# Patient Record
Sex: MALE | Race: WHITE | Employment: OTHER | URBAN - NONMETROPOLITAN AREA
[De-identification: names, ages, dates, MRNs, and addresses within clinical notes are randomized per-mention and may not be internally consistent; named-entity substitution may affect disease eponyms.]

---

## 2017-02-09 ENCOUNTER — OFFICE VISIT (OUTPATIENT)
Dept: FAMILY MEDICINE CLINIC | Age: 62
End: 2017-02-09

## 2017-02-09 ENCOUNTER — TELEPHONE (OUTPATIENT)
Dept: FAMILY MEDICINE CLINIC | Age: 62
End: 2017-02-09

## 2017-02-09 VITALS
WEIGHT: 218 LBS | DIASTOLIC BLOOD PRESSURE: 80 MMHG | HEIGHT: 73 IN | OXYGEN SATURATION: 99 % | HEART RATE: 57 BPM | SYSTOLIC BLOOD PRESSURE: 120 MMHG | BODY MASS INDEX: 28.89 KG/M2

## 2017-02-09 DIAGNOSIS — E11.9 TYPE 2 DIABETES MELLITUS WITHOUT COMPLICATION, WITHOUT LONG-TERM CURRENT USE OF INSULIN (HCC): ICD-10-CM

## 2017-02-09 DIAGNOSIS — Z01.84 IMMUNITY STATUS TESTING: ICD-10-CM

## 2017-02-09 DIAGNOSIS — Z23 NEED FOR ZOSTER VACCINE: ICD-10-CM

## 2017-02-09 DIAGNOSIS — C61 PROSTATE CANCER (HCC): ICD-10-CM

## 2017-02-09 DIAGNOSIS — Z11.59 NEED FOR HEPATITIS C SCREENING TEST: ICD-10-CM

## 2017-02-09 DIAGNOSIS — Z23 NEED FOR SHINGLES VACCINE: ICD-10-CM

## 2017-02-09 DIAGNOSIS — I10 ESSENTIAL HYPERTENSION: Primary | ICD-10-CM

## 2017-02-09 DIAGNOSIS — E78.2 MIXED HYPERLIPIDEMIA: ICD-10-CM

## 2017-02-09 PROCEDURE — 99203 OFFICE O/P NEW LOW 30 MIN: CPT | Performed by: FAMILY MEDICINE

## 2017-02-09 RX ORDER — ALBUTEROL SULFATE 90 UG/1
1 AEROSOL, METERED RESPIRATORY (INHALATION)
COMMUNITY
Start: 2015-01-10 | End: 2017-11-07 | Stop reason: SDUPTHER

## 2017-02-09 RX ORDER — ALLOPURINOL 300 MG/1
300 TABLET ORAL DAILY
COMMUNITY
Start: 2016-05-31 | End: 2017-03-06 | Stop reason: SDUPTHER

## 2017-02-09 RX ORDER — METFORMIN HYDROCHLORIDE 500 MG/1
500 TABLET, FILM COATED, EXTENDED RELEASE ORAL DAILY
COMMUNITY
Start: 2016-10-21 | End: 2017-03-06 | Stop reason: SDUPTHER

## 2017-02-09 RX ORDER — LOSARTAN POTASSIUM 100 MG/1
100 TABLET ORAL DAILY
COMMUNITY
Start: 2016-04-21 | End: 2017-03-06 | Stop reason: SDUPTHER

## 2017-02-09 RX ORDER — AMLODIPINE BESYLATE 5 MG/1
5 TABLET ORAL
COMMUNITY
Start: 2016-04-21 | End: 2017-03-06 | Stop reason: SDUPTHER

## 2017-02-09 RX ORDER — SIMVASTATIN 20 MG
20 TABLET ORAL DAILY
COMMUNITY
Start: 2016-04-21 | End: 2017-03-06 | Stop reason: SDUPTHER

## 2017-02-09 RX ORDER — METOPROLOL SUCCINATE 100 MG/1
50 TABLET, EXTENDED RELEASE ORAL DAILY
COMMUNITY
Start: 2017-01-12 | End: 2017-03-06

## 2017-02-09 RX ORDER — HYDROCHLOROTHIAZIDE 12.5 MG/1
12.5 CAPSULE, GELATIN COATED ORAL DAILY
COMMUNITY
Start: 2016-11-08 | End: 2017-03-06 | Stop reason: SDUPTHER

## 2017-02-09 ASSESSMENT — ENCOUNTER SYMPTOMS
BLURRED VISION: 0
SHORTNESS OF BREATH: 0

## 2017-02-10 DIAGNOSIS — E11.9 TYPE 2 DIABETES MELLITUS WITHOUT COMPLICATION, WITHOUT LONG-TERM CURRENT USE OF INSULIN (HCC): ICD-10-CM

## 2017-02-10 DIAGNOSIS — C61 PROSTATE CANCER (HCC): ICD-10-CM

## 2017-02-10 DIAGNOSIS — E78.2 MIXED HYPERLIPIDEMIA: ICD-10-CM

## 2017-02-10 DIAGNOSIS — Z11.59 NEED FOR HEPATITIS C SCREENING TEST: ICD-10-CM

## 2017-02-10 DIAGNOSIS — I10 ESSENTIAL HYPERTENSION: ICD-10-CM

## 2017-02-10 DIAGNOSIS — Z01.84 IMMUNITY STATUS TESTING: ICD-10-CM

## 2017-02-10 LAB
ALT SERPL-CCNC: 31 U/L (ref 0–41)
ANION GAP SERPL CALCULATED.3IONS-SCNC: 13 MEQ/L (ref 7–13)
AST SERPL-CCNC: 30 U/L (ref 0–40)
BASOPHILS ABSOLUTE: 0 K/UL (ref 0–0.2)
BASOPHILS RELATIVE PERCENT: 0.7 %
BUN BLDV-MCNC: 13 MG/DL (ref 8–23)
CALCIUM SERPL-MCNC: 9.3 MG/DL (ref 8.6–10.2)
CHLORIDE BLD-SCNC: 102 MEQ/L (ref 98–107)
CHOLESTEROL, TOTAL: 164 MG/DL (ref 0–199)
CO2: 26 MEQ/L (ref 22–29)
CREAT SERPL-MCNC: 0.7 MG/DL (ref 0.7–1.2)
CREATININE URINE: 217.9 MG/DL
EOSINOPHILS ABSOLUTE: 0.1 K/UL (ref 0–0.7)
EOSINOPHILS RELATIVE PERCENT: 1.6 %
GFR AFRICAN AMERICAN: >60
GFR NON-AFRICAN AMERICAN: >60
GLUCOSE BLD-MCNC: 107 MG/DL (ref 74–109)
HBA1C MFR BLD: 5.7 % (ref 4.8–5.9)
HCT VFR BLD CALC: 43.5 % (ref 42–52)
HDLC SERPL-MCNC: 92 MG/DL (ref 40–59)
HEMOGLOBIN: 14.4 G/DL (ref 14–18)
HEPATITIS C ANTIBODY INTERPRETATION: NORMAL
LDL CHOLESTEROL CALCULATED: 61 MG/DL (ref 0–129)
LYMPHOCYTES ABSOLUTE: 0.9 K/UL (ref 1–4.8)
LYMPHOCYTES RELATIVE PERCENT: 17.6 %
MCH RBC QN AUTO: 31.9 PG (ref 27–31.3)
MCHC RBC AUTO-ENTMCNC: 33.1 % (ref 33–37)
MCV RBC AUTO: 96.4 FL (ref 80–100)
MICROALBUMIN UR-MCNC: <1.2 MG/DL
MICROALBUMIN/CREAT UR-RTO: NORMAL MG/G (ref 0–30)
MONOCYTES ABSOLUTE: 0.5 K/UL (ref 0.2–0.8)
MONOCYTES RELATIVE PERCENT: 10.1 %
NEUTROPHILS ABSOLUTE: 3.6 K/UL (ref 1.4–6.5)
NEUTROPHILS RELATIVE PERCENT: 70 %
PDW BLD-RTO: 13.1 % (ref 11.5–14.5)
PLATELET # BLD: 191 K/UL (ref 130–400)
POTASSIUM SERPL-SCNC: 4 MEQ/L (ref 3.5–5.1)
PROSTATE SPECIFIC ANTIGEN: 0.01 NG/ML (ref 0–5.4)
RBC # BLD: 4.51 M/UL (ref 4.7–6.1)
SODIUM BLD-SCNC: 141 MEQ/L (ref 132–144)
TRIGL SERPL-MCNC: 55 MG/DL (ref 0–200)
WBC # BLD: 5.2 K/UL (ref 4.8–10.8)

## 2017-02-11 LAB — VZV IGG SER QL IA: 276 IV

## 2017-03-06 ENCOUNTER — OFFICE VISIT (OUTPATIENT)
Dept: FAMILY MEDICINE CLINIC | Age: 62
End: 2017-03-06

## 2017-03-06 VITALS
BODY MASS INDEX: 28.63 KG/M2 | DIASTOLIC BLOOD PRESSURE: 62 MMHG | WEIGHT: 216 LBS | HEIGHT: 73 IN | OXYGEN SATURATION: 96 % | SYSTOLIC BLOOD PRESSURE: 108 MMHG | HEART RATE: 68 BPM

## 2017-03-06 DIAGNOSIS — E78.2 MIXED HYPERLIPIDEMIA: ICD-10-CM

## 2017-03-06 DIAGNOSIS — L82.0 KERATOSIS, INFLAMED SEBORRHEIC: ICD-10-CM

## 2017-03-06 DIAGNOSIS — L57.0 ACTINIC KERATOSES: Primary | ICD-10-CM

## 2017-03-06 DIAGNOSIS — E11.9 TYPE 2 DIABETES MELLITUS WITHOUT COMPLICATION, WITHOUT LONG-TERM CURRENT USE OF INSULIN (HCC): ICD-10-CM

## 2017-03-06 DIAGNOSIS — I10 ESSENTIAL HYPERTENSION: ICD-10-CM

## 2017-03-06 DIAGNOSIS — M1A.0720 CHRONIC GOUT OF LEFT FOOT, UNSPECIFIED CAUSE: ICD-10-CM

## 2017-03-06 PROCEDURE — 99212 OFFICE O/P EST SF 10 MIN: CPT | Performed by: FAMILY MEDICINE

## 2017-03-06 PROCEDURE — 17110 DESTRUCTION B9 LES UP TO 14: CPT | Performed by: FAMILY MEDICINE

## 2017-03-06 RX ORDER — METOPROLOL SUCCINATE 50 MG/1
50 TABLET, EXTENDED RELEASE ORAL DAILY
COMMUNITY
End: 2019-01-22 | Stop reason: SDUPTHER

## 2017-03-06 RX ORDER — AMLODIPINE BESYLATE 5 MG/1
5 TABLET ORAL DAILY
Qty: 30 TABLET | Refills: 11 | Status: SHIPPED | OUTPATIENT
Start: 2017-03-06 | End: 2018-01-15 | Stop reason: SDUPTHER

## 2017-03-06 RX ORDER — METFORMIN HYDROCHLORIDE 500 MG/1
500 TABLET, EXTENDED RELEASE ORAL
COMMUNITY
End: 2017-12-20

## 2017-03-06 RX ORDER — HYDROCHLOROTHIAZIDE 12.5 MG/1
12.5 CAPSULE, GELATIN COATED ORAL DAILY
Qty: 30 CAPSULE | Refills: 11 | Status: SHIPPED | OUTPATIENT
Start: 2017-03-06 | End: 2017-12-14

## 2017-03-06 RX ORDER — ALLOPURINOL 300 MG/1
300 TABLET ORAL DAILY
Qty: 30 TABLET | Refills: 11 | Status: SHIPPED | OUTPATIENT
Start: 2017-03-06 | End: 2018-01-15 | Stop reason: SDUPTHER

## 2017-03-06 RX ORDER — LOSARTAN POTASSIUM 100 MG/1
100 TABLET ORAL DAILY
Qty: 30 TABLET | Refills: 11 | Status: SHIPPED | OUTPATIENT
Start: 2017-03-06 | End: 2018-01-15 | Stop reason: SDUPTHER

## 2017-03-06 RX ORDER — SIMVASTATIN 20 MG
20 TABLET ORAL DAILY
Qty: 30 TABLET | Refills: 11 | Status: SHIPPED | OUTPATIENT
Start: 2017-03-06 | End: 2018-01-15 | Stop reason: SDUPTHER

## 2017-03-06 RX ORDER — METFORMIN HYDROCHLORIDE 500 MG/1
500 TABLET, FILM COATED, EXTENDED RELEASE ORAL DAILY
Qty: 30 TABLET | Refills: 11 | Status: SHIPPED | OUTPATIENT
Start: 2017-03-06 | End: 2017-03-06

## 2017-03-06 ASSESSMENT — ENCOUNTER SYMPTOMS
ABDOMINAL PAIN: 0
SHORTNESS OF BREATH: 0

## 2017-03-16 ENCOUNTER — TELEPHONE (OUTPATIENT)
Dept: FAMILY MEDICINE CLINIC | Age: 62
End: 2017-03-16

## 2017-06-20 ENCOUNTER — OFFICE VISIT (OUTPATIENT)
Dept: FAMILY MEDICINE CLINIC | Age: 62
End: 2017-06-20

## 2017-06-20 VITALS
DIASTOLIC BLOOD PRESSURE: 68 MMHG | OXYGEN SATURATION: 98 % | TEMPERATURE: 97.3 F | SYSTOLIC BLOOD PRESSURE: 112 MMHG | HEIGHT: 73 IN | WEIGHT: 204.8 LBS | HEART RATE: 88 BPM | BODY MASS INDEX: 27.14 KG/M2

## 2017-06-20 DIAGNOSIS — L23.7 ALLERGIC CONTACT DERMATITIS DUE TO PLANTS, EXCEPT FOOD: Primary | ICD-10-CM

## 2017-06-20 DIAGNOSIS — Z23 NEED FOR SHINGLES VACCINE: ICD-10-CM

## 2017-06-20 PROCEDURE — 99213 OFFICE O/P EST LOW 20 MIN: CPT | Performed by: NURSE PRACTITIONER

## 2017-06-20 RX ORDER — TRIAMCINOLONE ACETONIDE 40 MG/ML
40 INJECTION, SUSPENSION INTRA-ARTICULAR; INTRAMUSCULAR ONCE
Status: COMPLETED | OUTPATIENT
Start: 2017-06-20 | End: 2017-06-20

## 2017-06-20 RX ORDER — METHYLPREDNISOLONE 4 MG/1
TABLET ORAL
Qty: 21 TABLET | Refills: 0 | Status: SHIPPED | OUTPATIENT
Start: 2017-06-20 | End: 2017-06-26

## 2017-06-20 RX ADMIN — TRIAMCINOLONE ACETONIDE 40 MG: 40 INJECTION, SUSPENSION INTRA-ARTICULAR; INTRAMUSCULAR at 15:01

## 2017-06-20 ASSESSMENT — ENCOUNTER SYMPTOMS
SHORTNESS OF BREATH: 0
COUGH: 0
RHINORRHEA: 0

## 2017-11-08 RX ORDER — ALBUTEROL SULFATE 90 UG/1
2 AEROSOL, METERED RESPIRATORY (INHALATION) EVERY 6 HOURS PRN
Qty: 1 INHALER | Refills: 11 | Status: SHIPPED | OUTPATIENT
Start: 2017-11-08 | End: 2018-01-15 | Stop reason: SDUPTHER

## 2017-11-29 ENCOUNTER — TELEPHONE (OUTPATIENT)
Dept: FAMILY MEDICINE CLINIC | Age: 62
End: 2017-11-29

## 2017-11-29 DIAGNOSIS — E11.9 TYPE 2 DIABETES MELLITUS WITHOUT COMPLICATION, WITHOUT LONG-TERM CURRENT USE OF INSULIN (HCC): Primary | ICD-10-CM

## 2017-11-29 NOTE — TELEPHONE ENCOUNTER
Pt hasn't had blood work done since February and was wondering if you could create an order? He would like to have everything tested that's involved with blood work. He would like to make an appt to see you, but knows that you're booked, which is why he's requesting an order. Please advise pt.

## 2017-11-30 NOTE — TELEPHONE ENCOUNTER
I saw him in Feb 2017, for his diabetes he was supposed to see me in 4 months (see note) which would have been June. He is 5 months late. He needs to schedule an appointment with me or a provider in near future.

## 2017-12-06 DIAGNOSIS — E11.9 TYPE 2 DIABETES MELLITUS WITHOUT COMPLICATION, WITHOUT LONG-TERM CURRENT USE OF INSULIN (HCC): ICD-10-CM

## 2017-12-06 LAB — HBA1C MFR BLD: 5.7 % (ref 4.8–5.9)

## 2017-12-14 ENCOUNTER — OFFICE VISIT (OUTPATIENT)
Dept: FAMILY MEDICINE CLINIC | Age: 62
End: 2017-12-14

## 2017-12-14 VITALS
SYSTOLIC BLOOD PRESSURE: 120 MMHG | OXYGEN SATURATION: 97 % | WEIGHT: 213 LBS | DIASTOLIC BLOOD PRESSURE: 78 MMHG | BODY MASS INDEX: 28.23 KG/M2 | HEART RATE: 70 BPM | HEIGHT: 73 IN

## 2017-12-14 DIAGNOSIS — M1A.0720 CHRONIC GOUT OF LEFT FOOT, UNSPECIFIED CAUSE: ICD-10-CM

## 2017-12-14 DIAGNOSIS — E78.2 MIXED HYPERLIPIDEMIA: ICD-10-CM

## 2017-12-14 DIAGNOSIS — E11.9 TYPE 2 DIABETES MELLITUS WITHOUT COMPLICATION, WITHOUT LONG-TERM CURRENT USE OF INSULIN (HCC): ICD-10-CM

## 2017-12-14 DIAGNOSIS — Z23 NEED FOR SHINGLES VACCINE: ICD-10-CM

## 2017-12-14 DIAGNOSIS — I83.90 VARICOSE VEIN OF LEG: ICD-10-CM

## 2017-12-14 DIAGNOSIS — I10 ESSENTIAL HYPERTENSION: Primary | ICD-10-CM

## 2017-12-14 DIAGNOSIS — M25.562 CHRONIC PAIN OF BOTH KNEES: ICD-10-CM

## 2017-12-14 DIAGNOSIS — G89.29 CHRONIC PAIN OF BOTH KNEES: ICD-10-CM

## 2017-12-14 DIAGNOSIS — M25.561 CHRONIC PAIN OF BOTH KNEES: ICD-10-CM

## 2017-12-14 PROCEDURE — 3017F COLORECTAL CA SCREEN DOC REV: CPT | Performed by: FAMILY MEDICINE

## 2017-12-14 PROCEDURE — 3044F HG A1C LEVEL LT 7.0%: CPT | Performed by: FAMILY MEDICINE

## 2017-12-14 PROCEDURE — 1036F TOBACCO NON-USER: CPT | Performed by: FAMILY MEDICINE

## 2017-12-14 PROCEDURE — 99214 OFFICE O/P EST MOD 30 MIN: CPT | Performed by: FAMILY MEDICINE

## 2017-12-14 PROCEDURE — G8427 DOCREV CUR MEDS BY ELIG CLIN: HCPCS | Performed by: FAMILY MEDICINE

## 2017-12-14 PROCEDURE — G8417 CALC BMI ABV UP PARAM F/U: HCPCS | Performed by: FAMILY MEDICINE

## 2017-12-14 PROCEDURE — G8484 FLU IMMUNIZE NO ADMIN: HCPCS | Performed by: FAMILY MEDICINE

## 2017-12-14 ASSESSMENT — ENCOUNTER SYMPTOMS
BLURRED VISION: 0
SHORTNESS OF BREATH: 0

## 2017-12-14 ASSESSMENT — PATIENT HEALTH QUESTIONNAIRE - PHQ9
1. LITTLE INTEREST OR PLEASURE IN DOING THINGS: 0
SUM OF ALL RESPONSES TO PHQ QUESTIONS 1-9: 0
2. FEELING DOWN, DEPRESSED OR HOPELESS: 0
SUM OF ALL RESPONSES TO PHQ9 QUESTIONS 1 & 2: 0

## 2017-12-14 NOTE — PROGRESS NOTES
Subjective  Eliud Hanson, 60 y.o. male presents today with:  Chief Complaint   Patient presents with    Check-Up       Hypertension   This is a chronic problem. The current episode started more than 1 year ago. The problem has been gradually improving since onset. The problem is controlled. Pertinent negatives include no blurred vision, chest pain, headaches, palpitations, shortness of breath or sweats. There are no associated agents to hypertension. Risk factors for coronary artery disease include diabetes mellitus, dyslipidemia and male gender. Past treatments include diuretics, angiotensin blockers, beta blockers and calcium channel blockers. The current treatment provides significant improvement. There are no compliance problems. There is no history of kidney disease, CAD/MI, CVA or heart failure. Diabetes   He presents for his follow-up diabetic visit. He has type 2 diabetes mellitus. No MedicAlert identification noted. His disease course has been stable. Pertinent negatives for hypoglycemia include no headaches, sweats or tremors. Pertinent negatives for diabetes include no blurred vision, no chest pain, no polydipsia, no polyphagia and no polyuria. There are no hypoglycemic complications. Pertinent negatives for hypoglycemia complications include no blackouts, no hospitalization and no nocturnal hypoglycemia. Symptoms are stable. Pertinent negatives for diabetic complications include no CVA or heart disease. Risk factors for coronary artery disease include diabetes mellitus, dyslipidemia, hypertension and male sex. Current diabetic treatment includes oral agent (dual therapy). He is compliant with treatment all of the time. His weight is stable. He is following a generally healthy diet. He rarely participates in exercise. An ACE inhibitor/angiotensin II receptor blocker is being taken. Here today for 6 month f/u with me on chronic problems. C/o bilateral knee pain. No injury.  Has been having pain for years. C/o varicose veins, had stripping done in the 80's. Also had scalp lesions but did not have time today to address. Review of Systems   Eyes: Negative for blurred vision. Respiratory: Negative for shortness of breath. Cardiovascular: Negative for chest pain and palpitations. Endocrine: Negative for polydipsia, polyphagia and polyuria. Neurological: Negative for tremors and headaches. Past Medical History:   Diagnosis Date    Actinic keratoses     has had LN2 and used Efudex    Allergic rhinitis     cats, weeds, grasses, ragweed    Asthma     Bilateral knee pain     Diabetes ([address]     about 15 years    Gout     History of colon polyps 02/2016    needs f/u 5 years [facility]    Hyperlipidemia     Hypertension     Keratosis, inflamed seborrheic     Prostate [address] ([facility] [address] 2014    s/p prostatectomy    Varicose vein of leg      Past Surgical History:   Procedure Laterality Date    COLONOSCOPY  02/04/2016    Dr. Aye Centeno; polyps f/u in 5 years    PROSTATECTOMY  11/2014    VARICOSE VEIN SURGERY       Social History     Social History    Marital status: Single     Spouse name: N/A    Number of children: N/A    Years of education: 0     Occupational History    Not on file. Social History Main Topics    Smoking status: Former Smoker     Quit date: 2/9/1982    Smokeless tobacco: Never Used    Alcohol use Yes      Comment: daily 5 vodka drinks per day    Drug use: No    Sexual activity: Yes     Partners: Female     Other Topics Concern    Not on file     Social History Narrative    Engaged. No children.      Family History   Problem Relation Age of Onset    Alzheimer's Disease Mother     Heart Disease Father     Cancer Father     Diabetes Father     Cancer Brother      No Known Allergies  Current Outpatient Prescriptions   Medication Sig Dispense Refill    Multiple Vitamins-Minerals (MULTIVITAMIN MEN 50+ PO) Take by mouth      zoster vaccine live, PF, ([facility]) [id number]

## 2017-12-20 ENCOUNTER — OFFICE VISIT (OUTPATIENT)
Dept: FAMILY MEDICINE CLINIC | Age: 62
End: 2017-12-20

## 2017-12-20 VITALS
HEART RATE: 63 BPM | SYSTOLIC BLOOD PRESSURE: 124 MMHG | WEIGHT: 216.4 LBS | BODY MASS INDEX: 28.68 KG/M2 | OXYGEN SATURATION: 97 % | HEIGHT: 73 IN | DIASTOLIC BLOOD PRESSURE: 82 MMHG

## 2017-12-20 DIAGNOSIS — I10 ESSENTIAL HYPERTENSION: ICD-10-CM

## 2017-12-20 DIAGNOSIS — Z23 NEED FOR TDAP VACCINATION: ICD-10-CM

## 2017-12-20 DIAGNOSIS — L57.0 ACTINIC KERATOSES: Primary | ICD-10-CM

## 2017-12-20 DIAGNOSIS — E11.9 TYPE 2 DIABETES MELLITUS WITHOUT COMPLICATION, WITHOUT LONG-TERM CURRENT USE OF INSULIN (HCC): ICD-10-CM

## 2017-12-20 PROCEDURE — 3044F HG A1C LEVEL LT 7.0%: CPT | Performed by: FAMILY MEDICINE

## 2017-12-20 PROCEDURE — 1036F TOBACCO NON-USER: CPT | Performed by: FAMILY MEDICINE

## 2017-12-20 PROCEDURE — G8484 FLU IMMUNIZE NO ADMIN: HCPCS | Performed by: FAMILY MEDICINE

## 2017-12-20 PROCEDURE — G8427 DOCREV CUR MEDS BY ELIG CLIN: HCPCS | Performed by: FAMILY MEDICINE

## 2017-12-20 PROCEDURE — 90715 TDAP VACCINE 7 YRS/> IM: CPT | Performed by: FAMILY MEDICINE

## 2017-12-20 PROCEDURE — 90471 IMMUNIZATION ADMIN: CPT | Performed by: FAMILY MEDICINE

## 2017-12-20 PROCEDURE — 3017F COLORECTAL CA SCREEN DOC REV: CPT | Performed by: FAMILY MEDICINE

## 2017-12-20 PROCEDURE — G8417 CALC BMI ABV UP PARAM F/U: HCPCS | Performed by: FAMILY MEDICINE

## 2017-12-20 PROCEDURE — 99212 OFFICE O/P EST SF 10 MIN: CPT | Performed by: FAMILY MEDICINE

## 2017-12-20 ASSESSMENT — ENCOUNTER SYMPTOMS
ABDOMINAL PAIN: 0
SHORTNESS OF BREATH: 0

## 2017-12-20 NOTE — PATIENT INSTRUCTIONS
Instructions after treatment with liquid nitrogen. Clean the area(s) once daily with antibacterial soap and water. Keep the area(s) moist with vaseline or polysporin. If the area(s) blister, it is best to leave them alone. However, you may take a sterilzed needle and pop the blister or if the blister pops on its own, apply polysporin and cover with a bandage. Change the bandage daily.

## 2017-12-20 NOTE — PROGRESS NOTES
Subjective  Lasha Rubio, 60 y.o. male presents today with:  Chief Complaint   Patient presents with    2 Week Follow-Up     skin, would like tdap       Hypertension  Pertinent negatives include no chest pain or shortness of breath. Diabetes    Pertinent negatives for diabetes include no chest pain. Here today for f/u on HTN and LN2. He stopped the HCTZ and is doing fine. Had lab test which he is here today to review. He did not get the x-ray of his knee at due to cost.    Review of Systems   Constitutional: Negative for fever. Respiratory: Negative for shortness of breath. Cardiovascular: Negative for chest pain. Gastrointestinal: Negative for abdominal pain. Skin: Negative for rash. Past Medical History:   Diagnosis Date    Actinic keratoses     has had LN2 and used Efudex    Actinic keratoses     Allergic rhinitis     cats, weeds, grasses, ragweed    Asthma     Bilateral knee pain     Diabetes ([address]     about 15 years    Gout     History of colon polyps 02/2016    needs f/u 5 years [facility]    Hyperlipidemia     Hypertension     Keratosis, inflamed seborrheic     Prostate [address] ([facility] [address] 2014    s/p prostatectomy    Varicose vein of leg      Past Surgical History:   Procedure Laterality Date    COLONOSCOPY  02/04/2016    Dr. Lonny Eugene; polyps f/u in 5 years    PROSTATECTOMY  11/2014    VARICOSE VEIN SURGERY       Social History     Social History    Marital status: Single     Spouse name: N/A    Number of children: N/A    Years of education: 0     Occupational History    Not on file. Social History Main Topics    Smoking status: Former Smoker     Quit date: 2/9/1982    Smokeless tobacco: Never Used    Alcohol use Yes      Comment: daily 5 vodka drinks per day    Drug use: No    Sexual activity: Yes     Partners: Female     Other Topics Concern    Not on file     Social History Narrative    Engaged. No children.      Family History   Problem Relation Age of Onset    defined types were placed in this encounter. No orders of the defined types were placed in this encounter. Medications Discontinued During This Encounter   Medication Reason    metFORMIN (GLUCOPHAGE-XR) 500 MG extended release tablet      Return in about 3 months (around 3/20/2018).     Ivon Barbour MD

## 2018-01-15 DIAGNOSIS — M1A.0720 CHRONIC GOUT OF LEFT FOOT, UNSPECIFIED CAUSE: ICD-10-CM

## 2018-01-15 DIAGNOSIS — E78.2 MIXED HYPERLIPIDEMIA: ICD-10-CM

## 2018-01-15 DIAGNOSIS — I10 ESSENTIAL HYPERTENSION: ICD-10-CM

## 2018-01-15 RX ORDER — SIMVASTATIN 20 MG
20 TABLET ORAL DAILY
Qty: 30 TABLET | Refills: 11 | Status: SHIPPED | OUTPATIENT
Start: 2018-01-15 | End: 2019-01-22 | Stop reason: SDUPTHER

## 2018-01-15 RX ORDER — AMLODIPINE BESYLATE 5 MG/1
5 TABLET ORAL DAILY
Qty: 30 TABLET | Refills: 11 | Status: SHIPPED | OUTPATIENT
Start: 2018-01-15 | End: 2018-03-14 | Stop reason: ALTCHOICE

## 2018-01-15 RX ORDER — LOSARTAN POTASSIUM 100 MG/1
100 TABLET ORAL DAILY
Qty: 30 TABLET | Refills: 11 | Status: SHIPPED | OUTPATIENT
Start: 2018-01-15 | End: 2019-01-22 | Stop reason: SDUPTHER

## 2018-01-15 RX ORDER — ALBUTEROL SULFATE 90 UG/1
2 AEROSOL, METERED RESPIRATORY (INHALATION) EVERY 6 HOURS PRN
Qty: 1 INHALER | Refills: 11 | Status: SHIPPED | OUTPATIENT
Start: 2018-01-15 | End: 2018-12-26 | Stop reason: SDUPTHER

## 2018-01-15 RX ORDER — ALLOPURINOL 300 MG/1
300 TABLET ORAL DAILY
Qty: 30 TABLET | Refills: 11 | Status: SHIPPED | OUTPATIENT
Start: 2018-01-15 | End: 2019-01-22 | Stop reason: SDUPTHER

## 2018-01-16 ENCOUNTER — OFFICE VISIT (OUTPATIENT)
Dept: INTERVENTIONAL RADIOLOGY/VASCULAR | Age: 63
End: 2018-01-16

## 2018-01-16 VITALS
HEART RATE: 80 BPM | DIASTOLIC BLOOD PRESSURE: 84 MMHG | BODY MASS INDEX: 28.5 KG/M2 | SYSTOLIC BLOOD PRESSURE: 138 MMHG | WEIGHT: 216 LBS | RESPIRATION RATE: 14 BRPM

## 2018-01-16 DIAGNOSIS — M79.604 LEG PAIN, BILATERAL: Primary | ICD-10-CM

## 2018-01-16 DIAGNOSIS — M79.605 LEG PAIN, BILATERAL: Primary | ICD-10-CM

## 2018-01-16 DIAGNOSIS — I83.90 VARICOSE VEIN OF LEG: Primary | ICD-10-CM

## 2018-01-16 DIAGNOSIS — I87.2 VENOUS INSUFFICIENCY OF LEFT LEG: ICD-10-CM

## 2018-01-16 PROCEDURE — G8484 FLU IMMUNIZE NO ADMIN: HCPCS | Performed by: RADIOLOGY

## 2018-01-16 PROCEDURE — G8427 DOCREV CUR MEDS BY ELIG CLIN: HCPCS | Performed by: RADIOLOGY

## 2018-01-16 PROCEDURE — 99203 OFFICE O/P NEW LOW 30 MIN: CPT | Performed by: RADIOLOGY

## 2018-01-16 PROCEDURE — G8417 CALC BMI ABV UP PARAM F/U: HCPCS | Performed by: RADIOLOGY

## 2018-01-16 PROCEDURE — 3017F COLORECTAL CA SCREEN DOC REV: CPT | Performed by: RADIOLOGY

## 2018-01-16 PROCEDURE — 1036F TOBACCO NON-USER: CPT | Performed by: RADIOLOGY

## 2018-01-16 ASSESSMENT — ENCOUNTER SYMPTOMS
VOMITING: 0
BACK PAIN: 1
HEMOPTYSIS: 0
SHORTNESS OF BREATH: 0
BLURRED VISION: 0
DIARRHEA: 0
DOUBLE VISION: 0
NAUSEA: 0
COUGH: 0
ABDOMINAL PAIN: 0
PHOTOPHOBIA: 0
HEARTBURN: 0
GASTROINTESTINAL NEGATIVE: 1
WHEEZING: 1
SPUTUM PRODUCTION: 0
EYES NEGATIVE: 1
CONSTIPATION: 0

## 2018-01-17 ENCOUNTER — TELEPHONE (OUTPATIENT)
Dept: INTERVENTIONAL RADIOLOGY/VASCULAR | Age: 63
End: 2018-01-17

## 2018-01-17 NOTE — TELEPHONE ENCOUNTER
Pt called because his compression stocking Rx was filled out for open toe. I let him know we will fax a new Rx over to his pharmacy but he said not to because they told him his insurance doesn't cover it and he will look for a affordable pair. I told him it was important to get the correct measurements, he said he knows but he doesn't want to pay [facility] the [facility] at [facility]. I told him if he changes his mind to call us and we will send in the Rx.

## 2018-02-02 ENCOUNTER — APPOINTMENT (OUTPATIENT)
Dept: CT IMAGING | Age: 63
End: 2018-02-02
Payer: COMMERCIAL

## 2018-02-02 ENCOUNTER — TELEPHONE (OUTPATIENT)
Dept: ADMINISTRATIVE | Age: 63
End: 2018-02-02

## 2018-02-02 ENCOUNTER — APPOINTMENT (OUTPATIENT)
Dept: GENERAL RADIOLOGY | Age: 63
End: 2018-02-02
Payer: COMMERCIAL

## 2018-02-02 ENCOUNTER — HOSPITAL ENCOUNTER (EMERGENCY)
Age: 63
Discharge: HOME | End: 2018-02-02
Attending: EMERGENCY MEDICINE
Payer: COMMERCIAL

## 2018-02-02 VITALS
TEMPERATURE: 98.3 F | DIASTOLIC BLOOD PRESSURE: 87 MMHG | BODY MASS INDEX: 28.49 KG/M2 | OXYGEN SATURATION: 96 % | RESPIRATION RATE: 16 BRPM | WEIGHT: 215 LBS | HEART RATE: 74 BPM | HEIGHT: 73 IN | SYSTOLIC BLOOD PRESSURE: 155 MMHG

## 2018-02-02 DIAGNOSIS — E86.0 DEHYDRATION: ICD-10-CM

## 2018-02-02 DIAGNOSIS — R55 SYNCOPE AND COLLAPSE: Primary | ICD-10-CM

## 2018-02-02 LAB
ALBUMIN SERPL-MCNC: 4.3 G/DL (ref 3.9–4.9)
ALP BLD-CCNC: 61 U/L (ref 35–104)
ALT SERPL-CCNC: 42 U/L (ref 0–41)
ANION GAP SERPL CALCULATED.3IONS-SCNC: 13 MEQ/L (ref 7–13)
APTT: 25.1 SEC (ref 21.6–35.4)
AST SERPL-CCNC: 40 U/L (ref 0–40)
BILIRUB SERPL-MCNC: 0.6 MG/DL (ref 0–1.2)
BUN BLDV-MCNC: 10 MG/DL (ref 8–23)
CALCIUM SERPL-MCNC: 9.6 MG/DL (ref 8.6–10.2)
CHLORIDE BLD-SCNC: 103 MEQ/L (ref 98–107)
CO2: 25 MEQ/L (ref 22–29)
CREAT SERPL-MCNC: 0.57 MG/DL (ref 0.7–1.2)
EKG ATRIAL RATE: 83 BPM
EKG P AXIS: 53 DEGREES
EKG P-R INTERVAL: 140 MS
EKG Q-T INTERVAL: 394 MS
EKG QRS DURATION: 88 MS
EKG QTC CALCULATION (BAZETT): 462 MS
EKG R AXIS: -2 DEGREES
EKG T AXIS: 38 DEGREES
EKG VENTRICULAR RATE: 83 BPM
GFR AFRICAN AMERICAN: >60
GFR NON-AFRICAN AMERICAN: >60
GLOBULIN: 2.5 G/DL (ref 2.3–3.5)
GLUCOSE BLD-MCNC: 129 MG/DL (ref 74–109)
HCT VFR BLD CALC: 43.5 % (ref 42–52)
HEMOGLOBIN: 15.5 G/DL (ref 14–18)
INR BLD: 1
MAGNESIUM: 2 MG/DL (ref 1.7–2.3)
MCH RBC QN AUTO: 33.3 PG (ref 27–31.3)
MCHC RBC AUTO-ENTMCNC: 35.6 % (ref 33–37)
MCV RBC AUTO: 93.5 FL (ref 80–100)
PDW BLD-RTO: 12.7 % (ref 11.5–14.5)
PLATELET # BLD: 159 K/UL (ref 130–400)
POTASSIUM SERPL-SCNC: 3.8 MEQ/L (ref 3.5–5.1)
PROTHROMBIN TIME: 10.3 SEC (ref 8.1–13.7)
RBC # BLD: 4.65 M/UL (ref 4.7–6.1)
SODIUM BLD-SCNC: 141 MEQ/L (ref 132–144)
TOTAL PROTEIN: 6.8 G/DL (ref 6.4–8.1)
TROPONIN: <0.01 NG/ML (ref 0–0.01)
WBC # BLD: 5.6 K/UL (ref 4.8–10.8)

## 2018-02-02 PROCEDURE — 85610 PROTHROMBIN TIME: CPT

## 2018-02-02 PROCEDURE — 2580000003 HC RX 258: Performed by: EMERGENCY MEDICINE

## 2018-02-02 PROCEDURE — 70450 CT HEAD/BRAIN W/O DYE: CPT

## 2018-02-02 PROCEDURE — 85027 COMPLETE CBC AUTOMATED: CPT

## 2018-02-02 PROCEDURE — 80053 COMPREHEN METABOLIC PANEL: CPT

## 2018-02-02 PROCEDURE — 85730 THROMBOPLASTIN TIME PARTIAL: CPT

## 2018-02-02 PROCEDURE — 99284 EMERGENCY DEPT VISIT MOD MDM: CPT

## 2018-02-02 PROCEDURE — 93005 ELECTROCARDIOGRAM TRACING: CPT

## 2018-02-02 PROCEDURE — 36415 COLL VENOUS BLD VENIPUNCTURE: CPT

## 2018-02-02 PROCEDURE — 84484 ASSAY OF TROPONIN QUANT: CPT

## 2018-02-02 PROCEDURE — 83735 ASSAY OF MAGNESIUM: CPT

## 2018-02-02 PROCEDURE — 71045 X-RAY EXAM CHEST 1 VIEW: CPT

## 2018-02-02 RX ORDER — 0.9 % SODIUM CHLORIDE 0.9 %
1000 INTRAVENOUS SOLUTION INTRAVENOUS ONCE
Status: COMPLETED | OUTPATIENT
Start: 2018-02-02 | End: 2018-02-02

## 2018-02-02 RX ADMIN — SODIUM CHLORIDE 1000 ML: 9 INJECTION, SOLUTION INTRAVENOUS at 13:59

## 2018-02-02 ASSESSMENT — PAIN DESCRIPTION - ORIENTATION: ORIENTATION: RIGHT;LOWER

## 2018-02-02 ASSESSMENT — ENCOUNTER SYMPTOMS
VOMITING: 0
COUGH: 0
SHORTNESS OF BREATH: 0

## 2018-02-02 ASSESSMENT — PAIN SCALES - GENERAL
PAINLEVEL_OUTOF10: 0
PAINLEVEL_OUTOF10: 2

## 2018-02-02 ASSESSMENT — PAIN DESCRIPTION - LOCATION: LOCATION: SHOULDER;ABDOMEN

## 2018-02-02 ASSESSMENT — PAIN DESCRIPTION - DESCRIPTORS: DESCRIPTORS: DISCOMFORT

## 2018-02-02 NOTE — ED PROVIDER NOTES
keratoses     Allergic rhinitis     cats, weeds, grasses, ragweed    Asthma     Bilateral knee pain     Diabetes ([facility] [address]     about 15 years    Gout     History of colon polyps 02/2016    needs f/u 5 years [facility]    Hyperlipidemia     Hypertension     Keratosis, inflamed seborrheic     Prostate cancer ([facility] [address] 2014    s/p prostatectomy    Varicose vein of leg          SURGICAL HISTORY       Past Surgical History:   Procedure Laterality Date    COLONOSCOPY  02/04/2016    Dr. Charleen Franco; polyps f/u in 5 years    PROSTATECTOMY  11/2014    VARICOSE VEIN SURGERY           CURRENT MEDICATIONS       Previous Medications    ALBUTEROL SULFATE HFA (PROAIR HFA) 108 (90 BASE) MCG/ACT INHALER    Inhale 2 puffs into the lungs every 6 hours as needed for Wheezing    ALLOPURINOL (ZYLOPRIM) 300 MG TABLET    Take 1 tablet by mouth daily    AMLODIPINE (NORVASC) 5 MG TABLET    Take 1 tablet by mouth daily    LOSARTAN (COZAAR) 100 MG TABLET    Take 1 tablet by mouth daily    METOPROLOL SUCCINATE (TOPROL XL) 50 MG EXTENDED RELEASE TABLET    Take 50 mg by mouth daily    MULTIPLE VITAMINS-MINERALS (MULTIVITAMIN MEN 50+ PO)    Take by mouth    SIMVASTATIN (ZOCOR) 20 MG TABLET    Take 1 tablet by mouth daily       ALLERGIES     Review of patient's allergies indicates no known allergies.     FAMILY HISTORY       Family History   Problem Relation Age of Onset    Alzheimer's Disease Mother     Heart Disease Father     Cancer Father     Diabetes Father     Cancer Brother           SOCIAL HISTORY       Social History     Social History    Marital status: Single     Spouse name: N/A    Number of children: N/A    Years of education: 0     Social History Main Topics    Smoking status: Former Smoker     Quit date: 2/9/1982    Smokeless tobacco: Never Used    Alcohol use Yes      Comment: daily 5 vodka drinks per day    Drug use: No    Sexual activity: Yes     Partners: Female     Other Topics Concern    None     Social History

## 2018-02-02 NOTE — ED NOTES
Xray at bedside for portable cxr, Labs labeled and sent to lab, Pt then to CT via cart, Pt and family aware of plan of care     Caity Wang RN  02/02/18 0345

## 2018-02-02 NOTE — TELEPHONE ENCOUNTER
Reba Zarco called, stated pt had fallen and was complaining of numbness in feet, Blood pressure was 166/97. She wants to schedule appointment with Dr. Hao Velazquez. When told that the office had no appointments available I advised her to take pt to Emergency Department.

## 2018-02-05 ENCOUNTER — OFFICE VISIT (OUTPATIENT)
Dept: FAMILY MEDICINE CLINIC | Age: 63
End: 2018-02-05
Payer: COMMERCIAL

## 2018-02-05 VITALS
OXYGEN SATURATION: 97 % | DIASTOLIC BLOOD PRESSURE: 86 MMHG | BODY MASS INDEX: 28.63 KG/M2 | HEART RATE: 69 BPM | WEIGHT: 216 LBS | HEIGHT: 73 IN | SYSTOLIC BLOOD PRESSURE: 142 MMHG

## 2018-02-05 DIAGNOSIS — R90.89 ABNORMAL CT OF BRAIN: ICD-10-CM

## 2018-02-05 DIAGNOSIS — R55 SYNCOPE AND COLLAPSE: Primary | ICD-10-CM

## 2018-02-05 PROCEDURE — G8484 FLU IMMUNIZE NO ADMIN: HCPCS | Performed by: FAMILY MEDICINE

## 2018-02-05 PROCEDURE — 3017F COLORECTAL CA SCREEN DOC REV: CPT | Performed by: FAMILY MEDICINE

## 2018-02-05 PROCEDURE — 1036F TOBACCO NON-USER: CPT | Performed by: FAMILY MEDICINE

## 2018-02-05 PROCEDURE — G8417 CALC BMI ABV UP PARAM F/U: HCPCS | Performed by: FAMILY MEDICINE

## 2018-02-05 PROCEDURE — 93010 ELECTROCARDIOGRAM REPORT: CPT | Performed by: INTERNAL MEDICINE

## 2018-02-05 PROCEDURE — G8427 DOCREV CUR MEDS BY ELIG CLIN: HCPCS | Performed by: FAMILY MEDICINE

## 2018-02-05 PROCEDURE — 99214 OFFICE O/P EST MOD 30 MIN: CPT | Performed by: FAMILY MEDICINE

## 2018-02-05 ASSESSMENT — ENCOUNTER SYMPTOMS
SHORTNESS OF BREATH: 0
VOMITING: 0
ABDOMINAL PAIN: 0

## 2018-02-05 NOTE — PROGRESS NOTES
ear and ear canal normal.   Left Ear: Tympanic membrane, external ear and ear canal normal.   Nose: Nose normal.   Mouth/Throat: Uvula is midline, oropharynx is clear and moist and mucous membranes are normal.   Neck: Normal range of motion. Neck supple. Cardiovascular: Normal rate, regular rhythm and normal heart sounds. No murmur heard. Pulmonary/Chest: Effort normal and breath sounds normal. He has no wheezes. Lymphadenopathy:     He has no cervical adenopathy. Skin: Skin is warm and dry. No rash noted. Assessment & Plan   1. Syncope and collapse  Holter Monitor 24 Hour    ECHO Complete 2D W Doppler W Color    Ambulatory referral to Cardiology    Referral To Neurology - ([facility]) MD Ashley Stokes   2. Abnormal CT of brain  Referral To Neurology - ([facility]) MD Ashley Stokes     Referrals made to cardiology for the syncope and neurology for the remote lacunar infarct. Counseled to cut back/quit drinking alcohol. He will cut down by 1 drink every 2 weeks until he gets to 1-2 drinks per day.      Orders Placed This Encounter   Procedures    Ambulatory referral to Cardiology     Referral Priority:   Routine     Referral Type:   Consult for Advice and Opinion     Referral Reason:   Specialty Services Required     Referred to Provider:   Bruno Benavides MD     Requested Specialty:   Cardiology     Number of Visits Requested:   1    Referral To Neurology - ([facility]) MD Ashley Stokes     Referral Priority:   Routine     Referral Type:   Consult for Advice and Opinion     Referral Reason:   Specialty Services Required     Referred to Provider:   Lara Gongora MD     Requested Specialty:   Neurology     Number of Visits Requested:   1    Holter Monitor 24 Hour     Standing Status:   Future     Standing Expiration Date:   5/5/2018     Order Specific Question:   Reason for Exam?     Answer:   Syncope    ECHO Complete 2D W Doppler W Color     Standing Status:   Future     Standing Expiration Date:

## 2018-02-07 ENCOUNTER — OFFICE VISIT (OUTPATIENT)
Dept: CARDIOLOGY CLINIC | Age: 63
End: 2018-02-07
Payer: COMMERCIAL

## 2018-02-07 VITALS
OXYGEN SATURATION: 98 % | DIASTOLIC BLOOD PRESSURE: 70 MMHG | HEIGHT: 73 IN | BODY MASS INDEX: 28.36 KG/M2 | HEART RATE: 68 BPM | WEIGHT: 214 LBS | SYSTOLIC BLOOD PRESSURE: 136 MMHG

## 2018-02-07 DIAGNOSIS — R55 SYNCOPE AND COLLAPSE: Primary | ICD-10-CM

## 2018-02-07 PROCEDURE — G8427 DOCREV CUR MEDS BY ELIG CLIN: HCPCS | Performed by: INTERNAL MEDICINE

## 2018-02-07 PROCEDURE — 3017F COLORECTAL CA SCREEN DOC REV: CPT | Performed by: INTERNAL MEDICINE

## 2018-02-07 PROCEDURE — 1036F TOBACCO NON-USER: CPT | Performed by: INTERNAL MEDICINE

## 2018-02-07 PROCEDURE — G8417 CALC BMI ABV UP PARAM F/U: HCPCS | Performed by: INTERNAL MEDICINE

## 2018-02-07 PROCEDURE — 99203 OFFICE O/P NEW LOW 30 MIN: CPT | Performed by: INTERNAL MEDICINE

## 2018-02-07 PROCEDURE — G8484 FLU IMMUNIZE NO ADMIN: HCPCS | Performed by: INTERNAL MEDICINE

## 2018-02-21 ENCOUNTER — HOSPITAL ENCOUNTER (OUTPATIENT)
Dept: NON INVASIVE DIAGNOSTICS | Age: 63
Discharge: HOME | End: 2018-02-21
Payer: COMMERCIAL

## 2018-02-21 DIAGNOSIS — R55 SYNCOPE AND COLLAPSE: ICD-10-CM

## 2018-02-21 LAB
LV EF: 50 %
LVEF MODALITY: NORMAL

## 2018-02-21 PROCEDURE — 93225 XTRNL ECG REC<48 HRS REC: CPT

## 2018-02-21 PROCEDURE — 93226 XTRNL ECG REC<48 HR SCAN A/R: CPT

## 2018-02-21 PROCEDURE — 93306 TTE W/DOPPLER COMPLETE: CPT

## 2018-02-22 ASSESSMENT — ENCOUNTER SYMPTOMS
NAUSEA: 0
EYE DISCHARGE: 0
SHORTNESS OF BREATH: 0
CHOKING: 0
COLOR CHANGE: 0
ABDOMINAL PAIN: 0
COUGH: 0
WHEEZING: 0
APNEA: 0
TROUBLE SWALLOWING: 0
STRIDOR: 0
EYE PAIN: 0
ABDOMINAL DISTENTION: 0
CHEST TIGHTNESS: 0

## 2018-02-22 NOTE — PROGRESS NOTES
[facility] CARDIOLOGY OFFICE CONSULT      Patient: Sav Patient  YOB: 1955  MRN: [id number]    Chief Complaint:  Chief Complaint   Patient presents with    Loss of Consciousness       Subjective/HPI    Patient had a syncopal episode. Got up and felt lightheaded, fell to the ground with loss of consciousness. No shaking, no bowel or bladder incontinence. Came back around quickly with no post-ictal state. Did not hurt himself. No chest pain or palpitations. EKG: NSR no ischemia    Past Medical History:   Diagnosis Date    Actinic keratoses     has had LN2 and used Efudex    Allergic rhinitis     cats, weeds, grasses, ragweed    Asthma     Bilateral knee pain     Diabetes ([facility] [address]     about 15 years    Gout     History of colon polyps 02/2016    needs f/u 5 years [facility]    Hyperlipidemia     Hypertension     Keratosis, inflamed seborrheic     Prostate cancer ([facility] [address] 2014    s/p prostatectomy    Syncope and collapse     Varicose vein of leg        Past Surgical History:   Procedure Laterality Date    COLONOSCOPY  02/04/2016    Dr. Scotty Lopez; polyps f/u in 5 years    PROSTATECTOMY  11/2014    [address]         Family History   Problem Relation Age of Onset    Alzheimer's Disease Mother     Heart Disease Father     Cancer Father     Diabetes Father     Cancer Brother        Social History     Social History    Marital status: Single     Spouse name: N/A    Number of children: N/A    Years of education: 0     Social History Main Topics    Smoking status: Former Smoker     Quit date: 2/9/1982    Smokeless tobacco: Never Used    Alcohol use Yes      Comment: daily 5 vodka drinks per day    Drug use: No    Sexual activity: Yes     Partners: Female     Other Topics Concern    Not on file     Social History Narrative    Engaged. No children.        No Known Allergies    Current Outpatient Prescriptions   Medication Sig Dispense Refill    amLODIPine (NORVASC) 5 MG tablet Take 1 tablet by mouth daily 30 tablet 11    losartan (COZAAR) 100 MG tablet Take 1 tablet by mouth daily 30 tablet 11    allopurinol (ZYLOPRIM) 300 MG tablet Take 1 tablet by mouth daily 30 tablet 11    simvastatin (ZOCOR) 20 MG tablet Take 1 tablet by mouth daily 30 tablet 11    albuterol sulfate HFA (PROAIR HFA) 108 (90 Base) MCG/ACT inhaler Inhale 2 puffs into the lungs every 6 hours as needed for Wheezing 1 Inhaler 11    Multiple Vitamins-Minerals (MULTIVITAMIN MEN 50+ PO) Take by mouth      metoprolol succinate (TOPROL XL) 50 MG extended release tablet Take 50 mg by mouth daily       No current facility-administered medications for this visit. Review of Systems:   Review of Systems   Constitutional: Negative. HENT: Negative for congestion and trouble swallowing. Eyes: Negative for pain, discharge and visual disturbance. Respiratory: Negative for apnea, cough, choking, chest tightness, shortness of breath, wheezing and stridor. Cardiovascular: Negative for chest pain, palpitations and leg swelling. Gastrointestinal: Negative for abdominal distention, abdominal pain and nausea. Endocrine: Negative for cold intolerance and heat intolerance. Genitourinary: Negative for difficulty urinating. Musculoskeletal: Negative for arthralgias and joint swelling. Skin: Negative for color change and pallor. Allergic/Immunologic: Negative for immunocompromised state. Neurological: Positive for dizziness, syncope and light-headedness. Negative for seizures, facial asymmetry, speech difficulty, weakness, numbness and headaches. Hematological: Negative for adenopathy. Psychiatric/Behavioral: Negative for agitation, behavioral problems and confusion. Physical Examination:    /70 (Site: Left Arm, Position: Sitting, Cuff Size: Large Adult)   Pulse 68   Ht 6' 1\" (1.854 m)   Wt 214 lb (97.1 kg)   SpO2 98%   BMI 28.23 kg/m²    Physical Exam   Constitutional: He appears healthy.  No

## 2018-02-26 DIAGNOSIS — M1A.0720 CHRONIC GOUT OF LEFT FOOT, UNSPECIFIED CAUSE: ICD-10-CM

## 2018-02-26 DIAGNOSIS — I10 ESSENTIAL HYPERTENSION: ICD-10-CM

## 2018-02-26 DIAGNOSIS — E11.9 TYPE 2 DIABETES MELLITUS WITHOUT COMPLICATION, WITHOUT LONG-TERM CURRENT USE OF INSULIN (HCC): ICD-10-CM

## 2018-02-26 DIAGNOSIS — E78.2 MIXED HYPERLIPIDEMIA: ICD-10-CM

## 2018-02-26 DIAGNOSIS — D72.819 LEUKOPENIA, UNSPECIFIED TYPE: Primary | ICD-10-CM

## 2018-02-26 LAB
ALT SERPL-CCNC: 29 U/L (ref 0–41)
ANION GAP SERPL CALCULATED.3IONS-SCNC: 19 MEQ/L (ref 7–13)
AST SERPL-CCNC: 24 U/L (ref 0–40)
BASOPHILS ABSOLUTE: 0 K/UL (ref 0–0.2)
BASOPHILS RELATIVE PERCENT: 0.9 %
BUN BLDV-MCNC: 13 MG/DL (ref 8–23)
CALCIUM SERPL-MCNC: 9.4 MG/DL (ref 8.6–10.2)
CHLORIDE BLD-SCNC: 99 MEQ/L (ref 98–107)
CHOLESTEROL, TOTAL: 174 MG/DL (ref 0–199)
CO2: 22 MEQ/L (ref 22–29)
CREAT SERPL-MCNC: 0.68 MG/DL (ref 0.7–1.2)
CREATININE URINE: 34.5 MG/DL
EOSINOPHILS ABSOLUTE: 0.1 K/UL (ref 0–0.7)
EOSINOPHILS RELATIVE PERCENT: 3.1 %
GFR AFRICAN AMERICAN: >60
GFR NON-AFRICAN AMERICAN: >60
GLUCOSE BLD-MCNC: 100 MG/DL (ref 74–109)
HBA1C MFR BLD: 5.5 % (ref 4.8–5.9)
HCT VFR BLD CALC: 45.8 % (ref 42–52)
HDLC SERPL-MCNC: 83 MG/DL (ref 40–59)
HEMOGLOBIN: 14.9 G/DL (ref 14–18)
LDL CHOLESTEROL CALCULATED: 80 MG/DL (ref 0–129)
LYMPHOCYTES ABSOLUTE: 1.2 K/UL (ref 1–4.8)
LYMPHOCYTES RELATIVE PERCENT: 29.8 %
MCH RBC QN AUTO: 32.2 PG (ref 27–31.3)
MCHC RBC AUTO-ENTMCNC: 32.6 % (ref 33–37)
MCV RBC AUTO: 98.9 FL (ref 80–100)
MICROALBUMIN UR-MCNC: <1.2 MG/DL
MICROALBUMIN/CREAT UR-RTO: NORMAL MG/G (ref 0–30)
MONOCYTES ABSOLUTE: 0.6 K/UL (ref 0.2–0.8)
MONOCYTES RELATIVE PERCENT: 14.8 %
NEUTROPHILS ABSOLUTE: 2 K/UL (ref 1.4–6.5)
NEUTROPHILS RELATIVE PERCENT: 51.4 %
PDW BLD-RTO: 13 % (ref 11.5–14.5)
PLATELET # BLD: 204 K/UL (ref 130–400)
POTASSIUM SERPL-SCNC: 4.2 MEQ/L (ref 3.5–5.1)
RBC # BLD: 4.63 M/UL (ref 4.7–6.1)
SODIUM BLD-SCNC: 140 MEQ/L (ref 132–144)
TRIGL SERPL-MCNC: 56 MG/DL (ref 0–200)
URIC ACID, SERUM: 4 MG/DL (ref 3.4–7)
WBC # BLD: 3.9 K/UL (ref 4.8–10.8)

## 2018-03-14 ENCOUNTER — OFFICE VISIT (OUTPATIENT)
Dept: CARDIOLOGY CLINIC | Age: 63
End: 2018-03-14
Payer: COMMERCIAL

## 2018-03-14 ENCOUNTER — TELEPHONE (OUTPATIENT)
Dept: FAMILY MEDICINE CLINIC | Age: 63
End: 2018-03-14

## 2018-03-14 VITALS
OXYGEN SATURATION: 96 % | WEIGHT: 212.8 LBS | HEIGHT: 73 IN | HEART RATE: 92 BPM | DIASTOLIC BLOOD PRESSURE: 80 MMHG | BODY MASS INDEX: 28.2 KG/M2 | SYSTOLIC BLOOD PRESSURE: 130 MMHG

## 2018-03-14 DIAGNOSIS — I50.32 DIASTOLIC CHF, CHRONIC (HCC): ICD-10-CM

## 2018-03-14 DIAGNOSIS — I10 ESSENTIAL HYPERTENSION: Primary | ICD-10-CM

## 2018-03-14 DIAGNOSIS — E78.00 PURE HYPERCHOLESTEROLEMIA: ICD-10-CM

## 2018-03-14 DIAGNOSIS — I63.9 CEREBROVASCULAR ACCIDENT (CVA), UNSPECIFIED MECHANISM (HCC): ICD-10-CM

## 2018-03-14 DIAGNOSIS — R55 SYNCOPE, UNSPECIFIED SYNCOPE TYPE: ICD-10-CM

## 2018-03-14 PROCEDURE — 99212 OFFICE O/P EST SF 10 MIN: CPT | Performed by: INTERNAL MEDICINE

## 2018-03-14 PROCEDURE — G8599 NO ASA/ANTIPLAT THER USE RNG: HCPCS | Performed by: INTERNAL MEDICINE

## 2018-03-14 PROCEDURE — G8484 FLU IMMUNIZE NO ADMIN: HCPCS | Performed by: INTERNAL MEDICINE

## 2018-03-14 PROCEDURE — 1036F TOBACCO NON-USER: CPT | Performed by: INTERNAL MEDICINE

## 2018-03-14 PROCEDURE — G8417 CALC BMI ABV UP PARAM F/U: HCPCS | Performed by: INTERNAL MEDICINE

## 2018-03-14 PROCEDURE — G8427 DOCREV CUR MEDS BY ELIG CLIN: HCPCS | Performed by: INTERNAL MEDICINE

## 2018-03-14 PROCEDURE — 3017F COLORECTAL CA SCREEN DOC REV: CPT | Performed by: INTERNAL MEDICINE

## 2018-03-14 RX ORDER — AMLODIPINE BESYLATE 5 MG/1
5 TABLET ORAL DAILY
Qty: 30 TABLET | Refills: 3 | Status: SHIPPED | OUTPATIENT
Start: 2018-03-14 | End: 2019-01-22 | Stop reason: SDUPTHER

## 2018-03-14 NOTE — TELEPHONE ENCOUNTER
Pt calling was seen be cardiology today states that  He was given bad news. He missed work today and is asking for a work excuse. Ok to do?  Dr Rema Veronica told him to ask PCP  Pt can be reached at [phone]

## 2018-03-15 ENCOUNTER — OFFICE VISIT (OUTPATIENT)
Dept: FAMILY MEDICINE CLINIC | Age: 63
End: 2018-03-15
Payer: COMMERCIAL

## 2018-03-15 VITALS
DIASTOLIC BLOOD PRESSURE: 80 MMHG | BODY MASS INDEX: 28.1 KG/M2 | SYSTOLIC BLOOD PRESSURE: 120 MMHG | WEIGHT: 212 LBS | HEART RATE: 79 BPM | HEIGHT: 73 IN | OXYGEN SATURATION: 98 %

## 2018-03-15 DIAGNOSIS — D72.819 LEUKOPENIA, UNSPECIFIED TYPE: ICD-10-CM

## 2018-03-15 DIAGNOSIS — Z13.39 ALCOHOL CONSUMPTION OF ONE TO FOUR DRINKS PER DAY ON ALCOHOL SCREENING: ICD-10-CM

## 2018-03-15 DIAGNOSIS — E11.9 TYPE 2 DIABETES MELLITUS WITHOUT COMPLICATION, WITHOUT LONG-TERM CURRENT USE OF INSULIN (HCC): ICD-10-CM

## 2018-03-15 DIAGNOSIS — L57.0 ACTINIC KERATOSES: ICD-10-CM

## 2018-03-15 DIAGNOSIS — E78.00 PURE HYPERCHOLESTEROLEMIA: ICD-10-CM

## 2018-03-15 DIAGNOSIS — I10 ESSENTIAL HYPERTENSION: Primary | ICD-10-CM

## 2018-03-15 DIAGNOSIS — M25.511 RIGHT SHOULDER PAIN, UNSPECIFIED CHRONICITY: ICD-10-CM

## 2018-03-15 PROCEDURE — 3017F COLORECTAL CA SCREEN DOC REV: CPT | Performed by: FAMILY MEDICINE

## 2018-03-15 PROCEDURE — G8417 CALC BMI ABV UP PARAM F/U: HCPCS | Performed by: FAMILY MEDICINE

## 2018-03-15 PROCEDURE — 1036F TOBACCO NON-USER: CPT | Performed by: FAMILY MEDICINE

## 2018-03-15 PROCEDURE — 99214 OFFICE O/P EST MOD 30 MIN: CPT | Performed by: FAMILY MEDICINE

## 2018-03-15 PROCEDURE — G8484 FLU IMMUNIZE NO ADMIN: HCPCS | Performed by: FAMILY MEDICINE

## 2018-03-15 PROCEDURE — G8427 DOCREV CUR MEDS BY ELIG CLIN: HCPCS | Performed by: FAMILY MEDICINE

## 2018-03-15 PROCEDURE — 17110 DESTRUCTION B9 LES UP TO 14: CPT | Performed by: FAMILY MEDICINE

## 2018-03-15 PROCEDURE — 3044F HG A1C LEVEL LT 7.0%: CPT | Performed by: FAMILY MEDICINE

## 2018-03-15 PROCEDURE — G8599 NO ASA/ANTIPLAT THER USE RNG: HCPCS | Performed by: FAMILY MEDICINE

## 2018-03-15 ASSESSMENT — ENCOUNTER SYMPTOMS
ABDOMINAL PAIN: 0
SHORTNESS OF BREATH: 0

## 2018-03-19 DIAGNOSIS — M25.559 ARTHRALGIA OF HIP, UNSPECIFIED LATERALITY: Primary | ICD-10-CM

## 2018-03-19 DIAGNOSIS — M25.569 KNEE PAIN, UNSPECIFIED CHRONICITY, UNSPECIFIED LATERALITY: ICD-10-CM

## 2018-03-21 ENCOUNTER — HOSPITAL ENCOUNTER (OUTPATIENT)
Dept: ULTRASOUND IMAGING | Age: 63
Discharge: HOME | End: 2018-03-23
Payer: COMMERCIAL

## 2018-03-21 DIAGNOSIS — I63.9 CEREBROVASCULAR ACCIDENT (CVA), UNSPECIFIED MECHANISM (HCC): ICD-10-CM

## 2018-03-21 PROCEDURE — 93880 EXTRACRANIAL BILAT STUDY: CPT

## 2018-03-27 ASSESSMENT — ENCOUNTER SYMPTOMS
EYE DISCHARGE: 0
NAUSEA: 0
ABDOMINAL DISTENTION: 0
ABDOMINAL PAIN: 0
STRIDOR: 0
WHEEZING: 0
TROUBLE SWALLOWING: 0
CHOKING: 0
SHORTNESS OF BREATH: 0
APNEA: 0
CHEST TIGHTNESS: 0
COUGH: 0
COLOR CHANGE: 0
EYE PAIN: 0

## 2018-03-27 NOTE — PROGRESS NOTES
[facility] CARDIOLOGY OFFICE FOLLOW-UP      Patient: Svetlana Mcnulty  YOB: 1955  MRN: [id number]    Chief Complaint:  Chief Complaint   Patient presents with    1 Month Follow-Up     ON SYNCOPE    Results     OF HOLTER MONITOR AND ECHOCARDIOGRAM       Subjective/HPI    Patient seen originally for syncope. Holter was benign. ECHO showed pseudonormal diastolic filling pattern, mild PA hypertension and normal LV. Patient denies symptoms currently. Patient denies chest pain or tightness, jaw or arm pain, shortness of breath, dyspnea on exertion, orthopnea, nocturnal dyspnea, lower extremity edema, weight gain, syncope, palpitations, lightheadedness, dizziness or claudication.         Past Medical History:   Diagnosis Date    Actinic keratoses     has had LN2 and used Efudex    Alcohol consumption of one to four drinks per day on alcohol screening     Allergic rhinitis     cats, weeds, grasses, ragweed    Asthma     Bilateral knee pain     Diabetes ([address]     about 15 years    Gout     History of colon polyps 02/2016    needs f/u 5 years [facility]    Hyperlipidemia     Hypertension     Keratosis, inflamed seborrheic     Osteoarthritis, localized, shoulder, right     Prostate [facility] ([facility] [address] 2014    s/p prostatectomy    Syncope and collapse     Varicose vein of leg        Past Surgical History:   Procedure Laterality Date    COLONOSCOPY  02/04/2016    Dr. Abiola Zapata; polyps f/u in 5 years    PROSTATECTOMY  11/2014    [address]         Family History   Problem Relation Age of Onset    Alzheimer's Disease Mother     Heart Disease Father     Cancer Father     Diabetes Father     Cancer Brother        Social History     Social History    Marital status: Single     Spouse name: N/A    Number of children: N/A    Years of education: 0     Social History Main Topics    Smoking status: Former Smoker     Quit date: 2/9/1982    Smokeless tobacco: Never Used    Alcohol use Yes      Comment:

## 2018-03-29 ENCOUNTER — HOSPITAL ENCOUNTER (OUTPATIENT)
Dept: NEUROLOGY | Age: 63
Discharge: HOME | End: 2018-03-29
Payer: COMMERCIAL

## 2018-03-29 ENCOUNTER — HOSPITAL ENCOUNTER (OUTPATIENT)
Dept: GENERAL RADIOLOGY | Age: 63
Discharge: HOME | End: 2018-03-31
Payer: COMMERCIAL

## 2018-03-29 DIAGNOSIS — M25.561 RIGHT KNEE PAIN, UNSPECIFIED CHRONICITY: ICD-10-CM

## 2018-03-29 DIAGNOSIS — M54.5 LOW BACK PAIN, UNSPECIFIED BACK PAIN LATERALITY, UNSPECIFIED CHRONICITY, WITH SCIATICA PRESENCE UNSPECIFIED: ICD-10-CM

## 2018-03-29 DIAGNOSIS — M25.562 LEFT KNEE PAIN, UNSPECIFIED CHRONICITY: ICD-10-CM

## 2018-03-29 PROCEDURE — 73564 X-RAY EXAM KNEE 4 OR MORE: CPT

## 2018-03-29 PROCEDURE — 95816 EEG AWAKE AND DROWSY: CPT

## 2018-03-29 PROCEDURE — 72100 X-RAY EXAM L-S SPINE 2/3 VWS: CPT

## 2018-04-03 LAB
CHOLESTEROL, TOTAL: 162 MG/DL (ref 0–199)
FOLATE: >20 NG/ML (ref 7.3–26.1)
HBA1C MFR BLD: 5.7 % (ref 4.8–5.9)
HDLC SERPL-MCNC: 73 MG/DL (ref 40–59)
LDL CHOLESTEROL CALCULATED: 78 MG/DL (ref 0–129)
TRIGL SERPL-MCNC: 57 MG/DL (ref 0–200)
TSH SERPL DL<=0.05 MIU/L-ACNC: 0.66 UIU/ML (ref 0.27–4.2)
VITAMIN B-12: 414 PG/ML (ref 232–1245)
VITAMIN D 25-HYDROXY: 43.1 NG/ML (ref 30–100)

## 2018-04-04 ENCOUNTER — HOSPITAL ENCOUNTER (OUTPATIENT)
Dept: MRI IMAGING | Age: 63
Discharge: HOME | End: 2018-04-06
Payer: COMMERCIAL

## 2018-04-04 DIAGNOSIS — G46.4 CEREBELLAR STROKE SYNDROME: ICD-10-CM

## 2018-04-04 PROCEDURE — 70551 MRI BRAIN STEM W/O DYE: CPT

## 2018-04-06 NOTE — PROCEDURES
[facility]                       [address] [facility] [place], [address] [facility]                            ELECTROENCEPHALOGRAM REPORT    PATIENT NAME: Hermila Díaz                      :        1955  MED REC NO:   [id number]                            ROOM:  ACCOUNT NO:   [de-identified]                           ADMIT DATE: 2018  PROVIDER:     Kellen Rubin MD    DATE OF EE2018    REFERRING PHYSICIAN:  Kellen Rubin MD.    REASON FOR THE STUDY:  The patient had changes in mental status. This is a 20-channel EEG. The patient had a background of 9 to 10 Hz alpha  activity, which is well developed and well organized. Artifacts appear due  to the patient's movement and muscle activity and electrode artifacts. Cardiac monitor shows heart rate of 91 beats per minute and is regular. Eye opening suppresses the background well. On hyperventilation, artifacts  appear. No epileptic discharges were seen during or after the  hyperventilation. On photic stimulation, good photic drive is seen. No  epileptic discharges were seen during or after the photic stimulation. During sleep, slowing of the background activity appears. No focal,  paroxysmal, or lateralizing abnormal discharges were seen during the  record. IMPRESSION:  This is normal, awake, drowsy, and sleep EEG. If clinically  indicated, we may repeat the study in two to months to see if any further  changes develop.       Vu Lucas MD    D: 2018 15:03:47       T: 2018 15:45:52     [email or web address]_[place]  Job#: [id number]     Doc#: [id number]    CC:

## 2018-04-11 ENCOUNTER — HOSPITAL ENCOUNTER (OUTPATIENT)
Dept: NEUROLOGY | Age: 63
Discharge: HOME | End: 2018-04-11
Payer: COMMERCIAL

## 2018-04-11 PROCEDURE — 95886 MUSC TEST DONE W/N TEST COMP: CPT

## 2018-04-11 PROCEDURE — 95910 NRV CNDJ TEST 7-8 STUDIES: CPT

## 2018-05-04 ENCOUNTER — HOSPITAL ENCOUNTER (OUTPATIENT)
Dept: MRI IMAGING | Age: 63
Discharge: HOME | End: 2018-05-06
Payer: COMMERCIAL

## 2018-05-04 DIAGNOSIS — G46.4 CEREBELLAR STROKE SYNDROME: ICD-10-CM

## 2018-05-04 PROCEDURE — 70551 MRI BRAIN STEM W/O DYE: CPT

## 2018-05-07 ENCOUNTER — OFFICE VISIT (OUTPATIENT)
Dept: INTERVENTIONAL RADIOLOGY/VASCULAR | Age: 63
End: 2018-05-07
Payer: COMMERCIAL

## 2018-05-07 VITALS
HEART RATE: 65 BPM | WEIGHT: 204.8 LBS | DIASTOLIC BLOOD PRESSURE: 80 MMHG | SYSTOLIC BLOOD PRESSURE: 126 MMHG | BODY MASS INDEX: 27.02 KG/M2 | OXYGEN SATURATION: 99 % | RESPIRATION RATE: 14 BRPM

## 2018-05-07 DIAGNOSIS — I83.812 VARICOSE VEINS OF LEFT LOWER EXTREMITY WITH PAIN: Primary | ICD-10-CM

## 2018-05-07 DIAGNOSIS — I87.2 VENOUS INSUFFICIENCY OF LEFT LEG: ICD-10-CM

## 2018-05-07 DIAGNOSIS — I83.93 VARICOSE VEINS OF BOTH LOWER EXTREMITIES: ICD-10-CM

## 2018-05-07 PROCEDURE — G8417 CALC BMI ABV UP PARAM F/U: HCPCS | Performed by: NURSE PRACTITIONER

## 2018-05-07 PROCEDURE — G8598 ASA/ANTIPLAT THER USED: HCPCS | Performed by: NURSE PRACTITIONER

## 2018-05-07 PROCEDURE — 1036F TOBACCO NON-USER: CPT | Performed by: NURSE PRACTITIONER

## 2018-05-07 PROCEDURE — G8427 DOCREV CUR MEDS BY ELIG CLIN: HCPCS | Performed by: NURSE PRACTITIONER

## 2018-05-07 PROCEDURE — 3017F COLORECTAL CA SCREEN DOC REV: CPT | Performed by: NURSE PRACTITIONER

## 2018-05-07 PROCEDURE — 99214 OFFICE O/P EST MOD 30 MIN: CPT | Performed by: NURSE PRACTITIONER

## 2018-05-07 RX ORDER — ASPIRIN 81 MG/1
81 TABLET, CHEWABLE ORAL DAILY
COMMUNITY
End: 2022-10-25 | Stop reason: ALTCHOICE

## 2018-05-07 RX ORDER — ASCORBIC ACID 500 MG
500 TABLET ORAL DAILY
COMMUNITY

## 2018-05-07 ASSESSMENT — ENCOUNTER SYMPTOMS
COUGH: 0
DOUBLE VISION: 0
BLURRED VISION: 0
BACK PAIN: 1
SINUS PAIN: 0
VOMITING: 0
CONSTIPATION: 0
HEARTBURN: 0
WHEEZING: 0
SHORTNESS OF BREATH: 0
DIARRHEA: 0
SORE THROAT: 0
ABDOMINAL PAIN: 0
NAUSEA: 0
EYE PAIN: 0

## 2018-05-09 DIAGNOSIS — D72.819 LEUKOPENIA, UNSPECIFIED TYPE: ICD-10-CM

## 2018-05-09 LAB — WBC # BLD: 7.4 K/UL (ref 4.8–10.8)

## 2018-05-17 ENCOUNTER — TELEPHONE (OUTPATIENT)
Dept: INTERVENTIONAL RADIOLOGY/VASCULAR | Age: 63
End: 2018-05-17

## 2018-07-03 ENCOUNTER — OFFICE VISIT (OUTPATIENT)
Dept: FAMILY MEDICINE CLINIC | Age: 63
End: 2018-07-03
Payer: COMMERCIAL

## 2018-07-03 VITALS
HEART RATE: 67 BPM | OXYGEN SATURATION: 98 % | WEIGHT: 210.8 LBS | SYSTOLIC BLOOD PRESSURE: 138 MMHG | BODY MASS INDEX: 27.94 KG/M2 | HEIGHT: 73 IN | DIASTOLIC BLOOD PRESSURE: 82 MMHG

## 2018-07-03 DIAGNOSIS — M1A.0720 CHRONIC GOUT OF LEFT FOOT, UNSPECIFIED CAUSE: ICD-10-CM

## 2018-07-03 DIAGNOSIS — L57.0 ACTINIC KERATOSES: ICD-10-CM

## 2018-07-03 DIAGNOSIS — I10 ESSENTIAL HYPERTENSION: Primary | ICD-10-CM

## 2018-07-03 DIAGNOSIS — L29.9 PRURITUS OF SKIN: ICD-10-CM

## 2018-07-03 DIAGNOSIS — E78.2 MIXED HYPERLIPIDEMIA: ICD-10-CM

## 2018-07-03 PROCEDURE — 3017F COLORECTAL CA SCREEN DOC REV: CPT | Performed by: FAMILY MEDICINE

## 2018-07-03 PROCEDURE — G8427 DOCREV CUR MEDS BY ELIG CLIN: HCPCS | Performed by: FAMILY MEDICINE

## 2018-07-03 PROCEDURE — G8417 CALC BMI ABV UP PARAM F/U: HCPCS | Performed by: FAMILY MEDICINE

## 2018-07-03 PROCEDURE — 17003 DESTRUCT PREMALG LES 2-14: CPT | Performed by: FAMILY MEDICINE

## 2018-07-03 PROCEDURE — G8598 ASA/ANTIPLAT THER USED: HCPCS | Performed by: FAMILY MEDICINE

## 2018-07-03 PROCEDURE — 99214 OFFICE O/P EST MOD 30 MIN: CPT | Performed by: FAMILY MEDICINE

## 2018-07-03 PROCEDURE — 17000 DESTRUCT PREMALG LESION: CPT | Performed by: FAMILY MEDICINE

## 2018-07-03 PROCEDURE — 1036F TOBACCO NON-USER: CPT | Performed by: FAMILY MEDICINE

## 2018-07-03 ASSESSMENT — ENCOUNTER SYMPTOMS
SHORTNESS OF BREATH: 0
ABDOMINAL PAIN: 0

## 2018-07-03 NOTE — PROGRESS NOTES
Subjective  Joya Olson, 58 y.o. male presents today with:  Chief Complaint   Patient presents with    3 Month Follow-Up       Hyperlipidemia   This is a chronic problem. The current episode started more than 1 year ago. The problem is controlled. Recent lipid tests were reviewed and are normal. He has no history of hypothyroidism, liver disease or obesity. Factors aggravating his hyperlipidemia include beta blockers. Pertinent negatives include no chest pain, leg pain or shortness of breath. Current antihyperlipidemic treatment includes statins. The current treatment provides significant improvement of lipids. There are no compliance problems. Risk factors for coronary artery disease include dyslipidemia, hypertension and male sex. Here today for 3 month f/u on HTN, hyperlipidemia. He is drinking more water but still drinking about 3 drinks per day. Wondering if he can stop gout med. Also has itch spots on scalp. Review of Systems   Constitutional: Negative for fever. Respiratory: Negative for shortness of breath. Cardiovascular: Negative for chest pain. Gastrointestinal: Negative for abdominal pain. Skin: Negative for rash.        Past Medical History:   Diagnosis Date    Actinic keratoses     has had LN2 and used Efudex    Alcohol consumption of one to four drinks per day on alcohol screening     Allergic rhinitis     cats, weeds, grasses, ragweed    Asthma     Bilateral knee pain     Gout     History of colon polyps 02/2016    needs f/u 5 years [facility]    History of type 2 diabetes mellitus     about 15 years    Hyperlipidemia     Hypertension     Keratosis, inflamed seborrheic     Osteoarthritis, localized, shoulder, right     Pro[facility] ([facility] [address] 2014    s/p prostatectomy    Syncope and collapse     Varicose vein of leg      Past Surgical History:   Procedure Laterality Date    COLONOSCOPY  02/04/2016    Dr. Samantha Padgett; polyps f/u in 5 years    PROSTATECTOMY  11/2014   

## 2018-09-17 ENCOUNTER — OFFICE VISIT (OUTPATIENT)
Dept: FAMILY MEDICINE CLINIC | Age: 63
End: 2018-09-17
Payer: COMMERCIAL

## 2018-09-17 VITALS
OXYGEN SATURATION: 98 % | HEIGHT: 73 IN | DIASTOLIC BLOOD PRESSURE: 82 MMHG | BODY MASS INDEX: 28.76 KG/M2 | HEART RATE: 68 BPM | SYSTOLIC BLOOD PRESSURE: 130 MMHG | WEIGHT: 217 LBS

## 2018-09-17 DIAGNOSIS — R73.03 PREDIABETES: ICD-10-CM

## 2018-09-17 DIAGNOSIS — G89.29 CHRONIC PAIN OF BOTH KNEES: ICD-10-CM

## 2018-09-17 DIAGNOSIS — L57.0 ACTINIC KERATOSES: ICD-10-CM

## 2018-09-17 DIAGNOSIS — M25.562 CHRONIC PAIN OF BOTH KNEES: ICD-10-CM

## 2018-09-17 DIAGNOSIS — M1A.0720 CHRONIC GOUT OF LEFT FOOT, UNSPECIFIED CAUSE: Primary | ICD-10-CM

## 2018-09-17 DIAGNOSIS — L29.9 PRURITUS OF SKIN: ICD-10-CM

## 2018-09-17 DIAGNOSIS — M1A.0720 CHRONIC GOUT OF LEFT FOOT, UNSPECIFIED CAUSE: ICD-10-CM

## 2018-09-17 DIAGNOSIS — M25.561 CHRONIC PAIN OF BOTH KNEES: ICD-10-CM

## 2018-09-17 LAB
HBA1C MFR BLD: 5.7 % (ref 4.8–5.9)
URIC ACID, SERUM: 3.6 MG/DL (ref 3.4–7)

## 2018-09-17 PROCEDURE — G8427 DOCREV CUR MEDS BY ELIG CLIN: HCPCS | Performed by: FAMILY MEDICINE

## 2018-09-17 PROCEDURE — 99213 OFFICE O/P EST LOW 20 MIN: CPT | Performed by: FAMILY MEDICINE

## 2018-09-17 PROCEDURE — G8417 CALC BMI ABV UP PARAM F/U: HCPCS | Performed by: FAMILY MEDICINE

## 2018-09-17 PROCEDURE — 1036F TOBACCO NON-USER: CPT | Performed by: FAMILY MEDICINE

## 2018-09-17 PROCEDURE — 17110 DESTRUCTION B9 LES UP TO 14: CPT | Performed by: FAMILY MEDICINE

## 2018-09-17 PROCEDURE — 3017F COLORECTAL CA SCREEN DOC REV: CPT | Performed by: FAMILY MEDICINE

## 2018-09-17 PROCEDURE — G8598 ASA/ANTIPLAT THER USED: HCPCS | Performed by: FAMILY MEDICINE

## 2018-09-17 RX ORDER — MECLIZINE HYDROCHLORIDE 25 MG/1
25 TABLET ORAL NIGHTLY PRN
COMMUNITY
End: 2019-01-14

## 2018-09-17 ASSESSMENT — PATIENT HEALTH QUESTIONNAIRE - PHQ9
SUM OF ALL RESPONSES TO PHQ QUESTIONS 1-9: 0
1. LITTLE INTEREST OR PLEASURE IN DOING THINGS: 0
SUM OF ALL RESPONSES TO PHQ9 QUESTIONS 1 & 2: 0
2. FEELING DOWN, DEPRESSED OR HOPELESS: 0
SUM OF ALL RESPONSES TO PHQ QUESTIONS 1-9: 0

## 2018-09-17 ASSESSMENT — ENCOUNTER SYMPTOMS
ABDOMINAL PAIN: 0
SHORTNESS OF BREATH: 0

## 2018-09-17 NOTE — PROGRESS NOTES
Subjective  Jonny Echevarria, 58 y.o. male presents today with:  Chief Complaint   Patient presents with    1 Month Follow-Up       HPI    Here today for 2 month f/u on LN2. Also 2 months ago we decided to try his allopurinol every other day but he only did that for a short time and and went back on it once daily. Also c/o bilateral chronic knee pain which is worse on the right. Review of Systems   Constitutional: Negative for fever. Respiratory: Negative for shortness of breath. Cardiovascular: Negative for chest pain. Gastrointestinal: Negative for abdominal pain. Skin: Negative for rash. Past Medical History:   Diagnosis Date    Actinic keratoses     has had LN2 and used Efudex    Alcohol consumption of one to four drinks per day on alcohol screening     Allergic rhinitis     cats, weeds, grasses, ragweed    Asthma     Bilateral knee pain     Gout     History of colon polyps 02/2016    needs f/u 5 years [facility]    History of type 2 diabetes mellitus     about 15 years    Hyperlipidemia     Hypertension     Keratosis, inflamed seborrheic     Osteoarthritis, localized, shoulder, right     Prediabetes     Prostate cancer ([facility] [address] 2014    s/p prostatectomy    Syncope and collapse     Varicose vein of leg      Past Surgical History:   Procedure Laterality Date    COLONOSCOPY  02/04/2016    Dr. Laurie Huizar; polyps f/u in 5 years    PROSTATECTOMY  11/2014    VARICOSE VEIN SURGERY       Social History     Social History    Marital status: Single     Spouse name: N/A    Number of children: N/A    Years of education: 0     Occupational History    Not on file.      Social History Main Topics    Smoking status: Former Smoker     Packs/day: 0.50     Years: 12.00     Types: Cigarettes     Quit date: 2/9/1982    Smokeless tobacco: Never Used    Alcohol use Yes      Comment: daily 3 vodka drinks per day    Drug use: No    Sexual activity: Yes     Partners: Female     Other Topics Concern    unspecified cause  Uric Acid   2. Actinic keratoses  67562 - SC DESTRUC PREMALIGNANT,2-14 LESIONS   3. Pruritus of skin  23414 - [place] DESTRUC PREMALIGNANT,2-14 LESIONS   4. Prediabetes  Hemoglobin A1C   5. Chronic pain of both knees  diclofenac sodium 1 % GEL     He is on allopurinol once a day so we will check a uric acid level. Plan as noted above. Follow up in 3 months. LN2 times 3 seconds to affected areas times 12 lesion(s). Informed consent given was given. Risks including infection, bleeding, scarring discussed. No guarantee how scars will look. Post op instructions given. Best to leave blisters alone if they form. If blister(s) pop or patient pops with a sterilized needed, apply antibiotic ointment and a bandage to affected area(s). Orders Placed This Encounter   Procedures    Hemoglobin A1C     Standing Status:   Future     Standing Expiration Date:   3/17/2019    Uric Acid     Standing Status:   Future     Standing Expiration Date:   3/17/2019    30869 - [place] DESTRUC PREMALIGNANT,2-14 LESIONS     Orders Placed This Encounter   Medications    diclofenac sodium 1 % GEL     Sig: Apply 2 g topically 2 times daily     Dispense:  1 Tube     Refill:  3     There are no discontinued medications. Return in about 3 months (around 12/17/2018).     Jas Cobb MD

## 2018-10-09 ENCOUNTER — TELEPHONE (OUTPATIENT)
Dept: FAMILY MEDICINE CLINIC | Age: 63
End: 2018-10-09

## 2018-12-14 ENCOUNTER — HOSPITAL ENCOUNTER (EMERGENCY)
Age: 63
Discharge: HOME | End: 2018-12-14
Payer: COMMERCIAL

## 2018-12-14 ENCOUNTER — TELEPHONE (OUTPATIENT)
Dept: FAMILY MEDICINE CLINIC | Age: 63
End: 2018-12-14

## 2018-12-14 VITALS
BODY MASS INDEX: 29.16 KG/M2 | HEART RATE: 87 BPM | TEMPERATURE: 98.6 F | HEIGHT: 73 IN | RESPIRATION RATE: 20 BRPM | DIASTOLIC BLOOD PRESSURE: 105 MMHG | WEIGHT: 220 LBS | OXYGEN SATURATION: 96 % | SYSTOLIC BLOOD PRESSURE: 167 MMHG

## 2018-12-14 DIAGNOSIS — G89.29 CHRONIC RIGHT SHOULDER PAIN: Primary | ICD-10-CM

## 2018-12-14 DIAGNOSIS — M43.6 TORTICOLLIS: ICD-10-CM

## 2018-12-14 DIAGNOSIS — M25.511 CHRONIC RIGHT SHOULDER PAIN: Primary | ICD-10-CM

## 2018-12-14 LAB — URIC ACID, SERUM: 3.5 MG/DL (ref 3.4–7)

## 2018-12-14 PROCEDURE — 6360000002 HC RX W HCPCS: Performed by: PHYSICIAN ASSISTANT

## 2018-12-14 PROCEDURE — 36415 COLL VENOUS BLD VENIPUNCTURE: CPT

## 2018-12-14 PROCEDURE — 96372 THER/PROPH/DIAG INJ SC/IM: CPT

## 2018-12-14 PROCEDURE — 84550 ASSAY OF BLOOD/URIC ACID: CPT

## 2018-12-14 PROCEDURE — 99283 EMERGENCY DEPT VISIT LOW MDM: CPT

## 2018-12-14 RX ORDER — ORPHENADRINE CITRATE 30 MG/ML
60 INJECTION INTRAMUSCULAR; INTRAVENOUS ONCE
Status: COMPLETED | OUTPATIENT
Start: 2018-12-14 | End: 2018-12-14

## 2018-12-14 RX ORDER — NAPROXEN 250 MG/1
500 TABLET ORAL 2 TIMES DAILY WITH MEALS
Qty: 20 TABLET | Refills: 0 | Status: SHIPPED | OUTPATIENT
Start: 2018-12-14 | End: 2019-04-03

## 2018-12-14 RX ORDER — CYCLOBENZAPRINE HCL 10 MG
10 TABLET ORAL 3 TIMES DAILY PRN
Qty: 15 TABLET | Refills: 0 | Status: SHIPPED | OUTPATIENT
Start: 2018-12-14 | End: 2018-12-24

## 2018-12-14 RX ORDER — KETOROLAC TROMETHAMINE 30 MG/ML
30 INJECTION, SOLUTION INTRAMUSCULAR; INTRAVENOUS ONCE
Status: COMPLETED | OUTPATIENT
Start: 2018-12-14 | End: 2018-12-14

## 2018-12-14 RX ADMIN — KETOROLAC TROMETHAMINE 30 MG: 30 INJECTION, SOLUTION INTRAMUSCULAR at 13:59

## 2018-12-14 RX ADMIN — ORPHENADRINE CITRATE 60 MG: 60 INJECTION INTRAMUSCULAR; INTRAVENOUS at 14:02

## 2018-12-14 ASSESSMENT — ENCOUNTER SYMPTOMS
RESPIRATORY NEGATIVE: 1
EYES NEGATIVE: 1
GASTROINTESTINAL NEGATIVE: 1

## 2018-12-14 ASSESSMENT — PAIN DESCRIPTION - ORIENTATION: ORIENTATION: RIGHT

## 2018-12-14 ASSESSMENT — PAIN DESCRIPTION - PROGRESSION: CLINICAL_PROGRESSION: NOT CHANGED

## 2018-12-14 ASSESSMENT — PAIN DESCRIPTION - FREQUENCY: FREQUENCY: CONTINUOUS

## 2018-12-14 ASSESSMENT — PAIN SCALES - WONG BAKER: WONGBAKER_NUMERICALRESPONSE: 2

## 2018-12-14 ASSESSMENT — PAIN DESCRIPTION - LOCATION: LOCATION: NECK

## 2018-12-14 ASSESSMENT — PAIN DESCRIPTION - DESCRIPTORS: DESCRIPTORS: CONSTANT;SHOOTING

## 2018-12-14 ASSESSMENT — PAIN SCALES - GENERAL
PAINLEVEL_OUTOF10: 4
PAINLEVEL_OUTOF10: 10

## 2018-12-14 ASSESSMENT — PAIN DESCRIPTION - ONSET: ONSET: PROGRESSIVE

## 2018-12-17 ENCOUNTER — OFFICE VISIT (OUTPATIENT)
Dept: FAMILY MEDICINE CLINIC | Age: 63
End: 2018-12-17
Payer: COMMERCIAL

## 2018-12-17 VITALS
OXYGEN SATURATION: 97 % | HEIGHT: 73 IN | TEMPERATURE: 97.8 F | DIASTOLIC BLOOD PRESSURE: 76 MMHG | BODY MASS INDEX: 29.29 KG/M2 | HEART RATE: 70 BPM | WEIGHT: 221 LBS | SYSTOLIC BLOOD PRESSURE: 116 MMHG

## 2018-12-17 DIAGNOSIS — B02.9 HERPES ZOSTER WITHOUT COMPLICATION: Primary | ICD-10-CM

## 2018-12-17 PROCEDURE — 3017F COLORECTAL CA SCREEN DOC REV: CPT | Performed by: FAMILY MEDICINE

## 2018-12-17 PROCEDURE — G8598 ASA/ANTIPLAT THER USED: HCPCS | Performed by: FAMILY MEDICINE

## 2018-12-17 PROCEDURE — G8417 CALC BMI ABV UP PARAM F/U: HCPCS | Performed by: FAMILY MEDICINE

## 2018-12-17 PROCEDURE — 1036F TOBACCO NON-USER: CPT | Performed by: FAMILY MEDICINE

## 2018-12-17 PROCEDURE — G8484 FLU IMMUNIZE NO ADMIN: HCPCS | Performed by: FAMILY MEDICINE

## 2018-12-17 PROCEDURE — G8427 DOCREV CUR MEDS BY ELIG CLIN: HCPCS | Performed by: FAMILY MEDICINE

## 2018-12-17 PROCEDURE — 99214 OFFICE O/P EST MOD 30 MIN: CPT | Performed by: FAMILY MEDICINE

## 2018-12-17 RX ORDER — VALACYCLOVIR HYDROCHLORIDE 1 G/1
1000 TABLET, FILM COATED ORAL 3 TIMES DAILY
Qty: 21 TABLET | Refills: 0 | Status: SHIPPED | OUTPATIENT
Start: 2018-12-17 | End: 2018-12-24

## 2018-12-17 ASSESSMENT — ENCOUNTER SYMPTOMS
NAUSEA: 0
COLOR CHANGE: 0
EYE DISCHARGE: 0
ABDOMINAL PAIN: 0
CONSTIPATION: 0
COUGH: 0
VOICE CHANGE: 0
ABDOMINAL DISTENTION: 0
SHORTNESS OF BREATH: 0
TROUBLE SWALLOWING: 0
DIARRHEA: 0

## 2018-12-17 NOTE — PROGRESS NOTES
Subjective:      Patient ID: Benson Rivera is a 58 y.o. male who presents for:  Chief Complaint   Patient presents with   [address] from [facility]     hospital f/u 12/14/18. pt states he was in for right shoulder pain that radiated to his neck. pt states he got shots there and his pain is more relief.  Medication Reaction     x sat. pt states he is having an allergic reaction from one of the new meds he was recently prescribed or from the muscle relaxer shot they given at the hospital.   [facility].     [facility].  Health Maintenance     refused flu vaccine and pneumoccal 23. pt would like shingrix script. Rashes been present for the last couple days. He stopped the cast face and after 2 treatments because he felt irritation. He noted some blistering. Increased pain. No itching. No discharge. No fever. Medications really don't seem to be helping for the pain. Pain is in the right neck right shoulder and into the right arm little bit. There is some involvement of the rash under the right arm.         Current Outpatient Prescriptions on File Prior to Visit   Medication Sig Dispense Refill    naproxen (NAPROSYN) 250 MG tablet Take 2 tablets by mouth 2 times daily (with meals) 20 tablet 0    cyclobenzaprine (FLEXERIL) 10 MG tablet Take 1 tablet by mouth 3 times daily as needed for Muscle spasms 15 tablet 0    aspirin 81 MG chewable tablet Take 81 mg by mouth daily      vitamin C (ASCORBIC ACID) 500 MG tablet Take 500 mg by mouth daily      amLODIPine (NORVASC) 5 MG tablet Take 1 tablet by mouth daily 30 tablet 3    losartan (COZAAR) 100 MG tablet Take 1 tablet by mouth daily 30 tablet 11    allopurinol (ZYLOPRIM) 300 MG tablet Take 1 tablet by mouth daily 30 tablet 11    simvastatin (ZOCOR) 20 MG tablet Take 1 tablet by mouth daily 30 tablet 11    albuterol sulfate HFA (PROAIR HFA) 108 (90 Base) MCG/ACT inhaler Inhale 2 puffs into the lungs every 6 hours as needed for Wheezing 1 Inhaler 11    Multiple Vitamins-Minerals (MULTIVITAMIN MEN 50+ PO) Take by mouth      metoprolol succinate (TOPROL XL) 50 MG extended release tablet Take 50 mg by mouth daily      meclizine (ANTIVERT) 25 MG tablet Take 25 mg by mouth nightly as needed       No current facility-administered medications on file prior to visit. Past Medical History:   Diagnosis Date    Actinic keratoses     has had LN2 and used Efudex    Alcohol consumption of one to four drinks per day on alcohol screening     Allergic rhinitis     cats, weeds, grasses, ragweed    Asthma     Bilateral knee pain     Gout     History of colon polyps 02/2016    needs f/u 5 years [facility]    History of type 2 diabetes mellitus     about 15 years    Hyperlipidemia     Hypertension     Keratosis, inflamed seborrheic     Osteoarthritis, localized, shoulder, right     Prediabetes     Prostate cancer ([facility] [address] 2014    s/p prostatectomy    Syncope and collapse     Varicose vein of leg      Past Surgical History:   Procedure Laterality Date    COLONOSCOPY  02/04/2016    Dr. Amador Woods; polyps f/u in 5 years    PROSTATECTOMY  11/2014    VARICOSE VEIN SURGERY       Social History     Social History    Marital status: Single     Spouse name: N/A    Number of children: N/A    Years of education: 0     Occupational History    Not on file. Social History Main Topics    Smoking status: Former Smoker     Packs/day: 0.50     Years: 12.00     Types: Cigarettes     Quit date: 2/9/1982    Smokeless tobacco: Never Used    Alcohol use Yes      Comment: daily 3 vodka drinks per day    Drug use: No    Sexual activity: Yes     Partners: Female     Other Topics Concern    Not on file     Social History Narrative    Engaged. No children.      Family History   Problem Relation Age of Onset    Alzheimer's Disease Mother     Heart Disease Father     Cancer Father     Diabetes Father     Cancer Brother      Allergies:  Patient has no known right side. Psychiatric: He has a normal mood and affect. Judgment and thought content normal.   Vitals reviewed. No results found for this visit on 12/17/18. Assessment:       Diagnosis Orders   1. Herpes zoster without complication  valACYclovir (VALTREX) 1 g tablet         Plan:     Patient Instructions       Patient Education        Shingles: Care Instructions  Your Care Instructions    Shingles (herpes zoster) causes pain and a blistered rash. The rash can appear anywhere on the body but will be on only one side of the body, the left or right. It will be in a band, a strip, or a small area. The pain can be very severe. Shingles can also cause tingling or itching in the area of the rash. The blisters scab over after a few days and heal in 2 to 4 weeks. Medicines can help you feel better and may help prevent more serious problems caused by shingles. Shingles is caused by the same virus that causes chickenpox. When you have chickenpox, the virus gets into your nerve roots and stays there (becomes dormant) long after you get over the chickenpox. If the virus becomes active again, it can cause shingles. Follow-up care is a key part of your treatment and safety. Be sure to make and go to all appointments, and call your doctor if you are having problems. It's also a good idea to know your test results and keep a list of the medicines you take. How can you care for yourself at home? · Be safe with medicines. Take your medicines exactly as prescribed. Call your doctor if you think you are having a problem with your medicine. Antiviral medicine helps you get better faster. · Try not to scratch or pick at the blisters. They will crust over and fall off on their own if you leave them alone. · Put cool, wet cloths on the area to relieve pain and itching. You can also use calamine lotion. Try not to use so much lotion that it cakes and is hard to get off.   · Put cornstarch or baking soda on the sores to help dry them out so they heal faster. · Do not use thick ointment, such as petroleum jelly, on the sores. This will keep them from drying and healing. · To help remove loose crusts, soak them in tap water. This can help decrease oozing, and dry and soothe the skin. · Take an over-the-counter pain medicine, such as acetaminophen (Tylenol), ibuprofen (Advil, Motrin), or naproxen (Aleve). Read and follow all instructions on the label. · Avoid close contact with people until the blisters have healed. It is very important for you to avoid contact with anyone who has never had chickenpox or the chickenpox vaccine. Pregnant women, young babies, and anyone else who has a hard time fighting infection (such as someone with HIV, diabetes, or cancer) is especially at risk. When should you call for help? Call your doctor now or seek immediate medical care if:    · You have a new or higher fever.     · You have a severe headache and a stiff neck.     · You lose the ability to think clearly.     · The rash spreads to your forehead, nose, eyes, or eyelids.     · You have eye pain, or your vision gets worse.     · You have new pain in your face, or you cannot move the muscles in your face.     · Blisters spread to new parts of your body.    Watch closely for changes in your health, and be sure to contact your doctor if:    · The rash has not healed after 2 to 4 weeks.     · You still have pain after the rash has healed. Where can you learn more? Go to https://agreement24 avtal24po.Flamsred. org and sign in to your Break30 account. Sameer Stewart in the [facility] box to learn more about \"Shingles: Care Instructions. \"     If you do not have an account, please click on the \"Sign Up Now\" link. Current as of: November 18, 2017  Content Version: 11.8  © 5812-6526 Healthwise, Incorporated. Care instructions adapted under license by [facility] ([facility]).  If you have questions about a medical condition or this instruction, always ask your healthcare professional. [place] [address] any warranty or liability for your use of this information. No orders of the defined types were placed in this encounter. Orders Placed This Encounter   Medications    valACYclovir (VALTREX) 1 g tablet     Sig: Take 1 tablet by mouth 3 times daily for 7 days For Shingles     Dispense:  21 tablet     Refill:  0       Return if symptoms worsen or fail to improve. Mukesh Phillips M.D.

## 2018-12-17 NOTE — PATIENT INSTRUCTIONS
Patient Education        Shingles: Care Instructions  Your Care Instructions    Shingles (herpes zoster) causes pain and a blistered rash. The rash can appear anywhere on the body but will be on only one side of the body, the left or right. It will be in a band, a strip, or a small area. The pain can be very severe. Shingles can also cause tingling or itching in the area of the rash. The blisters scab over after a few days and heal in 2 to 4 weeks. Medicines can help you feel better and may help prevent more serious problems caused by shingles. Shingles is caused by the same virus that causes chickenpox. When you have chickenpox, the virus gets into your nerve roots and stays there (becomes dormant) long after you get over the chickenpox. If the virus becomes active again, it can cause shingles. Follow-up care is a key part of your treatment and safety. Be sure to make and go to all appointments, and call your doctor if you are having problems. It's also a good idea to know your test results and keep a list of the medicines you take. How can you care for yourself at home? · Be safe with medicines. Take your medicines exactly as prescribed. Call your doctor if you think you are having a problem with your medicine. Antiviral medicine helps you get better faster. · Try not to scratch or pick at the blisters. They will crust over and fall off on their own if you leave them alone. · Put cool, wet cloths on the area to relieve pain and itching. You can also use calamine lotion. Try not to use so much lotion that it cakes and is hard to get off. · Put cornstarch or baking soda on the sores to help dry them out so they heal faster. · Do not use thick ointment, such as petroleum jelly, on the sores. This will keep them from drying and healing. · To help remove loose crusts, soak them in tap water. This can help decrease oozing, and dry and soothe the skin.   · Take an over-the-counter pain medicine, such as acetaminophen (Tylenol), ibuprofen (Advil, Motrin), or naproxen (Aleve). Read and follow all instructions on the label. · Avoid close contact with people until the blisters have healed. It is very important for you to avoid contact with anyone who has never had chickenpox or the chickenpox vaccine. Pregnant women, young babies, and anyone else who has a hard time fighting infection (such as someone with HIV, diabetes, or cancer) is especially at risk. When should you call for help? Call your doctor now or seek immediate medical care if:    · You have a new or higher fever.     · You have a severe headache and a stiff neck.     · You lose the ability to think clearly.     · The rash spreads to your forehead, nose, eyes, or eyelids.     · You have eye pain, or your vision gets worse.     · You have new pain in your face, or you cannot move the muscles in your face.     · Blisters spread to new parts of your body.    Watch closely for changes in your health, and be sure to contact your doctor if:    · The rash has not healed after 2 to 4 weeks.     · You still have pain after the rash has healed. Where can you learn more? Go to https://HyperActive TechnologiespeEosHealtheweb.UM [employer]. org and sign in to your [employer] account. Van Bardales in the [facility] box to learn more about \"Shingles: Care Instructions. \"     If you do not have an account, please click on the \"Sign Up Now\" link. Current as of: November 18, 2017  Content Version: 11.8  © 6938-1470 Healthwise, [employer]. Care instructions adapted under license by [facility] ([facility]). If you have questions about a medical condition or this instruction, always ask your healthcare professional. [address] any warranty or liability for your use of this information.

## 2018-12-26 RX ORDER — ALBUTEROL SULFATE 90 UG/1
2 AEROSOL, METERED RESPIRATORY (INHALATION) EVERY 6 HOURS PRN
Qty: 1 INHALER | Refills: 11 | Status: SHIPPED | OUTPATIENT
Start: 2018-12-26 | End: 2019-10-07 | Stop reason: DRUGHIGH

## 2018-12-31 ENCOUNTER — TELEPHONE (OUTPATIENT)
Dept: FAMILY MEDICINE CLINIC | Age: 63
End: 2018-12-31

## 2018-12-31 DIAGNOSIS — B02.9 HERPES ZOSTER WITHOUT COMPLICATION: ICD-10-CM

## 2018-12-31 RX ORDER — VALACYCLOVIR HYDROCHLORIDE 1 G/1
1000 TABLET, FILM COATED ORAL 3 TIMES DAILY
Qty: 21 TABLET | Refills: 0 | Status: CANCELLED | OUTPATIENT
Start: 2018-12-31 | End: 2019-01-07

## 2019-01-14 ENCOUNTER — OFFICE VISIT (OUTPATIENT)
Dept: FAMILY MEDICINE CLINIC | Age: 64
End: 2019-01-14
Payer: COMMERCIAL

## 2019-01-14 VITALS
DIASTOLIC BLOOD PRESSURE: 70 MMHG | BODY MASS INDEX: 29.69 KG/M2 | HEART RATE: 77 BPM | WEIGHT: 224 LBS | SYSTOLIC BLOOD PRESSURE: 120 MMHG | OXYGEN SATURATION: 97 % | TEMPERATURE: 97 F | HEIGHT: 73 IN

## 2019-01-14 DIAGNOSIS — B36.0 TINEA VERSICOLOR: ICD-10-CM

## 2019-01-14 DIAGNOSIS — B02.29 POST HERPETIC NEURALGIA: Primary | ICD-10-CM

## 2019-01-14 PROCEDURE — G8484 FLU IMMUNIZE NO ADMIN: HCPCS | Performed by: FAMILY MEDICINE

## 2019-01-14 PROCEDURE — G8417 CALC BMI ABV UP PARAM F/U: HCPCS | Performed by: FAMILY MEDICINE

## 2019-01-14 PROCEDURE — 99214 OFFICE O/P EST MOD 30 MIN: CPT | Performed by: FAMILY MEDICINE

## 2019-01-14 PROCEDURE — G8427 DOCREV CUR MEDS BY ELIG CLIN: HCPCS | Performed by: FAMILY MEDICINE

## 2019-01-14 PROCEDURE — 3017F COLORECTAL CA SCREEN DOC REV: CPT | Performed by: FAMILY MEDICINE

## 2019-01-14 PROCEDURE — 1036F TOBACCO NON-USER: CPT | Performed by: FAMILY MEDICINE

## 2019-01-14 RX ORDER — NYSTATIN 100000 U/G
CREAM TOPICAL
Qty: 15 G | Refills: 0 | Status: SHIPPED | OUTPATIENT
Start: 2019-01-14 | End: 2019-10-07 | Stop reason: ALTCHOICE

## 2019-01-14 RX ORDER — GABAPENTIN 300 MG/1
300 CAPSULE ORAL 2 TIMES DAILY
Qty: 60 CAPSULE | Refills: 5 | Status: SHIPPED | OUTPATIENT
Start: 2019-01-14 | End: 2019-03-19

## 2019-01-14 ASSESSMENT — ENCOUNTER SYMPTOMS
BACK PAIN: 1
SHORTNESS OF BREATH: 0
VOICE CHANGE: 0
DIARRHEA: 0
TROUBLE SWALLOWING: 0
EYE DISCHARGE: 0
NAUSEA: 0
ABDOMINAL DISTENTION: 0
COLOR CHANGE: 0
ABDOMINAL PAIN: 0
COUGH: 0
CONSTIPATION: 0

## 2019-01-22 DIAGNOSIS — M1A.0720 CHRONIC GOUT OF LEFT FOOT, UNSPECIFIED CAUSE: ICD-10-CM

## 2019-01-22 DIAGNOSIS — E78.2 MIXED HYPERLIPIDEMIA: ICD-10-CM

## 2019-01-22 DIAGNOSIS — I10 ESSENTIAL HYPERTENSION: ICD-10-CM

## 2019-01-23 ENCOUNTER — TELEPHONE (OUTPATIENT)
Dept: FAMILY MEDICINE CLINIC | Age: 64
End: 2019-01-23

## 2019-01-23 ENCOUNTER — APPOINTMENT (OUTPATIENT)
Dept: CT IMAGING | Age: 64
End: 2019-01-23
Payer: COMMERCIAL

## 2019-01-23 ENCOUNTER — HOSPITAL ENCOUNTER (EMERGENCY)
Age: 64
Discharge: HOME | End: 2019-01-23
Payer: COMMERCIAL

## 2019-01-23 VITALS
TEMPERATURE: 97.4 F | WEIGHT: 225 LBS | DIASTOLIC BLOOD PRESSURE: 92 MMHG | OXYGEN SATURATION: 97 % | RESPIRATION RATE: 16 BRPM | BODY MASS INDEX: 29.82 KG/M2 | SYSTOLIC BLOOD PRESSURE: 159 MMHG | HEART RATE: 80 BPM | HEIGHT: 73 IN

## 2019-01-23 DIAGNOSIS — R10.31 RIGHT LOWER QUADRANT ABDOMINAL PAIN: Primary | ICD-10-CM

## 2019-01-23 DIAGNOSIS — N20.0 KIDNEY STONE: ICD-10-CM

## 2019-01-23 LAB
ALBUMIN SERPL-MCNC: 4.5 G/DL (ref 3.9–4.9)
ALP BLD-CCNC: 71 U/L (ref 35–104)
ALT SERPL-CCNC: 39 U/L (ref 0–41)
ANION GAP SERPL CALCULATED.3IONS-SCNC: 12 MEQ/L (ref 7–13)
AST SERPL-CCNC: 33 U/L (ref 0–40)
BASOPHILS ABSOLUTE: 0 K/UL (ref 0–0.2)
BASOPHILS RELATIVE PERCENT: 0.5 %
BILIRUB SERPL-MCNC: 0.4 MG/DL (ref 0–1.2)
BILIRUBIN URINE: NEGATIVE
BLOOD, URINE: NEGATIVE
BUN BLDV-MCNC: 14 MG/DL (ref 8–23)
CALCIUM SERPL-MCNC: 9.4 MG/DL (ref 8.6–10.2)
CHLORIDE BLD-SCNC: 101 MEQ/L (ref 98–107)
CLARITY: CLEAR
CO2: 27 MEQ/L (ref 22–29)
COLOR: YELLOW
CREAT SERPL-MCNC: 0.74 MG/DL (ref 0.7–1.2)
EOSINOPHILS ABSOLUTE: 0.3 K/UL (ref 0–0.7)
EOSINOPHILS RELATIVE PERCENT: 4.6 %
GFR AFRICAN AMERICAN: >60
GFR NON-AFRICAN AMERICAN: >60
GLOBULIN: 2.8 G/DL (ref 2.3–3.5)
GLUCOSE BLD-MCNC: 109 MG/DL (ref 74–109)
GLUCOSE URINE: NEGATIVE MG/DL
HCT VFR BLD CALC: 45.5 % (ref 42–52)
HEMOGLOBIN: 15.8 G/DL (ref 14–18)
KETONES, URINE: NEGATIVE MG/DL
LACTIC ACID: 1.4 MMOL/L (ref 0.5–2.2)
LEUKOCYTE ESTERASE, URINE: NEGATIVE
LIPASE: 39 U/L (ref 13–60)
LYMPHOCYTES ABSOLUTE: 1.2 K/UL (ref 1–4.8)
LYMPHOCYTES RELATIVE PERCENT: 20 %
MCH RBC QN AUTO: 32.9 PG (ref 27–31.3)
MCHC RBC AUTO-ENTMCNC: 34.7 % (ref 33–37)
MCV RBC AUTO: 95 FL (ref 80–100)
MONOCYTES ABSOLUTE: 0.7 K/UL (ref 0.2–0.8)
MONOCYTES RELATIVE PERCENT: 11.5 %
NEUTROPHILS ABSOLUTE: 3.8 K/UL (ref 1.4–6.5)
NEUTROPHILS RELATIVE PERCENT: 63.4 %
NITRITE, URINE: NEGATIVE
PDW BLD-RTO: 13.4 % (ref 11.5–14.5)
PH UA: 7 (ref 5–9)
PLATELET # BLD: 179 K/UL (ref 130–400)
POTASSIUM SERPL-SCNC: 3.9 MEQ/L (ref 3.5–5.1)
PROTEIN UA: NEGATIVE MG/DL
RBC # BLD: 4.79 M/UL (ref 4.7–6.1)
SODIUM BLD-SCNC: 140 MEQ/L (ref 132–144)
SPECIFIC GRAVITY UA: 1.01 (ref 1–1.03)
TOTAL PROTEIN: 7.3 G/DL (ref 6.4–8.1)
URINE REFLEX TO CULTURE: NORMAL
UROBILINOGEN, URINE: 0.2 E.U./DL
WBC # BLD: 6 K/UL (ref 4.8–10.8)

## 2019-01-23 PROCEDURE — 36415 COLL VENOUS BLD VENIPUNCTURE: CPT

## 2019-01-23 PROCEDURE — 2580000003 HC RX 258: Performed by: PHYSICIAN ASSISTANT

## 2019-01-23 PROCEDURE — 74150 CT ABDOMEN W/O CONTRAST: CPT

## 2019-01-23 PROCEDURE — 83690 ASSAY OF LIPASE: CPT

## 2019-01-23 PROCEDURE — 83605 ASSAY OF LACTIC ACID: CPT

## 2019-01-23 PROCEDURE — 6360000002 HC RX W HCPCS: Performed by: PHYSICIAN ASSISTANT

## 2019-01-23 PROCEDURE — 81003 URINALYSIS AUTO W/O SCOPE: CPT

## 2019-01-23 PROCEDURE — 80053 COMPREHEN METABOLIC PANEL: CPT

## 2019-01-23 PROCEDURE — 85025 COMPLETE CBC W/AUTO DIFF WBC: CPT

## 2019-01-23 PROCEDURE — 96374 THER/PROPH/DIAG INJ IV PUSH: CPT

## 2019-01-23 PROCEDURE — 99284 EMERGENCY DEPT VISIT MOD MDM: CPT

## 2019-01-23 RX ORDER — ETODOLAC 400 MG/1
400 TABLET, FILM COATED ORAL 2 TIMES DAILY
Qty: 14 TABLET | Refills: 0 | Status: SHIPPED | OUTPATIENT
Start: 2019-01-23 | End: 2019-03-19 | Stop reason: ALTCHOICE

## 2019-01-23 RX ORDER — METOPROLOL SUCCINATE 50 MG/1
50 TABLET, EXTENDED RELEASE ORAL DAILY
Qty: 30 TABLET | Refills: 11 | Status: SHIPPED | OUTPATIENT
Start: 2019-01-23 | End: 2020-02-04 | Stop reason: SDUPTHER

## 2019-01-23 RX ORDER — ONDANSETRON 4 MG/1
4 TABLET, ORALLY DISINTEGRATING ORAL EVERY 8 HOURS PRN
Qty: 20 TABLET | Refills: 0 | Status: SHIPPED | OUTPATIENT
Start: 2019-01-23 | End: 2019-03-19 | Stop reason: ALTCHOICE

## 2019-01-23 RX ORDER — LOSARTAN POTASSIUM 100 MG/1
100 TABLET ORAL DAILY
Qty: 30 TABLET | Refills: 11 | Status: SHIPPED | OUTPATIENT
Start: 2019-01-23 | End: 2020-01-21 | Stop reason: RX

## 2019-01-23 RX ORDER — KETOROLAC TROMETHAMINE 30 MG/ML
30 INJECTION, SOLUTION INTRAMUSCULAR; INTRAVENOUS ONCE
Status: COMPLETED | OUTPATIENT
Start: 2019-01-23 | End: 2019-01-23

## 2019-01-23 RX ORDER — AMLODIPINE BESYLATE 5 MG/1
5 TABLET ORAL DAILY
Qty: 30 TABLET | Refills: 11 | Status: SHIPPED | OUTPATIENT
Start: 2019-01-23 | End: 2020-01-27

## 2019-01-23 RX ORDER — ALLOPURINOL 300 MG/1
300 TABLET ORAL DAILY
Qty: 30 TABLET | Refills: 11 | Status: SHIPPED | OUTPATIENT
Start: 2019-01-23 | End: 2019-01-24 | Stop reason: SDUPTHER

## 2019-01-23 RX ORDER — SIMVASTATIN 20 MG
20 TABLET ORAL DAILY
Qty: 30 TABLET | Refills: 11 | Status: SHIPPED | OUTPATIENT
Start: 2019-01-23 | End: 2019-01-24 | Stop reason: SDUPTHER

## 2019-01-23 RX ORDER — 0.9 % SODIUM CHLORIDE 0.9 %
1000 INTRAVENOUS SOLUTION INTRAVENOUS ONCE
Status: COMPLETED | OUTPATIENT
Start: 2019-01-23 | End: 2019-01-23

## 2019-01-23 RX ADMIN — SODIUM CHLORIDE 1000 ML: 9 INJECTION, SOLUTION INTRAVENOUS at 18:06

## 2019-01-23 RX ADMIN — KETOROLAC TROMETHAMINE 30 MG: 30 INJECTION INTRAMUSCULAR; INTRAVENOUS at 18:06

## 2019-01-23 ASSESSMENT — PAIN SCALES - GENERAL
PAINLEVEL_OUTOF10: 1
PAINLEVEL_OUTOF10: 0

## 2019-01-23 ASSESSMENT — ENCOUNTER SYMPTOMS
ALLERGIC/IMMUNOLOGIC NEGATIVE: 1
APNEA: 0
TROUBLE SWALLOWING: 0
EYE PAIN: 0
ABDOMINAL PAIN: 1
SHORTNESS OF BREATH: 0
COLOR CHANGE: 0

## 2019-01-24 DIAGNOSIS — M1A.0720 CHRONIC GOUT OF LEFT FOOT, UNSPECIFIED CAUSE: ICD-10-CM

## 2019-01-24 DIAGNOSIS — E78.2 MIXED HYPERLIPIDEMIA: ICD-10-CM

## 2019-01-25 RX ORDER — SIMVASTATIN 20 MG
20 TABLET ORAL DAILY
Qty: 30 TABLET | Refills: 2 | Status: SHIPPED | OUTPATIENT
Start: 2019-01-25 | End: 2020-02-04 | Stop reason: SDUPTHER

## 2019-01-25 RX ORDER — ALLOPURINOL 300 MG/1
300 TABLET ORAL DAILY
Qty: 30 TABLET | Refills: 2 | Status: SHIPPED | OUTPATIENT
Start: 2019-01-25 | End: 2019-05-06 | Stop reason: SDUPTHER

## 2019-03-19 ENCOUNTER — OFFICE VISIT (OUTPATIENT)
Dept: FAMILY MEDICINE CLINIC | Age: 64
End: 2019-03-19
Payer: MEDICARE

## 2019-03-19 VITALS
TEMPERATURE: 97.6 F | HEART RATE: 65 BPM | DIASTOLIC BLOOD PRESSURE: 78 MMHG | OXYGEN SATURATION: 98 % | HEIGHT: 73 IN | WEIGHT: 227 LBS | BODY MASS INDEX: 30.09 KG/M2 | SYSTOLIC BLOOD PRESSURE: 110 MMHG

## 2019-03-19 DIAGNOSIS — T48.5X5A RHINITIS MEDICAMENTOSA: Primary | ICD-10-CM

## 2019-03-19 DIAGNOSIS — M1A.0720 CHRONIC GOUT OF LEFT FOOT, UNSPECIFIED CAUSE: ICD-10-CM

## 2019-03-19 DIAGNOSIS — K57.30 DIVERTICULOSIS OF LARGE INTESTINE WITHOUT HEMORRHAGE: ICD-10-CM

## 2019-03-19 DIAGNOSIS — J45.41 MODERATE PERSISTENT ASTHMA WITH EXACERBATION: ICD-10-CM

## 2019-03-19 DIAGNOSIS — I10 ESSENTIAL HYPERTENSION: ICD-10-CM

## 2019-03-19 DIAGNOSIS — L23.81 CAT ALLERGY DUE TO BOTH AIRBORNE AND SKIN CONTACT: ICD-10-CM

## 2019-03-19 DIAGNOSIS — J31.0 RHINITIS MEDICAMENTOSA: Primary | ICD-10-CM

## 2019-03-19 DIAGNOSIS — E78.2 MIXED HYPERLIPIDEMIA: ICD-10-CM

## 2019-03-19 DIAGNOSIS — E11.9 TYPE 2 DIABETES MELLITUS WITHOUT COMPLICATION, WITHOUT LONG-TERM CURRENT USE OF INSULIN (HCC): ICD-10-CM

## 2019-03-19 DIAGNOSIS — J30.81 CAT ALLERGY DUE TO BOTH AIRBORNE AND SKIN CONTACT: ICD-10-CM

## 2019-03-19 PROCEDURE — 99215 OFFICE O/P EST HI 40 MIN: CPT | Performed by: FAMILY MEDICINE

## 2019-03-19 PROCEDURE — 1036F TOBACCO NON-USER: CPT | Performed by: FAMILY MEDICINE

## 2019-03-19 PROCEDURE — 2022F DILAT RTA XM EVC RTNOPTHY: CPT | Performed by: FAMILY MEDICINE

## 2019-03-19 PROCEDURE — G8417 CALC BMI ABV UP PARAM F/U: HCPCS | Performed by: FAMILY MEDICINE

## 2019-03-19 PROCEDURE — 3046F HEMOGLOBIN A1C LEVEL >9.0%: CPT | Performed by: FAMILY MEDICINE

## 2019-03-19 PROCEDURE — G8427 DOCREV CUR MEDS BY ELIG CLIN: HCPCS | Performed by: FAMILY MEDICINE

## 2019-03-19 PROCEDURE — G8484 FLU IMMUNIZE NO ADMIN: HCPCS | Performed by: FAMILY MEDICINE

## 2019-03-19 PROCEDURE — 3017F COLORECTAL CA SCREEN DOC REV: CPT | Performed by: FAMILY MEDICINE

## 2019-03-19 RX ORDER — METHYLPREDNISOLONE 4 MG/1
TABLET ORAL
Qty: 1 KIT | Refills: 0 | Status: SHIPPED | OUTPATIENT
Start: 2019-03-19 | End: 2019-04-03 | Stop reason: ALTCHOICE

## 2019-03-19 RX ORDER — LORATADINE 10 MG/1
10 TABLET ORAL DAILY
Qty: 30 TABLET | Refills: 11 | Status: SHIPPED | OUTPATIENT
Start: 2019-03-19 | End: 2021-12-07

## 2019-03-19 RX ORDER — FLUTICASONE PROPIONATE 50 MCG
1 SPRAY, SUSPENSION (ML) NASAL DAILY
Qty: 2 BOTTLE | Refills: 1 | Status: SHIPPED | OUTPATIENT
Start: 2019-03-19 | End: 2020-04-20

## 2019-03-19 ASSESSMENT — ENCOUNTER SYMPTOMS
CONSTIPATION: 0
NAUSEA: 0
TROUBLE SWALLOWING: 0
ABDOMINAL PAIN: 1
DIARRHEA: 0
VOICE CHANGE: 0
ABDOMINAL DISTENTION: 0
COUGH: 0
COLOR CHANGE: 0
SHORTNESS OF BREATH: 0
EYE DISCHARGE: 0

## 2019-03-19 ASSESSMENT — PATIENT HEALTH QUESTIONNAIRE - PHQ9
1. LITTLE INTEREST OR PLEASURE IN DOING THINGS: 0
2. FEELING DOWN, DEPRESSED OR HOPELESS: 0
SUM OF ALL RESPONSES TO PHQ9 QUESTIONS 1 & 2: 0
SUM OF ALL RESPONSES TO PHQ QUESTIONS 1-9: 0
SUM OF ALL RESPONSES TO PHQ QUESTIONS 1-9: 0

## 2019-04-03 ENCOUNTER — OFFICE VISIT (OUTPATIENT)
Dept: FAMILY MEDICINE CLINIC | Age: 64
End: 2019-04-03
Payer: MEDICARE

## 2019-04-03 VITALS
HEIGHT: 73 IN | DIASTOLIC BLOOD PRESSURE: 64 MMHG | HEART RATE: 72 BPM | TEMPERATURE: 97.6 F | SYSTOLIC BLOOD PRESSURE: 110 MMHG | OXYGEN SATURATION: 98 % | BODY MASS INDEX: 30.09 KG/M2 | WEIGHT: 227 LBS

## 2019-04-03 DIAGNOSIS — J31.0 RHINITIS MEDICAMENTOSA: Primary | ICD-10-CM

## 2019-04-03 DIAGNOSIS — L57.0 AK (ACTINIC KERATOSIS): ICD-10-CM

## 2019-04-03 DIAGNOSIS — T48.5X5A RHINITIS MEDICAMENTOSA: Primary | ICD-10-CM

## 2019-04-03 PROCEDURE — 1123F ACP DISCUSS/DSCN MKR DOCD: CPT | Performed by: FAMILY MEDICINE

## 2019-04-03 PROCEDURE — 4040F PNEUMOC VAC/ADMIN/RCVD: CPT | Performed by: FAMILY MEDICINE

## 2019-04-03 PROCEDURE — G8417 CALC BMI ABV UP PARAM F/U: HCPCS | Performed by: FAMILY MEDICINE

## 2019-04-03 PROCEDURE — G8427 DOCREV CUR MEDS BY ELIG CLIN: HCPCS | Performed by: FAMILY MEDICINE

## 2019-04-03 PROCEDURE — 1036F TOBACCO NON-USER: CPT | Performed by: FAMILY MEDICINE

## 2019-04-03 PROCEDURE — 99213 OFFICE O/P EST LOW 20 MIN: CPT | Performed by: FAMILY MEDICINE

## 2019-04-03 PROCEDURE — 3017F COLORECTAL CA SCREEN DOC REV: CPT | Performed by: FAMILY MEDICINE

## 2019-04-03 ASSESSMENT — ENCOUNTER SYMPTOMS
ABDOMINAL PAIN: 0
ABDOMINAL DISTENTION: 0
COUGH: 0
VOICE CHANGE: 0
TROUBLE SWALLOWING: 0
COLOR CHANGE: 0
DIARRHEA: 0
NAUSEA: 0
EYE DISCHARGE: 0
CONSTIPATION: 0
SHORTNESS OF BREATH: 0

## 2019-04-03 NOTE — PATIENT INSTRUCTIONS
Post op instructions given. A printed copy provided. It is best to leave blisters alone if they form. They should be covered with a bandage to prevent blister breakage and dirt exposure. The wounds should remain dry while there is a blister, therefore if this is a sweaty location like the foot you may need to change socks multiple times per day. If blister(s) pop or patient pops with a sterilized needed, apply antibiotic (not triple antibiotic) ointment and a bandage to affected area(s). The ointment should be applied to the open area as long as it is not covered with skin. Exposed tissue is meant to be moist.  Once a scab formed she may stop applying ointment. If this treatment was for a large wart you may note that a plug of skin may fall out of the area that was treated. That is the center of the wart and it is appropriate for it to come out. If exposed skin remains treat that area as you would a ruptured blister as mentioned above.

## 2019-04-14 ENCOUNTER — HOSPITAL ENCOUNTER (EMERGENCY)
Age: 64
Discharge: HOME | End: 2019-04-14
Payer: MEDICARE

## 2019-04-14 ENCOUNTER — APPOINTMENT (OUTPATIENT)
Dept: CT IMAGING | Age: 64
End: 2019-04-14
Payer: MEDICARE

## 2019-04-14 VITALS
SYSTOLIC BLOOD PRESSURE: 167 MMHG | HEIGHT: 73 IN | WEIGHT: 225 LBS | TEMPERATURE: 97.4 F | DIASTOLIC BLOOD PRESSURE: 92 MMHG | RESPIRATION RATE: 20 BRPM | HEART RATE: 96 BPM | OXYGEN SATURATION: 98 % | BODY MASS INDEX: 29.82 KG/M2

## 2019-04-14 DIAGNOSIS — S09.90XA INJURY OF HEAD, INITIAL ENCOUNTER: Primary | ICD-10-CM

## 2019-04-14 PROCEDURE — 70450 CT HEAD/BRAIN W/O DYE: CPT

## 2019-04-14 PROCEDURE — 6370000000 HC RX 637 (ALT 250 FOR IP): Performed by: NURSE PRACTITIONER

## 2019-04-14 PROCEDURE — 99283 EMERGENCY DEPT VISIT LOW MDM: CPT

## 2019-04-14 PROCEDURE — 72125 CT NECK SPINE W/O DYE: CPT

## 2019-04-14 RX ORDER — ACETAMINOPHEN 500 MG
1000 TABLET ORAL ONCE
Status: COMPLETED | OUTPATIENT
Start: 2019-04-14 | End: 2019-04-14

## 2019-04-14 RX ADMIN — ACETAMINOPHEN 1000 MG: 500 TABLET ORAL at 18:06

## 2019-04-14 ASSESSMENT — ENCOUNTER SYMPTOMS
SORE THROAT: 0
BACK PAIN: 0
NAUSEA: 0
SHORTNESS OF BREATH: 0
VOMITING: 0
COUGH: 0
EYE PAIN: 0
ABDOMINAL PAIN: 0
DIARRHEA: 0
RHINORRHEA: 0
PHOTOPHOBIA: 0

## 2019-04-14 ASSESSMENT — PAIN DESCRIPTION - LOCATION: LOCATION: HEAD

## 2019-04-14 ASSESSMENT — PAIN SCALES - GENERAL
PAINLEVEL_OUTOF10: 4
PAINLEVEL_OUTOF10: 3

## 2019-04-14 ASSESSMENT — PAIN DESCRIPTION - DESCRIPTORS: DESCRIPTORS: CONSTANT

## 2019-04-14 NOTE — ED TRIAGE NOTES
Pt to ER with c/o head injury, pt states that on Friday he hit his head really hard on a metal rack and today has been dizzy, shaky has a constant headache pain 3/10 and feels very anxious, pt a&ox4, resp even and unlabored

## 2019-04-14 NOTE — ED PROVIDER NOTES
[address] [facility] ED  eMERGENCY dEPARTMENT eNCOUnter      Pt Name: Rosaura Rosa  MRN: [id number]  [place] 1955  Date of evaluation: 4/14/2019  Provider: CALIXTO Marie 6626       Chief Complaint   Patient presents with    Head Injury     hit head on metal rack on friday and now has headache and is shaky and anxious         HISTORY OF PRESENT ILLNESS   (Location/Symptom, Timing/Onset,Context/Setting, Quality, Duration, Modifying Factors, Severity)  Note limiting factors. Rosaura Rosa is a 71 y.o. male who presents to the emergency department with complaints of head injury. He admits to striking the top of his head on a metal rack 2 days ago. He did not lose consciousness. He did not have any dizziness or lightheadedness. He admits that he awoke today with a headache and became concerned after he struck his head on this metal rack. He also reports that he is feeling slightly dehydrated after consuming excessive amounts of alcohol last night. Currently he has complaints of minimal tenderness to the top of his head and rates the pain at 2 out of 10 only with touch. He denies dizziness lightheadedness fever sweats or chills chest pain shortness of breath palpitations nausea vomiting diarrhea constipation or abdominal pain. Location/Symptom - head injury  Timing/Onset - 2 days ago  Context/Setting - as above  Quality - tenderness  Duration - 2 days  Modifying Factors - tenderness to touch, it resolves when he stops touching    Severity - mild    Nursing Notes were reviewed. REVIEW OF SYSTEMS    (2-9 systems for level 4, 10 or more for level 5)     Review of Systems   Constitutional: Negative for chills, diaphoresis, fatigue and fever. HENT: Negative for congestion, rhinorrhea and sore throat. Eyes: Negative for photophobia and pain. Respiratory: Negative for cough and shortness of breath. Cardiovascular: Negative for chest pain and palpitations. Gastrointestinal: Negative for abdominal pain, diarrhea, nausea and vomiting. Genitourinary: Negative for dysuria and flank pain. Musculoskeletal: Negative for back pain. Skin: Negative for rash. Neurological: Positive for headaches (head injury). Negative for dizziness and light-headedness. Psychiatric/Behavioral: Negative. All other systems reviewed and are negative. Except as noted above the remainder of the review of systems was reviewed and negative.        PAST MEDICAL HISTORY     Past Medical History:   Diagnosis Date    Actinic keratoses     has had LN2 and used Efudex    Alcohol consumption of one to four drinks per day on alcohol screening     Allergic rhinitis     cats, weeds, grasses, ragweed    Asthma     Bilateral knee pain     Gout     History of colon polyps 2016    needs f/u 5 years [facility]    History of type 2 diabetes mellitus     about 15 years    Hyperlipidemia     Hypertension     Keratosis, inflamed seborrheic     Osteoarthritis, localized, shoulder, right     Prediabetes     Prostate cancer ([facility] [address] 2014    s/p prostatectomy    Syncope and collapse     Varicose vein of leg      Past Surgical History:   Procedure Laterality Date    COLONOSCOPY  2016    Dr. Inez Mae; polyps f/u in 5 years    PROSTATECTOMY  2014    VARICOSE VEIN SURGERY       Social History     Socioeconomic History    Marital status: Single     Spouse name: None    Number of children: None    Years of education: 0    Highest education level: None   Occupational History    None   Social Needs    Financial resource strain: None    Food insecurity:     Worry: None     Inability: None    Transportation needs:     Medical: None     Non-medical: None   Tobacco Use    Smoking status: Former Smoker     Packs/day: 0.50     Years: 12.00     Pack years: 6.00     Types: Cigarettes     Last attempt to quit: 1982     Years since quittin.2    Smokeless tobacco: Never Used Substance and Sexual Activity    Alcohol use: Yes     Comment: daily 5 vodka drinks per day    Drug use: No    Sexual activity: Yes     Partners: Female   Lifestyle    Physical activity:     Days per week: None     Minutes per session: None    Stress: None   Relationships    Social connections:     Talks on phone: None     Gets together: None     Attends Rastafari service: None     Active member of club or organization: None     Attends meetings of clubs or organizations: None     Relationship status: None    Intimate partner violence:     Fear of current or ex partner: None     Emotionally abused: None     Physically abused: None     Forced sexual activity: None   Other Topics Concern    None   Social History Narrative    Engaged. No children. SCREENINGS    [place] Coma Scale  Eye Opening: Spontaneous  Best Verbal Response: Oriented  Best Motor Response: Obeys commands  [place] Coma Scale Score: 15 @FLOW([id number])@      PHYSICAL EXAM    (up to 7 for level 4, 8 or more for level 5)     ED Triage Vitals [04/14/19 1619]   BP Temp Temp Source Pulse Resp SpO2 Height Weight   (!) 181/89 97.4 °F (36.3 °C) Oral 107 20 97 % 6' 1\" (1.854 m) 225 lb (102.1 kg)       Physical Exam   Constitutional: He is oriented to person, place, and time. He appears well-developed and well-nourished. He is active. No distress. HENT:   Head: Normocephalic and atraumatic. Mouth/Throat: Mucous membranes are normal.   Eyes: Conjunctivae and lids are normal.   Neck: Normal range of motion. Neck supple. Cardiovascular: Normal rate, regular rhythm, normal heart sounds, intact distal pulses and normal pulses. Pulmonary/Chest: Effort normal and breath sounds normal.   Abdominal: Soft. Normal appearance and bowel sounds are normal. There is no tenderness. Lymphadenopathy:     He has no cervical adenopathy. Neurological: He is alert and oriented to person, place, and time. He has normal strength.    Skin: Skin is warm, dry

## 2019-04-14 NOTE — ED NOTES
No laceration, hematoma, ecchymosis, redness, or swelling noted atop of head.       Larissa Alcala RN  04/14/19 6215

## 2019-04-14 NOTE — ED NOTES
Patient provided ice water per Jackson Hernandez NP's verbal order.       Carlos Cruz RN  04/14/19 7883

## 2019-10-07 ENCOUNTER — OFFICE VISIT (OUTPATIENT)
Dept: FAMILY MEDICINE CLINIC | Age: 64
End: 2019-10-07
Payer: MEDICARE

## 2019-10-07 VITALS
HEART RATE: 79 BPM | BODY MASS INDEX: 29.82 KG/M2 | DIASTOLIC BLOOD PRESSURE: 80 MMHG | WEIGHT: 225 LBS | OXYGEN SATURATION: 98 % | TEMPERATURE: 98.4 F | SYSTOLIC BLOOD PRESSURE: 118 MMHG | HEIGHT: 73 IN

## 2019-10-07 DIAGNOSIS — L57.0 AK (ACTINIC KERATOSIS): ICD-10-CM

## 2019-10-07 DIAGNOSIS — Z23 NEED FOR PNEUMOCOCCAL VACCINE: ICD-10-CM

## 2019-10-07 DIAGNOSIS — J45.41 MODERATE PERSISTENT ASTHMA WITH ACUTE EXACERBATION: ICD-10-CM

## 2019-10-07 DIAGNOSIS — C61 PROSTATE CA (HCC): ICD-10-CM

## 2019-10-07 DIAGNOSIS — E11.9 TYPE 2 DIABETES MELLITUS WITHOUT COMPLICATION, WITHOUT LONG-TERM CURRENT USE OF INSULIN (HCC): ICD-10-CM

## 2019-10-07 DIAGNOSIS — I10 ESSENTIAL HYPERTENSION: Primary | ICD-10-CM

## 2019-10-07 DIAGNOSIS — J30.2 SEASONAL ALLERGIC RHINITIS, UNSPECIFIED TRIGGER: ICD-10-CM

## 2019-10-07 PROCEDURE — G8427 DOCREV CUR MEDS BY ELIG CLIN: HCPCS | Performed by: FAMILY MEDICINE

## 2019-10-07 PROCEDURE — 90670 PCV13 VACCINE IM: CPT | Performed by: FAMILY MEDICINE

## 2019-10-07 PROCEDURE — 1123F ACP DISCUSS/DSCN MKR DOCD: CPT | Performed by: FAMILY MEDICINE

## 2019-10-07 PROCEDURE — G8417 CALC BMI ABV UP PARAM F/U: HCPCS | Performed by: FAMILY MEDICINE

## 2019-10-07 PROCEDURE — 2022F DILAT RTA XM EVC RTNOPTHY: CPT | Performed by: FAMILY MEDICINE

## 2019-10-07 PROCEDURE — G8510 SCR DEP NEG, NO PLAN REQD: HCPCS | Performed by: FAMILY MEDICINE

## 2019-10-07 PROCEDURE — 99214 OFFICE O/P EST MOD 30 MIN: CPT | Performed by: FAMILY MEDICINE

## 2019-10-07 PROCEDURE — 17003 DESTRUCT PREMALG LES 2-14: CPT | Performed by: FAMILY MEDICINE

## 2019-10-07 PROCEDURE — 1036F TOBACCO NON-USER: CPT | Performed by: FAMILY MEDICINE

## 2019-10-07 PROCEDURE — G0009 ADMIN PNEUMOCOCCAL VACCINE: HCPCS | Performed by: FAMILY MEDICINE

## 2019-10-07 PROCEDURE — 17000 DESTRUCT PREMALG LESION: CPT | Performed by: FAMILY MEDICINE

## 2019-10-07 PROCEDURE — 3017F COLORECTAL CA SCREEN DOC REV: CPT | Performed by: FAMILY MEDICINE

## 2019-10-07 PROCEDURE — 3046F HEMOGLOBIN A1C LEVEL >9.0%: CPT | Performed by: FAMILY MEDICINE

## 2019-10-07 PROCEDURE — 3288F FALL RISK ASSESSMENT DOCD: CPT | Performed by: FAMILY MEDICINE

## 2019-10-07 PROCEDURE — 4040F PNEUMOC VAC/ADMIN/RCVD: CPT | Performed by: FAMILY MEDICINE

## 2019-10-07 PROCEDURE — G8484 FLU IMMUNIZE NO ADMIN: HCPCS | Performed by: FAMILY MEDICINE

## 2019-10-07 RX ORDER — ALBUTEROL SULFATE 90 UG/1
2 AEROSOL, METERED RESPIRATORY (INHALATION) EVERY 6 HOURS PRN
Qty: 1 INHALER | Refills: 11 | Status: SHIPPED | OUTPATIENT
Start: 2019-10-07 | End: 2020-02-11

## 2019-10-07 RX ORDER — FLUTICASONE PROPIONATE 110 UG/1
2 AEROSOL, METERED RESPIRATORY (INHALATION) 2 TIMES DAILY
Qty: 1 INHALER | Refills: 3 | Status: SHIPPED | OUTPATIENT
Start: 2019-10-07 | End: 2021-01-19 | Stop reason: SDUPTHER

## 2019-10-07 ASSESSMENT — ENCOUNTER SYMPTOMS
COLOR CHANGE: 0
NAUSEA: 0
DIARRHEA: 0
SHORTNESS OF BREATH: 0
CONSTIPATION: 0
COUGH: 0
EYE DISCHARGE: 0
VOICE CHANGE: 0
ABDOMINAL DISTENTION: 0
TROUBLE SWALLOWING: 0
ABDOMINAL PAIN: 0

## 2019-10-07 ASSESSMENT — PATIENT HEALTH QUESTIONNAIRE - PHQ9
2. FEELING DOWN, DEPRESSED OR HOPELESS: 0
1. LITTLE INTEREST OR PLEASURE IN DOING THINGS: 0
SUM OF ALL RESPONSES TO PHQ QUESTIONS 1-9: 0
SUM OF ALL RESPONSES TO PHQ QUESTIONS 1-9: 0
SUM OF ALL RESPONSES TO PHQ9 QUESTIONS 1 & 2: 0

## 2019-10-08 ENCOUNTER — TELEPHONE (OUTPATIENT)
Dept: FAMILY MEDICINE CLINIC | Age: 64
End: 2019-10-08

## 2019-10-08 DIAGNOSIS — E11.9 TYPE 2 DIABETES MELLITUS WITHOUT COMPLICATION, WITHOUT LONG-TERM CURRENT USE OF INSULIN (HCC): ICD-10-CM

## 2019-10-08 DIAGNOSIS — C61 PROSTATE CA (HCC): ICD-10-CM

## 2019-10-08 DIAGNOSIS — I10 ESSENTIAL HYPERTENSION: ICD-10-CM

## 2019-10-08 LAB
ALBUMIN SERPL-MCNC: 4.6 G/DL (ref 3.5–4.6)
ALP BLD-CCNC: 82 U/L (ref 35–104)
ALT SERPL-CCNC: 86 U/L (ref 0–41)
ANION GAP SERPL CALCULATED.3IONS-SCNC: 14 MEQ/L (ref 9–15)
AST SERPL-CCNC: 83 U/L (ref 0–40)
BASOPHILS ABSOLUTE: 0 K/UL (ref 0–0.2)
BASOPHILS RELATIVE PERCENT: 0.6 %
BILIRUB SERPL-MCNC: 1.4 MG/DL (ref 0.2–0.7)
BUN BLDV-MCNC: 8 MG/DL (ref 8–23)
CALCIUM SERPL-MCNC: 9.8 MG/DL (ref 8.5–9.9)
CHLORIDE BLD-SCNC: 97 MEQ/L (ref 95–107)
CHOLESTEROL, FASTING: 162 MG/DL (ref 0–199)
CO2: 24 MEQ/L (ref 20–31)
CREAT SERPL-MCNC: 0.78 MG/DL (ref 0.7–1.2)
CREATININE URINE: 212.4 MG/DL
EOSINOPHILS ABSOLUTE: 0.1 K/UL (ref 0–0.7)
EOSINOPHILS RELATIVE PERCENT: 1.5 %
GFR AFRICAN AMERICAN: >60
GFR NON-AFRICAN AMERICAN: >60
GLOBULIN: 2.8 G/DL (ref 2.3–3.5)
GLUCOSE BLD-MCNC: 133 MG/DL (ref 70–99)
HBA1C MFR BLD: 6.2 % (ref 4.8–5.9)
HCT VFR BLD CALC: 45.5 % (ref 42–52)
HDLC SERPL-MCNC: 75 MG/DL (ref 40–59)
HEMOGLOBIN: 15.2 G/DL (ref 14–18)
LDL CHOLESTEROL CALCULATED: 71 MG/DL (ref 0–129)
LYMPHOCYTES ABSOLUTE: 1.1 K/UL (ref 1–4.8)
LYMPHOCYTES RELATIVE PERCENT: 17.9 %
MCH RBC QN AUTO: 33.2 PG (ref 27–31.3)
MCHC RBC AUTO-ENTMCNC: 33.5 % (ref 33–37)
MCV RBC AUTO: 99.2 FL (ref 80–100)
MICROALBUMIN UR-MCNC: <1.2 MG/DL
MICROALBUMIN/CREAT UR-RTO: NORMAL MG/G (ref 0–30)
MONOCYTES ABSOLUTE: 0.9 K/UL (ref 0.2–0.8)
MONOCYTES RELATIVE PERCENT: 15.2 %
NEUTROPHILS ABSOLUTE: 3.8 K/UL (ref 1.4–6.5)
NEUTROPHILS RELATIVE PERCENT: 64.8 %
PDW BLD-RTO: 12.9 % (ref 11.5–14.5)
PLATELET # BLD: 185 K/UL (ref 130–400)
POTASSIUM SERPL-SCNC: 3.7 MEQ/L (ref 3.4–4.9)
PROSTATE SPECIFIC ANTIGEN: <0.01 NG/ML (ref 0–5.4)
RBC # BLD: 4.59 M/UL (ref 4.7–6.1)
SODIUM BLD-SCNC: 135 MEQ/L (ref 135–144)
TOTAL PROTEIN: 7.4 G/DL (ref 6.3–8)
TRIGLYCERIDE, FASTING: 78 MG/DL (ref 0–150)
TSH REFLEX: 1.33 UIU/ML (ref 0.44–3.86)
WBC # BLD: 5.9 K/UL (ref 4.8–10.8)

## 2019-10-21 ENCOUNTER — OFFICE VISIT (OUTPATIENT)
Dept: FAMILY MEDICINE CLINIC | Age: 64
End: 2019-10-21
Payer: MEDICARE

## 2019-10-21 VITALS
HEART RATE: 76 BPM | TEMPERATURE: 98.6 F | OXYGEN SATURATION: 97 % | SYSTOLIC BLOOD PRESSURE: 138 MMHG | WEIGHT: 230 LBS | BODY MASS INDEX: 30.48 KG/M2 | HEIGHT: 73 IN | DIASTOLIC BLOOD PRESSURE: 78 MMHG

## 2019-10-21 DIAGNOSIS — J45.41 MODERATE PERSISTENT ASTHMA WITH ACUTE EXACERBATION: Primary | ICD-10-CM

## 2019-10-21 DIAGNOSIS — E11.9 TYPE 2 DIABETES MELLITUS WITHOUT COMPLICATION, WITHOUT LONG-TERM CURRENT USE OF INSULIN (HCC): ICD-10-CM

## 2019-10-21 DIAGNOSIS — J30.2 SEASONAL ALLERGIC RHINITIS, UNSPECIFIED TRIGGER: ICD-10-CM

## 2019-10-21 DIAGNOSIS — R74.8 ELEVATED LIVER ENZYMES: ICD-10-CM

## 2019-10-21 PROCEDURE — G8427 DOCREV CUR MEDS BY ELIG CLIN: HCPCS | Performed by: FAMILY MEDICINE

## 2019-10-21 PROCEDURE — G8417 CALC BMI ABV UP PARAM F/U: HCPCS | Performed by: FAMILY MEDICINE

## 2019-10-21 PROCEDURE — 4040F PNEUMOC VAC/ADMIN/RCVD: CPT | Performed by: FAMILY MEDICINE

## 2019-10-21 PROCEDURE — 3044F HG A1C LEVEL LT 7.0%: CPT | Performed by: FAMILY MEDICINE

## 2019-10-21 PROCEDURE — 99214 OFFICE O/P EST MOD 30 MIN: CPT | Performed by: FAMILY MEDICINE

## 2019-10-21 PROCEDURE — 2022F DILAT RTA XM EVC RTNOPTHY: CPT | Performed by: FAMILY MEDICINE

## 2019-10-21 PROCEDURE — 1123F ACP DISCUSS/DSCN MKR DOCD: CPT | Performed by: FAMILY MEDICINE

## 2019-10-21 PROCEDURE — 3017F COLORECTAL CA SCREEN DOC REV: CPT | Performed by: FAMILY MEDICINE

## 2019-10-21 PROCEDURE — 1036F TOBACCO NON-USER: CPT | Performed by: FAMILY MEDICINE

## 2019-10-21 PROCEDURE — G8484 FLU IMMUNIZE NO ADMIN: HCPCS | Performed by: FAMILY MEDICINE

## 2019-10-21 ASSESSMENT — ENCOUNTER SYMPTOMS
TROUBLE SWALLOWING: 0
CONSTIPATION: 0
VOICE CHANGE: 0
EYE DISCHARGE: 0
COLOR CHANGE: 0
DIARRHEA: 0
ABDOMINAL PAIN: 0
NAUSEA: 0
ABDOMINAL DISTENTION: 0
SHORTNESS OF BREATH: 0
COUGH: 0

## 2020-01-14 ENCOUNTER — PROCEDURE VISIT (OUTPATIENT)
Dept: FAMILY MEDICINE CLINIC | Age: 65
End: 2020-01-14
Payer: MEDICARE

## 2020-01-14 VITALS
TEMPERATURE: 97.5 F | SYSTOLIC BLOOD PRESSURE: 138 MMHG | WEIGHT: 236 LBS | HEART RATE: 94 BPM | DIASTOLIC BLOOD PRESSURE: 70 MMHG | HEIGHT: 73 IN | OXYGEN SATURATION: 95 % | BODY MASS INDEX: 31.28 KG/M2

## 2020-01-14 PROCEDURE — 99213 OFFICE O/P EST LOW 20 MIN: CPT | Performed by: FAMILY MEDICINE

## 2020-01-14 PROCEDURE — G8510 SCR DEP NEG, NO PLAN REQD: HCPCS | Performed by: FAMILY MEDICINE

## 2020-01-14 PROCEDURE — 4040F PNEUMOC VAC/ADMIN/RCVD: CPT | Performed by: FAMILY MEDICINE

## 2020-01-14 PROCEDURE — G8427 DOCREV CUR MEDS BY ELIG CLIN: HCPCS | Performed by: FAMILY MEDICINE

## 2020-01-14 PROCEDURE — G8417 CALC BMI ABV UP PARAM F/U: HCPCS | Performed by: FAMILY MEDICINE

## 2020-01-14 PROCEDURE — 17003 DESTRUCT PREMALG LES 2-14: CPT | Performed by: FAMILY MEDICINE

## 2020-01-14 PROCEDURE — 3017F COLORECTAL CA SCREEN DOC REV: CPT | Performed by: FAMILY MEDICINE

## 2020-01-14 PROCEDURE — 1036F TOBACCO NON-USER: CPT | Performed by: FAMILY MEDICINE

## 2020-01-14 PROCEDURE — 1123F ACP DISCUSS/DSCN MKR DOCD: CPT | Performed by: FAMILY MEDICINE

## 2020-01-14 PROCEDURE — 17000 DESTRUCT PREMALG LESION: CPT | Performed by: FAMILY MEDICINE

## 2020-01-14 PROCEDURE — G8484 FLU IMMUNIZE NO ADMIN: HCPCS | Performed by: FAMILY MEDICINE

## 2020-01-14 SDOH — ECONOMIC STABILITY: FOOD INSECURITY: WITHIN THE PAST 12 MONTHS, THE FOOD YOU BOUGHT JUST DIDN'T LAST AND YOU DIDN'T HAVE MONEY TO GET MORE.: NEVER TRUE

## 2020-01-14 SDOH — ECONOMIC STABILITY: TRANSPORTATION INSECURITY
IN THE PAST 12 MONTHS, HAS LACK OF TRANSPORTATION KEPT YOU FROM MEETINGS, WORK, OR FROM GETTING THINGS NEEDED FOR DAILY LIVING?: NO

## 2020-01-14 SDOH — ECONOMIC STABILITY: FOOD INSECURITY: WITHIN THE PAST 12 MONTHS, YOU WORRIED THAT YOUR FOOD WOULD RUN OUT BEFORE YOU GOT MONEY TO BUY MORE.: NEVER TRUE

## 2020-01-14 SDOH — ECONOMIC STABILITY: INCOME INSECURITY: HOW HARD IS IT FOR YOU TO PAY FOR THE VERY BASICS LIKE FOOD, HOUSING, MEDICAL CARE, AND HEATING?: NOT HARD AT ALL

## 2020-01-14 SDOH — ECONOMIC STABILITY: TRANSPORTATION INSECURITY
IN THE PAST 12 MONTHS, HAS THE LACK OF TRANSPORTATION KEPT YOU FROM MEDICAL APPOINTMENTS OR FROM GETTING MEDICATIONS?: NO

## 2020-01-14 ASSESSMENT — PATIENT HEALTH QUESTIONNAIRE - PHQ9
2. FEELING DOWN, DEPRESSED OR HOPELESS: 0
SUM OF ALL RESPONSES TO PHQ QUESTIONS 1-9: 0
1. LITTLE INTEREST OR PLEASURE IN DOING THINGS: 0
SUM OF ALL RESPONSES TO PHQ QUESTIONS 1-9: 0
SUM OF ALL RESPONSES TO PHQ9 QUESTIONS 1 & 2: 0

## 2020-01-14 ASSESSMENT — ENCOUNTER SYMPTOMS
FACIAL SWELLING: 0
COUGH: 0
SINUS PAIN: 0
EYE DISCHARGE: 0
EYE REDNESS: 0
WHEEZING: 0

## 2020-01-14 NOTE — PROGRESS NOTES
 Asthma     Bilateral knee pain     Gout     History of colon polyps 2016    needs f/u 5 years [facility]    History of type 2 diabetes mellitus     about 15 years    Hyperlipidemia     Hypertension     Keratosis, inflamed seborrheic     Osteoarthritis, localized, shoulder, right     Polyp of transverse colon f/u due 2014    Prediabetes     Prostate cancer ([facility] [address] 2014    s/p prostatectomy    Syncope and collapse     Varicose vein of leg      Past Surgical History:   Procedure Laterality Date    COLONOSCOPY  2016    Dr. Daron Ureña; polyps f/u in 5 years    PROSTATECTOMY  2014    [address]       Social History     Socioeconomic History    Marital status: Single     Spouse name: Not on file    Number of children: Not on file    Years of education: 0    Highest education level: Not on file   Occupational History    Not on file   Social Needs    Financial resource strain: Not hard at all   [employer] insecurity:     Worry: Never true     Inability: Never true   [employer] needs:     Medical: No     Non-medical: No   Tobacco Use    Smoking status: Former Smoker     Packs/day: 0.50     Years: 12.00     Pack years: 6.00     Types: Cigarettes     Last attempt to quit: 1982     Years since quittin.9    Smokeless tobacco: Never Used   Substance and Sexual Activity    Alcohol use: Yes     Comment: daily 5 vodka drinks per day    Drug use: No    Sexual activity: Yes     Partners: Female   Lifestyle    Physical activity:     Days per week: Not on file     Minutes per session: Not on file    Stress: Not on file   Relationships    Social connections:     Talks on phone: Not on file     Gets together: Not on file     Attends Church service: Not on file     Active member of club or organization: Not on file     Attends meetings of clubs or organizations: Not on file     Relationship status: Not on file    Intimate partner violence:     Fear of current or ex partner: Not on file     Emotionally abused: Not on file     Physically abused: Not on file     Forced sexual activity: Not on file   Other Topics Concern    Not on file   Social History Narrative    Engaged. No children. Family History   Problem Relation Age of Onset    Alzheimer's Disease Mother     Heart Disease Father     Cancer Father     Diabetes Father     Cancer Brother      Allergies:  Patient has no known allergies. Review of Systems   Constitutional: Negative for chills, fatigue and fever. HENT: Negative for congestion, ear discharge, facial swelling, mouth sores, nosebleeds, postnasal drip and sinus pain. Eyes: Negative for discharge and redness. Respiratory: Negative for cough and wheezing. Endocrine: Negative for cold intolerance and polydipsia. Genitourinary: Negative for genital sores. Musculoskeletal: Negative for joint swelling. Allergic/Immunologic: Negative for environmental allergies and food allergies. Hematological: Negative for adenopathy. Does not bruise/bleed easily. Objective:   /70   Pulse 94   Temp 97.5 °F (36.4 °C)   Ht 6' 1\" (1.854 m)   Wt 236 lb (107 kg)   SpO2 95%   BMI 31.14 kg/m²     Physical Exam  Constitutional:       General: He is not in acute distress. Appearance: Normal appearance. He is well-developed. He is not toxic-appearing. HENT:      Head: Normocephalic and atraumatic. Right Ear: Hearing and tympanic membrane normal. A foreign body (excess cerumen) is present. Left Ear: Hearing, tympanic membrane, ear canal and external ear normal.      Nose: Nose normal. No nasal deformity. Eyes:      General: Lids are normal.         Right eye: No discharge. Left eye: No discharge. Conjunctiva/sclera: Conjunctivae normal.      Pupils: Pupils are equal, round, and reactive to light. Neck:      Musculoskeletal: Full passive range of motion without pain. Thyroid: No thyroid mass or thyromegaly.

## 2020-01-21 ENCOUNTER — TELEPHONE (OUTPATIENT)
Dept: FAMILY MEDICINE CLINIC | Age: 65
End: 2020-01-21

## 2020-01-21 RX ORDER — IRBESARTAN 150 MG/1
150 TABLET ORAL DAILY
Qty: 30 TABLET | Refills: 5 | Status: SHIPPED | OUTPATIENT
Start: 2020-01-21 | End: 2020-06-17 | Stop reason: SDUPTHER

## 2020-01-21 NOTE — TELEPHONE ENCOUNTER
Pt calling losartan is not available he needs something sent to [facility] to replace this.             Pt can be reached at [phone]

## 2020-01-27 RX ORDER — AMLODIPINE BESYLATE 5 MG/1
TABLET ORAL
Qty: 30 TABLET | Refills: 2 | Status: SHIPPED | OUTPATIENT
Start: 2020-01-27 | End: 2020-02-04 | Stop reason: SDUPTHER

## 2020-02-04 RX ORDER — ALLOPURINOL 300 MG/1
300 TABLET ORAL DAILY
Qty: 30 TABLET | Refills: 11 | Status: SHIPPED | OUTPATIENT
Start: 2020-02-04 | End: 2021-02-08

## 2020-02-04 RX ORDER — METOPROLOL SUCCINATE 50 MG/1
50 TABLET, EXTENDED RELEASE ORAL DAILY
Qty: 30 TABLET | Refills: 11 | Status: SHIPPED | OUTPATIENT
Start: 2020-02-04 | End: 2020-09-09 | Stop reason: DRUGHIGH

## 2020-02-04 RX ORDER — SIMVASTATIN 20 MG
20 TABLET ORAL DAILY
Qty: 30 TABLET | Refills: 11 | Status: SHIPPED | OUTPATIENT
Start: 2020-02-04 | End: 2020-09-09 | Stop reason: SDUPTHER

## 2020-02-04 RX ORDER — AMLODIPINE BESYLATE 5 MG/1
5 TABLET ORAL DAILY
Qty: 30 TABLET | Refills: 11 | Status: SHIPPED | OUTPATIENT
Start: 2020-02-04 | End: 2020-03-10 | Stop reason: SDUPTHER

## 2020-02-04 NOTE — TELEPHONE ENCOUNTER
Patient  requesting medication refill.  Please approve or deny this request.    Rx requested:  Requested Prescriptions     Pending Prescriptions Disp Refills    metoprolol succinate (TOPROL XL) 50 MG extended release tablet 30 tablet 11     Sig: Take 1 tablet by mouth daily    simvastatin (ZOCOR) 20 MG tablet 30 tablet 2     Sig: Take 1 tablet by mouth daily    allopurinol (ZYLOPRIM) 300 MG tablet 90 tablet 3    amLODIPine (NORVASC) 5 MG tablet 30 tablet 2         Last Office Visit:   10/21/2019      Next Visit Date:  Future Appointments   Date Time Provider Odessa Peres   4/7/2020  1:15 PM [facility]

## 2020-03-06 ENCOUNTER — CARE COORDINATION (OUTPATIENT)
Dept: CARE COORDINATION | Age: 65
End: 2020-03-06

## 2020-03-10 RX ORDER — AMLODIPINE BESYLATE 5 MG/1
5 TABLET ORAL DAILY
Qty: 30 TABLET | Refills: 11 | Status: SHIPPED | OUTPATIENT
Start: 2020-03-10 | End: 2021-03-29 | Stop reason: SDUPTHER

## 2020-03-13 ENCOUNTER — CARE COORDINATION (OUTPATIENT)
Dept: CARE COORDINATION | Age: 65
End: 2020-03-13

## 2020-04-06 ENCOUNTER — TELEPHONE (OUTPATIENT)
Dept: FAMILY MEDICINE CLINIC | Age: 65
End: 2020-04-06

## 2020-04-20 RX ORDER — FLUTICASONE PROPIONATE 50 MCG
SPRAY, SUSPENSION (ML) NASAL
Qty: 32 G | Refills: 0 | Status: SHIPPED | OUTPATIENT
Start: 2020-04-20 | End: 2020-04-20 | Stop reason: SDUPTHER

## 2020-04-20 RX ORDER — FLUTICASONE PROPIONATE 50 MCG
2 SPRAY, SUSPENSION (ML) NASAL DAILY
Qty: 32 G | Refills: 0 | Status: SHIPPED | OUTPATIENT
Start: 2020-04-20 | End: 2022-01-28 | Stop reason: SDUPTHER

## 2020-04-28 ENCOUNTER — OFFICE VISIT (OUTPATIENT)
Dept: FAMILY MEDICINE CLINIC | Age: 65
End: 2020-04-28
Payer: MEDICARE

## 2020-04-28 VITALS
HEIGHT: 73 IN | DIASTOLIC BLOOD PRESSURE: 72 MMHG | WEIGHT: 237 LBS | SYSTOLIC BLOOD PRESSURE: 124 MMHG | HEART RATE: 86 BPM | OXYGEN SATURATION: 98 % | TEMPERATURE: 98.6 F | BODY MASS INDEX: 31.41 KG/M2

## 2020-04-28 LAB — HBA1C MFR BLD: 6.7 %

## 2020-04-28 PROCEDURE — 17000 DESTRUCT PREMALG LESION: CPT | Performed by: FAMILY MEDICINE

## 2020-04-28 PROCEDURE — 1036F TOBACCO NON-USER: CPT | Performed by: FAMILY MEDICINE

## 2020-04-28 PROCEDURE — 99214 OFFICE O/P EST MOD 30 MIN: CPT | Performed by: FAMILY MEDICINE

## 2020-04-28 PROCEDURE — 3017F COLORECTAL CA SCREEN DOC REV: CPT | Performed by: FAMILY MEDICINE

## 2020-04-28 PROCEDURE — G8417 CALC BMI ABV UP PARAM F/U: HCPCS | Performed by: FAMILY MEDICINE

## 2020-04-28 PROCEDURE — 17003 DESTRUCT PREMALG LES 2-14: CPT | Performed by: FAMILY MEDICINE

## 2020-04-28 PROCEDURE — 1123F ACP DISCUSS/DSCN MKR DOCD: CPT | Performed by: FAMILY MEDICINE

## 2020-04-28 PROCEDURE — G8427 DOCREV CUR MEDS BY ELIG CLIN: HCPCS | Performed by: FAMILY MEDICINE

## 2020-04-28 PROCEDURE — 83036 HEMOGLOBIN GLYCOSYLATED A1C: CPT | Performed by: FAMILY MEDICINE

## 2020-04-28 PROCEDURE — G0439 PPPS, SUBSEQ VISIT: HCPCS | Performed by: FAMILY MEDICINE

## 2020-04-28 PROCEDURE — 2022F DILAT RTA XM EVC RTNOPTHY: CPT | Performed by: FAMILY MEDICINE

## 2020-04-28 PROCEDURE — 4040F PNEUMOC VAC/ADMIN/RCVD: CPT | Performed by: FAMILY MEDICINE

## 2020-04-28 PROCEDURE — 3044F HG A1C LEVEL LT 7.0%: CPT | Performed by: FAMILY MEDICINE

## 2020-04-28 ASSESSMENT — LIFESTYLE VARIABLES
HOW OFTEN DURING THE LAST YEAR HAVE YOU HAD A FEELING OF GUILT OR REMORSE AFTER DRINKING: 0
HAS A RELATIVE, FRIEND, DOCTOR, OR ANOTHER HEALTH PROFESSIONAL EXPRESSED CONCERN ABOUT YOUR DRINKING OR SUGGESTED YOU CUT DOWN: 4
HOW OFTEN DURING THE LAST YEAR HAVE YOU NEEDED AN ALCOHOLIC DRINK FIRST THING IN THE MORNING TO GET YOURSELF GOING AFTER A NIGHT OF HEAVY DRINKING: 4
HOW OFTEN DURING THE LAST YEAR HAVE YOU FOUND THAT YOU WERE NOT ABLE TO STOP DRINKING ONCE YOU HAD STARTED: 4
HOW OFTEN DO YOU HAVE A DRINK CONTAINING ALCOHOL: 4
HOW OFTEN DO YOU HAVE SIX OR MORE DRINKS ON ONE OCCASION: 4
HOW OFTEN DURING THE LAST YEAR HAVE YOU FAILED TO DO WHAT WAS NORMALLY EXPECTED FROM YOU BECAUSE OF DRINKING: 0
HOW MANY STANDARD DRINKS CONTAINING ALCOHOL DO YOU HAVE ON A TYPICAL DAY: 0
AUDIT TOTAL SCORE: 20
AUDIT-C TOTAL SCORE: 8
HAVE YOU OR SOMEONE ELSE BEEN INJURED AS A RESULT OF YOUR DRINKING: 0
HOW OFTEN DURING THE LAST YEAR HAVE YOU BEEN UNABLE TO REMEMBER WHAT HAPPENED THE NIGHT BEFORE BECAUSE YOU HAD BEEN DRINKING: 0

## 2020-04-28 ASSESSMENT — ENCOUNTER SYMPTOMS
SHORTNESS OF BREATH: 0
DIARRHEA: 0
EYE DISCHARGE: 0
VOICE CHANGE: 0
ABDOMINAL DISTENTION: 0
TROUBLE SWALLOWING: 0
CONSTIPATION: 0
COLOR CHANGE: 0
NAUSEA: 0
COUGH: 0
ABDOMINAL PAIN: 0

## 2020-04-28 NOTE — PATIENT INSTRUCTIONS
when traveling in a car. Always wear a helmet when riding a bicycle or motorcycle. Personalized Preventive Plan for Evan Donald - 4/28/2020  Medicare offers a range of preventive health benefits. Some of the tests and screenings are paid in full while other may be subject to a deductible, co-insurance, and/or copay. Some of these benefits include a comprehensive review of your medical history including lifestyle, illnesses that may run in your family, and various assessments and screenings as appropriate. After reviewing your medical record and screening and assessments performed today your provider may have ordered immunizations, labs, imaging, and/or referrals for you. A list of these orders (if applicable) as well as your Preventive Care list are included within your After Visit Summary for your review. Other Preventive Recommendations:    A preventive eye exam performed by an eye specialist is recommended every 1-2 years to screen for glaucoma; cataracts, macular degeneration, and other eye disorders. A preventive dental visit is recommended every 6 months. Try to get at least 150 minutes of exercise per week or 10,000 steps per day on a pedometer . Order or download the FREE \"Exercise & Physical Activity: Your Everyday Guide\" from [employer]. Call 0-937.480.1863 or search The [employer] online. You need 2911-4066 mg of calcium and 6990-4699 IU of vitamin D per day. It is possible to meet your calcium requirement with diet alone, but a vitamin D supplement is usually necessary to meet this goal.  When exposed to the sun, use a sunscreen that protects against both UVA and UVB radiation with an SPF of 30 or greater. Reapply every 2 to 3 hours or after sweating, drying off with a towel, or swimming. Always wear a seat belt when traveling in a car. Always wear a helmet when riding a bicycle or motorcycle. 1

## 2020-04-28 NOTE — PROGRESS NOTES
Subjective:      Patient ID: Tarik Mulligan is a 76 y.o. male who presents for:  Chief Complaint   Patient presents with    6 Month Follow-Up     [facility]     Skin Problem     Skin check - Scalp     Medicare AW       Does not think he has very many new skin lesions. He is using Suave shampoo. He has a history of actinic keratosis in the past.  Prior treatment for actinic keratosis has been cryo therapy    Asthma has been under good control at this time. He is not needing a rescue inhaler at all. Not feeling short of breath. Activities do not lead to coughing. No ill symptoms. No limitation of activities. Diabetes   He presents for his follow-up diabetic visit. He has type 2 diabetes mellitus. No MedicAlert identification noted. His disease course has been stable. There are no hypoglycemic associated symptoms. Pertinent negatives for hypoglycemia include no speech difficulty. There are no diabetic associated symptoms. Pertinent negatives for diabetes include no chest pain, no fatigue, no polydipsia, no polyuria and no weakness. There are no hypoglycemic complications. Symptoms are stable. There are no diabetic complications. Risk factors for coronary artery disease include male sex, obesity and sedentary lifestyle. Current diabetic treatment includes diet. He is compliant with treatment most of the time. He is following a generally healthy diet. He has not had a previous visit with a dietitian. He rarely participates in exercise. An ACE inhibitor/angiotensin II receptor blocker is being taken. He does not see a podiatrist.Eye exam is current.        Current Outpatient Medications on File Prior to Visit   Medication Sig Dispense Refill    Coenzyme Q10-Vitamin E (QUNOL ULTRA COQ10) 100-150 MG-UNIT CAPS       fluticasone (FLONASE) 50 MCG/ACT nasal spray 2 sprays by Nasal route daily 32 g 0    amLODIPine (NORVASC) 5 MG tablet Take 1 tablet by mouth daily 30 tablet 11    VENTOLIN  (90 Base) MCG/ACT inhaler inhale 2 puffs into the lungs every 6 hours as needed for wheezing 18 g 0    metoprolol succinate (TOPROL XL) 50 MG extended release tablet Take 1 tablet by mouth daily 30 tablet 11    simvastatin (ZOCOR) 20 MG tablet Take 1 tablet by mouth daily 30 tablet 11    allopurinol (ZYLOPRIM) 300 MG tablet Take 1 tablet by mouth daily 30 tablet 11    irbesartan (AVAPRO) 150 MG tablet Take 1 tablet by mouth daily 30 tablet 5    fluticasone (FLOVENT HFA) 110 MCG/ACT inhaler Inhale 2 puffs into the lungs 2 times daily 1 Inhaler 3    loratadine (CLARITIN) 10 MG tablet Take 1 tablet by mouth daily 30 tablet 11    aspirin 81 MG chewable tablet Take 81 mg by mouth daily      vitamin C (ASCORBIC ACID) 500 MG tablet Take 500 mg by mouth daily      Multiple Vitamins-Minerals (MULTIVITAMIN MEN 50+ PO) Take by mouth       No current facility-administered medications on file prior to visit.       Past Medical History:   Diagnosis Date    Actinic keratoses     has had LN2 and used Efudex    Alcohol consumption of one to four drinks per day on alcohol screening     Allergic rhinitis     cats, weeds, grasses, ragweed    Asthma     Bilateral knee pain     Gout     History of colon polyps 02/2016    needs f/u 5 years [facility]    History of type 2 diabetes mellitus     about 15 years    Hyperlipidemia     Hypertension     Keratosis, inflamed seborrheic     Osteoarthritis, localized, shoulder, right     Polyp of transverse colon f/u due 2021 8/9/2014    Prediabetes     Prostate cancer ([facility] [address] 2014    s/p prostatectomy    Syncope and collapse     Varicose vein of leg      Past Surgical History:   Procedure Laterality Date    COLONOSCOPY  02/04/2016    Dr. Linn Cabot; polyps f/u in 5 years    PROSTATECTOMY  11/2014    VARICOSE VEIN SURGERY       Social History     Socioeconomic History    Marital status: Single     Spouse name: Not on file    Number of children: Not on file    Years of education: 0    Highest education level: Not on file   Occupational History    Not on file   Social Needs    Financial resource strain: Not hard at all    Food insecurity     Worry: Never true     Inability: Never true   [employer] needs     Medical: No     Non-medical: No   Tobacco Use    Smoking status: Former Smoker     Packs/day: 0.50     Years: 12.00     Pack years: 6.00     Types: Cigarettes     Last attempt to quit: 1982     Years since quittin.2    Smokeless tobacco: Never Used   Substance and Sexual Activity    Alcohol use: Yes     Comment: daily 5 vodka drinks per day    Drug use: No    Sexual activity: Yes     Partners: Female   Lifestyle    Physical activity     Days per week: Not on file     Minutes per session: Not on file    Stress: Not on file   Relationships    Social connections     Talks on phone: Not on file     Gets together: Not on file     Attends [employer] service: Not on file     Active member of club or organization: Not on file     Attends meetings of clubs or organizations: Not on file     Relationship status: Not on file    Intimate partner violence     Fear of current or ex partner: Not on file     Emotionally abused: Not on file     Physically abused: Not on file     Forced sexual activity: Not on file   Other Topics Concern    Not on file   Social History Narrative    Engaged. No children. Family History   Problem Relation Age of Onset    Alzheimer's Disease Mother     Heart Disease Father     Cancer Father     Diabetes Father     Cancer Brother      Allergies:  Patient has no known allergies. Review of Systems   Constitutional: Negative for activity change, appetite change, fatigue and unexpected weight change. HENT: Negative for congestion, dental problem, trouble swallowing and voice change. Eyes: Negative for discharge and visual disturbance. Respiratory: Negative for cough and shortness of breath. Cardiovascular: Negative for chest pain. these benefits include a comprehensive review of your medical history including lifestyle, illnesses that may run in your family, and various assessments and screenings as appropriate. After reviewing your medical record and screening and assessments performed today your provider may have ordered immunizations, labs, imaging, and/or referrals for you. A list of these orders (if applicable) as well as your Preventive Care list are included within your After Visit Summary for your review. Other Preventive Recommendations:    A preventive eye exam performed by an eye specialist is recommended every 1-2 years to screen for glaucoma; cataracts, macular degeneration, and other eye disorders. A preventive dental visit is recommended every 6 months. Try to get at least 150 minutes of exercise per week or 10,000 steps per day on a pedometer . Order or download the FREE \"Exercise & Physical Activity: Your Everyday Guide\" from [employer]. Call 8-195.991.7669 or search The [employer] online. You need 7908-2810 mg of calcium and 6047-8670 IU of vitamin D per day. It is possible to meet your calcium requirement with diet alone, but a vitamin D supplement is usually necessary to meet this goal.  When exposed to the sun, use a sunscreen that protects against both UVA and UVB radiation with an SPF of 30 or greater. Reapply every 2 to 3 hours or after sweating, drying off with a towel, or swimming. Always wear a seat belt when traveling in a car. Always wear a helmet when riding a bicycle or motorcycle. Mukesh Crouch M.D. Medicare Annual Wellness Visit  Name: Derek Arellano Date: 2020   MRN: [id number] Sex: Male   Age: 76 y.o.  Ethnicity: Non-/Non    : 1955 Race: Armando Norwood is here for 6 Month Follow-Up (Address [facility] ); Skin Problem (Skin check - Scalp ); and Medicare AWV    Screenings for behavioral, psychosocial patient refuse to take the cognition test?: No  Cognitive Impairment Interventions:  · in office test normal    General Health:  General  In general, how would you say your health is?: Good  In the past 7 days, have you experienced any of the following? New or Increased Pain, New or Increased Fatigue, Loneliness, Social Isolation, Stress or Anger?: None of These  Do you get the social and emotional support that you need?: Yes  Do you have a Living Will?: (!) No  General Health Risk Interventions:  · gave resources pt will look to initiate    Health Habits/Nutrition:  Health Habits/Nutrition  Do you exercise for at least 20 minutes 2-3 times per week?: Yes  Have you lost any weight without trying in the past 3 months?: No  Do you eat fewer than 2 meals per day?: No  Have you seen a dentist within the past year?: (!) No  Body mass index is 31.7 kg/m².   Health Habits/Nutrition Interventions:  · Inadequate physical activity:  pt will think about diet and activity he can change and work toward that    Safety:  Safety  Do you have working smoke detectors?: Yes  Have all throw rugs been removed or fastened?: (!) No  Do you have non-slip mats or surfaces in all bathtubs/showers?: (!) No  Do all of your stairways have a railing or banister?: Yes  Are your doorways, halls and stairs free of clutter?: Yes  Do you always fasten your seatbelt when you are in a car?: Yes  Safety Interventions:  · Home safety tips provided    Personalized Preventive Plan   Current Health Maintenance Status  Immunization History   Administered Date(s) Administered    Pneumococcal Conjugate 13-valent ([id number]) 10/07/2019    Tdap (Boostrix, Adacel) 12/20/2017        Health Maintenance   Topic Date Due    AAA screen  1955    Diabetic retinal exam  03/21/1964    Shingles Vaccine (1 of 2) 03/21/2004    Annual Wellness Visit (AWV)  05/29/2019    Flu vaccine (Season Ended) 10/07/2020 (Originally 9/1/2020)    Pneumococcal 65+ years Vaccine Increased Pain, New or Increased Fatigue, Loneliness, Social Isolation, Stress or Anger?: None of These  Do you get the social and emotional support that you need?: Yes  Do you have a Living Will?: (!) No  General Health Risk Interventions:  · see other note    Health Habits/Nutrition:  Health Habits/Nutrition  Do you exercise for at least 20 minutes 2-3 times per week?: Yes  Have you lost any weight without trying in the past 3 months?: No  Do you eat fewer than 2 meals per day?: No  Have you seen a dentist within the past year?: (!) No  Body mass index is 31.7 kg/m².   Health Habits/Nutrition Interventions:  · pt will look into    Safety:  Safety  Do you have working smoke detectors?: Yes  Have all throw rugs been removed or fastened?: (!) No  Do you have non-slip mats or surfaces in all bathtubs/showers?: (!) No  Do all of your stairways have a railing or banister?: Yes  Are your doorways, halls and stairs free of clutter?: Yes  Do you always fasten your seatbelt when you are in a car?: Yes  Safety Interventions:  · Home safety tips provided    Personalized Preventive Plan   Current Health Maintenance Status  Immunization History   Administered Date(s) Administered    Pneumococcal Conjugate 13-valent ([id number]) 10/07/2019    Tdap (Boostrix, Adacel) 12/20/2017        Health Maintenance   Topic Date Due    AAA screen  1955    Diabetic retinal exam  03/21/1964    Shingles Vaccine (1 of 2) 03/21/2004    Annual Wellness Visit (AWV)  05/29/2019    Flu vaccine (Season Ended) 10/07/2020 (Originally 9/1/2020)    Pneumococcal 65+ years Vaccine (2 of 2 - PPSV23) 10/07/2020    A1C test (Diabetic or Prediabetic)  10/08/2020    Diabetic microalbuminuria test  10/08/2020    Lipid screen  10/08/2020    Potassium monitoring  10/08/2020    Creatinine monitoring  10/08/2020    PSA counseling  10/08/2020    Diabetic foot exam  10/21/2020    Colon cancer screen colonoscopy  02/04/2026    DTaP/Tdap/Td vaccine (2

## 2020-05-14 RX ORDER — LOSARTAN POTASSIUM 100 MG/1
100 TABLET ORAL DAILY
Qty: 30 TABLET | Refills: 5 | OUTPATIENT
Start: 2020-05-14

## 2020-06-17 RX ORDER — IRBESARTAN 150 MG/1
150 TABLET ORAL DAILY
Qty: 90 TABLET | Refills: 3 | Status: SHIPPED | OUTPATIENT
Start: 2020-06-17 | End: 2020-07-22 | Stop reason: SDUPTHER

## 2020-07-22 RX ORDER — IRBESARTAN 150 MG/1
150 TABLET ORAL DAILY
Qty: 90 TABLET | Refills: 3 | Status: SHIPPED | OUTPATIENT
Start: 2020-07-22 | End: 2021-08-30

## 2020-07-22 NOTE — TELEPHONE ENCOUNTER
Contact patient. He has been back and forth between irbesartan and losartan due to pharmacy supply. Find out what he is currently taking.

## 2020-07-28 ENCOUNTER — OFFICE VISIT (OUTPATIENT)
Dept: FAMILY MEDICINE CLINIC | Age: 65
End: 2020-07-28
Payer: MEDICARE

## 2020-07-28 VITALS
BODY MASS INDEX: 31.42 KG/M2 | HEIGHT: 72 IN | WEIGHT: 232 LBS | DIASTOLIC BLOOD PRESSURE: 74 MMHG | TEMPERATURE: 97.3 F | SYSTOLIC BLOOD PRESSURE: 124 MMHG | OXYGEN SATURATION: 98 % | HEART RATE: 96 BPM

## 2020-07-28 PROBLEM — I50.32 DIASTOLIC CHF, CHRONIC (HCC): Status: ACTIVE | Noted: 2020-07-28

## 2020-07-28 PROCEDURE — 1123F ACP DISCUSS/DSCN MKR DOCD: CPT | Performed by: FAMILY MEDICINE

## 2020-07-28 PROCEDURE — 99214 OFFICE O/P EST MOD 30 MIN: CPT | Performed by: FAMILY MEDICINE

## 2020-07-28 PROCEDURE — 4040F PNEUMOC VAC/ADMIN/RCVD: CPT | Performed by: FAMILY MEDICINE

## 2020-07-28 PROCEDURE — 3017F COLORECTAL CA SCREEN DOC REV: CPT | Performed by: FAMILY MEDICINE

## 2020-07-28 PROCEDURE — 1036F TOBACCO NON-USER: CPT | Performed by: FAMILY MEDICINE

## 2020-07-28 PROCEDURE — G8417 CALC BMI ABV UP PARAM F/U: HCPCS | Performed by: FAMILY MEDICINE

## 2020-07-28 PROCEDURE — G8427 DOCREV CUR MEDS BY ELIG CLIN: HCPCS | Performed by: FAMILY MEDICINE

## 2020-07-28 PROCEDURE — 2022F DILAT RTA XM EVC RTNOPTHY: CPT | Performed by: FAMILY MEDICINE

## 2020-07-28 PROCEDURE — 3044F HG A1C LEVEL LT 7.0%: CPT | Performed by: FAMILY MEDICINE

## 2020-07-28 ASSESSMENT — ENCOUNTER SYMPTOMS
ABDOMINAL PAIN: 0
CHEST TIGHTNESS: 0
PHOTOPHOBIA: 0
SHORTNESS OF BREATH: 0
VISUAL CHANGE: 0
ABDOMINAL DISTENTION: 0

## 2020-07-28 NOTE — PATIENT INSTRUCTIONS
[email or web address] brand home blood pressure monitor has been found to be reliable in this area. Arm measures 13 inches. Patient will bring his own cuff to the office for evaluation of accuracy. If it is accurate there is no need to get a new cuff. The patient verbalizes understanding of the diagnosis of hypertension (high blood pressure). The patient is aware the goal for the blood pressure is 135/85 or below. We have also discussed the symptoms of low blood pressure. They are lightheadedness, weakness, pale color, and nausea. If these occur the patient should have the blood pressure checked as soon as possible. This is because the blood pressure medication dose may need to be delayed. If the patient has a home blood pressure monitor they can do this at home. If the patient does not, the patient should have someone bring them to the office to have blood pressure checked. The patient is aware that weight loss and diet control are an important part of managing high blood pressure. Diet aiming at lowering sodium and cholesterol  are very important contributors to blood pressure management. The overall control of  blood pressure is important because of directly relates preventing heart attacks and strokes. Patient will contact prostate cancer specialist to be certain of follow-up strategy. It appears last follow-up was around 2016.

## 2020-07-28 NOTE — PROGRESS NOTES
Diagnosis Orders   1. Essential hypertension  CBC with Differential    Comprehensive Metabolic Panel    Lipid, Fasting    TSH with Reflex    US SCREENING FOR AAA   2. Type 2 diabetes mellitus without complication, without long-term current use of insulin (HCC)  Microalbumin / Creatinine Urine Ratio    Hemoglobin A1C   3. Diastolic CHF, chronic (HCC)  Brain Natriuretic Peptide    Echo 2d w doppler w color w contrast   4. Prostate CA (HCC)  Psa screening   5. Family history of tobacco use  US SCREENING FOR AAA     Return in about 6 months (around 1/28/2021) for for review of outcome of today's recommendation. Subjective:      Patient ID: Holden Shea is a 76 y.o. male who presents for:  Chief Complaint   Patient presents with    3 Month Follow-Up     DM 4/28/2020 Last a1c 6.7    Skin Problem     Pt [facility]        Weight trending back down. LAST prostate cancer f/u around 2016. Prostatectomy 2014 complete. Hypertension:   He is somewhat adherent to a low sodium/low cholesterol diet. Patient denies chest pain, shortness of breath, headache, blurred vision, palpitations and leg swelling. Antihypertensive medication side effects: no medication side effects noted. Use of agents associated with hypertension: none. Pt is currently on beta blocker and ARB  Pt has no complications from this regimen and has achieved acceptable hypertensive control. Pt does have a home blood pressure monitor that has not been verified for accuracy. Patient's other medical conditions that contribute to blood pressure goals are hyperlipidemia. Pt has not suffered complications from HTN such as heart attack, stroke, or renal compromise. Diabetes   He presents for his follow-up diabetic visit. He has type 2 diabetes mellitus. No MedicAlert identification noted. His disease course has been stable.  Pertinent negatives for hypoglycemia include no confusion, dizziness, headaches or tremors. Pertinent negatives for diabetes include no chest pain, no polydipsia, no visual change and no weakness. There are no hypoglycemic complications. Symptoms are stable. There are no diabetic complications. Risk factors for coronary artery disease include diabetes mellitus, dyslipidemia, hypertension and male sex. Current diabetic treatment includes oral agent (monotherapy). He is compliant with treatment most of the time. His weight is decreasing steadily. He is following a generally healthy diet. Meal planning includes avoidance of concentrated sweets. There is no change in his home blood glucose trend. An ACE inhibitor/angiotensin II receptor blocker is being taken.        Current Outpatient Medications on File Prior to Visit   Medication Sig Dispense Refill    irbesartan (AVAPRO) 150 MG tablet Take 1 tablet by mouth daily 90 tablet 3    Coenzyme Q10-Vitamin E (QUNOL ULTRA COQ10) 100-150 MG-UNIT CAPS       metFORMIN (GLUCOPHAGE) 500 MG tablet Take 1 tablet by mouth daily (with breakfast) 30 tablet 5    fluticasone (FLONASE) 50 MCG/ACT nasal spray 2 sprays by Nasal route daily 32 g 0    amLODIPine (NORVASC) 5 MG tablet Take 1 tablet by mouth daily 30 tablet 11    VENTOLIN  (90 Base) MCG/ACT inhaler inhale 2 puffs into the lungs every 6 hours as needed for wheezing 18 g 0    metoprolol succinate (TOPROL XL) 50 MG extended release tablet Take 1 tablet by mouth daily 30 tablet 11    simvastatin (ZOCOR) 20 MG tablet Take 1 tablet by mouth daily 30 tablet 11    allopurinol (ZYLOPRIM) 300 MG tablet Take 1 tablet by mouth daily 30 tablet 11    fluticasone (FLOVENT HFA) 110 MCG/ACT inhaler Inhale 2 puffs into the lungs 2 times daily 1 Inhaler 3    loratadine (CLARITIN) 10 MG tablet Take 1 tablet by mouth daily 30 tablet 11    aspirin 81 MG chewable tablet Take 81 mg by mouth daily      vitamin C (ASCORBIC ACID) 500 MG tablet Take 500 mg by mouth daily      Multiple Vitamins-Minerals (MULTIVITAMIN MEN 50+ PO) Take by mouth       No current facility-administered medications on file prior to visit.       Past Medical History:   Diagnosis Date    Actinic keratoses     has had LN2 and used Efudex    Alcohol consumption of one to four drinks per day on alcohol screening     Allergic rhinitis     cats, weeds, grasses, ragweed    Asthma     Bilateral knee pain     Gout     History of colon polyps 2016    needs f/u 5 years [facility]    History of type 2 diabetes mellitus     about 15 years    Hyperlipidemia     Hypertension     Keratosis, inflamed seborrheic     Osteoarthritis, localized, shoulder, right     Polyp of transverse colon f/u due 2014    Prediabetes     [facility] 2014    s/p prostatectomy    Syncope and collapse     Varicose vein of leg      Past Surgical History:   Procedure Laterality Date    COLONOSCOPY  2016    Dr. Mady Charles; polyps f/u in 5 years    PROSTATECTOMY  2014    [address]       Social History     Socioeconomic History    Marital status: Single     Spouse name: Not on file    Number of children: Not on file    Years of education: 0    Highest education level: Not on file   Occupational History    Not on file   Social Needs    Financial resource strain: Not hard at all   Inkvite insecurity     Worry: Never true     Inability: Never true   Souktel needs     Medical: No     Non-medical: No   Tobacco Use    Smoking status: Former Smoker     Packs/day: 0.50     Years: 12.00     Pack years: 6.00     Types: Cigarettes     Last attempt to quit: 1982     Years since quittin.4    Smokeless tobacco: Never Used   Substance and Sexual Activity    Alcohol use: Yes     Comment: daily 5 vodka drinks per day    Drug use: No    Sexual activity: Yes     Partners: Female   Lifestyle    Physical activity     Days per week: Not on file     Minutes per session: Not on file    Stress: Not on file   Relationships    Social Nose: No nasal deformity or rhinorrhea. Eyes:      General: Lids are normal.         Right eye: No discharge. Left eye: No discharge. Extraocular Movements:      Right eye: No nystagmus. Left eye: No nystagmus. Conjunctiva/sclera: Conjunctivae normal.      Right eye: No hemorrhage. Left eye: No hemorrhage. Pupils: Pupils are equal, round, and reactive to light. Neck:      Musculoskeletal: Full passive range of motion without pain and neck supple. Normal range of motion. Thyroid: No thyroid mass or thyromegaly. Vascular: Normal carotid pulses. No carotid bruit or JVD. Trachea: No tracheal tenderness or tracheal deviation. Cardiovascular:      Rate and Rhythm: Normal rate and regular rhythm. Chest Wall: PMI displaced: normal location PMI. Pulses:           Carotid pulses are 2+ on the right side and 2+ on the left side. Radial pulses are 2+ on the right side and 2+ on the left side. Heart sounds: S1 normal and S2 normal. No murmur. No friction rub. No gallop. No S3 sounds. Pulmonary:      Effort: Pulmonary effort is normal. No accessory muscle usage or respiratory distress. Breath sounds: Normal breath sounds. Chest:      Chest wall: No deformity. Abdominal:      General: There is no distension. Musculoskeletal:         General: No deformity. Cervical back: He exhibits normal range of motion, no tenderness and no deformity. Lymphadenopathy:      Cervical:      Right cervical: No superficial cervical adenopathy. Left cervical: No superficial cervical adenopathy. Upper Body:      Right upper body: No supraclavicular adenopathy. Left upper body: No supraclavicular adenopathy. Skin:     General: Skin is warm and dry. Findings: No bruising or rash. Nails: There is no clubbing. Neurological:      Mental Status: He is alert.       Cranial Nerves: Cranial nerve deficit: CN II-XII GI without obvious deficit. Sensory: Sensory deficit: grossly intact for hands. Motor: No tremor. Atrophy: B UE and LE without atrophy. Abnormal muscle tone: B UE and LE have normal tone. Coordination: Coordination normal.      Gait: Gait normal.   Psychiatric:         Attention and Perception: He is attentive. Mood and Affect: Mood is not anxious. Affect is not inappropriate. Speech: Speech normal.         Behavior: Behavior is not agitated. Behavior is cooperative. Judgment: Judgment normal.         No results found for this visit on 07/28/20. No results found for this or any previous visit (from the past 2016 hour(s)). Assessment:       Diagnosis Orders   1. Essential hypertension  CBC with Differential    Comprehensive Metabolic Panel    Lipid, Fasting    TSH with Reflex    US SCREENING FOR AAA   2. Type 2 diabetes mellitus without complication, without long-term current use of insulin (HCC)  Microalbumin / Creatinine Urine Ratio    Hemoglobin A1C   3. Diastolic CHF, chronic (HCC)  Brain Natriuretic Peptide    Echo 2d w doppler w color w contrast   4. Prostate CA (HCC)  Psa screening   5.  Family history of tobacco use  US SCREENING FOR AAA         Orders Placed This Encounter   Procedures    US SCREENING FOR AAA     Standing Status:   Future     Standing Expiration Date:   7/28/2021     Order Specific Question:   Reason for exam:     Answer:   HISTORY SMOKING    Psa screening     Standing Status:   Future     Standing Expiration Date:   7/28/2021    CBC with Differential     Standing Status:   Future     Standing Expiration Date:   7/28/2021    Comprehensive Metabolic Panel     Standing Status:   Future     Standing Expiration Date:   7/28/2021    Lipid, Fasting     Standing Status:   Future     Standing Expiration Date:   7/28/2021    TSH with Reflex     Standing Status:   Future     Standing Expiration Date:   7/28/2021    Brain Natriuretic Peptide     Standing Status: Darian Romeo M.D.

## 2020-08-14 ENCOUNTER — HOSPITAL ENCOUNTER (OUTPATIENT)
Dept: ULTRASOUND IMAGING | Age: 65
Discharge: HOME | End: 2020-08-16
Payer: MEDICARE

## 2020-08-14 ENCOUNTER — HOSPITAL ENCOUNTER (OUTPATIENT)
Dept: NON INVASIVE DIAGNOSTICS | Age: 65
Discharge: HOME | End: 2020-08-14
Payer: MEDICARE

## 2020-08-14 LAB
LV EF: 60 %
LVEF MODALITY: NORMAL

## 2020-08-14 PROCEDURE — 76706 US ABDL AORTA SCREEN AAA: CPT

## 2020-08-14 PROCEDURE — 93306 TTE W/DOPPLER COMPLETE: CPT

## 2020-08-25 ENCOUNTER — OFFICE VISIT (OUTPATIENT)
Dept: FAMILY MEDICINE CLINIC | Age: 65
End: 2020-08-25
Payer: MEDICARE

## 2020-08-25 ENCOUNTER — NURSE ONLY (OUTPATIENT)
Dept: FAMILY MEDICINE CLINIC | Age: 65
End: 2020-08-25

## 2020-08-25 VITALS
HEART RATE: 99 BPM | SYSTOLIC BLOOD PRESSURE: 175 MMHG | WEIGHT: 223 LBS | TEMPERATURE: 97.3 F | OXYGEN SATURATION: 99 % | DIASTOLIC BLOOD PRESSURE: 115 MMHG | BODY MASS INDEX: 30.24 KG/M2

## 2020-08-25 VITALS — DIASTOLIC BLOOD PRESSURE: 105 MMHG | SYSTOLIC BLOOD PRESSURE: 161 MMHG

## 2020-08-25 DIAGNOSIS — M54.50 LOW BACK PAIN, UNSPECIFIED BACK PAIN LATERALITY, UNSPECIFIED CHRONICITY, UNSPECIFIED WHETHER SCIATICA PRESENT: ICD-10-CM

## 2020-08-25 DIAGNOSIS — R14.0 ABDOMINAL DISTENSION: ICD-10-CM

## 2020-08-25 LAB
ALBUMIN SERPL-MCNC: 4.6 G/DL (ref 3.5–4.6)
ALP BLD-CCNC: 79 U/L (ref 35–104)
ALT SERPL-CCNC: 65 U/L (ref 0–41)
AMYLASE: 37 U/L (ref 22–93)
ANION GAP SERPL CALCULATED.3IONS-SCNC: 18 MEQ/L (ref 9–15)
AST SERPL-CCNC: 53 U/L (ref 0–40)
BASOPHILS ABSOLUTE: 0 K/UL (ref 0–0.2)
BASOPHILS RELATIVE PERCENT: 0.4 %
BILIRUB SERPL-MCNC: 1.2 MG/DL (ref 0.2–0.7)
BUN BLDV-MCNC: 10 MG/DL (ref 8–23)
CALCIUM SERPL-MCNC: 10.4 MG/DL (ref 8.5–9.9)
CHLORIDE BLD-SCNC: 97 MEQ/L (ref 95–107)
CO2: 24 MEQ/L (ref 20–31)
CREAT SERPL-MCNC: 0.75 MG/DL (ref 0.7–1.2)
EOSINOPHILS ABSOLUTE: 0 K/UL (ref 0–0.7)
EOSINOPHILS RELATIVE PERCENT: 0.4 %
GFR AFRICAN AMERICAN: >60
GFR NON-AFRICAN AMERICAN: >60
GLOBULIN: 3.3 G/DL (ref 2.3–3.5)
GLUCOSE BLD-MCNC: 126 MG/DL (ref 70–99)
HCT VFR BLD CALC: 47.9 % (ref 42–52)
HEMOGLOBIN: 16.4 G/DL (ref 14–18)
LIPASE: 32 U/L (ref 12–95)
LYMPHOCYTES ABSOLUTE: 0.9 K/UL (ref 1–4.8)
LYMPHOCYTES RELATIVE PERCENT: 16.3 %
MCH RBC QN AUTO: 33.6 PG (ref 27–31.3)
MCHC RBC AUTO-ENTMCNC: 34.1 % (ref 33–37)
MCV RBC AUTO: 98.4 FL (ref 80–100)
MONOCYTES ABSOLUTE: 0.7 K/UL (ref 0.2–0.8)
MONOCYTES RELATIVE PERCENT: 11.6 %
NEUTROPHILS ABSOLUTE: 4.1 K/UL (ref 1.4–6.5)
NEUTROPHILS RELATIVE PERCENT: 71.3 %
PDW BLD-RTO: 13 % (ref 11.5–14.5)
PLATELET # BLD: 154 K/UL (ref 130–400)
POTASSIUM SERPL-SCNC: 3.7 MEQ/L (ref 3.4–4.9)
PRO-BNP: 136 PG/ML
PROSTATE SPECIFIC ANTIGEN: 0.01 NG/ML (ref 0–5.4)
RBC # BLD: 4.87 M/UL (ref 4.7–6.1)
SODIUM BLD-SCNC: 139 MEQ/L (ref 135–144)
TOTAL PROTEIN: 7.9 G/DL (ref 6.3–8)
TROPONIN: <0.01 NG/ML (ref 0–0.01)
WBC # BLD: 5.8 K/UL (ref 4.8–10.8)

## 2020-08-25 PROCEDURE — 1123F ACP DISCUSS/DSCN MKR DOCD: CPT | Performed by: FAMILY MEDICINE

## 2020-08-25 PROCEDURE — 99215 OFFICE O/P EST HI 40 MIN: CPT | Performed by: FAMILY MEDICINE

## 2020-08-25 PROCEDURE — 4040F PNEUMOC VAC/ADMIN/RCVD: CPT | Performed by: FAMILY MEDICINE

## 2020-08-25 PROCEDURE — 3017F COLORECTAL CA SCREEN DOC REV: CPT | Performed by: FAMILY MEDICINE

## 2020-08-25 PROCEDURE — G8427 DOCREV CUR MEDS BY ELIG CLIN: HCPCS | Performed by: FAMILY MEDICINE

## 2020-08-25 PROCEDURE — 93000 ELECTROCARDIOGRAM COMPLETE: CPT | Performed by: FAMILY MEDICINE

## 2020-08-25 PROCEDURE — G8417 CALC BMI ABV UP PARAM F/U: HCPCS | Performed by: FAMILY MEDICINE

## 2020-08-25 PROCEDURE — 1036F TOBACCO NON-USER: CPT | Performed by: FAMILY MEDICINE

## 2020-08-25 RX ORDER — BLOOD PRESSURE TEST KIT
1 KIT MISCELLANEOUS 4 TIMES DAILY
Qty: 1 KIT | Refills: 0 | Status: SHIPPED | OUTPATIENT
Start: 2020-08-25

## 2020-08-25 NOTE — PROGRESS NOTES
ventolin for a long time. flovent added in the fall. Had no issues with the ventolin. The flovent makes him want to gag. He does not have a spacer chamber. He is still wheezing a couple times a week and wants to know if he needs to use his inhalers. He notes he has weakness upon standing. He is difficulty changing position from sitting to standing. Once he standing and walking he does okay. He feels like he has weakness in his knees that leads to this difficulty from sitting to standing. He has to push off his chair in order to get up. Current Outpatient Medications on File Prior to Visit   Medication Sig Dispense Refill    irbesartan (AVAPRO) 150 MG tablet Take 1 tablet by mouth daily 90 tablet 3    Coenzyme Q10-Vitamin E (QUNOL ULTRA COQ10) 100-150 MG-UNIT CAPS       metFORMIN (GLUCOPHAGE) 500 MG tablet Take 1 tablet by mouth daily (with breakfast) 30 tablet 5    fluticasone (FLONASE) 50 MCG/ACT nasal spray 2 sprays by Nasal route daily 32 g 0    amLODIPine (NORVASC) 5 MG tablet Take 1 tablet by mouth daily 30 tablet 11    VENTOLIN  (90 Base) MCG/ACT inhaler inhale 2 puffs into the lungs every 6 hours as needed for wheezing 18 g 0    metoprolol succinate (TOPROL XL) 50 MG extended release tablet Take 1 tablet by mouth daily 30 tablet 11    simvastatin (ZOCOR) 20 MG tablet Take 1 tablet by mouth daily 30 tablet 11    allopurinol (ZYLOPRIM) 300 MG tablet Take 1 tablet by mouth daily 30 tablet 11    fluticasone (FLOVENT HFA) 110 MCG/ACT inhaler Inhale 2 puffs into the lungs 2 times daily 1 Inhaler 3    loratadine (CLARITIN) 10 MG tablet Take 1 tablet by mouth daily 30 tablet 11    aspirin 81 MG chewable tablet Take 81 mg by mouth daily      vitamin C (ASCORBIC ACID) 500 MG tablet Take 500 mg by mouth daily      Multiple Vitamins-Minerals (MULTIVITAMIN MEN 50+ PO) Take by mouth       No current facility-administered medications on file prior to visit.       Past Medical History: Diagnosis Date    Actinic keratoses     has had LN2 and used Efudex    Alcohol consumption of one to four drinks per day on alcohol screening     Allergic rhinitis     cats, weeds, grasses, ragweed    Asthma     Bilateral knee pain     Gout     History of colon polyps 2016    needs f/u 5 years [facility]    History of type 2 diabetes mellitus     about 15 years    Hyperlipidemia     Hypertension     Keratosis, inflamed seborrheic     Osteoarthritis, localized, shoulder, right     Polyp of transverse colon f/u due 2014    Prediabetes     Prostate cancer ([facility] [address] 2014    s/p prostatectomy    Syncope and collapse     Varicose vein of leg      Past Surgical History:   Procedure Laterality Date    COLONOSCOPY  2016    Dr. Daruisz Patel; polyps f/u in 5 years    PROSTATECTOMY  2014    [address]       Social History     Socioeconomic History    Marital status: Single     Spouse name: Not on file    Number of children: Not on file    Years of education: 0    Highest education level: Not on file   Occupational History    Not on file   Social Needs    Financial resource strain: Not hard at all   Vaprema insecurity     Worry: Never true     Inability: Never true   Returbo needs     Medical: No     Non-medical: No   Tobacco Use    Smoking status: Former Smoker     Packs/day: 0.50     Years: 12.00     Pack years: 6.00     Types: Cigarettes     Last attempt to quit: 1982     Years since quittin.5    Smokeless tobacco: Never Used   Substance and Sexual Activity    Alcohol use: Yes     Comment: daily 5 vodka drinks per day    Drug use: No    Sexual activity: Yes     Partners: Female   Lifestyle    Physical activity     Days per week: Not on file     Minutes per session: Not on file    Stress: Not on file   Relationships    Social connections     Talks on phone: Not on file     Gets together: Not on file     Attends Congregational service: Not on file     Active member of club or organization: Not on file     Attends meetings of clubs or organizations: Not on file     Relationship status: Not on file    Intimate partner violence     Fear of current or ex partner: Not on file     Emotionally abused: Not on file     Physically abused: Not on file     Forced sexual activity: Not on file   Other Topics Concern    Not on file   Social History Narrative    Engaged. No children. Family History   Problem Relation Age of Onset    Alzheimer's Disease Mother     Heart Disease Father     Cancer Father     Diabetes Father     Cancer Brother      Allergies:  Patient has no known allergies. Review of Systems   Constitutional:        Refer to HPI. All other systems reviewed and are negative. Objective:   BP (!) 175/115 (Site: Left Upper Arm) Comment: pt bp cuff  Pulse 99   Temp 97.3 °F (36.3 °C)   Wt 223 lb (101.2 kg)   SpO2 99%   BMI 30.24 kg/m²     Physical Exam  Constitutional:       Appearance: Normal appearance. He is well-developed. He is not ill-appearing or diaphoretic. Comments: Vitals signs reviewed   HENT:      Head: Normocephalic and atraumatic. Right Ear: Hearing and external ear normal.      Left Ear: Hearing and external ear normal.      Nose: No nasal deformity or rhinorrhea. Eyes:      General: Lids are normal.         Right eye: No discharge. Left eye: No discharge. Extraocular Movements:      Right eye: No nystagmus. Left eye: No nystagmus. Conjunctiva/sclera: Conjunctivae normal.      Right eye: No hemorrhage. Left eye: No hemorrhage. Pupils: Pupils are equal, round, and reactive to light. Neck:      Musculoskeletal: Full passive range of motion without pain and neck supple. Normal range of motion. Thyroid: No thyroid mass or thyromegaly. Vascular: Normal carotid pulses. No carotid bruit or JVD. Trachea: No tracheal tenderness or tracheal deviation.    Cardiovascular:      Rate and Rhythm: Normal rate and regular rhythm. Chest Wall: PMI displaced: normal location PMI. Pulses:           Carotid pulses are 2+ on the right side and 2+ on the left side. Radial pulses are 2+ on the right side and 2+ on the left side. Heart sounds: S1 normal and S2 normal. No murmur. No friction rub. No gallop. No S3 sounds. Pulmonary:      Effort: Pulmonary effort is normal. No accessory muscle usage or respiratory distress. Breath sounds: Normal breath sounds. Chest:      Chest wall: No deformity. Abdominal:      General: Bowel sounds are normal. There is distension. Palpations: There is no mass. Tenderness: There is no abdominal tenderness. There is no guarding. Hernia: No hernia is present. Musculoskeletal:         General: No swelling, tenderness, deformity or signs of injury. Cervical back: He exhibits normal range of motion, no tenderness and no deformity. Right lower leg: No edema. Left lower leg: No edema. Lymphadenopathy:      Cervical:      Right cervical: No superficial cervical adenopathy. Left cervical: No superficial cervical adenopathy. Upper Body:      Right upper body: No supraclavicular adenopathy. Left upper body: No supraclavicular adenopathy. Skin:     General: Skin is warm and dry. Findings: No bruising or rash. Nails: There is no clubbing. Neurological:      Mental Status: He is alert. Cranial Nerves: Cranial nerve deficit: CN II-XII GI without obvious deficit. Sensory: Sensory deficit: grossly intact for hands. Motor: No tremor. Atrophy: B UE and LE without atrophy. Abnormal muscle tone: B UE and LE have normal tone. Coordination: Coordination normal.      Gait: Gait normal.      Comments: Patient has difficulty getting up from seated. As he attempts to do it without using his hands it appears his hip girdle and quadriceps are very weak.   Once he is up and walking he ambulates normally   Psychiatric:         Attention and Perception: He is attentive. Mood and Affect: Mood is not anxious. Affect is not inappropriate. Speech: Speech normal.         Behavior: Behavior is not agitated. Behavior is cooperative. Judgment: Judgment normal.         No results found for this visit on 08/25/20. No results found for this or any previous visit (from the past 2016 hour(s)). Assessment:       Diagnosis Orders   1. Essential hypertension uncontrolled EKG 12 lead    EKG 12 lead    Blood Pressure KIT   2. Abdominal distension new onset, worsening and associated with weight loss XR ABDOMEN (complete, including decubitus and/or erect views)    CBC Auto Differential    Psa screening    Amylase    Lipase    Comprehensive Metabolic Panel    Brain Natriuretic Peptide   3. Mild intermittent asthma without complication  Spacer/Aero-Hold Chamber Bags MISC   4.  Low back pain, unspecified back pain laterality, unspecified chronicity, unspecified whether sciatica present  Troponin    XR LUMBAR SPINE (MIN 4 VIEWS)   5. Leg weakness, bilateral  XR LUMBAR SPINE (MIN 4 VIEWS)         Orders Placed This Encounter   Procedures    XR ABDOMEN (complete, including decubitus and/or erect views)     Standing Status:   Future     Number of Occurrences:   1     Standing Expiration Date:   8/25/2021    XR LUMBAR SPINE (MIN 4 VIEWS)     Standing Status:   Future     Number of Occurrences:   1     Standing Expiration Date:   8/25/2021    CBC Auto Differential     Standing Status:   Future     Standing Expiration Date:   8/25/2021    Psa screening     Standing Status:   Future     Standing Expiration Date:   8/25/2021    Amylase     Standing Status:   Future     Standing Expiration Date:   8/25/2021    Lipase     Standing Status:   Future     Standing Expiration Date:   8/25/2021    Comprehensive Metabolic Panel     Standing Status:   Future     Standing Expiration Date:   8/25/2021

## 2020-08-25 NOTE — PATIENT INSTRUCTIONS
Due to his uncontrolled hypertension we obtained an EKG today. EKG appears normal.  Increase metoprolol to 100mg, tonight take extra 50 mg to equal 100 mg    Labs and xray today. F/u appt 1 day    Spacer ordered    With patient's history of prostate cancer metastasis is concerning here. Also with his history of drinking abdominal distention and cirrhosis are concerning here. I am concerned his back pain may be either related to bone metastasis or distention of the abdomen pulling so hard he gives him back pain. Patient has been advised to go to the emergency room if anything worsens in the meantime.

## 2020-08-26 ENCOUNTER — OFFICE VISIT (OUTPATIENT)
Dept: FAMILY MEDICINE CLINIC | Age: 65
End: 2020-08-26
Payer: MEDICARE

## 2020-08-26 VITALS
HEART RATE: 81 BPM | SYSTOLIC BLOOD PRESSURE: 130 MMHG | WEIGHT: 224 LBS | BODY MASS INDEX: 29.69 KG/M2 | OXYGEN SATURATION: 98 % | HEIGHT: 73 IN | DIASTOLIC BLOOD PRESSURE: 88 MMHG | TEMPERATURE: 97.3 F

## 2020-08-26 PROBLEM — R79.89 ELEVATED LFTS: Status: ACTIVE | Noted: 2020-08-26

## 2020-08-26 PROCEDURE — G8417 CALC BMI ABV UP PARAM F/U: HCPCS | Performed by: FAMILY MEDICINE

## 2020-08-26 PROCEDURE — 99214 OFFICE O/P EST MOD 30 MIN: CPT | Performed by: FAMILY MEDICINE

## 2020-08-26 PROCEDURE — 1123F ACP DISCUSS/DSCN MKR DOCD: CPT | Performed by: FAMILY MEDICINE

## 2020-08-26 PROCEDURE — G8427 DOCREV CUR MEDS BY ELIG CLIN: HCPCS | Performed by: FAMILY MEDICINE

## 2020-08-26 PROCEDURE — 1036F TOBACCO NON-USER: CPT | Performed by: FAMILY MEDICINE

## 2020-08-26 PROCEDURE — 3017F COLORECTAL CA SCREEN DOC REV: CPT | Performed by: FAMILY MEDICINE

## 2020-08-26 PROCEDURE — 4040F PNEUMOC VAC/ADMIN/RCVD: CPT | Performed by: FAMILY MEDICINE

## 2020-08-26 RX ORDER — ALBUTEROL SULFATE 90 UG/1
2 AEROSOL, METERED RESPIRATORY (INHALATION) EVERY 6 HOURS PRN
Qty: 18 G | Refills: 1 | Status: SHIPPED | OUTPATIENT
Start: 2020-08-26 | End: 2022-03-07 | Stop reason: SDUPTHER

## 2020-08-26 RX ORDER — CYCLOBENZAPRINE HCL 10 MG
10 TABLET ORAL NIGHTLY PRN
Qty: 30 TABLET | Refills: 0 | Status: SHIPPED | OUTPATIENT
Start: 2020-08-26 | End: 2021-11-22 | Stop reason: SDUPTHER

## 2020-08-26 ASSESSMENT — ENCOUNTER SYMPTOMS
BACK PAIN: 1
ABDOMINAL PAIN: 0
ABDOMINAL DISTENTION: 1

## 2020-08-26 NOTE — PROGRESS NOTES
Diagnosis Orders   1. Low back pain, unspecified back pain laterality, unspecified chronicity, unspecified whether sciatica present  [facility] [place]    cyclobenzaprine (FLEXERIL) 10 MG tablet    diclofenac (VOLTAREN) 50 MG EC tablet   2. Leg weakness, bilateral  [facility] [place]    cyclobenzaprine (FLEXERIL) 10 MG tablet    diclofenac (VOLTAREN) 50 MG EC tablet   3. Abdominal distension  CT ABDOMEN PELVIS W IV CONTRAST Additional Contrast? Oral   4. History of prostate cancer  CT ABDOMEN PELVIS W IV CONTRAST Additional Contrast? Oral   5. Elevated LFTs       Return for After testing complete. Subjective:      Patient ID: Elian Kauffman is a 76 y.o. male who presents for:  Chief Complaint   Patient presents with    Blood Pressure Check     pT BROUT IN HOME MONITOR TO CHECK FOR ACCURACY    Check-Up     X 1 DAY - BACK STILL ACJY        Pain and weakness are somewhat decreased today. Still present. Pain is mostly behind the right side of the low back. Weakness is mostly around both knees. Belly is still distended. No change. We reviewed laboratories from yesterday as well as x-ray findings.       Current Outpatient Medications on File Prior to Visit   Medication Sig Dispense Refill    Spacer/Aero-Hold Chamber Bags MISC 1 applicator by Does not apply route 4 times daily 1 each 0    Blood Pressure KIT 1 applicator by Does not apply route 4 times daily 1 kit 0    irbesartan (AVAPRO) 150 MG tablet Take 1 tablet by mouth daily 90 tablet 3    Coenzyme Q10-Vitamin E (QUNOL ULTRA COQ10) 100-150 MG-UNIT CAPS       metFORMIN (GLUCOPHAGE) 500 MG tablet Take 1 tablet by mouth daily (with breakfast) 30 tablet 5    fluticasone (FLONASE) 50 MCG/ACT nasal spray 2 sprays by Nasal route daily 32 g 0    amLODIPine (NORVASC) 5 MG tablet Take 1 tablet by mouth daily 30 tablet 11    metoprolol succinate (TOPROL XL) 50 MG extended release tablet Take 1 tablet by mouth daily 30 tablet 11    simvastatin (ZOCOR) 20 MG tablet Take 1 tablet by mouth daily 30 tablet 11    allopurinol (ZYLOPRIM) 300 MG tablet Take 1 tablet by mouth daily 30 tablet 11    fluticasone (FLOVENT HFA) 110 MCG/ACT inhaler Inhale 2 puffs into the lungs 2 times daily 1 Inhaler 3    loratadine (CLARITIN) 10 MG tablet Take 1 tablet by mouth daily 30 tablet 11    aspirin 81 MG chewable tablet Take 81 mg by mouth daily      vitamin C (ASCORBIC ACID) 500 MG tablet Take 500 mg by mouth daily      Multiple Vitamins-Minerals (MULTIVITAMIN MEN 50+ PO) Take by mouth       No current facility-administered medications on file prior to visit.       Past Medical History:   Diagnosis Date    Actinic keratoses     has had LN2 and used Efudex    Alcohol consumption of one to four drinks per day on alcohol screening     Allergic rhinitis     cats, weeds, grasses, ragweed    Asthma     Bilateral knee pain     Gout     History of colon polyps 02/2016    needs f/u 5 years [facility]    History of type 2 diabetes mellitus     about 15 years    Hyperlipidemia     Hypertension     Keratosis, inflamed seborrheic     Osteoarthritis, localized, shoulder, right     Polyp of transverse colon f/u due 2021 8/9/2014    Prediabetes     Prostate cancer ([facility] [address] 2014    s/p prostatectomy    Syncope and collapse     Varicose vein of leg      Past Surgical History:   Procedure Laterality Date    COLONOSCOPY  02/04/2016    Dr. Shade Valdes; polyps f/u in 5 years    PROSTATECTOMY  11/2014    VARICOSE VEIN SURGERY       Social History     Socioeconomic History    Marital status: Single     Spouse name: Not on file    Number of children: Not on file    Years of education: 0    Highest education level: Not on file   Occupational History    Not on file   Social Needs    Financial resource strain: Not hard at all   Jamey-[place]om[place] insecurity     Worry: Never true     Inability: Never true   [employer] needs     Medical: No     Non-medical: No well-developed. HENT:      Head: Normocephalic and atraumatic. Right Ear: External ear normal.      Left Ear: External ear normal.      Nose: Nose normal.   Eyes:      General:         Right eye: No discharge. Left eye: No discharge. Conjunctiva/sclera: Conjunctivae normal.      Pupils: Pupils are equal, round, and reactive to light. Neck:      Musculoskeletal: Neck supple. Thyroid: No thyromegaly. Cardiovascular:      Rate and Rhythm: Normal rate and regular rhythm. Pulmonary:      Effort: Pulmonary effort is normal. No respiratory distress. Abdominal:      General: There is no distension. Skin:     General: Skin is warm and dry. Neurological:      Mental Status: He is alert and oriented to person, place, and time. Coordination: Coordination normal.      Comments: Ambulating much more easily out of the chair today. Psychiatric:         Thought Content: Thought content normal.         Judgment: Judgment normal.         No results found for this visit on 08/26/20.     Recent Results (from the past 2016 hour(s))   Troponin    Collection Time: 08/25/20  4:30 PM   Result Value Ref Range    Troponin <0.010 0.000 - 0.010 ng/mL   Brain Natriuretic Peptide    Collection Time: 08/25/20  4:30 PM   Result Value Ref Range    Pro- pg/mL   Comprehensive Metabolic Panel    Collection Time: 08/25/20  4:30 PM   Result Value Ref Range    Sodium 139 135 - 144 mEq/L    Potassium 3.7 3.4 - 4.9 mEq/L    Chloride 97 95 - 107 mEq/L    CO2 24 20 - 31 mEq/L    Anion Gap 18 (H) 9 - 15 mEq/L    Glucose 126 (H) 70 - 99 mg/dL    BUN 10 8 - 23 mg/dL    CREATININE 0.75 0.70 - 1.20 mg/dL    GFR Non-African American >60.0 >60    GFR  >60.0 >60    Calcium 10.4 (H) 8.5 - 9.9 mg/dL    Total Protein 7.9 6.3 - 8.0 g/dL    Alb 4.6 3.5 - 4.6 g/dL    Total Bilirubin 1.2 (H) 0.2 - 0.7 mg/dL    Alkaline Phosphatase 79 35 - 104 U/L    ALT 65 (H) 0 - 41 U/L    AST 53 (H) 0 - 40 U/L    Globulin 3.3 2.3 - 3.5 g/dL   Lipase    Collection Time: 08/25/20  4:30 PM   Result Value Ref Range    Lipase 32 12 - 95 U/L   Amylase    Collection Time: 08/25/20  4:30 PM   Result Value Ref Range    Amylase 37 22 - 93 U/L   Psa screening    Collection Time: 08/25/20  4:30 PM   Result Value Ref Range    PSA 0.01 0.00 - 5.40 ng/mL   CBC Auto Differential    Collection Time: 08/25/20  4:30 PM   Result Value Ref Range    WBC 5.8 4.8 - 10.8 K/uL    RBC 4.87 4.70 - 6.10 M/uL    Hemoglobin 16.4 14.0 - 18.0 g/dL    Hematocrit 47.9 42.0 - 52.0 %    MCV 98.4 80.0 - 100.0 fL    MCH 33.6 (H) 27.0 - 31.3 pg    MCHC 34.1 33.0 - 37.0 %    RDW 13.0 11.5 - 14.5 %    Platelets 307 471 - 624 K/uL    Neutrophils % 71.3 %    Lymphocytes % 16.3 %    Monocytes % 11.6 %    Eosinophils % 0.4 %    Basophils % 0.4 %    Neutrophils Absolute 4.1 1.4 - 6.5 K/uL    Lymphocytes Absolute 0.9 (L) 1.0 - 4.8 K/uL    Monocytes Absolute 0.7 0.2 - 0.8 K/uL    Eosinophils Absolute 0.0 0.0 - 0.7 K/uL    Basophils Absolute 0.0 0.0 - 0.2 K/uL           Assessment:       Diagnosis Orders   1. Low back pain, unspecified back pain laterality, unspecified chronicity, unspecified whether sciatica present  [facility] [place]    cyclobenzaprine (FLEXERIL) 10 MG tablet    diclofenac (VOLTAREN) 50 MG EC tablet   2. Leg weakness, bilateral  [facility] [place]    cyclobenzaprine (FLEXERIL) 10 MG tablet    diclofenac (VOLTAREN) 50 MG EC tablet   3. Abdominal distension  CT ABDOMEN PELVIS W IV CONTRAST Additional Contrast? Oral   4. History of prostate cancer  CT ABDOMEN PELVIS W IV CONTRAST Additional Contrast? Oral   5.  Elevated LFTs           Orders Placed This Encounter   Procedures    CT ABDOMEN PELVIS W IV CONTRAST Additional Contrast? Oral     Standing Status:   Future     Standing Expiration Date:   8/26/2021     Order Specific Question:   Additional Contrast?     Answer:   Oral     Order Specific Question:   Reason for exam: Answer:   see above   [address]     Referral Priority:   Routine     Referral Type:   Eval and Treat     Referral Reason:   Specialty Services Required     Requested Specialty:   Physical Therapy     Number of Visits Requested:   1         Plan:   Return for After testing complete. Patient Instructions   Reviewed all lab results with the patient. Will monitor LFTs in the future. Obtaining CAT scan of the abdomen to further evaluate abdominal distention especially in relation to the history of prostate cancer. Physical therapy to help with the discomfort from the back pain and then monitor the gains in strength of the legs. If that remains unchanged consider flexion-extension views of the back and MRI. Patient has a reliable home blood pressure cuff. Blood pressure is better today. No changes. Mukesh Woods M.D.

## 2020-08-26 NOTE — PATIENT INSTRUCTIONS
Reviewed all lab results with the patient. Will monitor LFTs in the future. Obtaining CAT scan of the abdomen to further evaluate abdominal distention especially in relation to the history of prostate cancer. Physical therapy to help with the discomfort from the back pain and then monitor the gains in strength of the legs. If that remains unchanged consider flexion-extension views of the back and MRI. Patient has a reliable home blood pressure cuff. Blood pressure is better today. No changes.

## 2020-09-01 ENCOUNTER — HOSPITAL ENCOUNTER (OUTPATIENT)
Dept: CT IMAGING | Age: 65
Discharge: HOME | End: 2020-09-03
Payer: MEDICARE

## 2020-09-01 VITALS
RESPIRATION RATE: 16 BRPM | WEIGHT: 225 LBS | SYSTOLIC BLOOD PRESSURE: 139 MMHG | BODY MASS INDEX: 29.82 KG/M2 | HEIGHT: 73 IN | DIASTOLIC BLOOD PRESSURE: 81 MMHG | HEART RATE: 69 BPM

## 2020-09-01 PROCEDURE — 6360000004 HC RX CONTRAST MEDICATION: Performed by: FAMILY MEDICINE

## 2020-09-01 PROCEDURE — 74177 CT ABD & PELVIS W/CONTRAST: CPT

## 2020-09-01 PROCEDURE — 2500000003 HC RX 250 WO HCPCS: Performed by: FAMILY MEDICINE

## 2020-09-01 RX ADMIN — IOPAMIDOL 100 ML: 612 INJECTION, SOLUTION INTRAVENOUS at 14:02

## 2020-09-01 RX ADMIN — BARIUM SULFATE 450 ML: 20 SUSPENSION ORAL at 14:02

## 2020-09-08 ENCOUNTER — VIRTUAL VISIT (OUTPATIENT)
Dept: GASTROENTEROLOGY | Age: 65
End: 2020-09-08
Payer: MEDICARE

## 2020-09-08 PROCEDURE — 99443 PR PHYS/QHP TELEPHONE EVALUATION 21-30 MIN: CPT | Performed by: INTERNAL MEDICINE

## 2020-09-08 RX ORDER — SODIUM, POTASSIUM,MAG SULFATES 17.5-3.13G
SOLUTION, RECONSTITUTED, ORAL ORAL
Qty: 1 BOTTLE | Refills: 0 | Status: SHIPPED | OUTPATIENT
Start: 2020-09-08 | End: 2021-01-26 | Stop reason: ALTCHOICE

## 2020-09-08 RX ORDER — METOPROLOL SUCCINATE 50 MG/1
50 TABLET, EXTENDED RELEASE ORAL DAILY
Qty: 30 TABLET | Refills: 11 | OUTPATIENT
Start: 2020-09-08

## 2020-09-08 NOTE — PROGRESS NOTES
2020    TELEHEALTH EVALUATION -- Audio/Visual (During PHVCW-59 public health emergency)    Due to COVID 19 outbreak, patient's office visit was converted to a virtual visit. Patient was contacted and agreed to proceed with a virtual visit via phone visit  The risks and benefits of converting to a virtual visit were discussed in light of the current infectious disease epidemic. Patient also understood that insurance coverage and co-pays are up to their individual insurance plans. Chief Complaint   Patient presents with    Consultation     HPI:  Chantelle Mccord (:  1955) has requested an audio/video evaluation for the following concern(s): This is very pleasant 70-year-old was referred to us for further evaluation management. Patient during routine work-up for back pain had CT abdomen that shows a mass in the liver like around 1 cm. The mass was not clearly characterized. Noted patient does have underlying disease liver disease with abnormal liver enzymes noted he does endorse alcohol use 6 beers a day for many years. Denies any prior history of viral hepatitis or admission related to liver conditions. Denies any history of jaundice easy bruising or hepatic encephalopathy. Noted platelet borderline range. Otherwise patient had previous colonoscopy in 2016 and was recommended 5 years follow-up. Patient came in today for the initial visit. Referral visit    Previous GI work up/Endoscopic investigations:     Review of Systems    Prior to Visit Medications    Medication Sig Taking?  Authorizing Provider   Na Sulfate-K Sulfate-Mg Sulf 17.5-3.13-1.6 GM/177ML SOLN As directed Yes Jewel Muse MD   albuterol sulfate HFA (VENTOLIN HFA) 108 (90 Base) MCG/ACT inhaler Inhale 2 puffs into the lungs every 6 hours as needed for Wheezing Yes Yocasta Lock MD   diclofenac (VOLTAREN) 50 MG EC tablet Take 1 tablet by mouth 3 times daily (with meals) Yes Yocasta Lock MD   Spacer/Aero-Hold Chamber Bags MISC 1 applicator by Does not apply route 4 times daily Yes Nori Streeter MD   Blood Pressure KIT 1 applicator by Does not apply route 4 times daily Yes Nori Streeter MD   irbesartan (AVAPRO) 150 MG tablet Take 1 tablet by mouth daily Yes Nori Streeter MD   Coenzyme Q10-Vitamin E (QUNOL ULTRA COQ10) 100-150 MG-UNIT CAPS  Yes Historical Provider, MD   fluticasone (FLONASE) 50 MCG/ACT nasal spray 2 sprays by Nasal route daily Yes Nori Streeter MD   amLODIPine (NORVASC) 5 MG tablet Take 1 tablet by mouth daily Yes Nori Streeter MD   metoprolol succinate (TOPROL XL) 50 MG extended release tablet Take 1 tablet by mouth daily Yes Nori Streeter MD   simvastatin (ZOCOR) 20 MG tablet Take 1 tablet by mouth daily Yes Nori Streeter MD   allopurinol (ZYLOPRIM) 300 MG tablet Take 1 tablet by mouth daily Yes Nori Streeter MD   fluticasone (FLOVENT HFA) 110 MCG/ACT inhaler Inhale 2 puffs into the lungs 2 times daily Yes Nori Streeter MD   loratadine (CLARITIN) 10 MG tablet Take 1 tablet by mouth daily Yes Nori Streeter MD   aspirin 81 MG chewable tablet Take 81 mg by mouth daily Yes Historical Provider, MD   vitamin C (ASCORBIC ACID) 500 MG tablet Take 500 mg by mouth daily Yes Historical Provider, MD   Multiple Vitamins-Minerals (MULTIVITAMIN MEN 50+ PO) Take by mouth Yes Historical Provider, MD   metFORMIN (GLUCOPHAGE) 500 MG tablet Take 1 tablet by mouth daily (with breakfast)  Patient not taking: Reported on 2020  Nori Streeter MD     Social History     Tobacco Use    Smoking status: Former Smoker     Packs/day: 0.50     Years: 12.00     Pack years: 6.00     Types: Cigarettes     Last attempt to quit: 1982     Years since quittin.6    Smokeless tobacco: Never Used   Substance Use Topics    Alcohol use: Yes     Comment: daily 5 vodka drinks per day    Drug use: No      No Known Allergies,   Past Medical History:   Diagnosis Date    Actinic keratoses     has had LN2 and used Efudex    Alcohol consumption of one to four drinks per day on alcohol screening     Allergic rhinitis     cats, weeds, grasses, ragweed    Asthma     Bilateral knee pain     Gout     History of colon polyps 2016    needs f/u 5 years [facility]    History of type 2 diabetes mellitus     about 15 years    Hyperlipidemia     Hypertension     Keratosis, inflamed seborrheic     Osteoarthritis, localized, shoulder, right     Polyp of transverse colon f/u due 2014    Prediabetes     Prostate cancer ([facility] [address] 2014    s/p prostatectomy    Syncope and collapse     Varicose vein of leg    ,   Past Surgical History:   Procedure Laterality Date    COLONOSCOPY  2016    Dr. Bubba Anne; polyps f/u in 5 years    PROSTATECTOMY  2014    [address]     ,   Social History     Tobacco Use    Smoking status: Former Smoker     Packs/day: 0.50     Years: 12.00     Pack years: 6.00     Types: Cigarettes     Last attempt to quit: 1982     Years since quittin.6    Smokeless tobacco: Never Used   Substance Use Topics    Alcohol use: Yes     Comment: daily 5 vodka drinks per day    Drug use: No   ,   Family History   Problem Relation Age of Onset    Alzheimer's Disease Mother     Heart Disease Father     Cancer Father     Diabetes Father     Cancer Brother     Colon Cancer Neg Hx     Celiac Disease Neg Hx     Crohn's Disease Neg Hx    ,   Immunization History   Administered Date(s) Administered    Pneumococcal Conjugate 13-valent (Mariya Churn) 10/07/2019    Tdap (Boostrix, Adacel) 2017   ,   Health Maintenance   Topic Date Due    Diabetic retinal exam  1964    Shingles Vaccine (1 of 2) 2004    Flu vaccine (1) 2020    Pneumococcal 65+ years Vaccine (2 of 2 - PPSV23) 10/07/2020    Diabetic microalbuminuria test  10/08/2020    Lipid screen  10/08/2020    Diabetic foot exam  10/21/2020    A1C test (Diabetic or Prediabetic)  2021    Annual Wellness Visit (AWV)  04/29/2021    Potassium monitoring  08/25/2021    Creatinine monitoring  08/25/2021    PSA counseling  08/25/2021    Colon cancer screen colonoscopy  02/04/2026    DTaP/Tdap/Td vaccine (2 - Td) 12/20/2027    AAA screen  Completed    Hepatitis C screen  Completed    Hepatitis A vaccine  Aged Out    Hib vaccine  Aged Out    Meningococcal (ACWY) vaccine  Aged Out       PHYSICAL EXAMINATION:  [ INSTRUCTIONS:  \"[x]\" Indicates a positive item  \"[]\" Indicates a negative item  -- DELETE ALL ITEMS NOT EXAMINED]  [] Alert  [] Oriented to person/place/time    [] No apparent distress  [] Toxic appearing    [] Face flushed appearing [] Sclera clear  [] Lips are cyanotic      [] Breathing appears normal  [] Appears tachypneic      [] Rash on visible skin    [] Cranial Nerves II-XII grossly intact    [] Motor grossly intact in visible upper extremities    [] Motor grossly intact in visible lower extremities    [] Normal Mood  [] Anxious appearing    [] Depressed appearing  [] Confused appearing      [] Poor short term memory  [] Poor long term memory    [] OTHER:      Due to this being a TeleHealth encounter, evaluation of the following organ systems is limited: Vitals/Constitutional/EENT/Resp/CV/GI//MS/Neuro/Skin/Heme-Lymph-Imm. ASSESSMENT/PLAN:    1- Liver mass:  Ongoing evaluation. The mass is around 1 cm not clearly identified on Two phase CT scan  Obtain cross-sectional study either MRI with Eovist or equivalent/four-phase CT scan. Noted given the size the reported lesion, MRI could provide better sensitivity for further identification. Noted underlying liver disease with suspicions of alcoholic liver disease. Presently elevation liver enzymes and borderline thrombocytopenia  Will also proceed with colonoscopy given the history of colon polyps for comprehensive evaluation  2-Underlying liver disease evaluation  No clinical or radiological evidence of cirrhosis.   However NB persistent elevation of liver enzymes with borderline platelet that may suggest advanced disease. We will proceed with FibroScan for staging purposes. Obtain etiology work-up with to assess for any associated viral, metabolic or other etiologies  3-Alcoholic liver disease  Noted  steatosis and significant alcohol use. Alcohol abstinence strongly recommended  4-associated medical conditions include but not limited to history of colon polyp noting colonoscopy in 2016, joint pain, gout, hypertension, lipidemia, history of prostate surgery. .. Spent 22-minute during the telephone visit    Return in about 4 weeks (around 10/6/2020) for Post procedure results discussion, further management. An electronic signature was used to authenticate this note. --Steve Monk MD on 9/8/2020 at 12:58 PM  Gastroenterology  [address] to the emergency declaration under the [facility], [facility]5 waiver authority and the [employer] Act, this Virtual Visit was conducted, with patient's consent, to reduce the patient's risk of exposure to COVID-19 and provide continuity of care for an established patient. Services were provided through a video synchronous discussion virtually to substitute for in-person clinic visit. Please note this report has been partially produced using speech recognition software and may cause contain errors related to thatsystem including grammar, punctuation and spelling as well as words and phrases that may seem inappropriate. If there are questions or concerns please feel free to contact me to clarify.

## 2020-09-09 DIAGNOSIS — R79.89 ABNORMAL LFTS: ICD-10-CM

## 2020-09-09 DIAGNOSIS — K76.0 FATTY LIVER: ICD-10-CM

## 2020-09-09 DIAGNOSIS — R93.3 ABNORMAL FINDING ON GI TRACT IMAGING: ICD-10-CM

## 2020-09-09 DIAGNOSIS — Z11.59 ENCOUNTER FOR SCREENING FOR OTHER VIRAL DISEASES: ICD-10-CM

## 2020-09-09 DIAGNOSIS — R16.0 LIVER MASS: ICD-10-CM

## 2020-09-09 LAB
FERRITIN: 492.9 NG/ML (ref 30–400)
HBV SURFACE AB TITR SER: NORMAL MIU/ML
HEPATITIS B SURFACE ANTIGEN INTERPRETATION: NORMAL
HEPATITIS C ANTIBODY INTERPRETATION: NORMAL
INR BLD: 1.1
IRON SATURATION: 56 % (ref 11–46)
IRON: 153 UG/DL (ref 59–158)
PROTHROMBIN TIME: 14.3 SEC (ref 12.3–14.9)
TOTAL IRON BINDING CAPACITY: 274 UG/DL (ref 178–450)

## 2020-09-09 RX ORDER — SIMVASTATIN 20 MG
20 TABLET ORAL DAILY
Qty: 90 TABLET | Refills: 1 | Status: SHIPPED | OUTPATIENT
Start: 2020-09-09 | End: 2021-01-25 | Stop reason: SDUPTHER

## 2020-09-09 RX ORDER — METOPROLOL SUCCINATE 100 MG/1
100 TABLET, EXTENDED RELEASE ORAL DAILY
Qty: 90 TABLET | Refills: 1 | Status: SHIPPED | OUTPATIENT
Start: 2020-09-09 | End: 2021-03-08

## 2020-09-11 LAB
HAV AB SERPL IA-ACNC: NEGATIVE
HEPATITIS B CORE TOTAL ANTIBODY: NEGATIVE

## 2020-09-12 LAB
ALPHA-1 ANTITRYPSIN PHENOTYPE: ABNORMAL
ALPHA-1 ANTITRYPSIN: 63 MG/DL (ref 90–200)

## 2020-09-15 RX ORDER — POLYETHYLENE GLYCOL 3350, SODIUM SULFATE ANHYDROUS, SODIUM BICARBONATE, SODIUM CHLORIDE, POTASSIUM CHLORIDE 236; 22.74; 6.74; 5.86; 2.97 G/4L; G/4L; G/4L; G/4L; G/4L
4 POWDER, FOR SOLUTION ORAL ONCE
Qty: 4000 ML | Refills: 0 | Status: SHIPPED | OUTPATIENT
Start: 2020-09-15 | End: 2020-09-15

## 2020-09-16 ENCOUNTER — ANCILLARY PROCEDURE (OUTPATIENT)
Dept: ENDOSCOPY | Age: 65
End: 2020-09-16
Payer: MEDICARE

## 2020-09-16 PROCEDURE — 91200 LIVER ELASTOGRAPHY: CPT

## 2020-09-28 ENCOUNTER — NURSE ONLY (OUTPATIENT)
Dept: PRIMARY CARE CLINIC | Age: 65
End: 2020-09-28

## 2020-09-28 ENCOUNTER — HOSPITAL ENCOUNTER (OUTPATIENT)
Dept: MRI IMAGING | Age: 65
Discharge: HOME | End: 2020-09-30
Payer: MEDICARE

## 2020-09-28 PROCEDURE — A9577 INJ MULTIHANCE: HCPCS | Performed by: INTERNAL MEDICINE

## 2020-09-28 PROCEDURE — 74183 MRI ABD W/O CNTR FLWD CNTR: CPT

## 2020-09-28 PROCEDURE — 6360000004 HC RX CONTRAST MEDICATION: Performed by: INTERNAL MEDICINE

## 2020-09-28 PROCEDURE — U0003 INFECTIOUS AGENT DETECTION BY NUCLEIC ACID (DNA OR RNA); SEVERE ACUTE RESPIRATORY SYNDROME CORONAVIRUS 2 (SARS-COV-2) (CORONAVIRUS DISEASE [COVID-19]), AMPLIFIED PROBE TECHNIQUE, MAKING USE OF HIGH THROUGHPUT TECHNOLOGIES AS DESCRIBED BY CMS-2020-01-R: HCPCS

## 2020-09-28 RX ORDER — SODIUM CHLORIDE 0.9 % (FLUSH) 0.9 %
40 SYRINGE (ML) INJECTION ONCE
Status: DISCONTINUED | OUTPATIENT
Start: 2020-09-28 | End: 2020-10-01 | Stop reason: HOSPADM

## 2020-09-28 RX ADMIN — GADOBENATE DIMEGLUMINE 20 ML: 529 INJECTION, SOLUTION INTRAVENOUS at 10:20

## 2020-09-30 LAB
SARS-COV-2: NOT DETECTED
SOURCE: NORMAL

## 2020-10-02 ENCOUNTER — ANESTHESIA (OUTPATIENT)
Dept: ENDOSCOPY | Age: 65
End: 2020-10-02
Payer: MEDICARE

## 2020-10-02 ENCOUNTER — ANESTHESIA EVENT (OUTPATIENT)
Dept: ENDOSCOPY | Age: 65
End: 2020-10-02
Payer: MEDICARE

## 2020-10-02 ENCOUNTER — ANCILLARY PROCEDURE (OUTPATIENT)
Dept: ENDOSCOPY | Age: 65
End: 2020-10-02
Attending: INTERNAL MEDICINE
Payer: MEDICARE

## 2020-10-02 ENCOUNTER — HOSPITAL ENCOUNTER (OUTPATIENT)
Age: 65
Setting detail: OUTPATIENT SURGERY
Discharge: HOME | End: 2020-10-02
Attending: INTERNAL MEDICINE | Admitting: INTERNAL MEDICINE
Payer: MEDICARE

## 2020-10-02 VITALS
WEIGHT: 222 LBS | HEIGHT: 73 IN | DIASTOLIC BLOOD PRESSURE: 72 MMHG | OXYGEN SATURATION: 95 % | RESPIRATION RATE: 16 BRPM | BODY MASS INDEX: 29.42 KG/M2 | TEMPERATURE: 98.1 F | SYSTOLIC BLOOD PRESSURE: 121 MMHG | HEART RATE: 64 BPM

## 2020-10-02 VITALS
SYSTOLIC BLOOD PRESSURE: 95 MMHG | DIASTOLIC BLOOD PRESSURE: 61 MMHG | OXYGEN SATURATION: 96 % | RESPIRATION RATE: 12 BRPM

## 2020-10-02 LAB
GLUCOSE BLD-MCNC: 124 MG/DL (ref 60–115)
PERFORMED ON: ABNORMAL

## 2020-10-02 PROCEDURE — 3700000001 HC ADD 15 MINUTES (ANESTHESIA): Performed by: INTERNAL MEDICINE

## 2020-10-02 PROCEDURE — 45380 COLONOSCOPY AND BIOPSY: CPT | Performed by: INTERNAL MEDICINE

## 2020-10-02 PROCEDURE — 2500000003 HC RX 250 WO HCPCS: Performed by: NURSE ANESTHETIST, CERTIFIED REGISTERED

## 2020-10-02 PROCEDURE — 6360000002 HC RX W HCPCS: Performed by: NURSE ANESTHETIST, CERTIFIED REGISTERED

## 2020-10-02 PROCEDURE — 2709999900 HC NON-CHARGEABLE SUPPLY: Performed by: INTERNAL MEDICINE

## 2020-10-02 PROCEDURE — 3700000000 HC ANESTHESIA ATTENDED CARE: Performed by: INTERNAL MEDICINE

## 2020-10-02 PROCEDURE — 2580000003 HC RX 258

## 2020-10-02 PROCEDURE — 45385 COLONOSCOPY W/LESION REMOVAL: CPT | Performed by: INTERNAL MEDICINE

## 2020-10-02 PROCEDURE — 3609027000 HC COLONOSCOPY: Performed by: INTERNAL MEDICINE

## 2020-10-02 PROCEDURE — 88305 TISSUE EXAM BY PATHOLOGIST: CPT

## 2020-10-02 PROCEDURE — 2580000003 HC RX 258: Performed by: INTERNAL MEDICINE

## 2020-10-02 PROCEDURE — 7100000010 HC PHASE II RECOVERY - FIRST 15 MIN: Performed by: INTERNAL MEDICINE

## 2020-10-02 PROCEDURE — 6370000000 HC RX 637 (ALT 250 FOR IP): Performed by: INTERNAL MEDICINE

## 2020-10-02 RX ORDER — SIMETHICONE 20 MG/.3ML
EMULSION ORAL PRN
Status: DISCONTINUED | OUTPATIENT
Start: 2020-10-02 | End: 2020-10-02 | Stop reason: ALTCHOICE

## 2020-10-02 RX ORDER — PROPOFOL 10 MG/ML
INJECTION, EMULSION INTRAVENOUS PRN
Status: DISCONTINUED | OUTPATIENT
Start: 2020-10-02 | End: 2020-10-02 | Stop reason: SDUPTHER

## 2020-10-02 RX ORDER — MAGNESIUM HYDROXIDE 1200 MG/15ML
LIQUID ORAL PRN
Status: DISCONTINUED | OUTPATIENT
Start: 2020-10-02 | End: 2020-10-02 | Stop reason: ALTCHOICE

## 2020-10-02 RX ORDER — SODIUM CHLORIDE 9 MG/ML
INJECTION, SOLUTION INTRAVENOUS
Status: COMPLETED
Start: 2020-10-02 | End: 2020-10-02

## 2020-10-02 RX ORDER — SODIUM CHLORIDE 9 MG/ML
INJECTION, SOLUTION INTRAVENOUS CONTINUOUS
Status: DISCONTINUED | OUTPATIENT
Start: 2020-10-02 | End: 2020-10-02 | Stop reason: HOSPADM

## 2020-10-02 RX ORDER — LIDOCAINE HYDROCHLORIDE 20 MG/ML
INJECTION, SOLUTION INFILTRATION; PERINEURAL PRN
Status: DISCONTINUED | OUTPATIENT
Start: 2020-10-02 | End: 2020-10-02 | Stop reason: SDUPTHER

## 2020-10-02 RX ADMIN — PROPOFOL 500 MG: 10 INJECTION, EMULSION INTRAVENOUS at 08:57

## 2020-10-02 RX ADMIN — LIDOCAINE HYDROCHLORIDE 60 MG: 20 INJECTION, SOLUTION INFILTRATION; PERINEURAL at 08:57

## 2020-10-02 RX ADMIN — SODIUM CHLORIDE: 9 INJECTION, SOLUTION INTRAVENOUS at 08:17

## 2020-10-02 ASSESSMENT — PULMONARY FUNCTION TESTS
PIF_VALUE: 0

## 2020-10-02 ASSESSMENT — PAIN SCALES - GENERAL
PAINLEVEL_OUTOF10: 0
PAINLEVEL_OUTOF10: 0

## 2020-10-02 ASSESSMENT — PAIN - FUNCTIONAL ASSESSMENT: PAIN_FUNCTIONAL_ASSESSMENT: 0-10

## 2020-10-02 NOTE — H&P
Patient Name: Sabrina Queen  : 1955  MRN: [id number]  DATE: 10/02/20      ENDOSCOPY  History and Physical    Procedure:    [x] Diagnostic Colonoscopy       [x] Screening Colonoscopy  [] EGD      [] ERCP      [] EUS       [] Other    [x] Previous office notes/History and Physical reviewed from the patients chart. Please see EMR for further details of HPI. I have examined the patient's status immediately prior to the procedure and:      Indications/HPI:    []Abdominal Pain   []Cancer- GI/Lung  []Fhx of colon CA/polyps  [x]History of Polyps   []Watkinss   []Melena  []Abnormal Imaging   []Dysphagia    []Persistent Pneumonia  []Anemia   []Food Impaction  []History of Polyps  []GI Bleed   []Pulmonary nodule/Mass  []Change in bowel habits  []Heartburn/Reflux  []Rectal Bleed (BRBPR)  []Chest Pain - Non Cardiac  []Heme (+) Stool  []Ulcers  []Constipation   []Hemoptysis   []Varices  []Diarrhea   []Hypoxemia  []Nausea/Vomiting   []Screening   []Crohns/Colitis  []Other:    Anesthesia:   [x] MAC [] Moderate Sedation   [] General   [] None     ROS: 12 pt Review of Symptoms was negative unless mentioned above    Medications:   Prior to Admission medications    Medication Sig Start Date End Date Taking?  Authorizing Provider   metoprolol succinate (TOPROL XL) 100 MG extended release tablet Take 1 tablet by mouth daily 20  Yes Maggie Beavers MD   metFORMIN (GLUCOPHAGE) 500 MG tablet Take 1 tablet by mouth daily (with breakfast) 20  Yes Maggie Beavers MD   Na Sulfate-K Sulfate-Mg Sulf 17.5-3.13-1.6 GM/177ML SOLN As directed 20  Yes Summer Pino MD   fluticasone (FLONASE) 50 MCG/ACT nasal spray 2 sprays by Nasal route daily 20  Yes Maggie Beavers MD   fluticasone (FLOVENT HFA) 110 MCG/ACT inhaler Inhale 2 puffs into the lungs 2 times daily 10/7/19 10/6/20 Yes Maggie Beavers MD   loratadine (CLARITIN) 10 MG tablet Take 1 tablet by mouth daily 3/19/19  Yes Maggie Beavers MD   aspirin 81 MG chewable tablet Take 81 mg by mouth daily   Yes Historical Provider, MD   simvastatin (ZOCOR) 20 MG tablet Take 1 tablet by mouth daily 9/9/20   Luis Spann MD   albuterol sulfate HFA (VENTOLIN HFA) 108 (90 Base) MCG/ACT inhaler Inhale 2 puffs into the lungs every 6 hours as needed for Wheezing 8/26/20   Luis Spann MD   diclofenac (VOLTAREN) 50 MG EC tablet Take 1 tablet by mouth 3 times daily (with meals) 8/26/20   Luis Spann MD   Spacer/Aero-Hold Chamber Bags MISC 1 applicator by Does not apply route 4 times daily 8/25/20   Luis Spann MD   Blood Pressure KIT 1 applicator by Does not apply route 4 times daily 8/25/20   Luis Spann MD   irbesartan (AVAPRO) 150 MG tablet Take 1 tablet by mouth daily 7/22/20   Luis Spann MD   Coenzyme Q10-Vitamin E (QUNOL ULTRA COQ10) 100-150 MG-UNIT CAPS  3/10/20   Historical Provider, MD   amLODIPine (NORVASC) 5 MG tablet Take 1 tablet by mouth daily 3/10/20   Luis Spann MD   allopurinol (ZYLOPRIM) 300 MG tablet Take 1 tablet by mouth daily 2/4/20   Luis Spann MD   vitamin C (ASCORBIC ACID) 500 MG tablet Take 500 mg by mouth daily    Historical Provider, MD   Multiple Vitamins-Minerals (MULTIVITAMIN MEN 50+ PO) Take by mouth    Historical Provider, MD       Allergies: No Known Allergies     History of allergic reaction to anesthesia:  No    Past Medical History:  Past Medical History:   Diagnosis Date    Actinic keratoses     has had LN2 and used Efudex    Alcohol consumption of one to four drinks per day on alcohol screening     Allergic rhinitis     cats, weeds, grasses, ragweed    Asthma     Bilateral knee pain     Gout     History of colon polyps 02/2016    needs f/u 5 years [facility]    History of type 2 diabetes mellitus     about 15 years    Hyperlipidemia     Hypertension     Keratosis, inflamed seborrheic     Osteoarthritis, localized, shoulder, right     Polyp of transverse colon f/u due 2021 8/9/2014    Prediabetes     Prostate cancer ([facility] [address]     s/p prostatectomy    Syncope and collapse     Varicose vein of leg        Past Surgical History:  Past Surgical History:   Procedure Laterality Date    COLONOSCOPY  2016    Dr. Su Darden; polyps f/u in 5 years    PROSTATECTOMY  2014    VARICOSE VEIN SURGERY         Social History:  Social History     Tobacco Use    Smoking status: Former Smoker     Packs/day: 0.50     Years: 12.00     Pack years: 6.00     Types: Cigarettes     Last attempt to quit: 1982     Years since quittin.6    Smokeless tobacco: Never Used   Substance Use Topics    Alcohol use: Yes     Comment: daily 5 vodka drinks per day    Drug use: No       Vital Signs:   Vitals:    10/02/20 0807   BP: (!) 156/89   Pulse: 69   Resp: 18   Temp: 98.1 °F (36.7 °C)   SpO2: 96%        Physical Exam:  Cardiac:  [x]WNL  []Comments:  Pulmonary:  [x]WNL   []Comments:   Neuro/Mental Status:  [x]WNL  []Comments:  Abdominal:  [x]WNL    []Comments:  Other:   []WNL  []Comments:    Informed Consent:  The risks and benefits of the procedure have been discussed with either the patient or if they cannot consent, their representative. Assessment:  Patient examined and appropriate for planned sedation and procedure. Plan:  Proceed with planned sedation and procedure as above.     Clearance MD [place]  8:14 AM

## 2020-10-02 NOTE — ANESTHESIA PRE PROCEDURE
Department of Anesthesiology  Preprocedure Note       Name:  Nannette Sanabria   Age:  76 y.o.  :  1955                                          MRN:  [id number]         Date:  10/2/2020      Surgeon: Adrien Jones):  Saw Greco MD    Procedure: Procedure(s):  COLONOSCOPY DIAGNOSTIC    Medications prior to admission:   Prior to Admission medications    Medication Sig Start Date End Date Taking?  Authorizing Provider   metoprolol succinate (TOPROL XL) 100 MG extended release tablet Take 1 tablet by mouth daily 20   Bonner Merlin, MD   simvastatin (ZOCOR) 20 MG tablet Take 1 tablet by mouth daily 20   Bonner Merlin, MD   metFORMIN (GLUCOPHAGE) 500 MG tablet Take 1 tablet by mouth daily (with breakfast) 20   Bonner Merlin, MD   Na Sulfate-K Sulfate-Mg Sulf 17.5-3.13-1.6 GM/177ML SOLN As directed 20   Saw Greco MD   albuterol sulfate HFA (VENTOLIN HFA) 108 (90 Base) MCG/ACT inhaler Inhale 2 puffs into the lungs every 6 hours as needed for Wheezing 20   Bonner Merlin, MD   diclofenac (VOLTAREN) 50 MG EC tablet Take 1 tablet by mouth 3 times daily (with meals) 20   Bonner Merlin, MD   Spacer/Aero-Hold Chamber Bags MISC 1 applicator by Does not apply route 4 times daily 20   Bonner Merlin, MD   Blood Pressure KIT 1 applicator by Does not apply route 4 times daily 20   Bonner Merlin, MD   irbesartan (AVAPRO) 150 MG tablet Take 1 tablet by mouth daily 20   Bonner Merlin, MD   Coenzyme Q10-Vitamin E (QUNOL ULTRA COQ10) 100-150 MG-UNIT CAPS  3/10/20   Historical Provider, MD   fluticasone [facility]) 50 MCG/ACT nasal spray 2 sprays by Nasal route daily 20   Bonner Merlin, MD   amLODIPine (NORVASC) 5 MG tablet Take 1 tablet by mouth daily 3/10/20   Bonner Merlin, MD   allopurinol (ZYLOPRIM) 300 MG tablet Take 1 tablet by mouth daily 20   Bonner Merlin, MD   fluticasone (FLOVENT HFA) 110 MCG/ACT inhaler Inhale 2 puffs into the lungs 2 times daily 10/7/19 10/6/20  Epi Ron MD   loratadine (CLARITIN) 10 MG tablet Take 1 tablet by mouth daily 3/19/19   Epi Ron MD   aspirin 81 MG chewable tablet Take 81 mg by mouth daily    Historical Provider, MD   vitamin C (ASCORBIC ACID) 500 MG tablet Take 500 mg by mouth daily    Historical Provider, MD   Multiple Vitamins-Minerals (MULTIVITAMIN MEN 50+ PO) Take by mouth    Historical Provider, MD       Current medications:    No current facility-administered medications for this encounter.       Current Outpatient Medications   Medication Sig Dispense Refill    metoprolol succinate (TOPROL XL) 100 MG extended release tablet Take 1 tablet by mouth daily 90 tablet 1    simvastatin (ZOCOR) 20 MG tablet Take 1 tablet by mouth daily 90 tablet 1    metFORMIN (GLUCOPHAGE) 500 MG tablet Take 1 tablet by mouth daily (with breakfast) 90 tablet 1    Na Sulfate-K Sulfate-Mg Sulf 17.5-3.13-1.6 GM/177ML SOLN As directed 1 Bottle 0    albuterol sulfate HFA (VENTOLIN HFA) 108 (90 Base) MCG/ACT inhaler Inhale 2 puffs into the lungs every 6 hours as needed for Wheezing 18 g 1    diclofenac (VOLTAREN) 50 MG EC tablet Take 1 tablet by mouth 3 times daily (with meals) 60 tablet 0    Spacer/Aero-Hold Chamber Bags MISC 1 applicator by Does not apply route 4 times daily 1 each 0    Blood Pressure KIT 1 applicator by Does not apply route 4 times daily 1 kit 0    irbesartan (AVAPRO) 150 MG tablet Take 1 tablet by mouth daily 90 tablet 3    Coenzyme Q10-Vitamin E (QUNOL ULTRA COQ10) 100-150 MG-UNIT CAPS       fluticasone (FLONASE) 50 MCG/ACT nasal spray 2 sprays by Nasal route daily 32 g 0    amLODIPine (NORVASC) 5 MG tablet Take 1 tablet by mouth daily 30 tablet 11    allopurinol (ZYLOPRIM) 300 MG tablet Take 1 tablet by mouth daily 30 tablet 11    fluticasone (FLOVENT HFA) 110 MCG/ACT inhaler Inhale 2 puffs into the lungs 2 times daily 1 Inhaler 3    loratadine (CLARITIN) 10 MG tablet Take 1 tablet by mouth daily 30 tablet 11    aspirin 81 MG chewable tablet Take 81 mg by mouth daily      vitamin C (ASCORBIC ACID) 500 MG tablet Take 500 mg by mouth daily      Multiple Vitamins-Minerals (MULTIVITAMIN MEN 50+ PO) Take by mouth         Allergies:  No Known Allergies    Problem List:    Patient Active Problem List   Diagnosis Code    Asthma J45.909    Allergic rhinitis J30.9    Gout M10.9    Mixed hyperlipidemia E78.2    Essential hypertension I10    Prostate CA ([address] C61    Actinic keratoses L57.0    Keratosis, inflamed seborrheic L82.0    Bilateral knee pain M25.561, M25.562    Varicose veins of left lower extremity with pain I83.812    Venous insufficiency of left leg I87.2    Syncope and collapse R55    Alcohol consumption of one to four drinks per day on alcohol screening Z13.39    Varicose veins of both lower extremities I83.93    Acute left ankle pain M25.572    FH: prostate cancer Z80.42    Hypertrophy of prostate with urinary obstruction and other lower urinary tract symptoms (LUTS) N40.1    Hyperuricemia E79.0    Polyp of transverse colon f/u due 2021 K63.5    Posterior calcaneal exostosis M77.30    Primary osteoarthritis of both knees M17.0    S/P prostatectomy Z90.79    Type 2 diabetes mellitus without complication (HCC) G60.4    Diverticula of colon Z02.19    Diastolic CHF, chronic (HCC) I50.32    Elevated LFTs R79.89       Past Medical History:        Diagnosis Date    Actinic keratoses     has had LN2 and used Efudex    Alcohol consumption of one to four drinks per day on alcohol screening     Allergic rhinitis     cats, weeds, grasses, ragweed    Asthma     Bilateral knee pain     Gout     History of colon polyps 02/2016    needs f/u 5 years [facility]    History of type 2 diabetes mellitus     about 15 years    Hyperlipidemia     Hypertension     Keratosis, inflamed seborrheic     Osteoarthritis, localized, shoulder, right     Polyp of transverse colon f/u due 2014    Prediabetes     Prostate cancer ([facility] [address] 2014    s/p prostatectomy    Syncope and collapse     Varicose vein of leg        Past Surgical History:        Procedure Laterality Date    COLONOSCOPY  2016    Dr. Jorge Garcia; polyps f/u in 5 years    PROSTATECTOMY  2014    VARICOSE VEIN SURGERY         Social History:    Social History     Tobacco Use    Smoking status: Former Smoker     Packs/day: 0.50     Years: 12.00     Pack years: 6.00     Types: Cigarettes     Last attempt to quit: 1982     Years since quittin.6    Smokeless tobacco: Never Used   Substance Use Topics    Alcohol use: Yes     Comment: daily 5 vodka drinks per day                                Counseling given: Not Answered      Vital Signs (Current): There were no vitals filed for this visit.                                            BP Readings from Last 3 Encounters:   20 139/81   20 130/88   20 (!) 175/115       NPO Status:                                                                                 BMI:   Wt Readings from Last 3 Encounters:   20 225 lb (102.1 kg)   20 224 lb (101.6 kg)   20 223 lb (101.2 kg)     There is no height or weight on file to calculate BMI.    CBC:   Lab Results   Component Value Date    WBC 5.8 2020    RBC 4.87 2020    HGB 16.4 2020    HCT 47.9 2020    MCV 98.4 2020    RDW 13.0 2020     2020       CMP:   Lab Results   Component Value Date     2020    K 3.7 2020    CL 97 2020    CO2 24 2020    BUN 10 2020    CREATININE 0.75 2020    GFRAA >60.0 2020    LABGLOM >60.0 2020    GLUCOSE 126 2020    PROT 7.9 2020    CALCIUM 10.4 2020    BILITOT 1.2 2020    ALKPHOS 79 2020    AST 53 2020    ALT 65 2020       POC Tests: No results for input(s): POCGLU, POCNA, POCK, POCCL, POCBUN, POCHEMO, POCHCT in the last 72 hours.    Coags:   Lab Results   Component Value Date    PROTIME 14.3 09/09/2020    INR 1.1 09/09/2020    APTT 25.1 02/02/2018       HCG (If Applicable): No results found for: PREGTESTUR, PREGSERUM, HCG, HCGQUANT     ABGs: No results found for: PHART, PO2ART, SJO7FIS, MQC8RMU, BEART, M8NOHKBG     Type & Screen (If Applicable):  No results found for: LABABO, LABRH    Drug/Infectious Status (If Applicable):  No results found for: HIV, HEPCAB    COVID-19 Screening (If Applicable):   Lab Results   Component Value Date    COVID19 Not Detected 09/28/2020         Anesthesia Evaluation    Airway: Mallampati: II  TM distance: >3 FB   Neck ROM: full  Mouth opening: > = 3 FB Dental:          Pulmonary:normal exam    (+) asthma:                            Cardiovascular:    (+) hypertension:, CHF:, hyperlipidemia        Rhythm: regular  Rate: normal                    Neuro/Psych:   Negative Neuro/Psych ROS              GI/Hepatic/Renal:   (+) bowel prep,           Endo/Other:    (+) Diabetes, : arthritis: OA., .                 Abdominal:           Vascular: negative vascular ROS. Anesthesia Plan      MAC     ASA 3       Induction: intravenous. Anesthetic plan and risks discussed with patient. Plan discussed with attending.                   Padma Lombardi, APRN - CRNA   10/2/2020

## 2020-10-02 NOTE — ANESTHESIA POSTPROCEDURE EVALUATION
Department of Anesthesiology  Postprocedure Note    Patient: Misbah Wheeler  MRN: [id number]  [place]: 1955  Date of evaluation: 10/2/2020  Time:  9:19 AM     Procedure Summary     Date:  10/02/20 Room / Location:  [address] [place] [address]    Anesthesia Start:  2242 Anesthesia Stop:      Procedure:  COLONOSCOPY DIAGNOSTIC (N/A ) Diagnosis:  (polyp of colon, liver mass, abnormal findings on GI tract imaging; K63.5, R16.0, R93.3)    Surgeon:  Tracey Soares MD Responsible Provider:  CALIXTO Willingham CRNA    Anesthesia Type:  MAC ASA Status:  3          Anesthesia Type: MAC    Isabella Phase I: Isabella Score: 10    Isabella Phase II:      Last vitals: Reviewed and per EMR flowsheets.        Anesthesia Post Evaluation    Patient location during evaluation: bedside  Patient participation: complete - patient participated  Level of consciousness: awake and awake and alert  Pain score: 0  Airway patency: patent  Nausea & Vomiting: no nausea and no vomiting  Complications: no  Cardiovascular status: blood pressure returned to baseline and hemodynamically stable  Respiratory status: acceptable and spontaneous ventilation  Hydration status: euvolemic

## 2020-10-12 RX ORDER — SIMVASTATIN 20 MG
20 TABLET ORAL DAILY
Qty: 90 TABLET | Refills: 1 | OUTPATIENT
Start: 2020-10-12

## 2020-11-18 DIAGNOSIS — I10 ESSENTIAL HYPERTENSION: ICD-10-CM

## 2020-11-18 DIAGNOSIS — E11.9 TYPE 2 DIABETES MELLITUS WITHOUT COMPLICATION, WITHOUT LONG-TERM CURRENT USE OF INSULIN (HCC): ICD-10-CM

## 2020-11-18 DIAGNOSIS — C61 PROSTATE CA (HCC): ICD-10-CM

## 2020-11-18 DIAGNOSIS — I50.32 DIASTOLIC CHF, CHRONIC (HCC): ICD-10-CM

## 2020-11-18 LAB
ALBUMIN SERPL-MCNC: 4.4 G/DL (ref 3.5–4.6)
ALP BLD-CCNC: 68 U/L (ref 35–104)
ALT SERPL-CCNC: 52 U/L (ref 0–41)
ANION GAP SERPL CALCULATED.3IONS-SCNC: 15 MEQ/L (ref 9–15)
AST SERPL-CCNC: 54 U/L (ref 0–40)
BASOPHILS ABSOLUTE: 0 K/UL (ref 0–0.2)
BASOPHILS RELATIVE PERCENT: 0.5 %
BILIRUB SERPL-MCNC: 1.3 MG/DL (ref 0.2–0.7)
BUN BLDV-MCNC: 11 MG/DL (ref 8–23)
CALCIUM SERPL-MCNC: 9.2 MG/DL (ref 8.5–9.9)
CHLORIDE BLD-SCNC: 101 MEQ/L (ref 95–107)
CHOLESTEROL, FASTING: 146 MG/DL (ref 0–199)
CO2: 24 MEQ/L (ref 20–31)
CREAT SERPL-MCNC: 0.77 MG/DL (ref 0.7–1.2)
CREATININE URINE: 713.1 MG/DL
EOSINOPHILS ABSOLUTE: 0.2 K/UL (ref 0–0.7)
EOSINOPHILS RELATIVE PERCENT: 2.6 %
GFR AFRICAN AMERICAN: >60
GFR NON-AFRICAN AMERICAN: >60
GLOBULIN: 2.7 G/DL (ref 2.3–3.5)
GLUCOSE BLD-MCNC: 131 MG/DL (ref 70–99)
HBA1C MFR BLD: 6.5 % (ref 4.8–5.9)
HCT VFR BLD CALC: 46.6 % (ref 42–52)
HDLC SERPL-MCNC: 58 MG/DL (ref 40–59)
HEMOGLOBIN: 15.5 G/DL (ref 14–18)
LDL CHOLESTEROL CALCULATED: 71 MG/DL (ref 0–129)
LYMPHOCYTES ABSOLUTE: 1.3 K/UL (ref 1–4.8)
LYMPHOCYTES RELATIVE PERCENT: 21.3 %
MCH RBC QN AUTO: 33.2 PG (ref 27–31.3)
MCHC RBC AUTO-ENTMCNC: 33.2 % (ref 33–37)
MCV RBC AUTO: 100 FL (ref 80–100)
MICROALBUMIN UR-MCNC: 21.2 MG/DL
MICROALBUMIN/CREAT UR-RTO: 29.7 MG/G (ref 0–30)
MONOCYTES ABSOLUTE: 0.8 K/UL (ref 0.2–0.8)
MONOCYTES RELATIVE PERCENT: 13.2 %
NEUTROPHILS ABSOLUTE: 3.8 K/UL (ref 1.4–6.5)
NEUTROPHILS RELATIVE PERCENT: 62.4 %
PDW BLD-RTO: 13 % (ref 11.5–14.5)
PLATELET # BLD: 189 K/UL (ref 130–400)
POTASSIUM SERPL-SCNC: 4 MEQ/L (ref 3.4–4.9)
PRO-BNP: 91 PG/ML
PROSTATE SPECIFIC ANTIGEN: 0.01 NG/ML (ref 0–5.4)
RBC # BLD: 4.66 M/UL (ref 4.7–6.1)
SODIUM BLD-SCNC: 140 MEQ/L (ref 135–144)
TOTAL PROTEIN: 7.1 G/DL (ref 6.3–8)
TRIGLYCERIDE, FASTING: 84 MG/DL (ref 0–150)
TSH REFLEX: 1.71 UIU/ML (ref 0.44–3.86)
WBC # BLD: 6.1 K/UL (ref 4.8–10.8)

## 2021-01-19 DIAGNOSIS — J45.41 MODERATE PERSISTENT ASTHMA WITH ACUTE EXACERBATION: ICD-10-CM

## 2021-01-20 RX ORDER — FLUTICASONE PROPIONATE 110 UG/1
2 AEROSOL, METERED RESPIRATORY (INHALATION) 2 TIMES DAILY
Qty: 1 INHALER | Refills: 3 | Status: SHIPPED | OUTPATIENT
Start: 2021-01-20 | End: 2022-08-25 | Stop reason: SDUPTHER

## 2021-01-25 DIAGNOSIS — E78.2 MIXED HYPERLIPIDEMIA: ICD-10-CM

## 2021-01-25 RX ORDER — SIMVASTATIN 20 MG
20 TABLET ORAL DAILY
Qty: 90 TABLET | Refills: 1 | Status: SHIPPED | OUTPATIENT
Start: 2021-01-25 | End: 2021-05-29 | Stop reason: SDUPTHER

## 2021-01-26 ENCOUNTER — OFFICE VISIT (OUTPATIENT)
Dept: FAMILY MEDICINE CLINIC | Age: 66
End: 2021-01-26
Payer: MEDICARE

## 2021-01-26 VITALS
DIASTOLIC BLOOD PRESSURE: 72 MMHG | HEIGHT: 73 IN | TEMPERATURE: 98.3 F | WEIGHT: 234 LBS | HEART RATE: 73 BPM | SYSTOLIC BLOOD PRESSURE: 126 MMHG | OXYGEN SATURATION: 98 % | BODY MASS INDEX: 31.01 KG/M2

## 2021-01-26 DIAGNOSIS — C61 PROSTATE CA (HCC): ICD-10-CM

## 2021-01-26 DIAGNOSIS — L57.0 ACTINIC KERATOSES: Primary | ICD-10-CM

## 2021-01-26 DIAGNOSIS — D17.1 LIPOMA OF TORSO: ICD-10-CM

## 2021-01-26 DIAGNOSIS — Z23 NEED FOR VACCINATION FOR PNEUMOCOCCUS: ICD-10-CM

## 2021-01-26 DIAGNOSIS — Z23 NEED FOR PNEUMOCOCCAL VACCINE: ICD-10-CM

## 2021-01-26 DIAGNOSIS — E11.9 TYPE 2 DIABETES MELLITUS WITHOUT COMPLICATION, WITHOUT LONG-TERM CURRENT USE OF INSULIN (HCC): ICD-10-CM

## 2021-01-26 DIAGNOSIS — I50.32 DIASTOLIC CHF, CHRONIC (HCC): ICD-10-CM

## 2021-01-26 PROCEDURE — 17000 DESTRUCT PREMALG LESION: CPT | Performed by: FAMILY MEDICINE

## 2021-01-26 PROCEDURE — 1036F TOBACCO NON-USER: CPT | Performed by: FAMILY MEDICINE

## 2021-01-26 PROCEDURE — 99215 OFFICE O/P EST HI 40 MIN: CPT | Performed by: FAMILY MEDICINE

## 2021-01-26 PROCEDURE — 3046F HEMOGLOBIN A1C LEVEL >9.0%: CPT | Performed by: FAMILY MEDICINE

## 2021-01-26 PROCEDURE — 4040F PNEUMOC VAC/ADMIN/RCVD: CPT | Performed by: FAMILY MEDICINE

## 2021-01-26 PROCEDURE — 1123F ACP DISCUSS/DSCN MKR DOCD: CPT | Performed by: FAMILY MEDICINE

## 2021-01-26 PROCEDURE — 90732 PPSV23 VACC 2 YRS+ SUBQ/IM: CPT | Performed by: FAMILY MEDICINE

## 2021-01-26 PROCEDURE — 2022F DILAT RTA XM EVC RTNOPTHY: CPT | Performed by: FAMILY MEDICINE

## 2021-01-26 PROCEDURE — G8417 CALC BMI ABV UP PARAM F/U: HCPCS | Performed by: FAMILY MEDICINE

## 2021-01-26 PROCEDURE — G8427 DOCREV CUR MEDS BY ELIG CLIN: HCPCS | Performed by: FAMILY MEDICINE

## 2021-01-26 PROCEDURE — G8484 FLU IMMUNIZE NO ADMIN: HCPCS | Performed by: FAMILY MEDICINE

## 2021-01-26 PROCEDURE — 17003 DESTRUCT PREMALG LES 2-14: CPT | Performed by: FAMILY MEDICINE

## 2021-01-26 PROCEDURE — G0009 ADMIN PNEUMOCOCCAL VACCINE: HCPCS | Performed by: FAMILY MEDICINE

## 2021-01-26 PROCEDURE — 3017F COLORECTAL CA SCREEN DOC REV: CPT | Performed by: FAMILY MEDICINE

## 2021-01-26 ASSESSMENT — ENCOUNTER SYMPTOMS: COLOR CHANGE: 1

## 2021-01-26 ASSESSMENT — PATIENT HEALTH QUESTIONNAIRE - PHQ9
SUM OF ALL RESPONSES TO PHQ QUESTIONS 1-9: 0
2. FEELING DOWN, DEPRESSED OR HOPELESS: 0

## 2021-01-26 NOTE — PROGRESS NOTES
Diagnosis Orders   1. Actinic keratoses  VT DESTRUC PREMALIGNANT, FIRST LESION    VT DESTRUC PREMALIGNANT,2-14 LESIONS   2. Lipoma of torso  Camella Kayser, MD, General Surgery, [place]   3. Type 2 diabetes mellitus without complication, without long-term current use of insulin ([address]     4. Prostate CA ([address]     5. Diastolic CHF, chronic (HCC)     6. Need for vaccination for pneumococcus     7. Need for pneumococcal vaccine  PNEUMOVAX 23 subcutaneous/IM (Pneumococcal polysaccharide vaccine 23-valent >= 3yo)     Return for May appt for dm,  6 month skin. Patient Instructions   Liver abnormality being followed by Dr. Mio Molina. Reviewed recent scans ordered by Dr. Adair Machado. Last PSA was normal for patient no further action this year. Last hemoglobin A1c was 6.5 with microalbumin was elevated. Follow-up with us in 6 months of those labs for repeat. Refer to surgery for removal of lipoma when patient is ready. Treatment for actinic S keratosis with liquid nitrogen was done today. Diastolic heart failure has been taken off the problem list as it is resolved last 2D echo was normal.    Cryotherapy instructions    Post op instructions given. A printed copy provided. Patient has been diabetic for few years. Taking Metformin routinely. Watching his diet. Staying active. Tries to avoid concentrated sweets and get his eye exams routinely. Last hemoglobin A1c was 6.5. Current Outpatient Medications on File Prior to Visit   Medication Sig Dispense Refill    simvastatin (ZOCOR) 20 MG tablet Take 1 tablet by mouth daily 90 tablet 1    fluticasone (FLOVENT HFA) 110 MCG/ACT inhaler Inhale 2 puffs into the lungs 2 times daily 1 Inhaler 3    metFORMIN (GLUCOPHAGE) 500 MG tablet TAKE ONE TABLET BY MOUTH EVERY DAY WITH BREAKFAST  30 tablet 3    metoprolol succinate (TOPROL XL) 100 MG extended release tablet Take 1 tablet by mouth daily 90 tablet 1    albuterol sulfate HFA (VENTOLIN HFA) 108 (90 Base) MCG/ACT inhaler Inhale 2 puffs into the lungs every 6 hours as needed for Wheezing 18 g 1    diclofenac (VOLTAREN) 50 MG EC tablet Take 1 tablet by mouth 3 times daily (with meals) 60 tablet 0    Spacer/Aero-Hold Chamber Bags MISC 1 applicator by Does not apply route 4 times daily 1 each 0    Blood Pressure KIT 1 applicator by Does not apply route 4 times daily 1 kit 0    irbesartan (AVAPRO) 150 MG tablet Take 1 tablet by mouth daily 90 tablet 3    Coenzyme Q10-Vitamin E (QUNOL ULTRA COQ10) 100-150 MG-UNIT CAPS       fluticasone (FLONASE) 50 MCG/ACT nasal spray 2 sprays by Nasal route daily 32 g 0    amLODIPine (NORVASC) 5 MG tablet Take 1 tablet by mouth daily 30 tablet 11    allopurinol (ZYLOPRIM) 300 MG tablet Take 1 tablet by mouth daily 30 tablet 11    loratadine (CLARITIN) 10 MG tablet Take 1 tablet by mouth daily 30 tablet 11    aspirin 81 MG chewable tablet Take 81 mg by mouth daily      vitamin C (ASCORBIC ACID) 500 MG tablet Take 500 mg by mouth daily      Multiple Vitamins-Minerals (MULTIVITAMIN MEN 50+ PO) Take by mouth       No current facility-administered medications on file prior to visit.       Past Medical History:   Diagnosis Date  Actinic keratoses     has had LN2 and used Efudex    Alcohol consumption of one to four drinks per day on alcohol screening     Allergic rhinitis     cats, weeds, grasses, ragweed    Asthma     Bilateral knee pain     Diabetes mellitus ([facility] [address]     Gout     History of colon polyps 2016    needs f/u 5 years [facility]    History of type 2 diabetes mellitus     about 15 years    Hyperlipidemia     Hypertension     Keratosis, inflamed seborrheic     Osteoarthritis, localized, shoulder, right     Polyp of transverse colon f/u due 2014    Prediabetes     Prostate [facility] ([facility] 2014    s/p prostatectomy    Syncope and collapse     Varicose vein of leg      Past Surgical History:   Procedure Laterality Date    COLONOSCOPY  2016    Dr. Carly Segura; polyps f/u in 5 years    COLONOSCOPY N/A 10/2/2020    COLONOSCOPY DIAGNOSTIC performed by Beau Ontiveros MD at [address] [place] [address]  2014    ULNAR TUNNEL RELEASE Bilateral     VARICOSE VEIN SURGERY       Social History     Socioeconomic History    Marital status: Single     Spouse name: Not on file    Number of children: Not on file    Years of education: 0    Highest education level: Not on file   Occupational History    Not on file   Social Needs    Financial resource strain: Not hard at all   Flumes insecurity     Worry: Never true     Inability: Never true   [employer] needs     Medical: No     Non-medical: No   Tobacco Use    Smoking status: Former Smoker     Packs/day: 0.50     Years: 12.00     Pack years: 6.00     Types: Cigarettes     Quit date: 1982     Years since quittin.9    Smokeless tobacco: Never Used   Substance and Sexual Activity    Alcohol use: Yes     Comment: daily 5 vodka drinks per day    Drug use: No    Sexual activity: Yes     Partners: Female   Lifestyle    Physical activity     Days per week: Not on file     Minutes per session: Not on file    Stress: Not on file Pupils: Pupils are equal, round, and reactive to light. Neck:      Musculoskeletal: Full passive range of motion without pain. Thyroid: No thyroid mass or thyromegaly. Vascular: No JVD. Trachea: Trachea and phonation normal.   Cardiovascular:      Rate and Rhythm: Normal rate and regular rhythm. Pulmonary:      Effort: No accessory muscle usage or respiratory distress. Musculoskeletal:      Comments: FROM all large muscle groups and joints as witnessed when walking to exam table, getting on, and getting off the exam table. Skin:     General: Skin is warm and dry. Findings: No rash. Comments: 8 lesions throughout the scalp erythematous base rough white flake consistent with AK. Upper thoracic back large lipoma. Neurological:      Mental Status: He is alert. Motor: No tremor or atrophy. Gait: Gait normal.   Psychiatric:         Speech: Speech normal.         Behavior: Behavior normal.         Thought Content: Thought content normal.         No results found for this visit on 01/26/21.     Recent Results (from the past 2016 hour(s))   Psa screening    Collection Time: 11/18/20  8:37 AM   Result Value Ref Range    PSA 0.01 0.00 - 5.40 ng/mL   Comprehensive Metabolic Panel    Collection Time: 11/18/20  8:37 AM   Result Value Ref Range    Sodium 140 135 - 144 mEq/L    Potassium 4.0 3.4 - 4.9 mEq/L    Chloride 101 95 - 107 mEq/L    CO2 24 20 - 31 mEq/L    Anion Gap 15 9 - 15 mEq/L    Glucose 131 (H) 70 - 99 mg/dL    BUN 11 8 - 23 mg/dL    CREATININE 0.77 0.70 - 1.20 mg/dL    GFR Non-African American >60.0 >60    GFR  >60.0 >60    Calcium 9.2 8.5 - 9.9 mg/dL    Total Protein 7.1 6.3 - 8.0 g/dL    Albumin 4.4 3.5 - 4.6 g/dL    Total Bilirubin 1.3 (H) 0.2 - 0.7 mg/dL    Alkaline Phosphatase 68 35 - 104 U/L    ALT 52 (H) 0 - 41 U/L    AST 54 (H) 0 - 40 U/L    Globulin 2.7 2.3 - 3.5 g/dL   Lipid, Fasting    Collection Time: 11/18/20  8:37 AM Result Value Ref Range    Cholesterol, Fasting 146 0 - 199 mg/dL    Triglyceride, Fasting 84 0 - 150 mg/dL    HDL 58 40 - 59 mg/dL    LDL Calculated 71 0 - 129 mg/dL   TSH with Reflex    Collection Time: 11/18/20  8:37 AM   Result Value Ref Range    TSH 1.710 0.440 - 3.860 uIU/mL   Brain Natriuretic Peptide    Collection Time: 11/18/20  8:37 AM   Result Value Ref Range    Pro-BNP 91 pg/mL   Hemoglobin A1C    Collection Time: 11/18/20  8:37 AM   Result Value Ref Range    Hemoglobin A1C 6.5 (H) 4.8 - 5.9 %   CBC Auto Differential    Collection Time: 11/18/20  8:38 AM   Result Value Ref Range    WBC 6.1 4.8 - 10.8 K/uL    RBC 4.66 (L) 4.70 - 6.10 M/uL    Hemoglobin 15.5 14.0 - 18.0 g/dL    Hematocrit 46.6 42.0 - 52.0 %    .0 80.0 - 100.0 fL    MCH 33.2 (H) 27.0 - 31.3 pg    MCHC 33.2 33.0 - 37.0 %    RDW 13.0 11.5 - 14.5 %    Platelets 252 339 - 314 K/uL    Neutrophils % 62.4 %    Lymphocytes % 21.3 %    Monocytes % 13.2 %    Eosinophils % 2.6 %    Basophils % 0.5 %    Neutrophils Absolute 3.8 1.4 - 6.5 K/uL    Lymphocytes Absolute 1.3 1.0 - 4.8 K/uL    Monocytes Absolute 0.8 0.2 - 0.8 K/uL    Eosinophils Absolute 0.2 0.0 - 0.7 K/uL    Basophils Absolute 0.0 0.0 - 0.2 K/uL   Microalbumin / Creatinine Urine Ratio    Collection Time: 11/18/20  8:48 AM   Result Value Ref Range    Microalbumin, Random Urine 21.20 (H) Not Established mg/dL    Creatinine, Ur 713.1 Not Established mg/dL    Microalbumin Creatinine Ratio 29.7 0.0 - 30.0 mg/G           Assessment:       Diagnosis Orders   1. Actinic keratoses  MN DESTRUC PREMALIGNANT, FIRST LESION    MN DESTRUC PREMALIGNANT,2-14 LESIONS   2. Lipoma of torso  Gino Zayas MD, General Surgery, [place]   3. Type 2 diabetes mellitus without complication, without long-term current use of insulin ([address]     4. Prostate CA ([address]     5. Diastolic CHF, chronic (HCC)     6.  Need for vaccination for pneumococcus 7. Need for pneumococcal vaccine  PNEUMOVAX 23 subcutaneous/IM (Pneumococcal polysaccharide vaccine 23-valent >= 3yo)         Orders Placed This Encounter   Procedures    PNEUMOVAX 23 subcutaneous/IM (Pneumococcal polysaccharide vaccine 23-valent >= 3yo)   Mildred Brannon MD, General Surgery, [place]     Referral Priority:   Routine     Referral Type:   Eval and Treat     Referral Reason:   Specialty Services Required     Referred to Provider:   Olga Lidia Alvarenga MD     Requested Specialty:   General Surgery     Number of Visits Requested:   1     [facility], FIRST LESION     [facility]         Plan:   Return for May appt for dm,  6 month skin. Patient Instructions   Liver abnormality being followed by Dr. Vinod Walker. Reviewed recent scans ordered by Dr. Hans Claude. Last PSA was normal for patient no further action this year. Last hemoglobin A1c was 6.5 with microalbumin was elevated. Follow-up with us in 6 months of those labs for repeat. Refer to surgery for removal of lipoma when patient is ready. Treatment for actinic S keratosis with liquid nitrogen was done today. Diastolic heart failure has been taken off the problem list as it is resolved last 2D echo was normal.    Cryotherapy instructions    Post op instructions given. A printed copy provided. It is best to leave blisters alone if they form for the first 1-3 days to allow the desired damaged tissue (precancer lesion, wart, or whatever lesion is being removed) to separate from healthy tissue. They should be covered with a bandage to prevent blister breakage and dirt exposure. The wounds should remain dry while there is a blister, therefore if this is a sweaty location like the foot you may need to change socks multiple times per day. When the blister(s) pop or patient removes the top as instructed between day 3-5, apply antibiotic (NOT triple antibiotic, one brand is Neosporin) ointment and a bandage to affected area(s). The ointment should be applied to the open area as long as it is not covered with skin. Exposed tissue is meant to be moist.  Once a scab is formed the patient may stop applying ointment. The scab may appear yellow while moist, don't confuse this with infection. If the wound is infection pus will drain from the site. If this treatment was for a large wart you may note that a plug of skin may fall out of the area that was treated. That is the center of the wart and it is appropriate for it to come out. If exposed skin remains, treat that area as you would a ruptured blister as mentioned above. Bacitracin sample supplied              This note was partially created with the assistance of dictation. This may lead to grammatical or spelling errors. Michele L. Maryetta Saint, M.D.

## 2021-01-26 NOTE — PATIENT INSTRUCTIONS
Liver abnormality being followed by Dr. Amy Lopez. Reviewed recent scans ordered by Dr. Martina Garcia. Last PSA was normal for patient no further action this year. Last hemoglobin A1c was 6.5 with microalbumin was elevated. Follow-up with us in 6 months of those labs for repeat. Refer to surgery for removal of lipoma when patient is ready. Treatment for actinic S keratosis with liquid nitrogen was done today. Diastolic heart failure has been taken off the problem list as it is resolved last 2D echo was normal.    Cryotherapy instructions    Post op instructions given. A printed copy provided. It is best to leave blisters alone if they form for the first 1-3 days to allow the desired damaged tissue (precancer lesion, wart, or whatever lesion is being removed) to separate from healthy tissue. They should be covered with a bandage to prevent blister breakage and dirt exposure. The wounds should remain dry while there is a blister, therefore if this is a sweaty location like the foot you may need to change socks multiple times per day. When the blister(s) pop or patient removes the top as instructed between day 3-5, apply antibiotic (NOT triple antibiotic, one brand is Neosporin) ointment and a bandage to affected area(s). The ointment should be applied to the open area as long as it is not covered with skin. Exposed tissue is meant to be moist.  Once a scab is formed the patient may stop applying ointment. The scab may appear yellow while moist, don't confuse this with infection. If the wound is infection pus will drain from the site. If this treatment was for a large wart you may note that a plug of skin may fall out of the area that was treated. That is the center of the wart and it is appropriate for it to come out. If exposed skin remains, treat that area as you would a ruptured blister as mentioned above.     Bacitracin sample supplied

## 2021-01-26 NOTE — PROGRESS NOTES
After obtaining consent, and per orders of Dr. David Park, injection of Pneu 23 given in Left deltoid by Piter Hernandes. Patient instructed to remain in clinic for 20 minutes afterwards, and to report any adverse reaction to me immediately.

## 2021-02-22 ENCOUNTER — OFFICE VISIT (OUTPATIENT)
Dept: SURGERY | Age: 66
End: 2021-02-22
Payer: MEDICARE

## 2021-02-22 VITALS
HEIGHT: 73 IN | SYSTOLIC BLOOD PRESSURE: 116 MMHG | DIASTOLIC BLOOD PRESSURE: 74 MMHG | BODY MASS INDEX: 31.12 KG/M2 | TEMPERATURE: 97.2 F | WEIGHT: 234.8 LBS

## 2021-02-22 DIAGNOSIS — D17.1 LIPOMA OF BACK: Primary | ICD-10-CM

## 2021-02-22 PROCEDURE — 99203 OFFICE O/P NEW LOW 30 MIN: CPT | Performed by: SURGERY

## 2021-02-22 PROCEDURE — 3017F COLORECTAL CA SCREEN DOC REV: CPT | Performed by: SURGERY

## 2021-02-22 PROCEDURE — G8484 FLU IMMUNIZE NO ADMIN: HCPCS | Performed by: SURGERY

## 2021-02-22 PROCEDURE — 1036F TOBACCO NON-USER: CPT | Performed by: SURGERY

## 2021-02-22 PROCEDURE — G8427 DOCREV CUR MEDS BY ELIG CLIN: HCPCS | Performed by: SURGERY

## 2021-02-22 PROCEDURE — 4040F PNEUMOC VAC/ADMIN/RCVD: CPT | Performed by: SURGERY

## 2021-02-22 PROCEDURE — G8417 CALC BMI ABV UP PARAM F/U: HCPCS | Performed by: SURGERY

## 2021-02-22 PROCEDURE — 1123F ACP DISCUSS/DSCN MKR DOCD: CPT | Performed by: SURGERY

## 2021-02-22 ASSESSMENT — ENCOUNTER SYMPTOMS
ABDOMINAL PAIN: 0
RHINORRHEA: 0
CONSTIPATION: 0
SHORTNESS OF BREATH: 0
COLOR CHANGE: 0
EYES NEGATIVE: 1
ABDOMINAL DISTENTION: 0
CHEST TIGHTNESS: 0
ALLERGIC/IMMUNOLOGIC NEGATIVE: 1
BLOOD IN STOOL: 0

## 2021-02-22 NOTE — PROGRESS NOTES
Julio Doan (:  1955) is a 76 y.o. male,New patient, here for evaluation of the following chief complaint(s):  Lipoma      ASSESSMENT/PLAN:  Back lipoma, recurrent    Excision of back lipoma    The options of therapy were discussed. The procedure of the excision as well as potential risks and complications were discussed including but not exclusive to recurrence, infection, difficulty with wound healing and blood loss. The patient understands and is agreeable to the surgery and will call at a later date to schedule the procedure. SUBJECTIVE/OBJECTIVE:  HPI   Julio Doan is a 76 y.o. male seen at the request of Dr Palmira Castro MD for a soft tissue lesion of the back. It has been there for 2+ years and has been getting larger. It is not painful. The patient denies infection/inflammation. There is a history of previous surgery at this site. The patient does not have similar lesions. Testing has not been done. Review of Systems   Constitutional: Negative for activity change, appetite change and unexpected weight change. HENT: Negative for congestion, nosebleeds, postnasal drip, rhinorrhea and sneezing. Eyes: Negative. Negative for visual disturbance. Respiratory: Negative for chest tightness and shortness of breath. Cardiovascular: Negative for chest pain and leg swelling. Gastrointestinal: Negative for abdominal distention, abdominal pain, blood in stool and constipation. Endocrine: Negative. Genitourinary: Negative for difficulty urinating. Musculoskeletal: Negative for arthralgias, gait problem and myalgias. Skin: Negative for color change. Allergic/Immunologic: Negative. Neurological: Negative for dizziness, light-headedness, numbness and headaches. Hematological: Does not bruise/bleed easily. Psychiatric/Behavioral: Negative for sleep disturbance. Physical Exam  Constitutional:       General: He is not in acute distress. Appearance: Normal appearance. He is normal weight. HENT:      Mouth/Throat:      Mouth: Mucous membranes are moist.      Pharynx: Oropharynx is clear. Eyes:      Pupils: Pupils are equal, round, and reactive to light. Neck:      Comments: Neck is supple without any masses, no thyromegaly, trachea midline  Pulmonary:       Musculoskeletal:      Comments: Normal gait   Skin:     Findings: No bruising, lesion or rash. Neurological:      Mental Status: He is alert and oriented to person, place, and time. Psychiatric:         Mood and Affect: Mood normal.         Judgment: Judgment normal.       /74   Temp 97.2 °F (36.2 °C) (Temporal)   Ht 6' 1\" (1.854 m)   Wt 234 lb 12.8 oz (106.5 kg)   BMI 30.98 kg/m²       An electronic signature was used to authenticate this note.     --Sujata Gonzales MD

## 2021-03-01 DIAGNOSIS — E11.9 TYPE 2 DIABETES MELLITUS WITHOUT COMPLICATION, WITHOUT LONG-TERM CURRENT USE OF INSULIN (HCC): ICD-10-CM

## 2021-03-08 DIAGNOSIS — I10 ESSENTIAL HYPERTENSION: ICD-10-CM

## 2021-03-08 RX ORDER — METOPROLOL SUCCINATE 100 MG/1
TABLET, EXTENDED RELEASE ORAL
Qty: 90 TABLET | Refills: 0 | Status: SHIPPED | OUTPATIENT
Start: 2021-03-08 | End: 2021-05-29 | Stop reason: SDUPTHER

## 2021-03-17 ENCOUNTER — CARE COORDINATION (OUTPATIENT)
Dept: CARE COORDINATION | Age: 66
End: 2021-03-17

## 2021-03-17 NOTE — CARE COORDINATION
Attempted to contact patient for care coordination discussion and possible enrollment. Unable to reach patient by phone. Message left regarding purpose of call. Number provided and call back requested.

## 2021-03-24 ENCOUNTER — CARE COORDINATION (OUTPATIENT)
Dept: CARE COORDINATION | Age: 66
End: 2021-03-24

## 2021-04-01 ENCOUNTER — CARE COORDINATION (OUTPATIENT)
Dept: CARE COORDINATION | Age: 66
End: 2021-04-01

## 2021-04-26 ENCOUNTER — TELEPHONE (OUTPATIENT)
Dept: FAMILY MEDICINE CLINIC | Age: 66
End: 2021-04-26

## 2021-04-26 DIAGNOSIS — E11.9 TYPE 2 DIABETES MELLITUS WITHOUT COMPLICATION, WITHOUT LONG-TERM CURRENT USE OF INSULIN (HCC): ICD-10-CM

## 2021-04-29 ENCOUNTER — OFFICE VISIT (OUTPATIENT)
Dept: FAMILY MEDICINE CLINIC | Age: 66
End: 2021-04-29
Payer: MEDICARE

## 2021-04-29 ENCOUNTER — CLINICAL DOCUMENTATION (OUTPATIENT)
Dept: SPIRITUAL SERVICES | Age: 66
End: 2021-04-29

## 2021-04-29 VITALS
BODY MASS INDEX: 31.67 KG/M2 | HEIGHT: 72 IN | SYSTOLIC BLOOD PRESSURE: 138 MMHG | WEIGHT: 233.8 LBS | OXYGEN SATURATION: 97 % | DIASTOLIC BLOOD PRESSURE: 84 MMHG | HEART RATE: 76 BPM | TEMPERATURE: 98 F

## 2021-04-29 DIAGNOSIS — Z00.00 MEDICARE ANNUAL WELLNESS VISIT, SUBSEQUENT: Primary | ICD-10-CM

## 2021-04-29 PROCEDURE — 4040F PNEUMOC VAC/ADMIN/RCVD: CPT | Performed by: FAMILY MEDICINE

## 2021-04-29 PROCEDURE — 1123F ACP DISCUSS/DSCN MKR DOCD: CPT | Performed by: FAMILY MEDICINE

## 2021-04-29 PROCEDURE — G0439 PPPS, SUBSEQ VISIT: HCPCS | Performed by: FAMILY MEDICINE

## 2021-04-29 PROCEDURE — 99497 ADVNCD CARE PLAN 30 MIN: CPT | Performed by: FAMILY MEDICINE

## 2021-04-29 PROCEDURE — 3017F COLORECTAL CA SCREEN DOC REV: CPT | Performed by: FAMILY MEDICINE

## 2021-04-29 ASSESSMENT — LIFESTYLE VARIABLES
HOW OFTEN DO YOU HAVE SIX OR MORE DRINKS ON ONE OCCASION: 1
HOW OFTEN DO YOU HAVE A DRINK CONTAINING ALCOHOL: 4
HAS A RELATIVE, FRIEND, DOCTOR, OR ANOTHER HEALTH PROFESSIONAL EXPRESSED CONCERN ABOUT YOUR DRINKING OR SUGGESTED YOU CUT DOWN: 0
AUDIT TOTAL SCORE: 6
AUDIT-C TOTAL SCORE: 6
HAVE YOU OR SOMEONE ELSE BEEN INJURED AS A RESULT OF YOUR DRINKING: 0
HOW OFTEN DURING THE LAST YEAR HAVE YOU FAILED TO DO WHAT WAS NORMALLY EXPECTED FROM YOU BECAUSE OF DRINKING: 0
HOW OFTEN DURING THE LAST YEAR HAVE YOU BEEN UNABLE TO REMEMBER WHAT HAPPENED THE NIGHT BEFORE BECAUSE YOU HAD BEEN DRINKING: 0
HOW OFTEN DURING THE LAST YEAR HAVE YOU FOUND THAT YOU WERE NOT ABLE TO STOP DRINKING ONCE YOU HAD STARTED: 0

## 2021-04-29 ASSESSMENT — PATIENT HEALTH QUESTIONNAIRE - PHQ9
SUM OF ALL RESPONSES TO PHQ9 QUESTIONS 1 & 2: 0
SUM OF ALL RESPONSES TO PHQ QUESTIONS 1-9: 0
2. FEELING DOWN, DEPRESSED OR HOPELESS: 0
SUM OF ALL RESPONSES TO PHQ QUESTIONS 1-9: 0

## 2021-04-29 NOTE — PROGRESS NOTES
(106.5 kg)   01/26/21 234 lb (106.1 kg)     Vitals:    04/29/21 1116   BP: 138/84   Pulse: 76   Temp: 98 °F (36.7 °C)   SpO2: 97%   Weight: 233 lb 12.8 oz (106.1 kg)   Height: 6' 0.25\" (1.835 m)     Body mass index is 31.49 kg/m². Based upon direct observation of the patient, evaluation of cognition reveals recent and remote memory intact. Patient's complete Health Risk Assessment and screening values have been reviewed and are found in Flowsheets. The following problems were reviewed today and where indicated follow up appointments were made and/or referrals ordered. Positive Risk Factor Screenings with Interventions:            General Health and ACP:  General  In general, how would you say your health is?: Good  In the past 7 days, have you experienced any of the following? New or Increased Pain, New or Increased Fatigue, Loneliness, Social Isolation, Stress or Anger?: None of These  Do you get the social and emotional support that you need?: Yes  Do you have a Living Will?: (!) No  Advance Directives     Power of [facility] Will ACP-Advance Directive ACP-Power of     Not on File Not on File Not on File Not on File      General Health Risk Interventions:  · No Living Will: Advance Care Planning addressed with patient today and Patient referred to [address] [place] [address] (ACP) Physician/NP/PA Conversation    Date of Conversation: 4/29/2021  Conducted with: Patient with Decision Making Capacity    Healthcare Decision Maker:        Click here to complete [address] including selection of the Healthcare Decision Maker Relationship (ie \"Primary\")  Today we documented desired Decision Maker(s), who is (are) NOT Legal Next of Kin. ACP documents are required for decision maker authority. Care Preferences:    Hospitalization:   \"If your health worsens and it becomes clear that your chance of recovery is unlikely, what would be your preference regarding hospitalization? \"  The patient would prefer hospitalization. Ventilation: \"If you were unable to breath on your own and your chance of recovery was unlikely, what would be your preference about the use of a ventilator (breathing machine) if it was available to you? \"  The patient would desire the use of a ventilator. Resuscitation: \"In the event your heart stopped as a result of an underlying serious health condition, would you want attempts made to restart your heart, or would you prefer a natural death? \"  Yes, attempt to resuscitate.     treatment goals    Conversation Outcomes / Follow-Up Plan:  ACP incomplete - refer to ACP Clinical Specialist  Reviewed DNR/DNI and patient elects Full Code (Attempt Resuscitation)    Length of Voluntary ACP Conversation in minutes:  16 minutes    [facility] Habits/Nutrition:  Health Habits/Nutrition  Do you exercise for at least 20 minutes 2-3 times per week?: (!) No  Have you lost any weight without trying in the past 3 months?: No  Do you eat only one meal per day?: No  Have you seen the dentist within the past year?: (!) No  Body mass index: (!) 31.49  Health Habits/Nutrition Interventions:  · just hasnt gotten around to dentist.  will be biking in the summer       Personalized Preventive Plan   Current Health Maintenance Status  Immunization History   Administered Date(s) Administered    COVID-19, Fernández Peter, PF, 30mcg/0.3mL 03/11/2021, 04/23/2021    Pneumococcal Conjugate 13-valent ([id number]) 10/07/2019    Pneumococcal Polysaccharide ([id number]) 01/26/2021    Tdap (Boostrix, Adacel) 12/20/2017        Health Maintenance   Topic Date Due    Shingles Vaccine (1 of 2) Never done   [employer] Visit (AWV)  Never done    Diabetic foot exam  10/21/2020    Flu vaccine (Season Ended) 09/01/2021    A1C test (Diabetic or Prediabetic)  11/18/2021    Diabetic microalbuminuria test  11/18/2021    Lipid screen  11/18/2021    Potassium monitoring  11/18/2021    Creatinine monitoring  11/18/2021    PSA counseling  11/18/2021    Diabetic retinal exam  03/11/2022    Colon cancer screen colonoscopy  10/02/2025    DTaP/Tdap/Td vaccine (2 - Td) 12/20/2027    Pneumococcal 65+ years Vaccine  Completed    COVID-19 Vaccine  Completed    AAA screen  Completed    Hepatitis C screen  Completed    Hepatitis A vaccine  Aged Out    Hib vaccine  Aged Out    Meningococcal (ACWY) vaccine  Aged Out     Recommendations for [employer] Due: see orders and patient instructions/AVS.  . Recommended screening schedule for the next 5-10 years is provided to the patient in written form: see Patient Saniya Jones was seen today for medicare awv.     Diagnoses and all orders for this visit:    Medicare annual wellness visit, subsequent  -     [facility] Referral to [facility] Clinical Specialist

## 2021-05-12 ENCOUNTER — CLINICAL DOCUMENTATION (OUTPATIENT)
Dept: SPIRITUAL SERVICES | Age: 66
End: 2021-05-12

## 2021-05-12 NOTE — PROGRESS NOTES
Advance Care Planning    Ambulatory ACP Specialist Patient Outreach    Date:  5/12/2021  ACP Specialist:  Lilia Mcleod    Outreach call to patient in follow-up to ACP Specialist referral from:  [x] PCP  [] Provider   [] Ambulatory Care Management [] Other for Reason:    [x] Continued Conversation for ACP decision making / Goals of Care  [] Code Status Discussion  [] Completion of Adv Directive  [] Completion of Portable DNR order  [] Other (Specify)    Date Referral Received:4/29/21    Today's Outreach:  [] First   [x] Second  [] Third                               Third outreach made by []  phone  [] email []   [employer]     Intervention:  [x] Spoke with Patient  [] Left VM requesting return call      Outcome: Called patient. Patient stated he received email with docs but has not had a chance to look through them. Patient would like time to go through docs and states alejandro call back when he is ready for a specialist. Will call back in 2 weeks. Next Step:   [] ACP scheduled conversation  [x] Outreach again in one week               [] Email / Mail ACP Info Sheets  [] Email / Mail Advance Directive            [] Close Referral. Routing closure to referring provider/staff and to ACP Specialist .      Thank you for this referral.

## 2021-05-24 DIAGNOSIS — E11.9 TYPE 2 DIABETES MELLITUS WITHOUT COMPLICATION, WITHOUT LONG-TERM CURRENT USE OF INSULIN (HCC): ICD-10-CM

## 2021-05-27 DIAGNOSIS — J45.41 MODERATE PERSISTENT ASTHMA WITH ACUTE EXACERBATION: ICD-10-CM

## 2021-05-29 ENCOUNTER — PATIENT MESSAGE (OUTPATIENT)
Dept: FAMILY MEDICINE CLINIC | Age: 66
End: 2021-05-29

## 2021-05-29 NOTE — TELEPHONE ENCOUNTER
I get my Flovent from [facility]". Please fax prescription to them @ [phone]. Their phone is [phone]. Thanks.

## 2021-07-26 ENCOUNTER — OFFICE VISIT (OUTPATIENT)
Dept: FAMILY MEDICINE CLINIC | Age: 66
End: 2021-07-26
Payer: MEDICARE

## 2021-07-26 VITALS
DIASTOLIC BLOOD PRESSURE: 84 MMHG | WEIGHT: 237 LBS | SYSTOLIC BLOOD PRESSURE: 136 MMHG | BODY MASS INDEX: 32.1 KG/M2 | HEIGHT: 72 IN | HEART RATE: 81 BPM | OXYGEN SATURATION: 98 % | TEMPERATURE: 98 F

## 2021-07-26 DIAGNOSIS — I10 ESSENTIAL HYPERTENSION: ICD-10-CM

## 2021-07-26 DIAGNOSIS — L57.0 ACTINIC KERATOSES: ICD-10-CM

## 2021-07-26 DIAGNOSIS — E11.9 TYPE 2 DIABETES MELLITUS WITHOUT COMPLICATION, WITHOUT LONG-TERM CURRENT USE OF INSULIN (HCC): Primary | ICD-10-CM

## 2021-07-26 DIAGNOSIS — C61 PROSTATE CA (HCC): ICD-10-CM

## 2021-07-26 LAB — HBA1C MFR BLD: 6.5 %

## 2021-07-26 PROCEDURE — G8417 CALC BMI ABV UP PARAM F/U: HCPCS | Performed by: FAMILY MEDICINE

## 2021-07-26 PROCEDURE — 4040F PNEUMOC VAC/ADMIN/RCVD: CPT | Performed by: FAMILY MEDICINE

## 2021-07-26 PROCEDURE — 83036 HEMOGLOBIN GLYCOSYLATED A1C: CPT | Performed by: FAMILY MEDICINE

## 2021-07-26 PROCEDURE — 2022F DILAT RTA XM EVC RTNOPTHY: CPT | Performed by: FAMILY MEDICINE

## 2021-07-26 PROCEDURE — 3044F HG A1C LEVEL LT 7.0%: CPT | Performed by: FAMILY MEDICINE

## 2021-07-26 PROCEDURE — 99214 OFFICE O/P EST MOD 30 MIN: CPT | Performed by: FAMILY MEDICINE

## 2021-07-26 PROCEDURE — 17004 DESTROY PREMAL LESIONS 15/>: CPT | Performed by: FAMILY MEDICINE

## 2021-07-26 PROCEDURE — 1036F TOBACCO NON-USER: CPT | Performed by: FAMILY MEDICINE

## 2021-07-26 PROCEDURE — 1123F ACP DISCUSS/DSCN MKR DOCD: CPT | Performed by: FAMILY MEDICINE

## 2021-07-26 PROCEDURE — G8427 DOCREV CUR MEDS BY ELIG CLIN: HCPCS | Performed by: FAMILY MEDICINE

## 2021-07-26 PROCEDURE — 3017F COLORECTAL CA SCREEN DOC REV: CPT | Performed by: FAMILY MEDICINE

## 2021-07-26 SDOH — ECONOMIC STABILITY: FOOD INSECURITY: WITHIN THE PAST 12 MONTHS, YOU WORRIED THAT YOUR FOOD WOULD RUN OUT BEFORE YOU GOT MONEY TO BUY MORE.: NEVER TRUE

## 2021-07-26 SDOH — ECONOMIC STABILITY: FOOD INSECURITY: WITHIN THE PAST 12 MONTHS, THE FOOD YOU BOUGHT JUST DIDN'T LAST AND YOU DIDN'T HAVE MONEY TO GET MORE.: NEVER TRUE

## 2021-07-26 ASSESSMENT — ENCOUNTER SYMPTOMS
COLOR CHANGE: 1
SHORTNESS OF BREATH: 0
ABDOMINAL DISTENTION: 0
PHOTOPHOBIA: 0
CHEST TIGHTNESS: 0
ABDOMINAL PAIN: 0

## 2021-07-26 ASSESSMENT — SOCIAL DETERMINANTS OF HEALTH (SDOH): HOW HARD IS IT FOR YOU TO PAY FOR THE VERY BASICS LIKE FOOD, HOUSING, MEDICAL CARE, AND HEATING?: NOT HARD AT ALL

## 2021-07-26 ASSESSMENT — PATIENT HEALTH QUESTIONNAIRE - PHQ9
2. FEELING DOWN, DEPRESSED OR HOPELESS: 0
SUM OF ALL RESPONSES TO PHQ QUESTIONS 1-9: 0
SUM OF ALL RESPONSES TO PHQ9 QUESTIONS 1 & 2: 0
1. LITTLE INTEREST OR PLEASURE IN DOING THINGS: 0
SUM OF ALL RESPONSES TO PHQ QUESTIONS 1-9: 0
SUM OF ALL RESPONSES TO PHQ QUESTIONS 1-9: 0

## 2021-07-26 NOTE — PROGRESS NOTES
Return for May appt for dm,  6 month skin. Patient Instructions   Liver abnormality being followed by Dr. Miryam Hsu. Reviewed recent scans ordered by Dr. Dony Alvarez. Last PSA was normal for patient no further action this year. Last hemoglobin A1c was 6.5 with microalbumin was elevated. Follow-up with us in 6 months of those labs for repeat. Refer to surgery for removal of lipoma when patient is ready. Treatment for actinic S keratosis with liquid nitrogen was done today.      Diastolic heart failure has been taken off the problem list as it is resolved last 2D echo was normal.

## 2021-07-26 NOTE — PROGRESS NOTES
Diagnosis Orders   1. Type 2 diabetes mellitus without complication, without long-term current use of insulin (HCC)  POCT glycosylated hemoglobin (Hb A1C)    TSH with Reflex    Comprehensive Metabolic Panel    Lipid, Fasting    Microalbumin / Creatinine Urine Ratio    Hemoglobin A1C   2. Essential hypertension  CBC Auto Differential    TSH with Reflex    Lipid, Fasting   3. Prostate CA ([facility] [address]  PSA screening   4. Actinic keratoses  [place] DESTRUC PREMALIGNANT, FIRST LESION    [id number] - [place] DESTRUC PREMALIGNANT,15+ LESIONS     Return in about 3 months (around 10/26/2021) for 15 min skin check. Patient Instructions   3-month follow-up for cryotherapy due to frequency of actinic keratosis. Diabetic follow-up due in late November. No changes in medications at this time. Cryotherapy instructions    Post op instructions given. A printed copy provided. It is best to leave blisters alone if they form for the first 1-3 days to allow the desired damaged tissue (precancer lesion, wart, or whatever lesion is being removed) to separate from healthy tissue. The area should be covered with a bandage to prevent blister breakage and dirt exposure. The wounds should remain dry while there is a blister, therefore if this is a sweaty location like the foot you may need to change socks multiple times per day. When the blister(s) pop or patient removes the top as instructed between day 3-5, apply antibiotic (NOT triple antibiotic, one brand is Neosporin) ointment and a bandage to affected area(s). The ointment should be applied to the open area as long as it is not covered with skin. Exposed tissue is meant to be moist.      Once a scab is formed the patient may stop applying ointment. The scab may appear yellow while moist, don't confuse this with infection. If the wound is infection pus will drain from the site.  If this treatment was for a large wart you may note that a plug of skin may fall out of the area that was treated. That is the center of the wart and it is appropriate for it to come out. If exposed skin remains, treat that area as you would a ruptured blister as mentioned above. Bacitracin sample supplied              Subjective:      Patient ID: Sky Singh is a 78 y.o. male who presents for:  Chief Complaint   Patient presents with    Skin Exam     x 6 month skin check     Skin Problem     scalp        Patient is being relatively active. He recently got a indoor stationary bicycle that he has been starting to use. He monitors his diet to some degree. Has no difficulty with his diabetic medications. Blood pressure appears stable. Patient has no complaints of medication. Reviewed review of systems for cardiovascular symptom review. Patient believes he is got new lesions on his scalp for actinic keratosis and would like a skin exam.    Reviewed when yearly labs are due.       Current Outpatient Medications on File Prior to Visit   Medication Sig Dispense Refill    Cholecalciferol (D3-1000 PO) Take by mouth      simvastatin (ZOCOR) 20 MG tablet Take 1 tablet by mouth daily 90 tablet 3    metoprolol succinate (TOPROL XL) 100 MG extended release tablet Take 1 tablet by mouth daily 90 tablet 3    metFORMIN (GLUCOPHAGE) 500 MG tablet Take 1 tablet by mouth daily (with breakfast) 90 tablet 3    amLODIPine (NORVASC) 5 MG tablet Take 1 tablet by mouth daily 30 tablet 11    allopurinol (ZYLOPRIM) 300 MG tablet TAKE ONE TABLET BY MOUTH DAILY 30 tablet 11    fluticasone (FLOVENT HFA) 110 MCG/ACT inhaler Inhale 2 puffs into the lungs 2 times daily 1 Inhaler 3    albuterol sulfate HFA (VENTOLIN HFA) 108 (90 Base) MCG/ACT inhaler Inhale 2 puffs into the lungs every 6 hours as needed for Wheezing 18 g 1    Spacer/Aero-Hold Chamber Bags MISC 1 applicator by Does not apply route 4 times daily 1 each 0    Blood Pressure KIT 1 applicator by Does not apply route 4 times daily 1 kit 0    irbesartan (AVAPRO) 150 MG tablet Take 1 tablet by mouth daily 90 tablet 3    Coenzyme Q10-Vitamin E (QUNOL ULTRA COQ10) 100-150 MG-UNIT CAPS       fluticasone (FLONASE) 50 MCG/ACT nasal spray 2 sprays by Nasal route daily 32 g 0    aspirin 81 MG chewable tablet Take 81 mg by mouth daily      vitamin C (ASCORBIC ACID) 500 MG tablet Take 500 mg by mouth daily      Multiple Vitamins-Minerals (MULTIVITAMIN MEN 50+ PO) Take by mouth      loratadine (CLARITIN) 10 MG tablet Take 1 tablet by mouth daily (Patient not taking: Reported on 7/26/2021) 30 tablet 11     No current facility-administered medications on file prior to visit.      Past Medical History:   Diagnosis Date    Actinic keratoses     has had LN2 and used Efudex    Alcohol consumption of one to four drinks per day on alcohol screening     Allergic rhinitis     cats, weeds, grasses, ragweed    Asthma     Bilateral knee pain     Diabetes mellitus ([address]     Gout     History of colon polyps 02/2016    needs f/u 5 years [place]    History of type 2 diabetes mellitus     about 15 years    Hyperlipidemia     Hypertension     Keratosis, inflamed seborrheic     Osteoarthritis, localized, shoulder, right     Polyp of transverse colon f/u due 2021 8/9/2014    Prediabetes     Prostate cancer ([facility] [address] 2014    s/p prostatectomy    Syncope and collapse     Varicose vein of leg      Past Surgical History:   Procedure Laterality Date    COLONOSCOPY  02/04/2016    Dr. Aung Ventura; polyps f/u in 5 years    COLONOSCOPY N/A 10/2/2020    COLONOSCOPY DIAGNOSTIC performed by Saw Greco MD at [address] [place] [address]  11/2014    ULNAR TUNNEL RELEASE Bilateral     VARICOSE VEIN SURGERY       Social History     Socioeconomic History    Marital status: Single     Spouse name: Not on file    Number of children: Not on file    Years of education: 0    Highest education level: Not on file   Occupational History    Not on file   Tobacco Use    Smoking status: Former Smoker     Packs/day: 0.50     Years: 12.00     Pack years: 6.00     Types: Cigarettes     Quit date: 1982     Years since quittin.4    Smokeless tobacco: Never Used   Vaping Use    Vaping Use: Never used   Substance and Sexual Activity    Alcohol use: Yes     Comment: daily 5 vodka drinks per day    Drug use: No    Sexual activity: Yes     Partners: Female   Other Topics Concern    Not on file   Social History Narrative    Engaged. No children. Social Determinants of Health     Financial Resource Strain: Low Risk     Difficulty of Paying Living Expenses: Not hard at all   Food Insecurity: No Food Insecurity    Worried About Running Out of Food in the Last Year: Never true    Mesfin of Food in the Last Year: Never true   Transportation Needs:     Lack of Transportation (Medical):  Lack of Transportation (Non-Medical):    Physical Activity:     Days of Exercise per Week:     Minutes of Exercise per Session:    Stress:     Feeling of Stress :    Social Connections:     Frequency of Communication with Friends and Family:     Frequency of Social Gatherings with Friends and Family:     Attends Sikhism Services:     Active Member of Clubs or Organizations:     Attends Club or Organization Meetings:     Marital Status:    Intimate Partner Violence:     Fear of Current or Ex-Partner:     Emotionally Abused:     Physically Abused:     Sexually Abused:      Family History   Problem Relation Age of Onset    Alzheimer's Disease Mother     Heart Disease Father     Cancer Father     Diabetes Father     Cancer Brother     Colon Cancer Neg Hx     Celiac Disease Neg Hx     Crohn's Disease Neg Hx      Allergies:  Patient has no known allergies. Review of Systems   Constitutional: Negative for activity change, appetite change, diaphoresis and unexpected weight change. Eyes: Negative for photophobia and visual disturbance.    Respiratory: Negative for chest tightness and shortness of breath. No orthopnea   Cardiovascular: Negative for chest pain, palpitations and leg swelling. Gastrointestinal: Negative for abdominal distention and abdominal pain. Genitourinary: Negative for flank pain and frequency. Musculoskeletal: Negative for gait problem and joint swelling. Skin: Positive for color change and rash. Negative for wound. Neurological: Negative for dizziness, weakness, light-headedness and headaches. Psychiatric/Behavioral: Negative for confusion. Objective:   /84   Pulse 81   Temp 98 °F (36.7 °C)   Ht 6' 0.25\" (1.835 m)   Wt 237 lb (107.5 kg)   SpO2 98%   BMI 31.92 kg/m²     Physical Exam  Vitals reviewed. Constitutional:       General: He is not in acute distress. Appearance: He is well-developed. HENT:      Head: Normocephalic and atraumatic. Right Ear: External ear normal.      Left Ear: External ear normal.      Nose: Nose normal.   Eyes:      General:         Right eye: No discharge. Left eye: No discharge. Conjunctiva/sclera: Conjunctivae normal.      Pupils: Pupils are equal, round, and reactive to light. Neck:      Thyroid: No thyromegaly. Cardiovascular:      Rate and Rhythm: Normal rate and regular rhythm. Heart sounds: Normal heart sounds. No murmur heard. No friction rub. No gallop. Pulmonary:      Effort: Pulmonary effort is normal. No respiratory distress. Breath sounds: Normal breath sounds. No stridor. No wheezing, rhonchi or rales. Abdominal:      General: There is no distension. Musculoskeletal:      Cervical back: Neck supple. Skin:     General: Skin is warm and dry. Comments:  All lesions are erythematous base ranging between 2 to 4 mm in diameter with rough white flake:    Parietal scalp superiorly x4, right nose on the bridge x1, right cheek x2, left upper extremity x2, left forearm x5, left upper extremity x1, left dorsal hand x1   Neurological:      Mental Status: He is alert and oriented to person, place, and time. Coordination: Coordination normal.   Psychiatric:         Thought Content: Thought content normal.         Judgment: Judgment normal.         PROCEDURE:  The procedure was discussed with the patient. All questions were answered and alternative options discussed. The patient is aware of the risks of bleeding, infection, unsatisfactory scar result. Informed consent paperwork was signed by the patient. Liquid nitrogen was applied to the affected areas until freezing was noted then a thaw was allowed between dosing for a total of 2 applications. Applied to 16 lesion(s). The patient tolerated the procedure well. Post op instructions given. A printed copy provided. Results for POC orders placed in visit on 07/26/21   POCT glycosylated hemoglobin (Hb A1C)   Result Value Ref Range    Hemoglobin A1C 6.5 %       Recent Results (from the past 2016 hour(s))   POCT glycosylated hemoglobin (Hb A1C)    Collection Time: 07/26/21 11:02 AM   Result Value Ref Range    Hemoglobin A1C 6.5 %           Assessment:       Diagnosis Orders   1. Type 2 diabetes mellitus without complication, without long-term current use of insulin ([facility])  POCT glycosylated hemoglobin (Hb A1C)    TSH with Reflex    Comprehensive Metabolic Panel    Lipid, Fasting    Microalbumin / Creatinine Urine Ratio    Hemoglobin A1C   2. Essential hypertension  CBC Auto Differential    TSH with Reflex    Lipid, Fasting   3. Prostate CA ([address]  PSA screening   4.  Actinic keratoses  [place] DESTRUC PREMALIGNANT, FIRST LESION    [address] - [place] DESTRUC PREMALIGNANT,15+ LESIONS         Orders Placed This Encounter   Procedures    CBC Auto Differential     Standing Status:   Future     Standing Expiration Date:   7/26/2022    PSA screening     Standing Status:   Future     Standing Expiration Date:   7/26/2022    TSH with Reflex     Standing Status:   Future     Standing Expiration Date:   7/26/2022   [facility] Comprehensive Metabolic Panel     Standing Status:   Future     Standing Expiration Date:   7/26/2022    Lipid, Fasting     Standing Status:   Future     Standing Expiration Date:   7/26/2022    Microalbumin / Creatinine Urine Ratio     Standing Status:   Future     Standing Expiration Date:   7/26/2022    Hemoglobin A1C     Standing Status:   Future     Standing Expiration Date:   7/26/2022    POCT glycosylated hemoglobin (Hb A1C)    [place] DESTRUC PREMALIGNANT, FIRST LESION    [address] - [place] DESTRUC PREMALIGNANT,15+ LESIONS     15         Plan:   Return in about 3 months (around 10/26/2021) for 15 min skin check. Patient Instructions   3-month follow-up for cryotherapy due to frequency of actinic keratosis. Diabetic follow-up due in late November. No changes in medications at this time. Cryotherapy instructions    Post op instructions given. A printed copy provided. It is best to leave blisters alone if they form for the first 1-3 days to allow the desired damaged tissue (precancer lesion, wart, or whatever lesion is being removed) to separate from healthy tissue. The area should be covered with a bandage to prevent blister breakage and dirt exposure. The wounds should remain dry while there is a blister, therefore if this is a sweaty location like the foot you may need to change socks multiple times per day. When the blister(s) pop or patient removes the top as instructed between day 3-5, apply antibiotic (NOT triple antibiotic, one brand is Neosporin) ointment and a bandage to affected area(s). The ointment should be applied to the open area as long as it is not covered with skin. Exposed tissue is meant to be moist.      Once a scab is formed the patient may stop applying ointment. The scab may appear yellow while moist, don't confuse this with infection. If the wound is infection pus will drain from the site.  If this treatment was for a large wart you may note that a plug of skin may fall out of the area that was treated. That is the center of the wart and it is appropriate for it to come out. If exposed skin remains, treat that area as you would a ruptured blister as mentioned above. Bacitracin sample supplied              This note was partially created with the assistance of dictation. This may lead to grammatical or spelling errors. Mukesh Kramer M.D.

## 2021-07-26 NOTE — PATIENT INSTRUCTIONS
3-month follow-up for cryotherapy due to frequency of actinic keratosis. Diabetic follow-up due in late November. No changes in medications at this time. Cryotherapy instructions    Post op instructions given. A printed copy provided. It is best to leave blisters alone if they form for the first 1-3 days to allow the desired damaged tissue (precancer lesion, wart, or whatever lesion is being removed) to separate from healthy tissue. The area should be covered with a bandage to prevent blister breakage and dirt exposure. The wounds should remain dry while there is a blister, therefore if this is a sweaty location like the foot you may need to change socks multiple times per day. When the blister(s) pop or patient removes the top as instructed between day 3-5, apply antibiotic (NOT triple antibiotic, one brand is Neosporin) ointment and a bandage to affected area(s). The ointment should be applied to the open area as long as it is not covered with skin. Exposed tissue is meant to be moist.      Once a scab is formed the patient may stop applying ointment. The scab may appear yellow while moist, don't confuse this with infection. If the wound is infection pus will drain from the site. If this treatment was for a large wart you may note that a plug of skin may fall out of the area that was treated. That is the center of the wart and it is appropriate for it to come out. If exposed skin remains, treat that area as you would a ruptured blister as mentioned above.     Bacitracin sample supplied

## 2021-08-23 DIAGNOSIS — I10 ESSENTIAL HYPERTENSION: ICD-10-CM

## 2021-08-23 RX ORDER — METOPROLOL SUCCINATE 100 MG/1
100 TABLET, EXTENDED RELEASE ORAL DAILY
Qty: 90 TABLET | Refills: 3 | Status: SHIPPED | OUTPATIENT
Start: 2021-08-23 | End: 2022-06-02

## 2021-08-27 DIAGNOSIS — I10 ESSENTIAL HYPERTENSION: ICD-10-CM

## 2021-08-27 NOTE — TELEPHONE ENCOUNTER
Future Appointments     Provider   10/26/2021 Mary Shah MD   Department: [facility]   Appt Notes: Return in about 3 months (around 10/26/2021) for 15 min skin check   5/2/2022 Mary Shah MD   Department: [facility]   Appt Notes: AWV   Recent Visits    07/26/2021 Type 2 diabetes mellitus without complication, without long-term current use of insulin [facility] Mary Shah MD   04/29/2021 Medicare annual wellness visit, subsequent   [facility] Mary Shah MD

## 2021-08-30 RX ORDER — IRBESARTAN 150 MG/1
TABLET ORAL
Qty: 90 TABLET | Refills: 0 | Status: SHIPPED | OUTPATIENT
Start: 2021-08-30 | End: 2021-11-23

## 2021-10-25 ENCOUNTER — VIRTUAL VISIT (OUTPATIENT)
Dept: FAMILY MEDICINE CLINIC | Age: 66
End: 2021-10-25
Payer: MEDICARE

## 2021-10-25 DIAGNOSIS — R05.9 COUGH: Primary | ICD-10-CM

## 2021-10-25 DIAGNOSIS — E11.9 TYPE 2 DIABETES MELLITUS WITHOUT COMPLICATION, WITHOUT LONG-TERM CURRENT USE OF INSULIN (HCC): ICD-10-CM

## 2021-10-25 PROCEDURE — 4040F PNEUMOC VAC/ADMIN/RCVD: CPT | Performed by: FAMILY MEDICINE

## 2021-10-25 PROCEDURE — 3017F COLORECTAL CA SCREEN DOC REV: CPT | Performed by: FAMILY MEDICINE

## 2021-10-25 PROCEDURE — 1123F ACP DISCUSS/DSCN MKR DOCD: CPT | Performed by: FAMILY MEDICINE

## 2021-10-25 PROCEDURE — G8427 DOCREV CUR MEDS BY ELIG CLIN: HCPCS | Performed by: FAMILY MEDICINE

## 2021-10-25 PROCEDURE — 99213 OFFICE O/P EST LOW 20 MIN: CPT | Performed by: FAMILY MEDICINE

## 2021-10-25 RX ORDER — BENZONATATE 100 MG/1
100 CAPSULE ORAL 3 TIMES DAILY PRN
Qty: 30 CAPSULE | Refills: 0 | Status: SHIPPED | OUTPATIENT
Start: 2021-10-25 | End: 2021-11-01

## 2021-10-25 RX ORDER — GUAIFENESIN 600 MG/1
600 TABLET, EXTENDED RELEASE ORAL 2 TIMES DAILY
Qty: 30 TABLET | Refills: 0 | Status: SHIPPED | OUTPATIENT
Start: 2021-10-25 | End: 2021-11-09

## 2021-10-25 SDOH — ECONOMIC STABILITY: FOOD INSECURITY: WITHIN THE PAST 12 MONTHS, THE FOOD YOU BOUGHT JUST DIDN'T LAST AND YOU DIDN'T HAVE MONEY TO GET MORE.: NEVER TRUE

## 2021-10-25 SDOH — ECONOMIC STABILITY: FOOD INSECURITY: WITHIN THE PAST 12 MONTHS, YOU WORRIED THAT YOUR FOOD WOULD RUN OUT BEFORE YOU GOT MONEY TO BUY MORE.: NEVER TRUE

## 2021-10-25 ASSESSMENT — VISUAL ACUITY: OU: 1

## 2021-10-25 ASSESSMENT — ENCOUNTER SYMPTOMS
ABDOMINAL DISTENTION: 0
SHORTNESS OF BREATH: 0
ABDOMINAL PAIN: 0
CHEST TIGHTNESS: 0
SORE THROAT: 0
TROUBLE SWALLOWING: 0
BACK PAIN: 1
PHOTOPHOBIA: 0
COUGH: 1

## 2021-10-25 ASSESSMENT — SOCIAL DETERMINANTS OF HEALTH (SDOH): HOW HARD IS IT FOR YOU TO PAY FOR THE VERY BASICS LIKE FOOD, HOUSING, MEDICAL CARE, AND HEATING?: NOT HARD AT ALL

## 2021-10-25 NOTE — PROGRESS NOTES
Diagnosis Orders   1. Cough  benzonatate (TESSALON) 100 MG capsule    guaiFENesin (MUCINEX) 600 MG extended release tablet         No orders of the defined types were placed in this encounter. Return if symptoms worsen or fail to improve. There are no Patient Instructions on file for this visit. This visit began at 1:58pm      The location for this appointment was the [facility] primary care site. TELEHEALTH APPOINTMENT  Patient has been screened to determine that this visit qualifies for a \"Video Visit\". This visit was via video due to the restrictions of the COVID-19 pandemic. All issues as below were discussed and addressed but note a limited visually based physical exam was performed. If it was felt the patient should be evaluated in the clinic there will be comment below demonstrating they were directed there. The patient is aware and has given verbal consent to be billed for this video encounter. The resources used for this visit were [employer] for chart access and doxy. me had to convert to telephone call for audio    Chief Complaint   Patient presents with    Cough     congestion x 3-4 days        Pt has a cough, clear mucous with a tickle in throat. Some nasal congestion less as day goes on. Some sneezing. 4 days symptoms. Afebrile. No nausea vomiting or diarrhea.           PMH:    Current Outpatient Medications on File Prior to Visit   Medication Sig Dispense Refill    irbesartan (AVAPRO) 150 MG tablet TAKE ONE TABLET BY MOUTH DAILY 90 tablet 0    metoprolol succinate (TOPROL XL) 100 MG extended release tablet Take 1 tablet by mouth daily 90 tablet 3    Cholecalciferol (D3-1000 PO) Take by mouth      simvastatin (ZOCOR) 20 MG tablet Take 1 tablet by mouth daily 90 tablet 3    metFORMIN (GLUCOPHAGE) 500 MG tablet Take 1 tablet by mouth daily (with breakfast) 90 tablet 3    amLODIPine (NORVASC) 5 MG tablet Take 1 tablet by mouth daily 30 tablet 11    allopurinol (ZYLOPRIM) 300 MG tablet TAKE ONE TABLET BY MOUTH DAILY 30 tablet 11    fluticasone (FLOVENT HFA) 110 MCG/ACT inhaler Inhale 2 puffs into the lungs 2 times daily 1 Inhaler 3    albuterol sulfate HFA (VENTOLIN HFA) 108 (90 Base) MCG/ACT inhaler Inhale 2 puffs into the lungs every 6 hours as needed for Wheezing 18 g 1    Spacer/Aero-Hold Chamber Bags MISC 1 applicator by Does not apply route 4 times daily 1 each 0    Blood Pressure KIT 1 applicator by Does not apply route 4 times daily 1 kit 0    Coenzyme Q10-Vitamin E (QUNOL ULTRA COQ10) 100-150 MG-UNIT CAPS       fluticasone (FLONASE) 50 MCG/ACT nasal spray 2 sprays by Nasal route daily 32 g 0    loratadine (CLARITIN) 10 MG tablet Take 1 tablet by mouth daily 30 tablet 11    aspirin 81 MG chewable tablet Take 81 mg by mouth daily      vitamin C (ASCORBIC ACID) 500 MG tablet Take 500 mg by mouth daily      Multiple Vitamins-Minerals (MULTIVITAMIN MEN 50+ PO) Take by mouth       No current facility-administered medications on file prior to visit.      Past Medical History:   Diagnosis Date    Actinic keratoses     has had LN2 and used Efudex    Alcohol consumption of one to four drinks per day on alcohol screening     Allergic rhinitis     cats, weeds, grasses, ragweed    Asthma     Bilateral knee pain     Diabetes mellitus ([address]     Gout     History of colon polyps 02/2016    needs f/u 5 years [facility]    History of type 2 diabetes mellitus     about 15 years    Hyperlipidemia     Hypertension     Keratosis, inflamed seborrheic     Osteoarthritis, localized, shoulder, right     Polyp of transverse colon f/u due 2021 8/9/2014    Prediabetes     Prostate cancer ([address] 2014    s/p prostatectomy    Syncope and collapse     Varicose vein of leg      Past Surgical History:   Procedure Laterality Date    COLONOSCOPY  02/04/2016    Dr. Danitza Damon; polyps f/u in 5 years    COLONOSCOPY N/A 10/2/2020    COLONOSCOPY DIAGNOSTIC performed by Armani Stinson MD at [address] [place] [address]  2014    ULNAR TUNNEL RELEASE Bilateral     VARICOSE VEIN SURGERY       Social History     Socioeconomic History    Marital status: Single     Spouse name: Not on file    Number of children: Not on file    Years of education: 0    Highest education level: Not on file   Occupational History    Not on file   Tobacco Use    Smoking status: Former Smoker     Packs/day: 0.50     Years: 12.00     Pack years: 6.00     Types: Cigarettes     Quit date: 1982     Years since quittin.7    Smokeless tobacco: Never Used   Vaping Use    Vaping Use: Never used   Substance and Sexual Activity    Alcohol use: Yes     Comment: daily 5 vodka drinks per day    Drug use: No    Sexual activity: Yes     Partners: Female   Other Topics Concern    Not on file   Social History Narrative    Engaged. No children. Social Determinants of Health     Financial Resource Strain: Low Risk     Difficulty of Paying Living Expenses: Not hard at all   Food Insecurity: No Food Insecurity    Worried About Running Out of Food in the Last Year: Never true    Mesfin of Food in the Last Year: Never true   Transportation Needs:     Lack of Transportation (Medical):      Lack of Transportation (Non-Medical):    Physical Activity:     Days of Exercise per Week:     Minutes of Exercise per Session:    Stress:     Feeling of Stress :    Social Connections:     Frequency of Communication with Friends and Family:     Frequency of Social Gatherings with Friends and Family:     Attends [employer] Services:     Active Member of Clubs or Organizations:     Attends Club or Organization Meetings:     Marital Status:    Intimate Partner Violence:     Fear of Current or Ex-Partner:     Emotionally Abused:     Physically Abused:     Sexually Abused:      Family History   Problem Relation Age of Onset    Alzheimer's Disease Mother     Heart Disease Father     Cancer Father     Diabetes Father     Cancer Brother     Colon Cancer Neg Hx     Celiac Disease Neg Hx     Crohn's Disease Neg Hx      Allergies:  Patient has no known allergies. Review of Systems   Constitutional: Negative for activity change, appetite change, diaphoresis and unexpected weight change. HENT: Positive for congestion. Negative for sore throat and trouble swallowing. Eyes: Negative for photophobia and visual disturbance. Respiratory: Positive for cough. Negative for chest tightness and shortness of breath. No orthopnea   Cardiovascular: Negative for chest pain, palpitations and leg swelling. Gastrointestinal: Negative for abdominal distention and abdominal pain. Genitourinary: Negative for flank pain and frequency. Musculoskeletal: Positive for back pain. Negative for gait problem and joint swelling. Back pain from coughing   Neurological: Negative for dizziness, weakness, light-headedness and headaches. Psychiatric/Behavioral: Negative for confusion. PHYSICAL EXAM/ RESULTS    (Limited exam: only performed what is available through a visual exam during the video encounter as indicated due to the restrictions of the COVID-19 pandemic)    Patient-Reported Vitals 10/25/2021   Patient-Reported Weight 237lb   Patient-Reported Height 6 .25   Patient-Reported Systolic -   Patient-Reported Diastolic -   Patient-Reported Pulse -   Patient-Reported Temperature 98.0             Physical Exam  Vitals (All exam findings are noted during patient movement during video exam) reviewed. Constitutional:       General: He is not in acute distress. Appearance: Normal appearance. He is well-developed. He is not ill-appearing or diaphoretic. HENT:      Head: Normocephalic and atraumatic. No right periorbital erythema or left periorbital erythema. Hair is normal.      Jaw: No swelling or pain on movement.       Right Ear: Hearing and external ear normal.      Left Ear: Hearing and external ear normal. Ears:      Comments: External ears are normal shape and even level placement on the head     Nose: No nasal deformity. Right Nostril: No epistaxis. Left Nostril: No epistaxis. Mouth/Throat:      Lips: Pink. Comments: Lips have appropriate movement with conversation  Eyes:      General: Lids are normal. Vision grossly intact. Gaze aligned appropriately. No allergic shiner. Right eye: No discharge. Left eye: No discharge. Extraocular Movements: Extraocular movements intact. Conjunctiva/sclera: Conjunctivae normal.      Comments: Pupils equal   Neck:      Thyroid: No thyromegaly. Trachea: No tracheal deviation. Pulmonary:      Effort: No tachypnea, accessory muscle usage or respiratory distress. Comments: No difficulty with conversation  Musculoskeletal:      Cervical back: Normal range of motion. No erythema. No pain with movement. Comments: Range of motion of upper and lower extremity large muscle groups are normal during ambulation. Gait normal   Skin:     Coloration: Skin is not cyanotic, jaundiced or pale. Findings: No acne. Comments: No evidence of abnormal hair loss. Facial skin without defect. Neurological:      Mental Status: He is alert and oriented to person, place, and time. Cranial Nerves: No cranial nerve deficit, dysarthria or facial asymmetry. Coordination: Coordination normal.      Gait: Gait is intact. Comments: Motor function intact for gait and range of motion of large muscle groups of extremities   Psychiatric:         Attention and Perception: Attention and perception normal.         Mood and Affect: Mood and affect normal.         Speech: Speech normal.         Behavior: Behavior normal. Behavior is cooperative. Thought Content: Thought content normal.         Judgment: Judgment normal.         No results found for this or any previous visit (from the past 2016 hour(s)).     [] Pt was seen by provider for   Minutes  Counseling and coordination of care was done for all assessment diagnosis listed for today with patient and any family/friend present. More than 50% of this visit was spent coordinating cuurent care, obtaining information for prior records, and counseling for current plan of action. Assessment:       Diagnosis Orders   1. Cough  benzonatate (TESSALON) 100 MG capsule    guaiFENesin (MUCINEX) 600 MG extended release tablet         No orders of the defined types were placed in this encounter. Plan:   Return if symptoms worsen or fail to improve. There are no Patient Instructions on file for this visit. This visit ended at 2:06pm    The resources used for this visit were [employer] for chart access and doxy. me audio per telephone        Sierra [place]. Denia Christian M.D. An  electronic signature was used to authenticate this note. --Dc Mcmillan MD on 10/25/2021 at 2:08 PM        Pursuant to the emergency declaration under the [facility], [facility] waiver authority and the [employer] Act, this Virtual  Visit was conducted, with patient's consent, to reduce the patient's risk of exposure to COVID-19 and provide continuity of care for an established patient. Services were provided through a video synchronous discussion virtually to substitute for in-person clinic visit.

## 2021-10-30 ENCOUNTER — OFFICE VISIT (OUTPATIENT)
Dept: FAMILY MEDICINE CLINIC | Age: 66
End: 2021-10-30
Payer: MEDICARE

## 2021-10-30 VITALS
BODY MASS INDEX: 30.48 KG/M2 | DIASTOLIC BLOOD PRESSURE: 76 MMHG | TEMPERATURE: 98.9 F | HEART RATE: 78 BPM | HEIGHT: 73 IN | SYSTOLIC BLOOD PRESSURE: 130 MMHG | RESPIRATION RATE: 16 BRPM | WEIGHT: 230 LBS | OXYGEN SATURATION: 97 %

## 2021-10-30 DIAGNOSIS — R05.3 PERSISTENT COUGH: ICD-10-CM

## 2021-10-30 DIAGNOSIS — J22 LOWER RESPIRATORY INFECTION (E.G., BRONCHITIS, PNEUMONIA, PNEUMONITIS, PULMONITIS): Primary | ICD-10-CM

## 2021-10-30 PROCEDURE — G8417 CALC BMI ABV UP PARAM F/U: HCPCS | Performed by: NURSE PRACTITIONER

## 2021-10-30 PROCEDURE — 1036F TOBACCO NON-USER: CPT | Performed by: NURSE PRACTITIONER

## 2021-10-30 PROCEDURE — 3017F COLORECTAL CA SCREEN DOC REV: CPT | Performed by: NURSE PRACTITIONER

## 2021-10-30 PROCEDURE — 4040F PNEUMOC VAC/ADMIN/RCVD: CPT | Performed by: NURSE PRACTITIONER

## 2021-10-30 PROCEDURE — 99213 OFFICE O/P EST LOW 20 MIN: CPT | Performed by: NURSE PRACTITIONER

## 2021-10-30 PROCEDURE — G8484 FLU IMMUNIZE NO ADMIN: HCPCS | Performed by: NURSE PRACTITIONER

## 2021-10-30 PROCEDURE — 87804 INFLUENZA ASSAY W/OPTIC: CPT | Performed by: NURSE PRACTITIONER

## 2021-10-30 PROCEDURE — 1123F ACP DISCUSS/DSCN MKR DOCD: CPT | Performed by: NURSE PRACTITIONER

## 2021-10-30 PROCEDURE — G8427 DOCREV CUR MEDS BY ELIG CLIN: HCPCS | Performed by: NURSE PRACTITIONER

## 2021-10-30 RX ORDER — DOXYCYCLINE HYCLATE 100 MG
100 TABLET ORAL 2 TIMES DAILY
Qty: 20 TABLET | Refills: 0 | Status: SHIPPED | OUTPATIENT
Start: 2021-10-30 | End: 2021-11-09

## 2021-10-30 ASSESSMENT — ENCOUNTER SYMPTOMS
CHEST TIGHTNESS: 0
SHORTNESS OF BREATH: 1
NAUSEA: 0
SINUS PAIN: 0
RHINORRHEA: 0
DIARRHEA: 0
SORE THROAT: 0
WHEEZING: 0
COUGH: 1
SINUS PRESSURE: 0
ABDOMINAL PAIN: 0

## 2021-10-30 NOTE — PROGRESS NOTES
Subjective  Faviola Moseley, 78 y.o. male presents today with:  Chief Complaint   Patient presents with    Cough     cough and congestion started 9 days ago       HPI   Presents to [facility] for cough and chest congestion   #2 COVID-19 vaccine 4/23/21  Began Mucinex and Tessalon 10/25  Last 3 days with yellow/green thick mucous   Night sweats   Nasal congestion   SOB with exertion   Eating and drinking well   Sleep interrupted d/t cough   Denies GI abnormalities   Denies chest pain   Denies fever or chills   Passive smoke exposure   Environmental allergens             Past Medical History:   Diagnosis Date    Actinic keratoses     has had LN2 and used Efudex    Alcohol consumption of one to four drinks per day on alcohol screening     Allergic rhinitis     cats, weeds, grasses, ragweed    Asthma     Bilateral knee pain     [facility] mellitus [address]     Gout     History of colon polyps 02/2016    needs f/u 5 years [facility]    History of type 2 diabetes mellitus     about 15 years    Hyperlipidemia     Hypertension     Keratosis, inflamed seborrheic     Osteoarthritis, localized, shoulder, right     Polyp of transverse colon f/u due 2021 8/9/2014    Prediabetes     Prostate cancer ([facility] 2014    s/p prostatectomy    Syncope and collapse     Varicose vein of leg       Past Surgical History:   Procedure Laterality Date    COLONOSCOPY  02/04/2016    Dr. Almonte Re; polyps f/u in 5 years    COLONOSCOPY N/A 10/2/2020    COLONOSCOPY DIAGNOSTIC performed by Niru Zhu MD at [address] [place] [address]  11/2014    ULNAR TUNNEL RELEASE Bilateral     VARICOSE VEIN SURGERY       Family History   Problem Relation Age of Onset    Alzheimer's Disease Mother     Heart Disease Father     Cancer Father     Diabetes Father     Cancer Brother     Colon Cancer Neg Hx     Celiac Disease Neg Hx     Crohn's Disease Neg Hx              Review of Systems   Constitutional: Positive for diaphoresis.  Negative for activity change, appetite change, chills, fatigue and fever. HENT: Positive for congestion. Negative for ear pain, rhinorrhea, sinus pressure, sinus pain and sore throat. Respiratory: Positive for cough and shortness of breath (with exertion ). Negative for chest tightness and wheezing. Cardiovascular: Negative for chest pain and palpitations. Gastrointestinal: Negative for abdominal pain, diarrhea and nausea. Musculoskeletal: Negative for myalgias. Skin: Negative for rash. Neurological: Negative for dizziness, light-headedness and headaches. Psychiatric/Behavioral: Negative for sleep disturbance. PMH, Surgical Hx, Family Hx, and Social Hx reviewed and updated. Objective  Vitals:    10/30/21 1509   BP: 130/76   Site: Right Upper Arm   Position: Sitting   Cuff Size: Medium Adult   Pulse: 78   Resp: 16   Temp: 98.9 °F (37.2 °C)   TempSrc: Temporal   SpO2: 97%   Weight: 230 lb (104.3 kg)   Height: 6' 1\" (1.854 m)     BP Readings from Last 3 Encounters:   10/30/21 130/76   07/26/21 136/84   04/29/21 138/84     Wt Readings from Last 3 Encounters:   10/30/21 230 lb (104.3 kg)   07/26/21 237 lb (107.5 kg)   04/29/21 233 lb 12.8 oz (106.1 kg)         Physical Exam  Vitals reviewed. Constitutional:       General: He is not in acute distress. Appearance: Normal appearance. He is not toxic-appearing. HENT:      Right Ear: Tympanic membrane, ear canal and external ear normal.      Left Ear: Tympanic membrane, ear canal and external ear normal.      Nose: Nose normal.      Mouth/Throat:      Lips: Pink. Mouth: Mucous membranes are moist.      Pharynx: Oropharynx is clear. Uvula midline. No pharyngeal swelling, oropharyngeal exudate, posterior oropharyngeal erythema or uvula swelling. Eyes:      General: Lids are normal.      Conjunctiva/sclera: Conjunctivae normal.   Cardiovascular:      Rate and Rhythm: Normal rate.    Pulmonary:      Effort: Pulmonary effort is normal. Breath sounds: Examination of the left-lower field reveals rhonchi. Rhonchi present. Musculoskeletal:      Cervical back: Normal range of motion. No rigidity. No pain with movement. Lymphadenopathy:      Head:      Right side of head: No submental, submandibular, tonsillar, preauricular or posterior auricular adenopathy. Left side of head: No submental, submandibular, tonsillar, preauricular or posterior auricular adenopathy. Cervical: No cervical adenopathy. Skin:     General: Skin is warm and dry. Capillary Refill: Capillary refill takes less than 2 seconds. Coloration: Skin is not pale. Findings: No rash. Neurological:      General: No focal deficit present. Mental Status: He is alert and oriented to person, place, and time. Psychiatric:         Mood and Affect: Mood normal.             Assessment & Plan    Diagnosis Orders   1. Lower respiratory infection (e.g., bronchitis, pneumonia, pneumonitis, pulmonitis)  doxycycline hyclate (VIBRA-TABS) 100 MG tablet   2. Persistent cough  Covid-19 Ambulatory    POCT Influenza A/B     Orders Placed This Encounter   Procedures    Covid-19 Ambulatory     Standing Status:   Future     Standing Expiration Date:   10/30/2022     Scheduling Instructions:      Saline media preferred given current shortage of viral transport media but both acceptable     Order Specific Question:   Is this test for diagnosis or screening? Answer:   Diagnosis of ill patient     Order Specific Question:   Symptomatic for COVID-19 as defined by CDC? Answer:   Yes     Order Specific Question:   Date of Symptom Onset     Answer:   10/23/2021     Order Specific Question:   Hospitalized for COVID-19? Answer:   No     Order Specific Question:   Admitted to ICU for COVID-19? Answer:   No     Order Specific Question:   Employed in healthcare setting? Answer:   No     Order Specific Question:   Resident in a congregate (group) care setting? out.        When should you call for help? Call 911 anytime you think you may need emergency care. For example, call if:    · You have severe trouble breathing. Close follow up to evaluate treatment results and for coordination of care. I have reviewed the patient's medical history in detail and updated the computerized patient record.         CALIXTO Velasquez NP

## 2021-10-31 DIAGNOSIS — R05.3 PERSISTENT COUGH: ICD-10-CM

## 2021-11-02 LAB
SARS-COV-2: NOT DETECTED
SOURCE: NORMAL

## 2021-11-09 ENCOUNTER — TELEPHONE (OUTPATIENT)
Dept: FAMILY MEDICINE CLINIC | Age: 66
End: 2021-11-09

## 2021-11-22 ENCOUNTER — OFFICE VISIT (OUTPATIENT)
Dept: FAMILY MEDICINE CLINIC | Age: 66
End: 2021-11-22
Payer: MEDICARE

## 2021-11-22 VITALS
DIASTOLIC BLOOD PRESSURE: 86 MMHG | BODY MASS INDEX: 30.48 KG/M2 | OXYGEN SATURATION: 97 % | SYSTOLIC BLOOD PRESSURE: 124 MMHG | WEIGHT: 230 LBS | HEART RATE: 79 BPM | HEIGHT: 73 IN

## 2021-11-22 DIAGNOSIS — M94.0 COSTOCHONDRITIS: ICD-10-CM

## 2021-11-22 DIAGNOSIS — C61 PROSTATE CA (HCC): ICD-10-CM

## 2021-11-22 DIAGNOSIS — I10 ESSENTIAL HYPERTENSION: ICD-10-CM

## 2021-11-22 DIAGNOSIS — M54.9 MID BACK PAIN: Primary | ICD-10-CM

## 2021-11-22 DIAGNOSIS — E11.9 TYPE 2 DIABETES MELLITUS WITHOUT COMPLICATION, WITHOUT LONG-TERM CURRENT USE OF INSULIN (HCC): ICD-10-CM

## 2021-11-22 LAB
ALBUMIN SERPL-MCNC: 4.1 G/DL (ref 3.5–4.6)
ALP BLD-CCNC: 92 U/L (ref 35–104)
ALT SERPL-CCNC: 48 U/L (ref 0–41)
ANION GAP SERPL CALCULATED.3IONS-SCNC: 15 MEQ/L (ref 9–15)
AST SERPL-CCNC: 49 U/L (ref 0–40)
BASOPHILS ABSOLUTE: 0 K/UL (ref 0–0.2)
BASOPHILS RELATIVE PERCENT: 0.9 %
BILIRUB SERPL-MCNC: 0.6 MG/DL (ref 0.2–0.7)
BUN BLDV-MCNC: 7 MG/DL (ref 8–23)
CALCIUM SERPL-MCNC: 9.6 MG/DL (ref 8.5–9.9)
CHLORIDE BLD-SCNC: 97 MEQ/L (ref 95–107)
CHOLESTEROL, FASTING: 153 MG/DL (ref 0–199)
CO2: 26 MEQ/L (ref 20–31)
CREAT SERPL-MCNC: 0.83 MG/DL (ref 0.7–1.2)
CREATININE URINE: 190.4 MG/DL
EOSINOPHILS ABSOLUTE: 0.2 K/UL (ref 0–0.7)
EOSINOPHILS RELATIVE PERCENT: 3.1 %
GFR AFRICAN AMERICAN: >60
GFR NON-AFRICAN AMERICAN: >60
GLOBULIN: 3.2 G/DL (ref 2.3–3.5)
GLUCOSE BLD-MCNC: 189 MG/DL (ref 70–99)
HCT VFR BLD CALC: 46.4 % (ref 42–52)
HDLC SERPL-MCNC: 46 MG/DL (ref 40–59)
HEMOGLOBIN: 15.6 G/DL (ref 14–18)
LDL CHOLESTEROL CALCULATED: 89 MG/DL (ref 0–129)
LYMPHOCYTES ABSOLUTE: 1.3 K/UL (ref 1–4.8)
LYMPHOCYTES RELATIVE PERCENT: 24.2 %
MCH RBC QN AUTO: 32.2 PG (ref 27–31.3)
MCHC RBC AUTO-ENTMCNC: 33.6 % (ref 33–37)
MCV RBC AUTO: 95.9 FL (ref 80–100)
MICROALBUMIN UR-MCNC: <1.2 MG/DL
MICROALBUMIN/CREAT UR-RTO: NORMAL MG/G (ref 0–30)
MONOCYTES ABSOLUTE: 0.7 K/UL (ref 0.2–0.8)
MONOCYTES RELATIVE PERCENT: 12 %
NEUTROPHILS ABSOLUTE: 3.3 K/UL (ref 1.4–6.5)
NEUTROPHILS RELATIVE PERCENT: 59.8 %
PDW BLD-RTO: 12.5 % (ref 11.5–14.5)
PLATELET # BLD: 274 K/UL (ref 130–400)
POTASSIUM SERPL-SCNC: 4.6 MEQ/L (ref 3.4–4.9)
PROSTATE SPECIFIC ANTIGEN: 0.01 NG/ML (ref 0–4)
RBC # BLD: 4.83 M/UL (ref 4.7–6.1)
SODIUM BLD-SCNC: 138 MEQ/L (ref 135–144)
TOTAL PROTEIN: 7.3 G/DL (ref 6.3–8)
TRIGLYCERIDE, FASTING: 92 MG/DL (ref 0–150)
TSH REFLEX: 1.32 UIU/ML (ref 0.44–3.86)
WBC # BLD: 5.5 K/UL (ref 4.8–10.8)

## 2021-11-22 PROCEDURE — G8484 FLU IMMUNIZE NO ADMIN: HCPCS | Performed by: NURSE PRACTITIONER

## 2021-11-22 PROCEDURE — 4040F PNEUMOC VAC/ADMIN/RCVD: CPT | Performed by: NURSE PRACTITIONER

## 2021-11-22 PROCEDURE — 3017F COLORECTAL CA SCREEN DOC REV: CPT | Performed by: NURSE PRACTITIONER

## 2021-11-22 PROCEDURE — G8417 CALC BMI ABV UP PARAM F/U: HCPCS | Performed by: NURSE PRACTITIONER

## 2021-11-22 PROCEDURE — G8427 DOCREV CUR MEDS BY ELIG CLIN: HCPCS | Performed by: NURSE PRACTITIONER

## 2021-11-22 PROCEDURE — 99213 OFFICE O/P EST LOW 20 MIN: CPT | Performed by: NURSE PRACTITIONER

## 2021-11-22 PROCEDURE — 1036F TOBACCO NON-USER: CPT | Performed by: NURSE PRACTITIONER

## 2021-11-22 PROCEDURE — 1123F ACP DISCUSS/DSCN MKR DOCD: CPT | Performed by: NURSE PRACTITIONER

## 2021-11-22 RX ORDER — CYCLOBENZAPRINE HCL 10 MG
10 TABLET ORAL NIGHTLY PRN
Qty: 10 TABLET | Refills: 0 | Status: SHIPPED | OUTPATIENT
Start: 2021-11-22 | End: 2021-12-02

## 2021-11-22 ASSESSMENT — ENCOUNTER SYMPTOMS
BACK PAIN: 1
SHORTNESS OF BREATH: 0
COUGH: 1

## 2021-11-22 NOTE — TELEPHONE ENCOUNTER
Requesting medication refill.  Please approve or deny this request.    Rx requested:  Requested Prescriptions     Pending Prescriptions Disp Refills    irbesartan (AVAPRO) 150 MG tablet [Pharmacy Med Name: Irbesartan Oral Tablet 150 MG] 90 tablet 0     Sig: TAKE ONE TABLET BY MOUTH DAILY       Last Office Visit:   10/25/2021    Next Visit Date:  Future Appointments   Date Time Provider Odessa Peres   12/7/2021 12:30 PM Brice Flores MD [facility] [place]   5/2/2022 12:15 PM Brice Flores MD [facility] [place] [facility] [place]

## 2021-11-22 NOTE — PROGRESS NOTES
Subjective  Chief Complaint   Patient presents with   [facility] Maintenance     pt declined flu    Follow-up     pt states f/u on back pain x 2 weeks. HPI     Had neck pain that went down into the arm. Neck has greatly improved. Upper back is painful. Pain has been somewhat severe. This pain started after coughing last couple of weeks. Cough has since improved. Took flexeril recently. Helped some. Not taking anything else for the pain. Seems slightly improved. Trouble with sleep secondary to pain. Has had previous episodes like this in previous years.        Patient Active Problem List    Diagnosis Date Noted    Lipoma of back 02/22/2021    Elevated LFTs 08/26/2020    Diverticula of colon 03/19/2019    Varicose veins of both lower extremities 05/07/2018    Alcohol consumption of one to four drinks per day on alcohol screening     Syncope and collapse     Venous insufficiency of left leg 01/16/2018    Bilateral knee pain     Varicose veins of left lower extremity with pain     Keratosis, inflamed seborrheic     Asthma     Allergic rhinitis     Gout     Mixed hyperlipidemia     Essential hypertension     Actinic keratoses     Primary osteoarthritis of both knees 05/31/2016    Acute left ankle pain 03/09/2016    Posterior calcaneal exostosis 03/09/2016    S/P prostatectomy 12/23/2014    FH: prostate cancer 09/10/2014    Hypertrophy of prostate with urinary obstruction and other lower urinary tract symptoms (LUTS) 09/10/2014    Hyperuricemia 08/09/2014    Polyp of transverse colon f/u due 2021 08/09/2014    Type 2 diabetes mellitus without complication ([facility] [address] 31/03/6225    Prostate CA ([facility] [address] [facility] 01/01/2014     Past Medical History:   Diagnosis Date    Actinic keratoses     has had LN2 and used Efudex    Alcohol consumption of one to four drinks per day on alcohol screening     Allergic rhinitis     cats, weeds, grasses, ragweed    Asthma     Bilateral knee pain     Diabetes mellitus ([facility]     Gout     History of colon polyps 2016    needs f/u 5 years [place]    History of type 2 diabetes mellitus     about 15 years    Hyperlipidemia     Hypertension     Keratosis, inflamed seborrheic     Osteoarthritis, localized, shoulder, right     Polyp of transverse colon f/u due 2014    Prediabetes     Prostate cancer ([facility] [address] 2014    s/p prostatectomy    Syncope and collapse     Varicose vein of leg      Past Surgical History:   Procedure Laterality Date    COLONOSCOPY  2016    Dr. Ramona Alberto; polyps f/u in 5 years    COLONOSCOPY N/A 10/2/2020    COLONOSCOPY DIAGNOSTIC performed by Joe Gallegos MD at [address]  2014    ULNAR TUNNEL RELEASE Bilateral     [address]       Family History   Problem Relation Age of Onset    Alzheimer's Disease Mother     Heart Disease Father     Cancer Father     Diabetes Father     Cancer Brother     Colon Cancer Neg Hx     Celiac Disease Neg Hx     Crohn's Disease Neg Hx      Social History     Socioeconomic History    Marital status: Single     Spouse name: None    Number of children: None    Years of education: 0    Highest education level: None   Occupational History    None   Tobacco Use    Smoking status: Former Smoker     Packs/day: 0.50     Years: 12.00     Pack years: 6.00     Types: Cigarettes     Quit date: 1982     Years since quittin.8    Smokeless tobacco: Never Used   Vaping Use    Vaping Use: Never used   Substance and Sexual Activity    Alcohol use: Yes     Comment: daily 5 vodka drinks per day    Drug use: No    Sexual activity: Yes     Partners: Female   Other Topics Concern    None   Social History Narrative    Engaged. No children.      Social Determinants of Health     Financial Resource Strain: Low Risk     Difficulty of Paying Living Expenses: Not hard at all   Food Insecurity: No Food Insecurity    Worried About [facility] in the Last Year: Never true   [address] in the Last Year: Never true   Transportation Needs:     Lack of Transportation (Medical): Not on file    Lack of Transportation (Non-Medical):  Not on file   Physical Activity:     Days of Exercise per Week: Not on file    Minutes of Exercise per Session: Not on file   Stress:     Feeling of Stress : Not on file   Social Connections:     Frequency of Communication with Friends and Family: Not on file    Frequency of Social Gatherings with Friends and Family: Not on file    Attends Christian Services: Not on file    Active Member of [employer] or Organizations: Not on file    Attends Club or Organization Meetings: Not on file    Marital Status: Not on file   Intimate Partner Violence:     Fear of Current or Ex-Partner: Not on file    Emotionally Abused: Not on file    Physically Abused: Not on file    Sexually Abused: Not on file   Housing Stability:     Unable to Pay for Housing in the Last Year: Not on file    Number of [place] in the Last Year: Not on file    Unstable Housing in the Last Year: Not on file     Current Outpatient Medications on File Prior to Visit   Medication Sig Dispense Refill    metFORMIN (GLUCOPHAGE) 500 MG tablet Take 1 tablet by mouth daily (with breakfast) 90 tablet 3    irbesartan (AVAPRO) 150 MG tablet TAKE ONE TABLET BY MOUTH DAILY 90 tablet 0    metoprolol succinate (TOPROL XL) 100 MG extended release tablet Take 1 tablet by mouth daily 90 tablet 3    Cholecalciferol (D3-1000 PO) Take by mouth      simvastatin (ZOCOR) 20 MG tablet Take 1 tablet by mouth daily 90 tablet 3    amLODIPine (NORVASC) 5 MG tablet Take 1 tablet by mouth daily 30 tablet 11    allopurinol (ZYLOPRIM) 300 MG tablet TAKE ONE TABLET BY MOUTH DAILY 30 tablet 11    fluticasone (FLOVENT HFA) 110 MCG/ACT inhaler Inhale 2 puffs into the lungs 2 times daily 1 Inhaler 3    albuterol sulfate HFA (VENTOLIN HFA) 108 (90 Base) MCG/ACT inhaler Inhale 2 puffs into the lungs every 6 hours as needed for Wheezing 18 g 1    Spacer/Aero-Hold Chamber Bags MISC 1 applicator by Does not apply route 4 times daily 1 each 0    Blood Pressure KIT 1 applicator by Does not apply route 4 times daily 1 kit 0    Coenzyme Q10-Vitamin E (QUNOL ULTRA COQ10) 100-150 MG-UNIT CAPS       fluticasone (FLONASE) 50 MCG/ACT nasal spray 2 sprays by Nasal route daily 32 g 0    loratadine (CLARITIN) 10 MG tablet Take 1 tablet by mouth daily 30 tablet 11    aspirin 81 MG chewable tablet Take 81 mg by mouth daily      vitamin C (ASCORBIC ACID) 500 MG tablet Take 500 mg by mouth daily      Multiple Vitamins-Minerals (MULTIVITAMIN MEN 50+ PO) Take by mouth       No current facility-administered medications on file prior to visit. No Known Allergies    Review of Systems   Respiratory: Positive for cough. Negative for shortness of breath. Cardiovascular: Negative for chest pain. Musculoskeletal: Positive for back pain and neck pain. Neurological: Negative for weakness and numbness. Objective  Vitals:    11/22/21 0756   BP: 124/86   Pulse: 79   SpO2: 97%   Weight: 230 lb (104.3 kg)   Height: 6' 1\" (1.854 m)     Physical Exam  Vitals and nursing note reviewed. Constitutional:       Appearance: Normal appearance. He is normal weight. HENT:      Head: Normocephalic. Nose: Nose normal.      Mouth/Throat:      Mouth: Mucous membranes are moist.   Eyes:      Extraocular Movements: Extraocular movements intact. Conjunctiva/sclera: Conjunctivae normal.      Pupils: Pupils are equal, round, and reactive to light. Cardiovascular:      Rate and Rhythm: Normal rate and regular rhythm. Pulses: Normal pulses. Heart sounds: Normal heart sounds. Pulmonary:      Effort: Pulmonary effort is normal.      Breath sounds: Normal breath sounds. Skin:     General: Skin is warm. Neurological:      General: No focal deficit present.       Mental Status: He is alert and oriented

## 2021-11-23 RX ORDER — IRBESARTAN 150 MG/1
TABLET ORAL
Qty: 90 TABLET | Refills: 0 | Status: SHIPPED | OUTPATIENT
Start: 2021-11-23 | End: 2022-01-28 | Stop reason: SDUPTHER

## 2021-11-25 LAB — HBA1C MFR BLD: 8 % (ref 4.8–5.9)

## 2021-12-07 ENCOUNTER — OFFICE VISIT (OUTPATIENT)
Dept: FAMILY MEDICINE CLINIC | Age: 66
End: 2021-12-07
Payer: MEDICARE

## 2021-12-07 VITALS
WEIGHT: 228 LBS | HEIGHT: 73 IN | BODY MASS INDEX: 30.22 KG/M2 | OXYGEN SATURATION: 97 % | HEART RATE: 73 BPM | TEMPERATURE: 97.3 F | SYSTOLIC BLOOD PRESSURE: 130 MMHG | DIASTOLIC BLOOD PRESSURE: 70 MMHG

## 2021-12-07 DIAGNOSIS — L57.0 ACTINIC KERATOSES: ICD-10-CM

## 2021-12-07 DIAGNOSIS — M54.9 MID BACK PAIN: ICD-10-CM

## 2021-12-07 DIAGNOSIS — R05.3 PERSISTENT COUGH: Primary | ICD-10-CM

## 2021-12-07 PROCEDURE — G8484 FLU IMMUNIZE NO ADMIN: HCPCS | Performed by: FAMILY MEDICINE

## 2021-12-07 PROCEDURE — 17003 DESTRUCT PREMALG LES 2-14: CPT | Performed by: FAMILY MEDICINE

## 2021-12-07 PROCEDURE — 4040F PNEUMOC VAC/ADMIN/RCVD: CPT | Performed by: FAMILY MEDICINE

## 2021-12-07 PROCEDURE — 17000 DESTRUCT PREMALG LESION: CPT | Performed by: FAMILY MEDICINE

## 2021-12-07 PROCEDURE — 99214 OFFICE O/P EST MOD 30 MIN: CPT | Performed by: FAMILY MEDICINE

## 2021-12-07 PROCEDURE — G8427 DOCREV CUR MEDS BY ELIG CLIN: HCPCS | Performed by: FAMILY MEDICINE

## 2021-12-07 PROCEDURE — 1123F ACP DISCUSS/DSCN MKR DOCD: CPT | Performed by: FAMILY MEDICINE

## 2021-12-07 PROCEDURE — 3017F COLORECTAL CA SCREEN DOC REV: CPT | Performed by: FAMILY MEDICINE

## 2021-12-07 PROCEDURE — 1036F TOBACCO NON-USER: CPT | Performed by: FAMILY MEDICINE

## 2021-12-07 PROCEDURE — G8417 CALC BMI ABV UP PARAM F/U: HCPCS | Performed by: FAMILY MEDICINE

## 2021-12-07 ASSESSMENT — ENCOUNTER SYMPTOMS
BACK PAIN: 1
COUGH: 1

## 2021-12-07 NOTE — PROGRESS NOTES
Diagnosis Orders   1. Persistent cough     2. Actinic keratoses  AR DESTRUC PREMALIGNANT, FIRST LESION    AR DESTRUC PREMALIGNANT,2-14 LESIONS   3. Mid back pain         Return in about 3 months (around 3/7/2022) for 15 min skin check. Orders Placed This Encounter   Procedures     [address], FIRST LESION    AR Pradeep Pinto was seen today for cough and hair/scalp problem. Diagnoses and all orders for this visit:    Persistent cough    Actinic keratoses  -     AR DESTRUC PREMALIGNANT, FIRST LESION  -     AR DESTRUC PREMALIGNANT,2-14 LESIONS    Mid back pain        Return in about 3 months (around 3/7/2022) for 15 min skin check. Patient Instructions   Patient will obtain Mucinex/guaifenesin 600 to 1200 mg twice a day to thin mucus and clear cough. Patient can resume diclofenac 50 mg twice a day for back pain for a week or so. If he needs it longer than that follow-up appointment to discuss work-up and evaluation of continued pain    Cryotherapy instructions    Post op instructions given. A printed copy provided. It is best to leave blisters alone if they form for the first 1-3 days to allow the desired damaged tissue (precancer lesion, wart, or whatever lesion is being removed) to separate from healthy tissue. The area should be covered with a bandage to prevent blister breakage and dirt exposure. The wounds should remain dry while there is a blister, therefore if this is a sweaty location like the foot you may need to change socks multiple times per day. When the blister(s) pop or patient removes the top as instructed between day 3-5, apply antibiotic (NOT triple antibiotic, one brand is Neosporin) ointment and a bandage to affected area(s). The ointment should be applied to the open area as long as it is not covered with skin. Exposed tissue is meant to be moist.      Once a scab is formed the patient may stop applying ointment.   The No JVD. Trachea: Trachea and phonation normal.   Cardiovascular:      Rate and Rhythm: Normal rate and regular rhythm. Pulmonary:      Effort: No accessory muscle usage or respiratory distress. Musculoskeletal:      Cervical back: Full passive range of motion without pain. Comments: FROM all large muscle groups and joints as witnessed when walking to exam table, getting on, and getting off the exam table. Skin:     General: Skin is warm and dry. Findings: No rash. Comments: Multiple erythematous base rough white flake lesions. See consent for locations   Neurological:      Mental Status: He is alert. Motor: No tremor or atrophy. Gait: Gait normal.   Psychiatric:         Speech: Speech normal.         Behavior: Behavior normal.         Thought Content: Thought content normal.           Procedure consent  The procedure was discussed with the patient. All questions were answered and alternative options discussed. The patient is aware of the risks of bleeding, infection, unsatisfactory scar result. Informed consent paperwork was signed by the patient. Liquid nitrogen was applied for 2-6 seconds to affected areas with thaw allowed between dosing for a total of 2 applications. Applied to 11 lesion(s). The patient tolerated the procedure well. Diagnosis Orders   1. Persistent cough     2. Actinic keratoses  [place] DESTRUC PREMALIGNANT, FIRST LESION    [place] DESTRUC PREMALIGNANT,2-14 LESIONS   3. Mid back pain         Return in about 3 months (around 3/7/2022) for 15 min skin check. Orders Placed This Encounter   Procedures     [facility], FIRST LESION    [place] [address] LESIONS               Patient Instructions   Patient will obtain Mucinex/guaifenesin 600 to 1200 mg twice a day to thin mucus and clear cough. Patient can resume diclofenac 50 mg twice a day for back pain for a week or so.   If he needs it longer than that follow-up appointment to affected area(s). The ointment should be applied to the open area as long as it is not covered with skin. Exposed tissue is meant to be moist.  Once a scab formed she may stop applying ointment. If this treatment was for a large wart you may note that a plug of skin may fall out of the area that was treated. That is the center of the wart and it is appropriate for it to come out. If exposed skin remains, treat that area as you would a ruptured blister as mentioned above.

## 2021-12-07 NOTE — PATIENT INSTRUCTIONS
Patient will obtain Mucinex/guaifenesin 600 to 1200 mg twice a day to thin mucus and clear cough. Patient can resume diclofenac 50 mg twice a day for back pain for a week or so. If he needs it longer than that follow-up appointment to discuss work-up and evaluation of continued pain    Cryotherapy instructions    Post op instructions given. A printed copy provided. It is best to leave blisters alone if they form for the first 1-3 days to allow the desired damaged tissue (precancer lesion, wart, or whatever lesion is being removed) to separate from healthy tissue. The area should be covered with a bandage to prevent blister breakage and dirt exposure. The wounds should remain dry while there is a blister, therefore if this is a sweaty location like the foot you may need to change socks multiple times per day. When the blister(s) pop or patient removes the top as instructed between day 3-5, apply antibiotic (NOT triple antibiotic, one brand is Neosporin) ointment and a bandage to affected area(s). The ointment should be applied to the open area as long as it is not covered with skin. Exposed tissue is meant to be moist.      Once a scab is formed the patient may stop applying ointment. The scab may appear yellow while moist, don't confuse this with infection. If the wound is infection pus will drain from the site. If this treatment was for a large wart you may note that a plug of skin may fall out of the area that was treated. That is the center of the wart and it is appropriate for it to come out. If exposed skin remains, treat that area as you would a ruptured blister as mentioned above.     Bacitracin sample supplied

## 2021-12-27 ENCOUNTER — OFFICE VISIT (OUTPATIENT)
Dept: FAMILY MEDICINE CLINIC | Age: 66
End: 2021-12-27
Payer: MEDICARE

## 2021-12-27 VITALS
HEIGHT: 73 IN | DIASTOLIC BLOOD PRESSURE: 84 MMHG | SYSTOLIC BLOOD PRESSURE: 138 MMHG | OXYGEN SATURATION: 98 % | HEART RATE: 80 BPM | BODY MASS INDEX: 31.14 KG/M2 | TEMPERATURE: 97.3 F | WEIGHT: 235 LBS

## 2021-12-27 DIAGNOSIS — R05.9 COUGH: Primary | ICD-10-CM

## 2021-12-27 DIAGNOSIS — S03.00XA DISLOCATION OF TEMPOROMANDIBULAR JOINT, INITIAL ENCOUNTER: ICD-10-CM

## 2021-12-27 PROCEDURE — 99214 OFFICE O/P EST MOD 30 MIN: CPT | Performed by: FAMILY MEDICINE

## 2021-12-27 PROCEDURE — G8484 FLU IMMUNIZE NO ADMIN: HCPCS | Performed by: FAMILY MEDICINE

## 2021-12-27 PROCEDURE — 4040F PNEUMOC VAC/ADMIN/RCVD: CPT | Performed by: FAMILY MEDICINE

## 2021-12-27 PROCEDURE — G8417 CALC BMI ABV UP PARAM F/U: HCPCS | Performed by: FAMILY MEDICINE

## 2021-12-27 PROCEDURE — 1123F ACP DISCUSS/DSCN MKR DOCD: CPT | Performed by: FAMILY MEDICINE

## 2021-12-27 PROCEDURE — 3017F COLORECTAL CA SCREEN DOC REV: CPT | Performed by: FAMILY MEDICINE

## 2021-12-27 PROCEDURE — G8427 DOCREV CUR MEDS BY ELIG CLIN: HCPCS | Performed by: FAMILY MEDICINE

## 2021-12-27 PROCEDURE — 1036F TOBACCO NON-USER: CPT | Performed by: FAMILY MEDICINE

## 2021-12-27 RX ORDER — BENZONATATE 100 MG/1
200 CAPSULE ORAL 3 TIMES DAILY PRN
Qty: 42 CAPSULE | Refills: 0 | Status: SHIPPED | OUTPATIENT
Start: 2021-12-27 | End: 2022-01-03

## 2021-12-27 RX ORDER — AZITHROMYCIN 250 MG/1
TABLET, FILM COATED ORAL
Qty: 6 TABLET | Refills: 0 | Status: SHIPPED | OUTPATIENT
Start: 2021-12-27 | End: 2022-03-07 | Stop reason: ALTCHOICE

## 2021-12-27 ASSESSMENT — ENCOUNTER SYMPTOMS
SORE THROAT: 0
SHORTNESS OF BREATH: 0
ABDOMINAL DISTENTION: 0
ABDOMINAL PAIN: 0
WHEEZING: 0
PHOTOPHOBIA: 0
CHEST TIGHTNESS: 0
COUGH: 1

## 2021-12-27 NOTE — PATIENT INSTRUCTIONS
under stress. · Get at least 30 minutes of exercise on most days of the week to relieve stress. Walking is a good choice. · Ask your doctor if you can take an over-the-counter pain medicine, such as acetaminophen (Tylenol), ibuprofen (Advil, Motrin), or naproxen (Aleve). Be safe with medicines. Read and follow all instructions on the label. · Use good posture for sitting and standing. Slumping your shoulders disturbs the alignment of your facial bones and muscles. · Don't:  ? Hold a phone between your shoulder and your jaw. ? Open your mouth all the way, like when you sing loudly or yawn. ? Clench or grind your teeth, bite your lips, or chew your fingernails. ? Clench things such as pens, pipes, or cigars between your teeth. When should you call for help? Call your doctor now or seek immediate medical care if:    · Your jaw is locked open or shut or it is hard to move your jaw. Watch closely for changes in your health, and be sure to contact your doctor if:    · Your jaw pain gets worse.     · Your face is swollen.     · You do not get better as expected. Where can you learn more? Go to https://Trendslide.Profind. org and sign in to your Searchbox account. Enter U660 in the [employer] box to learn more about \"Temporomandibular Disorder: Care Instructions. \"     If you do not have an account, please click on the \"Sign Up Now\" link. Current as of: June 30, 2021               Content Version: 13.1  © 2154-5833 [employer]. Care instructions adapted under license by [facility] [employer] [facility] ([facility]). If you have questions about a medical condition or this instruction, always ask your healthcare professional. [place] [address] any warranty or liability for your use of this information.

## 2021-12-27 NOTE — PROGRESS NOTES
for a few minutes every morning and evening. Watch yourself in a mirror. Gently open and close your mouth. Move your jaw straight up and down. But don't do this if it makes your pain worse. · Manage stress. You may be clenching or tightening your muscles when you are under stress. · Get at least 30 minutes of exercise on most days of the week to relieve stress. Walking is a good choice. · Ask your doctor if you can take an over-the-counter pain medicine, such as acetaminophen (Tylenol), ibuprofen (Advil, Motrin), or naproxen (Aleve). Be safe with medicines. Read and follow all instructions on the label. · Use good posture for sitting and standing. Slumping your shoulders disturbs the alignment of your facial bones and muscles. · Don't:  ? Hold a phone between your shoulder and your jaw. ? Open your mouth all the way, like when you sing loudly or yawn. ? Clench or grind your teeth, bite your lips, or chew your fingernails. ? Clench things such as pens, pipes, or cigars between your teeth. When should you call for help? Call your doctor now or seek immediate medical care if:    · Your jaw is locked open or shut or it is hard to move your jaw. Watch closely for changes in your health, and be sure to contact your doctor if:    · Your jaw pain gets worse.     · Your face is swollen.     · You do not get better as expected. Where can you learn more? Go to https://[employer]. org and sign in to your [employer] account. Enter Y576 in the [facility] box to learn more about \"Temporomandibular Disorder: Care Instructions. \"     If you do not have an account, please click on the \"Sign Up Now\" link. Current as of: June 30, 2021               Content Version: 13.1  © 9055-7431 Healthwise, Incorporated. Care instructions adapted under license by [facility] ([facility]).  If you have questions about a medical condition or this instruction, always ask your healthcare professional. [employer] disclaims any warranty or liability for your use of this information. Subjective:      Patient ID: Iraj Abdi is a 78 y.o. male who presents for:  Chief Complaint   Patient presents with    Jaw Pain     x 2 weeks     Cough     productive with phlem        Cough for 2 months. No response to mucinex. Thick, clear phlegm with tickle in the throat. Jaw pain, wakes in morning with this, feels out of position and can hear it pop into place. Lasting longer and longer in the day.       Current Outpatient Medications on File Prior to Visit   Medication Sig Dispense Refill    irbesartan (AVAPRO) 150 MG tablet TAKE ONE TABLET BY MOUTH DAILY 90 tablet 0    metFORMIN (GLUCOPHAGE) 500 MG tablet Take 1 tablet by mouth daily (with breakfast) 90 tablet 3    metoprolol succinate (TOPROL XL) 100 MG extended release tablet Take 1 tablet by mouth daily 90 tablet 3    Cholecalciferol (D3-1000 PO) Take by mouth      simvastatin (ZOCOR) 20 MG tablet Take 1 tablet by mouth daily 90 tablet 3    amLODIPine (NORVASC) 5 MG tablet Take 1 tablet by mouth daily 30 tablet 11    allopurinol (ZYLOPRIM) 300 MG tablet TAKE ONE TABLET BY MOUTH DAILY 30 tablet 11    fluticasone (FLOVENT HFA) 110 MCG/ACT inhaler Inhale 2 puffs into the lungs 2 times daily 1 Inhaler 3    albuterol sulfate HFA (VENTOLIN HFA) 108 (90 Base) MCG/ACT inhaler Inhale 2 puffs into the lungs every 6 hours as needed for Wheezing 18 g 1    Spacer/Aero-Hold Chamber Bags MISC 1 applicator by Does not apply route 4 times daily 1 each 0    Blood Pressure KIT 1 applicator by Does not apply route 4 times daily 1 kit 0    Coenzyme Q10-Vitamin E (QUNOL ULTRA COQ10) 100-150 MG-UNIT CAPS       fluticasone (FLONASE) 50 MCG/ACT nasal spray 2 sprays by Nasal route daily (Patient taking differently: 2 sprays by Nasal route daily as needed ) 32 g 0    aspirin 81 MG chewable tablet Take 81 mg by mouth daily      vitamin C (ASCORBIC ACID) 500 MG tablet Take 500 mg by mouth daily      Multiple Vitamins-Minerals (MULTIVITAMIN MEN 50+ PO) Take by mouth      diclofenac (VOLTAREN) 50 MG EC tablet Take 1 tablet by mouth 2 times daily for 10 days 20 tablet 0     No current facility-administered medications on file prior to visit.      Past Medical History:   Diagnosis Date    Actinic keratoses     has had LN2 and used Efudex    Alcohol consumption of one to four drinks per day on alcohol screening     Allergic rhinitis     cats, weeds, grasses, ragweed    Asthma     Bilateral knee pain     Diabetes mellitus ([facility]     Gout     History of colon polyps 2016    needs f/u 5 years [place]    History of type 2 diabetes mellitus     about 15 years    Hyperlipidemia     Hypertension     Keratosis, inflamed seborrheic     Osteoarthritis, localized, shoulder, right     Polyp of transverse colon f/u due 2014    Prediabetes     Prostate cancer ([facility] 2014    s/p prostatectomy    Syncope and collapse     Varicose vein of leg      Past Surgical History:   Procedure Laterality Date    COLONOSCOPY  2016    Dr. Nilda Link; polyps f/u in 5 years    COLONOSCOPY N/A 10/2/2020    COLONOSCOPY DIAGNOSTIC performed by Prisca Caballero MD at [address] [place] [address]  2014    ULNAR TUNNEL RELEASE Bilateral     VARICOSE VEIN SURGERY       Social History     Socioeconomic History    Marital status: Single     Spouse name: Not on file    Number of children: Not on file    Years of education: 0    Highest education level: Not on file   Occupational History    Not on file   Tobacco Use    Smoking status: Former Smoker     Packs/day: 0.50     Years: 12.00     Pack years: 6.00     Types: Cigarettes     Quit date: 1982     Years since quittin.9    Smokeless tobacco: Never Used   Vaping Use    Vaping Use: Never used   Substance and Sexual Activity    Alcohol use: Yes     Comment: daily 5 vodka drinks per day    Drug use: No    Sexual activity: Yes     Partners: Female   Other Topics Concern    Not on file   Social History Narrative    Engaged. No children. Social Determinants of Health     Financial Resource Strain: Low Risk     Difficulty of Paying Living Expenses: Not hard at all   Food Insecurity: No Food Insecurity    Worried About Running Out of Food in the Last Year: Never true    Mesfin of Food in the Last Year: Never true   Transportation Needs:     Lack of Transportation (Medical): Not on file    Lack of Transportation (Non-Medical): Not on file   Physical Activity:     Days of Exercise per Week: Not on file    Minutes of Exercise per Session: Not on file   Stress:     Feeling of Stress : Not on file   Social Connections:     Frequency of Communication with Friends and Family: Not on file    Frequency of Social Gatherings with Friends and Family: Not on file    Attends Gnosticism Services: Not on file    Active Member of [address] [place] [address] [employer] or Organizations: Not on file    Attends Club or Organization Meetings: Not on file    Marital Status: Not on file   Intimate Partner Violence:     Fear of Current or Ex-Partner: Not on file    Emotionally Abused: Not on file    Physically Abused: Not on file    Sexually Abused: Not on file   Housing Stability:     Unable to Pay for Housing in the Last Year: Not on file    Number of [place] in the Last Year: Not on file    Unstable Housing in the Last Year: Not on file     Family History   Problem Relation Age of Onset    Alzheimer's Disease Mother     Heart Disease Father     Cancer Father     Diabetes Father     Cancer Brother     Colon Cancer Neg Hx     Celiac Disease Neg Hx     Crohn's Disease Neg Hx      Allergies:  Patient has no known allergies. Review of Systems   Constitutional: Negative for activity change, appetite change, diaphoresis and unexpected weight change. HENT: Negative for congestion and sore throat.     Eyes: Negative for photophobia and visual disturbance. Respiratory: Positive for cough. Negative for chest tightness, shortness of breath and wheezing. No orthopnea   Cardiovascular: Negative for chest pain, palpitations and leg swelling. Gastrointestinal: Negative for abdominal distention and abdominal pain. Genitourinary: Negative for flank pain and frequency. Musculoskeletal: Negative for gait problem and joint swelling. Neurological: Negative for dizziness, weakness, light-headedness and headaches. Psychiatric/Behavioral: Negative for confusion. Objective:   /84   Pulse 80   Temp 97.3 °F (36.3 °C)   Ht 6' 1\" (1.854 m)   Wt 235 lb (106.6 kg)   SpO2 98%   BMI 31.00 kg/m²     Physical Exam  Vitals reviewed. Constitutional:       General: He is not in acute distress. Appearance: He is well-developed. HENT:      Head: Normocephalic and atraumatic. Right Ear: External ear normal.      Left Ear: External ear normal.      Nose: Nose normal.   Eyes:      General:         Right eye: No discharge. Left eye: No discharge. Conjunctiva/sclera: Conjunctivae normal.      Pupils: Pupils are equal, round, and reactive to light. Neck:      Thyroid: No thyromegaly. Cardiovascular:      Rate and Rhythm: Normal rate and regular rhythm. Heart sounds: Normal heart sounds. Pulmonary:      Effort: Pulmonary effort is normal. No respiratory distress. Breath sounds: Normal breath sounds. Abdominal:      General: There is no distension. Musculoskeletal:      Cervical back: Neck supple. Skin:     General: Skin is warm and dry. Neurological:      Mental Status: He is alert and oriented to person, place, and time. Coordination: Coordination normal.   Psychiatric:         Thought Content: Thought content normal.         Judgment: Judgment normal.         No results found for this visit on 12/27/21.     Recent Results (from the past 2016 hour(s))   Covid-19 Ambulatory    Collection Time: Absolute 0.7 0.2 - 0.8 K/uL    Eosinophils Absolute 0.2 0.0 - 0.7 K/uL    Basophils Absolute 0.0 0.0 - 0.2 K/uL   Microalbumin / Creatinine Urine Ratio    Collection Time: 21  8:37 AM   Result Value Ref Range    Microalbumin, Random Urine <1.20 Not Established mg/dL    Creatinine, Ur 190.4 Not Established mg/dL    Microalbumin Creatinine Ratio see below 0.0 - 30.0 mg/G       [] Pt was seen by provider for      Minutes  Counseling and coordination of care was done for all assessment diagnosis listed for today with patient and any family/friend present. More than 50% of this visit was spent coordinating cuurent care, obtaining information for prior records, and counseling for current plan of action. Assessment:       Diagnosis Orders   1. Cough  azithromycin (ZITHROMAX) 250 MG tablet    benzonatate (TESSALON) 100 MG capsule   2. Dislocation of temporomandibular joint, initial encounter  [facility] [place]         Orders Placed This Encounter   Procedures    [facility] [place]     Referral Priority:   Routine     Referral Type:   Eval and Treat     Referral Reason:   Specialty Services Required     Requested Specialty:   Physical Therapy     Number of Visits Requested:   1       Orders Placed This Encounter   Medications    azithromycin (ZITHROMAX) 250 MG tablet     Si tabs by mouth first day. 1 tab daily thereafter until gone. Dispense:  6 tablet     Refill:  0    benzonatate (TESSALON) 100 MG capsule     Sig: Take 2 capsules by mouth 3 times daily as needed for Cough     Dispense:  42 capsule     Refill:  0          Medication List          Accurate as of 2021  2:28 PM. If you have any questions, ask your nurse or doctor. START taking these medications    azithromycin 250 MG tablet  Commonly known as: ZITHROMAX  2 tabs by mouth first day. 1 tab daily thereafter until gone.   Started by: Lucy Gibbs MD     benzonatate 100 MG capsule  Commonly known as: TESSALON  Take 2 capsules by mouth 3 times daily as needed for Cough  Started by: Danielle Charles MD        CHANGE how you take these medications    fluticasone 50 MCG/ACT nasal spray  Commonly known as: FLONASE  2 sprays by Nasal route daily  What changed:   · when to take this  · reasons to take this        CONTINUE taking these medications    albuterol sulfate  (90 Base) MCG/ACT inhaler  Commonly known as: Ventolin HFA  Inhale 2 puffs into the lungs every 6 hours as needed for Wheezing     allopurinol 300 MG tablet  Commonly known as: ZYLOPRIM  TAKE ONE TABLET BY MOUTH DAILY     amLODIPine 5 MG tablet  Commonly known as: NORVASC  Take 1 tablet by mouth daily     aspirin 81 MG chewable tablet     Blood Pressure Kit  1 applicator by Does not apply route 4 times daily     D3-1000 PO     diclofenac 50 MG EC tablet  Commonly known as: VOLTAREN  Take 1 tablet by mouth 2 times daily for 10 days     fluticasone 110 MCG/ACT inhaler  Commonly known as: Flovent HFA  Inhale 2 puffs into the lungs 2 times daily     irbesartan 150 MG tablet  Commonly known as: AVAPRO  TAKE ONE TABLET BY MOUTH DAILY     metFORMIN 500 MG tablet  Commonly known as: GLUCOPHAGE  Take 1 tablet by mouth daily (with breakfast)     metoprolol succinate 100 MG extended release tablet  Commonly known as: TOPROL XL  Take 1 tablet by mouth daily     MULTIVITAMIN MEN 50+ PO     Qunol Ultra CoQ10 100-150 MG-UNIT Caps  Generic drug: Coenzyme Q10-Vitamin E     simvastatin 20 MG tablet  Commonly known as: ZOCOR  Take 1 tablet by mouth daily     Spacer/Aero-Hold Chamber Bags Misc  1 applicator by Does not apply route 4 times daily     vitamin C 500 MG tablet  Commonly known as: ASCORBIC ACID           Where to Get Your Medications      These medications were sent to [id number] - [address], [address] [address] [address] [place] [address], [address]    Phone: [phone] down. But don't do this if it makes your pain worse. · Manage stress. You may be clenching or tightening your muscles when you are under stress. · Get at least 30 minutes of exercise on most days of the week to relieve stress. Walking is a good choice. · Ask your doctor if you can take an over-the-counter pain medicine, such as acetaminophen (Tylenol), ibuprofen (Advil, Motrin), or naproxen (Aleve). Be safe with medicines. Read and follow all instructions on the label. · Use good posture for sitting and standing. Slumping your shoulders disturbs the alignment of your facial bones and muscles. · Don't:  ? Hold a phone between your shoulder and your jaw. ? Open your mouth all the way, like when you sing loudly or yawn. ? Clench or grind your teeth, bite your lips, or chew your fingernails. ? Clench things such as pens, pipes, or cigars between your teeth. When should you call for help? Call your doctor now or seek immediate medical care if:    · Your jaw is locked open or shut or it is hard to move your jaw. Watch closely for changes in your health, and be sure to contact your doctor if:    · Your jaw pain gets worse.     · Your face is swollen.     · You do not get better as expected. Where can you learn more? Go to https://[email or web address]. org and sign in to your [employer] account. Enter W182 in the [employer] box to learn more about \"Temporomandibular Disorder: Care Instructions. \"     If you do not have an account, please click on the \"Sign Up Now\" link. Current as of: June 30, 2021               Content Version: 13.1  © 9763-0641 Healthwise, [employer]. Care instructions adapted under license by [facility] ([facility]). If you have questions about a medical condition or this instruction, always ask your healthcare professional. [address] any warranty or liability for your use of this information.              This note was partially created with the assistance of dictation. This may lead to grammatical or spelling errors. Mukesh Arceo M.D.

## 2022-02-03 DIAGNOSIS — M1A.0720 CHRONIC GOUT OF LEFT FOOT, UNSPECIFIED CAUSE: ICD-10-CM

## 2022-02-04 RX ORDER — ALLOPURINOL 300 MG/1
300 TABLET ORAL DAILY
Qty: 30 TABLET | Refills: 1 | OUTPATIENT
Start: 2022-02-04

## 2022-02-04 NOTE — TELEPHONE ENCOUNTER
Requesting medication refill.  Please approve or deny this request.    Rx requested:  Requested Prescriptions     Pending Prescriptions Disp Refills    allopurinol (ZYLOPRIM) 300 MG tablet 30 tablet 1     Sig: Take 1 tablet by mouth daily       Last Office Visit:   Visit date not found    Next Visit Date:  Future Appointments   Date Time Provider [place]   3/7/2022 11:15 AM Hussain Brandon MD [facility] [place]   5/2/2022 12:15 PM Hussain Brandon MD [facility] [place] [facility] [place]

## 2022-03-07 ENCOUNTER — OFFICE VISIT (OUTPATIENT)
Dept: FAMILY MEDICINE CLINIC | Age: 67
End: 2022-03-07
Payer: MEDICARE

## 2022-03-07 VITALS — TEMPERATURE: 97.8 F | BODY MASS INDEX: 31.14 KG/M2 | HEIGHT: 73 IN | WEIGHT: 235 LBS

## 2022-03-07 DIAGNOSIS — J45.30 MILD PERSISTENT ASTHMA WITHOUT COMPLICATION: ICD-10-CM

## 2022-03-07 DIAGNOSIS — L57.0 ACTINIC KERATOSES: ICD-10-CM

## 2022-03-07 DIAGNOSIS — E11.65 UNCONTROLLED TYPE 2 DIABETES MELLITUS WITH HYPERGLYCEMIA (HCC): ICD-10-CM

## 2022-03-07 DIAGNOSIS — E11.9 TYPE 2 DIABETES MELLITUS WITHOUT COMPLICATION, WITHOUT LONG-TERM CURRENT USE OF INSULIN (HCC): Primary | ICD-10-CM

## 2022-03-07 DIAGNOSIS — C61 PROSTATE CA (HCC): ICD-10-CM

## 2022-03-07 DIAGNOSIS — S80.812A ABRASION OF LEFT LOWER EXTREMITY, INITIAL ENCOUNTER: ICD-10-CM

## 2022-03-07 LAB — HBA1C MFR BLD: 10.4 %

## 2022-03-07 PROCEDURE — 1123F ACP DISCUSS/DSCN MKR DOCD: CPT | Performed by: FAMILY MEDICINE

## 2022-03-07 PROCEDURE — 99215 OFFICE O/P EST HI 40 MIN: CPT | Performed by: FAMILY MEDICINE

## 2022-03-07 PROCEDURE — G8427 DOCREV CUR MEDS BY ELIG CLIN: HCPCS | Performed by: FAMILY MEDICINE

## 2022-03-07 PROCEDURE — 3046F HEMOGLOBIN A1C LEVEL >9.0%: CPT | Performed by: FAMILY MEDICINE

## 2022-03-07 PROCEDURE — G8417 CALC BMI ABV UP PARAM F/U: HCPCS | Performed by: FAMILY MEDICINE

## 2022-03-07 PROCEDURE — 1036F TOBACCO NON-USER: CPT | Performed by: FAMILY MEDICINE

## 2022-03-07 PROCEDURE — 83036 HEMOGLOBIN GLYCOSYLATED A1C: CPT | Performed by: FAMILY MEDICINE

## 2022-03-07 PROCEDURE — 2022F DILAT RTA XM EVC RTNOPTHY: CPT | Performed by: FAMILY MEDICINE

## 2022-03-07 PROCEDURE — 3017F COLORECTAL CA SCREEN DOC REV: CPT | Performed by: FAMILY MEDICINE

## 2022-03-07 PROCEDURE — 4040F PNEUMOC VAC/ADMIN/RCVD: CPT | Performed by: FAMILY MEDICINE

## 2022-03-07 PROCEDURE — G8484 FLU IMMUNIZE NO ADMIN: HCPCS | Performed by: FAMILY MEDICINE

## 2022-03-07 RX ORDER — ALBUTEROL SULFATE 90 UG/1
2 AEROSOL, METERED RESPIRATORY (INHALATION) EVERY 6 HOURS PRN
Qty: 18 G | Refills: 1 | Status: SHIPPED | OUTPATIENT
Start: 2022-03-07 | End: 2022-08-25 | Stop reason: SDUPTHER

## 2022-03-07 ASSESSMENT — ENCOUNTER SYMPTOMS
ABDOMINAL DISTENTION: 0
COLOR CHANGE: 1
ABDOMINAL PAIN: 0
CHEST TIGHTNESS: 0
SHORTNESS OF BREATH: 0
PHOTOPHOBIA: 0

## 2022-03-07 ASSESSMENT — PATIENT HEALTH QUESTIONNAIRE - PHQ9
SUM OF ALL RESPONSES TO PHQ QUESTIONS 1-9: 0
SUM OF ALL RESPONSES TO PHQ9 QUESTIONS 1 & 2: 0
SUM OF ALL RESPONSES TO PHQ QUESTIONS 1-9: 0
SUM OF ALL RESPONSES TO PHQ QUESTIONS 1-9: 0
1. LITTLE INTEREST OR PLEASURE IN DOING THINGS: 0
SUM OF ALL RESPONSES TO PHQ QUESTIONS 1-9: 0
2. FEELING DOWN, DEPRESSED OR HOPELESS: 0

## 2022-03-07 NOTE — PATIENT INSTRUCTIONS
Patient denies using carb counting or any sort of dietary monitoring. He just feels well so he did not think his sugar was abnormal.  He does not check it routinely. Hemoglobin A1c came up elevated is 10.4. Will increase patient's Metformin to 500 mg 3 times a day and follow-up in 1 month for repeat hemoglobin A1c. Information provided about carbohydrate counting and evening snacks. Patient will attempt to make these adjustments in his diet and recheck in a month. Offered patient the option of for shave removal of the actinic keratosis on his head because we do not have liquid nitrogen today. He opts to have that done in the future with liquid nitrogen. We will also recheck leg lesion to confirm healing abrasion versus an early cancer at the next appointment. Inhaler refilled. Patient symptoms overall controlled. Prostate lab remains normal repeat yearly. Patient Education        Learning About Carbohydrate (Carb) Counting and Eating Out When You Have Diabetes  Why plan your meals? Meal planning can be a key part of managing diabetes. Planning meals and snacks with the right balance of carbohydrate, protein, and fat can help you keep your blood sugar at the target level you set with your doctor. You don't have to eat special foods. You can eat what your family eats, including sweets once in a while. But you do have to pay attention to how often you eat and how much you eat of certain foods. You may want to work with a dietitian or a certified diabetes educator. He or she can give you tips and meal ideas and can answer your questions about meal planning. This health professional can also help you reach a healthy weight if that is one of your goals. What should you know about eating carbs? Managing the amount of carbohydrate (carbs) you eat is an important part of healthy meals when you have diabetes. Carbohydrate is found in many foods. · Learn which foods have carbs.  And learn the amounts of carbs in different foods. ? Bread, cereal, pasta, and rice have about 15 grams of carbs in a serving. A serving is 1 slice of bread (1 ounce), ½ cup of cooked cereal, or 1/3 cup of cooked pasta or rice. ? Fruits have 15 grams of carbs in a serving. A serving is 1 small fresh fruit, such as an apple or orange; ½ of a banana; ½ cup of cooked or canned fruit; ½ cup of fruit juice; 1 cup of melon or raspberries; or 2 tablespoons of dried fruit. ? Milk and no-sugar-added yogurt have 15 grams of carbs in a serving. A serving is 1 cup of milk or 2/3 cup of no-sugar-added yogurt. ? Starchy vegetables have 15 grams of carbs in a serving. A serving is ½ cup of mashed potatoes or sweet potato; 1 cup winter squash; ½ of a small baked potato; ½ cup of cooked beans; or ½ cup cooked corn or green peas. · Learn how much carbs to eat each day and at each meal. A dietitian or CDE can teach you how to keep track of the amount of carbs you eat. This is called carbohydrate counting. · If you are not sure how to count carbohydrate grams, use the Plate Method to plan meals. It is a good, quick way to make sure that you have a balanced meal. It also helps you spread carbs throughout the day. ? Divide your plate by types of foods. Put non-starchy vegetables on half the plate, meat or other protein food on one-quarter of the plate, and a grain or starchy vegetable in the final quarter of the plate. To this you can add a small piece of fruit and 1 cup of milk or yogurt, depending on how many carbs you are supposed to eat at a meal.  · Try to eat about the same amount of carbs at each meal. Do not \"save up\" your daily allowance of carbs to eat at one meal.  · Proteins have very little or no carbs per serving. Examples of proteins are beef, chicken, turkey, fish, eggs, tofu, cheese, cottage cheese, and peanut butter. A serving size of meat is 3 ounces, which is about the size of a deck of cards.  Examples of meat substitute serving sizes (equal to 1 ounce of meat) are 1/4 cup of cottage cheese, 1 egg, 1 tablespoon of peanut butter, and ½ cup of tofu. How can you eat out and still eat healthy? · Learn to estimate the serving sizes of foods that have carbohydrate. If you measure food at home, it will be easier to estimate the amount in a serving of restaurant food. · If the meal you order has too much carbohydrate (such as potatoes, corn, or baked beans), ask to have a low-carbohydrate food instead. Ask for a salad or green vegetables. · If you use insulin, check your blood sugar before and after eating out to help you plan how much to eat in the future. · If you eat more carbohydrate at a meal than you had planned, take a walk or do other exercise. This will help lower your blood sugar. What are some tips for eating healthy? · Limit saturated fat, such as the fat from meat and dairy products. This is a healthy choice because people who have diabetes are at higher risk of heart disease. So choose lean cuts of meat and nonfat or low-fat dairy products. Use olive or canola oil instead of butter or shortening when cooking. · Don't skip meals. Your blood sugar may drop too low if you skip meals and take insulin or certain medicines for diabetes. · Check with your doctor before you drink alcohol. Alcohol can cause your blood sugar to drop too low. Alcohol can also cause a bad reaction if you take certain diabetes medicines. Follow-up care is a key part of your treatment and safety. Be sure to make and go to all appointments, and call your doctor if you are having problems. It's also a good idea to know your test results and keep a list of the medicines you take. Where can you learn more? Go to https://chpo.Next Glass. org and sign in to your Bubbli account. Enter K487 in the [facility] box to learn more about \"Learning About Carbohydrate (Carb) Counting and Eating Out When You Have Diabetes. \"     If you do not have an account, please click on the \"Sign Up Now\" link. Current as of: September 8, 2021               Content Version: 13.1  © 2006-2021 Healthwise, [employer]. Care instructions adapted under license by [facility] ([facility]). If you have questions about a medical condition or this instruction, always ask your healthcare professional. [address] any warranty or liability for your use of this information. Patient Education        Counting Carbohydrates for Diabetes: Care Instructions  Your Care Instructions     You don't have to eat special foods when you have diabetes. You just have to be careful to eat healthy foods. Carbohydrates (carbs) raise blood sugar higher and quicker than any other nutrient. Carbs are found in desserts, breads and cereals, and fruit. They're also in starchy vegetables. These include potatoes, corn, and grains such as rice and pasta. Carbs are also in milk and yogurt. The more carbs you eat at one time, the higher your blood sugar will rise. Spreading carbs all through the day helps keep your blood sugar levels within your target range. Counting carbs is one of the best ways to keep your blood sugar under control. If you use insulin, counting carbs helps you match the right amount of insulin to the number of grams of carbs in a meal. Then you can change your diet and insulin dose as needed. Testing your blood sugar several times a day can help you learn how carbs affect your blood sugar. A registered dietitian or certified diabetes educator can help you plan meals and snacks. Follow-up care is a key part of your treatment and safety. Be sure to make and go to all appointments, and call your doctor if you are having problems. It's also a good idea to know your test results and keep a list of the medicines you take. How can you care for yourself at home?   Know your daily amount of carbohydrates  Your daily amount depends on several things, such as your weight, how active you are, which diabetes medicines you take, and what your goals are for your blood sugar levels. A registered dietitian or certified diabetes educator can help you plan how many carbs to include in each meal and snack. For most adults, a guideline for the daily amount of carbs is:  · 45 to 60 grams at each meal. That's about the same as 3 to 4 carbohydrate servings. · 15 to 20 grams at each snack. That's about the same as 1 carbohydrate serving. Count carbs  Counting carbs lets you know how much rapid-acting insulin to take before you eat. If you use an insulin pump, you get a constant rate of insulin during the day. So the pump must be programmed at meals. This gives you extra insulin to cover the rise in blood sugar after meals. If you take insulin:  · Learn your own insulin-to-carb ratio. You and your diabetes health professional will figure out the ratio. You can do this by testing your blood sugar after meals. For example, you may need a certain amount of insulin for every 15 grams of carbs. · Add up the carb grams in a meal. Then you can figure out how many units of insulin to take based on your insulin-to-carb ratio. · Exercise lowers blood sugar. You can use less insulin than you would if you were not doing exercise. Keep in mind that timing matters. If you exercise within 1 hour after a meal, your body may need less insulin for that meal than it would if you exercised 3 hours after the meal. Test your blood sugar to find out how exercise affects your need for insulin. If you do or don't take insulin:  · Look at labels on packaged foods. This can tell you how many carbs are in a serving. You can also use guides from the American Diabetes Association. · Be aware of portions, or serving sizes. If a package has two servings and you eat the whole package, you need to double the number of grams of carbohydrate listed for one serving.   · Protein, fat, and fiber do not raise blood sugar as much as carbs do. If you eat a lot of these nutrients in a meal, your blood sugar will rise more slowly than it would otherwise. Eat from all food groups  · Eat at least three meals a day. · Plan meals to include food from all the food groups. The food groups include grains, fruits, dairy, proteins, and vegetables. · Talk to your dietitian or diabetes educator about ways to add limited amounts of sweets into your meal plan. · If you drink alcohol, talk to your doctor. It may not be recommended when you are taking certain diabetes medicines. Where can you learn more? Go to https://Consorte Mediapepiceweb.Netaplan. org and sign in to your Fanitics account. Enter S886 in the [employer] box to learn more about \"Counting Carbohydrates for Diabetes: Care Instructions. \"     If you do not have an account, please click on the \"Sign Up Now\" link. Current as of: July 28, 2021               Content Version: 13.1  © 2006-2021 Healthwise, [employer]. Care instructions adapted under license by [facility] ([facility]). If you have questions about a medical condition or this instruction, always ask your healthcare professional. [place] [address] any warranty or liability for your use of this information.

## 2022-03-07 NOTE — PROGRESS NOTES
Diagnosis Orders   1. Type 2 diabetes mellitus without complication, without long-term current use of insulin (HCC)  POCT glycosylated hemoglobin (Hb A1C)    metFORMIN (GLUCOPHAGE) 500 MG tablet   2. Prostate CA ([address]     3. Mild persistent asthma without complication  albuterol sulfate HFA (VENTOLIN HFA) 108 (90 Base) MCG/ACT inhaler   4. Uncontrolled type 2 diabetes mellitus with hyperglycemia (HCC)  metFORMIN (GLUCOPHAGE) 500 MG tablet   5. Actinic keratoses     6. Abrasion of left lower extremity, initial encounter       Return in about 4 weeks (around 4/4/2022) for cryo for scalp and, POCT HbA1c. Patient Instructions   Patient denies using carb counting or any sort of dietary monitoring. He just feels well so he did not think his sugar was abnormal.  He does not check it routinely. Hemoglobin A1c came up elevated is 10.4. Will increase patient's Metformin to 500 mg 3 times a day and follow-up in 1 month for repeat hemoglobin A1c. Information provided about carbohydrate counting and evening snacks. Patient will attempt to make these adjustments in his diet and recheck in a month. Offered patient the option of for shave removal of the actinic keratosis on his head because we do not have liquid nitrogen today. He opts to have that done in the future with liquid nitrogen. We will also recheck leg lesion to confirm healing abrasion versus an early cancer at the next appointment. Inhaler refilled. Patient symptoms overall controlled. Patient Education        Learning About Carbohydrate (Carb) Counting and Eating Out When You Have Diabetes  Why plan your meals? Meal planning can be a key part of managing diabetes. Planning meals and snacks with the right balance of carbohydrate, protein, and fat can help you keep your blood sugar at the target level you set with your doctor. You don't have to eat special foods. You can eat what your family eats, including sweets once in a while.  But you do have to pay attention to how often you eat and how much you eat of certain foods. You may want to work with a dietitian or a certified diabetes educator. He or she can give you tips and meal ideas and can answer your questions about meal planning. This health professional can also help you reach a healthy weight if that is one of your goals. What should you know about eating carbs? Managing the amount of carbohydrate (carbs) you eat is an important part of healthy meals when you have diabetes. Carbohydrate is found in many foods. · Learn which foods have carbs. And learn the amounts of carbs in different foods. ? Bread, cereal, pasta, and rice have about 15 grams of carbs in a serving. A serving is 1 slice of bread (1 ounce), ½ cup of cooked cereal, or 1/3 cup of cooked pasta or rice. ? Fruits have 15 grams of carbs in a serving. A serving is 1 small fresh fruit, such as an apple or orange; ½ of a banana; ½ cup of cooked or canned fruit; ½ cup of fruit juice; 1 cup of melon or raspberries; or 2 tablespoons of dried fruit. ? Milk and no-sugar-added yogurt have 15 grams of carbs in a serving. A serving is 1 cup of milk or 2/3 cup of no-sugar-added yogurt. ? Starchy vegetables have 15 grams of carbs in a serving. A serving is ½ cup of mashed potatoes or sweet potato; 1 cup winter squash; ½ of a small baked potato; ½ cup of cooked beans; or ½ cup cooked corn or green peas. · Learn how much carbs to eat each day and at each meal. A dietitian or CDE can teach you how to keep track of the amount of carbs you eat. This is called carbohydrate counting. · If you are not sure how to count carbohydrate grams, use the Plate Method to plan meals. It is a good, quick way to make sure that you have a balanced meal. It also helps you spread carbs throughout the day. ? Divide your plate by types of foods.  Put non-starchy vegetables on half the plate, meat or other protein food on one-quarter of the plate, and a grain or starchy vegetable in the final quarter of the plate. To this you can add a small piece of fruit and 1 cup of milk or yogurt, depending on how many carbs you are supposed to eat at a meal.  · Try to eat about the same amount of carbs at each meal. Do not \"save up\" your daily allowance of carbs to eat at one meal.  · Proteins have very little or no carbs per serving. Examples of proteins are beef, chicken, turkey, fish, eggs, tofu, cheese, cottage cheese, and peanut butter. A serving size of meat is 3 ounces, which is about the size of a deck of cards. Examples of meat substitute serving sizes (equal to 1 ounce of meat) are 1/4 cup of cottage cheese, 1 egg, 1 tablespoon of peanut butter, and ½ cup of tofu. How can you eat out and still eat healthy? · Learn to estimate the serving sizes of foods that have carbohydrate. If you measure food at home, it will be easier to estimate the amount in a serving of restaurant food. · If the meal you order has too much carbohydrate (such as potatoes, corn, or baked beans), ask to have a low-carbohydrate food instead. Ask for a salad or green vegetables. · If you use insulin, check your blood sugar before and after eating out to help you plan how much to eat in the future. · If you eat more carbohydrate at a meal than you had planned, take a walk or do other exercise. This will help lower your blood sugar. What are some tips for eating healthy? · Limit saturated fat, such as the fat from meat and dairy products. This is a healthy choice because people who have diabetes are at higher risk of heart disease. So choose lean cuts of meat and nonfat or low-fat dairy products. Use olive or canola oil instead of butter or shortening when cooking. · Don't skip meals. Your blood sugar may drop too low if you skip meals and take insulin or certain medicines for diabetes. · Check with your doctor before you drink alcohol. Alcohol can cause your blood sugar to drop too low.  Alcohol can also cause a bad reaction if you take certain diabetes medicines. Follow-up care is a key part of your treatment and safety. Be sure to make and go to all appointments, and call your doctor if you are having problems. It's also a good idea to know your test results and keep a list of the medicines you take. Where can you learn more? Go to https://chpepiceweb.[employer]. org and sign in to your [employer] account. Enter Y997 in the [employer] box to learn more about \"Learning About Carbohydrate (Carb) Counting and Eating Out When You Have Diabetes. \"     If you do not have an account, please click on the \"Sign Up Now\" link. Current as of: September 8, 2021               Content Version: 13.1  © 2522-2158 Healthwise, [employer]. Care instructions adapted under license by [facility] ([facility]). If you have questions about a medical condition or this instruction, always ask your healthcare professional. [place] [address] any warranty or liability for your use of this information. Patient Education        Counting Carbohydrates for Diabetes: Care Instructions  Your Care Instructions     You don't have to eat special foods when you have diabetes. You just have to be careful to eat healthy foods. Carbohydrates (carbs) raise blood sugar higher and quicker than any other nutrient. Carbs are found in desserts, breads and cereals, and fruit. They're also in starchy vegetables. These include potatoes, corn, and grains such as rice and pasta. Carbs are also in milk and yogurt. The more carbs you eat at one time, the higher your blood sugar will rise. Spreading carbs all through the day helps keep your blood sugar levels within your target range. Counting carbs is one of the best ways to keep your blood sugar under control.   If you use insulin, counting carbs helps you match the right amount of insulin to the number of grams of carbs in a meal. Then you can change your diet and insulin dose as needed. Testing your blood sugar several times a day can help you learn how carbs affect your blood sugar. A registered dietitian or certified diabetes educator can help you plan meals and snacks. Follow-up care is a key part of your treatment and safety. Be sure to make and go to all appointments, and call your doctor if you are having problems. It's also a good idea to know your test results and keep a list of the medicines you take. How can you care for yourself at home? Know your daily amount of carbohydrates  Your daily amount depends on several things, such as your weight, how active you are, which diabetes medicines you take, and what your goals are for your blood sugar levels. A registered dietitian or certified diabetes educator can help you plan how many carbs to include in each meal and snack. For most adults, a guideline for the daily amount of carbs is:  · 45 to 60 grams at each meal. That's about the same as 3 to 4 carbohydrate servings. · 15 to 20 grams at each snack. That's about the same as 1 carbohydrate serving. Count carbs  Counting carbs lets you know how much rapid-acting insulin to take before you eat. If you use an insulin pump, you get a constant rate of insulin during the day. So the pump must be programmed at meals. This gives you extra insulin to cover the rise in blood sugar after meals. If you take insulin:  · Learn your own insulin-to-carb ratio. You and your diabetes health professional will figure out the ratio. You can do this by testing your blood sugar after meals. For example, you may need a certain amount of insulin for every 15 grams of carbs. · Add up the carb grams in a meal. Then you can figure out how many units of insulin to take based on your insulin-to-carb ratio. · Exercise lowers blood sugar. You can use less insulin than you would if you were not doing exercise. Keep in mind that timing matters.  If you exercise within 1 hour after a meal, your body may need less insulin for that meal than it would if you exercised 3 hours after the meal. Test your blood sugar to find out how exercise affects your need for insulin. If you do or don't take insulin:  · Look at labels on packaged foods. This can tell you how many carbs are in a serving. You can also use guides from the American Diabetes Association. · Be aware of portions, or serving sizes. If a package has two servings and you eat the whole package, you need to double the number of grams of carbohydrate listed for one serving. · Protein, fat, and fiber do not raise blood sugar as much as carbs do. If you eat a lot of these nutrients in a meal, your blood sugar will rise more slowly than it would otherwise. Eat from all food groups  · Eat at least three meals a day. · Plan meals to include food from all the food groups. The food groups include grains, fruits, dairy, proteins, and vegetables. · Talk to your dietitian or diabetes educator about ways to add limited amounts of sweets into your meal plan. · If you drink alcohol, talk to your doctor. It may not be recommended when you are taking certain diabetes medicines. Where can you learn more? Go to https://[employer]. org and sign in to your [employer] account. Enter L198 in the [facility] box to learn more about \"Counting Carbohydrates for Diabetes: Care Instructions. \"     If you do not have an account, please click on the \"Sign Up Now\" link. Current as of: July 28, 2021               Content Version: 13.1  © 2006-2021 Healthwise, Incorporated. Care instructions adapted under license by [facility] ([facility]). If you have questions about a medical condition or this instruction, always ask your healthcare professional. [place] [address] any warranty or liability for your use of this information.              Subjective:      Patient ID: Carmen Kerr is a 78 y.o. male who presents for:  Chief Complaint   Patient presents with    Skin Problem     scalp, and left shin     Skin Exam     x 3 month    Congestion       Patient states he is feeling very well. No skin rashes or concerns there. Thinks his diabetes is under good control. Has not been checking his sugars because he feels well. He does note a couple lesions on his skin he would like to have evaluated. History of actinic keratosis in the past.    Patient states the lesion on his leg that he is worried about is getting smaller. He does not know for sure whether he bumped into something or how the wound got there. Patient's prostate lab was reviewed with him. He is up-to-date and it was normal.    Patient's breathing has been well controlled but he would like to have a refill of his inhaler available.       Current Outpatient Medications on File Prior to Visit   Medication Sig Dispense Refill    fluticasone (FLONASE) 50 MCG/ACT nasal spray 2 sprays by Nasal route daily 32 g 0    allopurinol (ZYLOPRIM) 300 MG tablet Take 1 tablet by mouth daily 30 tablet 1    irbesartan (AVAPRO) 150 MG tablet Take 1 tablet by mouth daily 90 tablet 0    metoprolol succinate (TOPROL XL) 100 MG extended release tablet Take 1 tablet by mouth daily 90 tablet 3    Cholecalciferol (D3-1000 PO) Take by mouth      simvastatin (ZOCOR) 20 MG tablet Take 1 tablet by mouth daily 90 tablet 3    amLODIPine (NORVASC) 5 MG tablet Take 1 tablet by mouth daily 30 tablet 11    fluticasone (FLOVENT HFA) 110 MCG/ACT inhaler Inhale 2 puffs into the lungs 2 times daily 1 Inhaler 3    Spacer/Aero-Hold Chamber Bags MISC 1 applicator by Does not apply route 4 times daily 1 each 0    Blood Pressure KIT 1 applicator by Does not apply route 4 times daily 1 kit 0    Coenzyme Q10-Vitamin E (QUNOL ULTRA COQ10) 100-150 MG-UNIT CAPS       aspirin 81 MG chewable tablet Take 81 mg by mouth daily      vitamin C (ASCORBIC ACID) 500 MG tablet Take 500 mg by mouth daily      Multiple Vitamins-Minerals (MULTIVITAMIN MEN 50+ PO) Take by mouth       No current facility-administered medications on file prior to visit. Past Medical History:   Diagnosis Date    Actinic keratoses     has had LN2 and used Efudex    Alcohol consumption of one to four drinks per day on alcohol screening     Allergic rhinitis     cats, weeds, grasses, ragweed    Asthma     Bilateral knee pain     [facility]     Gout     History of colon polyps 2016    needs f/u 5 years [place]    History of type 2 diabetes mellitus     about 15 years    Hyperlipidemia     Hypertension     Keratosis, inflamed seborrheic     Osteoarthritis, localized, shoulder, right     Polyp of transverse colon f/u due 2014    Prediabetes     Prostate cancer ([facility] 2014    s/p prostatectomy    Syncope and collapse     Varicose vein of leg      Past Surgical History:   Procedure Laterality Date    COLONOSCOPY  2016    Dr. Chirag Sahu; polyps f/u in 5 years    COLONOSCOPY N/A 10/2/2020    COLONOSCOPY DIAGNOSTIC performed by Daron Polk MD at [address] [place] [address]  2014    ULNAR TUNNEL RELEASE Bilateral     VARICOSE VEIN SURGERY       Social History     Socioeconomic History    Marital status: Single     Spouse name: Not on file    Number of children: Not on file    Years of education: 0    Highest education level: Not on file   Occupational History    Not on file   Tobacco Use    Smoking status: Former Smoker     Packs/day: 0.50     Years: 12.00     Pack years: 6.00     Types: Cigarettes     Quit date: 1982     Years since quittin.0    Smokeless tobacco: Never Used   Vaping Use    Vaping Use: Never used   Substance and Sexual Activity    Alcohol use: Yes     Comment: daily 5 vodka drinks per day    Drug use: No    Sexual activity: Yes     Partners: Female   Other Topics Concern    Not on file   Social History Narrative    Engaged. No children.      Social Determinants of Health Financial Resource Strain: Low Risk     Difficulty of Paying Living Expenses: Not hard at all   Food Insecurity: No Food Insecurity    Worried About Running Out of Food in the Last Year: Never true    Mesfin of Food in the Last Year: Never true   Transportation Needs:     Lack of Transportation (Medical): Not on file    Lack of Transportation (Non-Medical): Not on file   Physical Activity:     Days of Exercise per Week: Not on file    Minutes of Exercise per Session: Not on file   Stress:     Feeling of Stress : Not on file   Social Connections:     Frequency of Communication with Friends and Family: Not on file    Frequency of Social Gatherings with Friends and Family: Not on file    Attends Church Services: Not on file    Active Member of [address] [place] [address] [employer] or Organizations: Not on file    Attends Club or Organization Meetings: Not on file    Marital Status: Not on file   Intimate Partner Violence:     Fear of Current or Ex-Partner: Not on file    Emotionally Abused: Not on file    Physically Abused: Not on file    Sexually Abused: Not on file   Housing Stability:     Unable to Pay for Housing in the Last Year: Not on file    Number of [place] in the Last Year: Not on file    Unstable Housing in the Last Year: Not on file     Family History   Problem Relation Age of Onset    Alzheimer's Disease Mother     Heart Disease Father     Cancer Father     Diabetes Father     Cancer Brother     Colon Cancer Neg Hx     Celiac Disease Neg Hx     Crohn's Disease Neg Hx      Allergies:  Patient has no known allergies. Review of Systems   Constitutional: Negative for activity change, appetite change, diaphoresis and unexpected weight change. Eyes: Negative for photophobia and visual disturbance. Respiratory: Negative for chest tightness and shortness of breath. No orthopnea   Cardiovascular: Negative for chest pain, palpitations and leg swelling.    Gastrointestinal: Negative for abdominal distention and abdominal pain. Genitourinary: Negative for flank pain and frequency. Musculoskeletal: Negative for gait problem and joint swelling. Skin: Positive for color change. Neurological: Negative for dizziness, weakness, light-headedness and headaches. Psychiatric/Behavioral: Negative for confusion. Objective:   Temp 97.8 °F (36.6 °C)   Ht 6' 1\" (1.854 m)   Wt 235 lb (106.6 kg)   BMI 31.00 kg/m²     Physical Exam  Vitals reviewed. Constitutional:       General: He is not in acute distress. Appearance: He is well-developed. HENT:      Head: Normocephalic and atraumatic. Right Ear: External ear normal.      Left Ear: External ear normal.      Nose: Nose normal.   Eyes:      General:         Right eye: No discharge. Left eye: No discharge. Conjunctiva/sclera: Conjunctivae normal.      Pupils: Pupils are equal, round, and reactive to light. Neck:      Thyroid: No thyromegaly. Vascular: No carotid bruit. Cardiovascular:      Rate and Rhythm: Normal rate and regular rhythm. Heart sounds: Normal heart sounds. Pulmonary:      Effort: Pulmonary effort is normal. No respiratory distress. Breath sounds: Normal breath sounds. Abdominal:      General: There is no distension. Musculoskeletal:      Cervical back: Neck supple. Skin:     General: Skin is warm and dry. Comments: Left parietal scalp superiorly there is an erythematous base rough white flake lesion. Left anterior shin there is a lesion that appears to be an abrasion with scabbing noted   Neurological:      Mental Status: He is alert and oriented to person, place, and time. Coordination: Coordination normal.   Psychiatric:         Thought Content: Thought content normal.         Judgment: Judgment normal.         No results found for this visit on 03/07/22. No results found for this or any previous visit (from the past 2016 hour(s)).     [x] Pt was seen by provider for 40     Minutes  Counseling and coordination of care was done for all assessment diagnosis listed for today with patient and any family/friend present. More than 50% of this visit was spent coordinating cuurent care, obtaining information for prior records, and counseling for current plan of action. Assessment:       Diagnosis Orders   1. Type 2 diabetes mellitus without complication, without long-term current use of insulin (HCC)  POCT glycosylated hemoglobin (Hb A1C)    metFORMIN (GLUCOPHAGE) 500 MG tablet   2. Prostate CA ([address]     3. Mild persistent asthma without complication  albuterol sulfate HFA (VENTOLIN HFA) 108 (90 Base) MCG/ACT inhaler   4. Uncontrolled type 2 diabetes mellitus with hyperglycemia (HCC)  metFORMIN (GLUCOPHAGE) 500 MG tablet   5. Actinic keratoses     6. Abrasion of left lower extremity, initial encounter           Orders Placed This Encounter   Procedures    POCT glycosylated hemoglobin (Hb A1C)       Orders Placed This Encounter   Medications    albuterol sulfate HFA (VENTOLIN HFA) 108 (90 Base) MCG/ACT inhaler     Sig: Inhale 2 puffs into the lungs every 6 hours as needed for Wheezing     Dispense:  18 g     Refill:  1    metFORMIN (GLUCOPHAGE) 500 MG tablet     Sig: Take 1 tablet by mouth 3 times daily     Dispense:  90 tablet     Refill:  3          Medication List          Accurate as of March 7, 2022 12:08 PM. If you have any questions, ask your nurse or doctor.             CHANGE how you take these medications    metFORMIN 500 MG tablet  Commonly known as: GLUCOPHAGE  Take 1 tablet by mouth 3 times daily  What changed: when to take this  Changed by: Serg Pascual MD        CONTINUE taking these medications    albuterol sulfate  (90 Base) MCG/ACT inhaler  Commonly known as: Ventolin HFA  Inhale 2 puffs into the lungs every 6 hours as needed for Wheezing     allopurinol 300 MG tablet  Commonly known as: ZYLOPRIM  Take 1 tablet by mouth daily     amLODIPine 5 MG tablet  Commonly known as: NORVASC  Take 1 tablet by mouth daily     aspirin 81 MG chewable tablet     Blood Pressure Kit  1 applicator by Does not apply route 4 times daily     D3-1000 PO     fluticasone 110 MCG/ACT inhaler  Commonly known as: Flovent HFA  Inhale 2 puffs into the lungs 2 times daily     fluticasone 50 MCG/ACT nasal spray  Commonly known as: FLONASE  2 sprays by Nasal route daily     irbesartan 150 MG tablet  Commonly known as: AVAPRO  Take 1 tablet by mouth daily     metoprolol succinate 100 MG extended release tablet  Commonly known as: TOPROL XL  Take 1 tablet by mouth daily     MULTIVITAMIN MEN 50+ PO     Qunol Ultra CoQ10 100-150 MG-UNIT Caps  Generic drug: Coenzyme Q10-Vitamin E     simvastatin 20 MG tablet  Commonly known as: ZOCOR  Take 1 tablet by mouth daily     Spacer/Aero-Hold Chamber Bags Misc  1 applicator by Does not apply route 4 times daily     vitamin C 500 MG tablet  Commonly known as: ASCORBIC ACID        STOP taking these medications    azithromycin 250 MG tablet  Commonly known as: ZITHROMAX  Stopped by: Mima Darby MD     diclofenac 50 MG EC tablet  Commonly known as: VOLTAREN  Stopped by: Mima Darby MD           Where to Get Your Medications      These medications were sent to [address] [place] [address], [address] [place] [address]    Phone: [phone]   · albuterol sulfate  (90 Base) MCG/ACT inhaler  · metFORMIN 500 MG tablet           Plan:   Return in about 4 weeks (around 4/4/2022) for cryo for scalp and, POCT HbA1c. Patient Instructions   Patient denies using carb counting or any sort of dietary monitoring. He just feels well so he did not think his sugar was abnormal.  He does not check it routinely. Hemoglobin A1c came up elevated is 10.4. Will increase patient's Metformin to 500 mg 3 times a day and follow-up in 1 month for repeat hemoglobin A1c.   Information provided about carbohydrate counting and evening snacks. Patient will attempt to make these adjustments in his diet and recheck in a month. Offered patient the option of for shave removal of the actinic keratosis on his head because we do not have liquid nitrogen today. He opts to have that done in the future with liquid nitrogen. We will also recheck leg lesion to confirm healing abrasion versus an early cancer at the next appointment. Inhaler refilled. Patient symptoms overall controlled. Patient Education        Learning About Carbohydrate (Carb) Counting and Eating Out When You Have Diabetes  Why plan your meals? Meal planning can be a key part of managing diabetes. Planning meals and snacks with the right balance of carbohydrate, protein, and fat can help you keep your blood sugar at the target level you set with your doctor. You don't have to eat special foods. You can eat what your family eats, including sweets once in a while. But you do have to pay attention to how often you eat and how much you eat of certain foods. You may want to work with a dietitian or a certified diabetes educator. He or she can give you tips and meal ideas and can answer your questions about meal planning. This health professional can also help you reach a healthy weight if that is one of your goals. What should you know about eating carbs? Managing the amount of carbohydrate (carbs) you eat is an important part of healthy meals when you have diabetes. Carbohydrate is found in many foods. · Learn which foods have carbs. And learn the amounts of carbs in different foods. ? Bread, cereal, pasta, and rice have about 15 grams of carbs in a serving. A serving is 1 slice of bread (1 ounce), ½ cup of cooked cereal, or 1/3 cup of cooked pasta or rice. ? Fruits have 15 grams of carbs in a serving.  A serving is 1 small fresh fruit, such as an apple or orange; ½ of a banana; ½ cup of cooked or canned fruit; ½ cup of fruit juice; 1 cup of melon or raspberries; or 2 tablespoons of dried fruit. ? Milk and no-sugar-added yogurt have 15 grams of carbs in a serving. A serving is 1 cup of milk or 2/3 cup of no-sugar-added yogurt. ? Starchy vegetables have 15 grams of carbs in a serving. A serving is ½ cup of mashed potatoes or sweet potato; 1 cup winter squash; ½ of a small baked potato; ½ cup of cooked beans; or ½ cup cooked corn or green peas. · Learn how much carbs to eat each day and at each meal. A dietitian or CDE can teach you how to keep track of the amount of carbs you eat. This is called carbohydrate counting. · If you are not sure how to count carbohydrate grams, use the Plate Method to plan meals. It is a good, quick way to make sure that you have a balanced meal. It also helps you spread carbs throughout the day. ? Divide your plate by types of foods. Put non-starchy vegetables on half the plate, meat or other protein food on one-quarter of the plate, and a grain or starchy vegetable in the final quarter of the plate. To this you can add a small piece of fruit and 1 cup of milk or yogurt, depending on how many carbs you are supposed to eat at a meal.  · Try to eat about the same amount of carbs at each meal. Do not \"save up\" your daily allowance of carbs to eat at one meal.  · Proteins have very little or no carbs per serving. Examples of proteins are beef, chicken, turkey, fish, eggs, tofu, cheese, cottage cheese, and peanut butter. A serving size of meat is 3 ounces, which is about the size of a deck of cards. Examples of meat substitute serving sizes (equal to 1 ounce of meat) are 1/4 cup of cottage cheese, 1 egg, 1 tablespoon of peanut butter, and ½ cup of tofu. How can you eat out and still eat healthy? · Learn to estimate the serving sizes of foods that have carbohydrate. If you measure food at home, it will be easier to estimate the amount in a serving of restaurant food.   · If the meal you order has too much carbohydrate (such as potatoes, corn, or baked beans), ask to have a low-carbohydrate food instead. Ask for a salad or green vegetables. · If you use insulin, check your blood sugar before and after eating out to help you plan how much to eat in the future. · If you eat more carbohydrate at a meal than you had planned, take a walk or do other exercise. This will help lower your blood sugar. What are some tips for eating healthy? · Limit saturated fat, such as the fat from meat and dairy products. This is a healthy choice because people who have diabetes are at higher risk of heart disease. So choose lean cuts of meat and nonfat or low-fat dairy products. Use olive or canola oil instead of butter or shortening when cooking. · Don't skip meals. Your blood sugar may drop too low if you skip meals and take insulin or certain medicines for diabetes. · Check with your doctor before you drink alcohol. Alcohol can cause your blood sugar to drop too low. Alcohol can also cause a bad reaction if you take certain diabetes medicines. Follow-up care is a key part of your treatment and safety. Be sure to make and go to all appointments, and call your doctor if you are having problems. It's also a good idea to know your test results and keep a list of the medicines you take. Where can you learn more? Go to https://SPOOTNIC.COMpo.ADEA Cutters. org and sign in to your [employer] account. Enter G897 in the [facility] box to learn more about \"Learning About Carbohydrate (Carb) Counting and Eating Out When You Have Diabetes. \"     If you do not have an account, please click on the \"Sign Up Now\" link. Current as of: September 8, 2021               Content Version: 13.1  © 7377-8414 Healthwise, Incorporated. Care instructions adapted under license by [facility] ([facility]).  If you have questions about a medical condition or this instruction, always ask your healthcare professional. [address] any warranty or liability for your use of this information. Patient Education        Counting Carbohydrates for Diabetes: Care Instructions  Your Care Instructions     You don't have to eat special foods when you have diabetes. You just have to be careful to eat healthy foods. Carbohydrates (carbs) raise blood sugar higher and quicker than any other nutrient. Carbs are found in desserts, breads and cereals, and fruit. They're also in starchy vegetables. These include potatoes, corn, and grains such as rice and pasta. Carbs are also in milk and yogurt. The more carbs you eat at one time, the higher your blood sugar will rise. Spreading carbs all through the day helps keep your blood sugar levels within your target range. Counting carbs is one of the best ways to keep your blood sugar under control. If you use insulin, counting carbs helps you match the right amount of insulin to the number of grams of carbs in a meal. Then you can change your diet and insulin dose as needed. Testing your blood sugar several times a day can help you learn how carbs affect your blood sugar. A registered dietitian or certified diabetes educator can help you plan meals and snacks. Follow-up care is a key part of your treatment and safety. Be sure to make and go to all appointments, and call your doctor if you are having problems. It's also a good idea to know your test results and keep a list of the medicines you take. How can you care for yourself at home? Know your daily amount of carbohydrates  Your daily amount depends on several things, such as your weight, how active you are, which diabetes medicines you take, and what your goals are for your blood sugar levels. A registered dietitian or certified diabetes educator can help you plan how many carbs to include in each meal and snack.   For most adults, a guideline for the daily amount of carbs is:  · 45 to 60 grams at each meal. That's about the same as 3 to 4 carbohydrate servings. · 15 to 20 grams at each snack. That's about the same as 1 carbohydrate serving. Count carbs  Counting carbs lets you know how much rapid-acting insulin to take before you eat. If you use an insulin pump, you get a constant rate of insulin during the day. So the pump must be programmed at meals. This gives you extra insulin to cover the rise in blood sugar after meals. If you take insulin:  · Learn your own insulin-to-carb ratio. You and your diabetes health professional will figure out the ratio. You can do this by testing your blood sugar after meals. For example, you may need a certain amount of insulin for every 15 grams of carbs. · Add up the carb grams in a meal. Then you can figure out how many units of insulin to take based on your insulin-to-carb ratio. · Exercise lowers blood sugar. You can use less insulin than you would if you were not doing exercise. Keep in mind that timing matters. If you exercise within 1 hour after a meal, your body may need less insulin for that meal than it would if you exercised 3 hours after the meal. Test your blood sugar to find out how exercise affects your need for insulin. If you do or don't take insulin:  · Look at labels on packaged foods. This can tell you how many carbs are in a serving. You can also use guides from the American Diabetes Association. · Be aware of portions, or serving sizes. If a package has two servings and you eat the whole package, you need to double the number of grams of carbohydrate listed for one serving. · Protein, fat, and fiber do not raise blood sugar as much as carbs do. If you eat a lot of these nutrients in a meal, your blood sugar will rise more slowly than it would otherwise. Eat from all food groups  · Eat at least three meals a day. · Plan meals to include food from all the food groups. The food groups include grains, fruits, dairy, proteins, and vegetables.   · Talk to your dietitian or diabetes educator about ways to add limited amounts of sweets into your meal plan. · If you drink alcohol, talk to your doctor. It may not be recommended when you are taking certain diabetes medicines. Where can you learn more? Go to https://[employer] Therapeutics[employer]ZealCore Embedded Solutions.BaseTrace. org and sign in to your [employer] account. Enter U833 in the [employer] box to learn more about \"Counting Carbohydrates for Diabetes: Care Instructions. \"     If you do not have an account, please click on the \"Sign Up Now\" link. Current as of: July 28, 2021               Content Version: 13.1  © 5685-1793 Healthwise, Incorporated. Care instructions adapted under license by [facility] ([facility]). If you have questions about a medical condition or this instruction, always ask your healthcare professional. [address] any warranty or liability for your use of this information. This note was partially created with the assistance of dictation. This may lead to grammatical or spelling errors. Mukesh Dietrich M.D.

## 2022-04-02 DIAGNOSIS — M1A.0720 CHRONIC GOUT OF LEFT FOOT, UNSPECIFIED CAUSE: ICD-10-CM

## 2022-04-02 RX ORDER — AMLODIPINE BESYLATE 5 MG/1
5 TABLET ORAL DAILY
Qty: 30 TABLET | Refills: 11 | Status: SHIPPED | OUTPATIENT
Start: 2022-04-02

## 2022-04-02 NOTE — TELEPHONE ENCOUNTER
Future Appointments    Encounter Information    Provider Department Appt Notes   4/5/2022 Duc Murillo MD [facility] Return in about 4 weeks (around 4/4/2022) for cryo for scalp and, POCT HbA1c.   5/2/2022 Duc Murillo MD [facility] AWV       Past Visits    Date Provider Specialty Visit Type Primary Dx   03/07/2022 Duc Murillo MD Family Medicine Office Visit Type 2 diabetes mellitus without complication, without long-term current use of insulin ([facility] [address]

## 2022-04-04 RX ORDER — ALLOPURINOL 300 MG/1
300 TABLET ORAL DAILY
Qty: 30 TABLET | Refills: 1 | Status: SHIPPED | OUTPATIENT
Start: 2022-04-04 | End: 2022-06-02

## 2022-04-05 ENCOUNTER — OFFICE VISIT (OUTPATIENT)
Dept: FAMILY MEDICINE CLINIC | Age: 67
End: 2022-04-05
Payer: MEDICARE

## 2022-04-05 VITALS
DIASTOLIC BLOOD PRESSURE: 82 MMHG | HEIGHT: 73 IN | HEART RATE: 88 BPM | WEIGHT: 238 LBS | BODY MASS INDEX: 31.54 KG/M2 | OXYGEN SATURATION: 98 % | SYSTOLIC BLOOD PRESSURE: 136 MMHG | TEMPERATURE: 97.9 F

## 2022-04-05 DIAGNOSIS — E11.9 TYPE 2 DIABETES MELLITUS WITHOUT COMPLICATION, WITHOUT LONG-TERM CURRENT USE OF INSULIN (HCC): Primary | ICD-10-CM

## 2022-04-05 DIAGNOSIS — L57.0 ACTINIC KERATOSES: ICD-10-CM

## 2022-04-05 LAB — HBA1C MFR BLD: 7.8 %

## 2022-04-05 PROCEDURE — G8417 CALC BMI ABV UP PARAM F/U: HCPCS | Performed by: FAMILY MEDICINE

## 2022-04-05 PROCEDURE — 99214 OFFICE O/P EST MOD 30 MIN: CPT | Performed by: FAMILY MEDICINE

## 2022-04-05 PROCEDURE — 1036F TOBACCO NON-USER: CPT | Performed by: FAMILY MEDICINE

## 2022-04-05 PROCEDURE — 3017F COLORECTAL CA SCREEN DOC REV: CPT | Performed by: FAMILY MEDICINE

## 2022-04-05 PROCEDURE — 17004 DESTROY PREMAL LESIONS 15/>: CPT | Performed by: FAMILY MEDICINE

## 2022-04-05 PROCEDURE — 4040F PNEUMOC VAC/ADMIN/RCVD: CPT | Performed by: FAMILY MEDICINE

## 2022-04-05 PROCEDURE — 2022F DILAT RTA XM EVC RTNOPTHY: CPT | Performed by: FAMILY MEDICINE

## 2022-04-05 PROCEDURE — 1123F ACP DISCUSS/DSCN MKR DOCD: CPT | Performed by: FAMILY MEDICINE

## 2022-04-05 PROCEDURE — G8427 DOCREV CUR MEDS BY ELIG CLIN: HCPCS | Performed by: FAMILY MEDICINE

## 2022-04-05 PROCEDURE — 3051F HG A1C>EQUAL 7.0%<8.0%: CPT | Performed by: FAMILY MEDICINE

## 2022-04-05 ASSESSMENT — PATIENT HEALTH QUESTIONNAIRE - PHQ9
SUM OF ALL RESPONSES TO PHQ QUESTIONS 1-9: 0
2. FEELING DOWN, DEPRESSED OR HOPELESS: 0
SUM OF ALL RESPONSES TO PHQ QUESTIONS 1-9: 0
SUM OF ALL RESPONSES TO PHQ9 QUESTIONS 1 & 2: 0
SUM OF ALL RESPONSES TO PHQ QUESTIONS 1-9: 0
1. LITTLE INTEREST OR PLEASURE IN DOING THINGS: 0
SUM OF ALL RESPONSES TO PHQ QUESTIONS 1-9: 0

## 2022-04-05 ASSESSMENT — ENCOUNTER SYMPTOMS
ABDOMINAL DISTENTION: 0
CHEST TIGHTNESS: 0
PHOTOPHOBIA: 0
COLOR CHANGE: 1
ABDOMINAL PAIN: 0
SHORTNESS OF BREATH: 0

## 2022-04-05 NOTE — PATIENT INSTRUCTIONS
Patient is back on Metformin 500 mg 3 times a day. Hemoglobin A1c is down to 7.8. Recheck in 3 months    Cryotherapy instructions    Post op instructions given. A printed copy provided. It is best to leave blisters alone if they form for the first 1-3 days to allow the desired damaged tissue (precancer lesion, wart, or whatever lesion is being removed) to separate from healthy tissue. The area should be covered with a bandage to prevent blister breakage and dirt exposure. The wounds should remain dry while there is a blister, therefore if this is a sweaty location, like the foot ,you may need to change clothing, such as socks, multiple times per day. When the blister(s) pop or patient removes the top as instructed between day 3-5, apply antibiotic (NOT triple antibiotic, one brand is Neosporin) ointment, usually Bacitracin, and a bandage to affected area(s). The ointment should be applied to the open area as long as it is not covered with skin. Exposed tissue is meant to be moist.      Once a scab is formed the patient may stop applying ointment. The scab may appear yellow while moist, don't confuse this with infection. If the wound is infection pus will drain from the site. If this treatment was for a large wart you may note that a plug of skin may fall out of the area that was treated. That is the center of the wart and it is appropriate for it to come out. If exposed skin remains, treat that area as you would a ruptured blister as mentioned above.     Bacitracin sample supplied

## 2022-04-05 NOTE — PROGRESS NOTES
Diagnosis Orders   1. Type 2 diabetes mellitus without complication, without long-term current use of insulin (HCC)  POCT glycosylated hemoglobin (Hb A1C)   2. Actinic keratoses  31884 - [place] DESTRUC PREMALIGNANT,15+ LESIONS       Return in about 3 months (around 7/5/2022) for POCT HbA1c. Orders Placed This Encounter   Procedures    POCT glycosylated hemoglobin (Hb A1C)    61447 - [place] DESTRUC PREMALIG[place],15+ LESIONS       Jhonathan Palmer was seen today for skin problem and discuss labs. Diagnoses and all orders for this visit:    Type 2 diabetes mellitus without complication, without long-term current use of insulin (HCC)  -     Cancel: POCT glycosylated hemoglobin (Hb A1C)  -     POCT glycosylated hemoglobin (Hb A1C)    Actinic keratoses  -     13006 - [place] DESTRUC PREMALIG[place]NT,15+ LESIONS        Return in about 3 months (around 7/5/2022) for POCT HbA1c. Patient Instructions   Patient is back on Metformin 500 mg 3 times a day. Hemoglobin A1c is down to 7.8. Recheck in 3 months    Cryotherapy instructions    Post op instructions given. A printed copy provided. It is best to leave blisters alone if they form for the first 1-3 days to allow the desired damaged tissue (precancer lesion, wart, or whatever lesion is being removed) to separate from healthy tissue. The area should be covered with a bandage to prevent blister breakage and dirt exposure. The wounds should remain dry while there is a blister, therefore if this is a sweaty location, like the foot ,you may need to change clothing, such as socks, multiple times per day. When the blister(s) pop or patient removes the top as instructed between day 3-5, apply antibiotic (NOT triple antibiotic, one brand is Neosporin) ointment, usually Bacitracin, and a bandage to affected area(s). The ointment should be applied to the open area as long as it is not covered with skin.   Exposed tissue is meant to be moist.      Once a scab is formed the patient may stop applying ointment. The scab may appear yellow while moist, don't confuse this with infection. If the wound is infection pus will drain from the site. If this treatment was for a large wart you may note that a plug of skin may fall out of the area that was treated. That is the center of the wart and it is appropriate for it to come out. If exposed skin remains, treat that area as you would a ruptured blister as mentioned above. Bacitracin sample supplied                      Patient states he is taking his Metformin 3 times a day without difficulty. And he has been avoiding eating breads and carbohydrates like that. Otherwise feels well. He has a number of prickly lesions that itch on his scalp and also bleed at times. He would like to have those removed.   He has a history of actinic keratosis      Current Outpatient Medications on File Prior to Visit   Medication Sig Dispense Refill    allopurinol (ZYLOPRIM) 300 MG tablet Take 1 tablet by mouth daily 30 tablet 1    amLODIPine (NORVASC) 5 MG tablet Take 1 tablet by mouth daily 30 tablet 11    albuterol sulfate HFA (VENTOLIN HFA) 108 (90 Base) MCG/ACT inhaler Inhale 2 puffs into the lungs every 6 hours as needed for Wheezing 18 g 1    metFORMIN (GLUCOPHAGE) 500 MG tablet Take 1 tablet by mouth 3 times daily 90 tablet 3    fluticasone (FLONASE) 50 MCG/ACT nasal spray 2 sprays by Nasal route daily 32 g 0    irbesartan (AVAPRO) 150 MG tablet Take 1 tablet by mouth daily 90 tablet 0    metoprolol succinate (TOPROL XL) 100 MG extended release tablet Take 1 tablet by mouth daily 90 tablet 3    Cholecalciferol (D3-1000 PO) Take by mouth      simvastatin (ZOCOR) 20 MG tablet Take 1 tablet by mouth daily 90 tablet 3    fluticasone (FLOVENT HFA) 110 MCG/ACT inhaler Inhale 2 puffs into the lungs 2 times daily 1 Inhaler 3    Spacer/Aero-Hold Chamber Bags MISC 1 applicator by Does not apply route 4 times daily 1 each 0    Blood Pressure KIT 1 applicator by Does not apply route 4 times daily 1 kit 0    Coenzyme Q10-Vitamin E (QUNOL ULTRA COQ10) 100-150 MG-UNIT CAPS       aspirin 81 MG chewable tablet Take 81 mg by mouth daily      vitamin C (ASCORBIC ACID) 500 MG tablet Take 500 mg by mouth daily      Multiple Vitamins-Minerals (MULTIVITAMIN MEN 50+ PO) Take by mouth       No current facility-administered medications on file prior to visit. Patient has no known allergies. Review of Systems   Constitutional: Negative for activity change, appetite change, diaphoresis and unexpected weight change. Eyes: Negative for photophobia and visual disturbance. Respiratory: Negative for chest tightness and shortness of breath. No orthopnea   Cardiovascular: Negative for chest pain, palpitations and leg swelling. Gastrointestinal: Negative for abdominal distention and abdominal pain. Endocrine: Negative for polydipsia, polyphagia and polyuria. Genitourinary: Negative for flank pain and frequency. Musculoskeletal: Negative for gait problem and joint swelling. Skin: Positive for color change. Neurological: Negative for dizziness, weakness, light-headedness and headaches. Psychiatric/Behavioral: Negative for confusion. /82   Pulse 88   Temp 97.9 °F (36.6 °C)   Ht 6' 1\" (1.854 m)   Wt 238 lb (108 kg)   SpO2 98%   BMI 31.40 kg/m²   Physical Exam  Vitals reviewed. Constitutional:       General: He is not in acute distress. Appearance: He is well-developed. HENT:      Head: Normocephalic and atraumatic. Right Ear: External ear normal.      Left Ear: External ear normal.      Nose: Nose normal.   Eyes:      General:         Right eye: No discharge. Left eye: No discharge. Conjunctiva/sclera: Conjunctivae normal.      Pupils: Pupils are equal, round, and reactive to light. Neck:      Thyroid: No thyromegaly. Cardiovascular:      Rate and Rhythm: Normal rate and regular rhythm.    Pulmonary: Effort: Pulmonary effort is normal. No respiratory distress. Abdominal:      General: There is no distension. Musculoskeletal:      Cervical back: Neck supple. Skin:     General: Skin is warm and dry. Comments: Erythematous base rough white lesions noted on the scalp x9, left dorsal hand x1, forehead x3, right forearm x3. Neurological:      Mental Status: He is alert and oriented to person, place, and time. Coordination: Coordination normal.   Psychiatric:         Thought Content: Thought content normal.         Judgment: Judgment normal.           Procedure consent  The procedure was discussed with the patient. All questions were answered and alternative options discussed. The patient is aware of the risks of bleeding, infection, unsatisfactory scar result. Informed consent paperwork was signed by the patient. Liquid nitrogen was applied for 2-6 seconds to affected areas with thaw allowed between dosing for a total of 2 applications. Applied to 16   lesion(s). The patient tolerated the procedure well. Diagnosis Orders   1. Type 2 diabetes mellitus without complication, without long-term current use of insulin ([facility])  POCT glycosylated hemoglobin (Hb A1C)   2. Actinic keratoses  33592 - [place] DESTRUC PREMALIGNANT,15+ LESIONS       Return in about 3 months (around 7/5/2022) for POCT HbA1c. Orders Placed This Encounter   Procedures    POCT glycosylated hemoglobin (Hb A1C)    69888 - [place] DESTRUC PREMALIGNANT,15+ LESIONS               Patient Instructions   Patient is back on Metformin 500 mg 3 times a day. Hemoglobin A1c is down to 7.8. Recheck in 3 months    Cryotherapy instructions    Post op instructions given. A printed copy provided. It is best to leave blisters alone if they form for the first 1-3 days to allow the desired damaged tissue (precancer lesion, wart, or whatever lesion is being removed) to separate from healthy tissue.      The area should be covered with a bandage to prevent blister breakage and dirt exposure. The wounds should remain dry while there is a blister, therefore if this is a sweaty location, like the foot ,you may need to change clothing, such as socks, multiple times per day. When the blister(s) pop or patient removes the top as instructed between day 3-5, apply antibiotic (NOT triple antibiotic, one brand is Neosporin) ointment, usually Bacitracin, and a bandage to affected area(s). The ointment should be applied to the open area as long as it is not covered with skin. Exposed tissue is meant to be moist.      Once a scab is formed the patient may stop applying ointment. The scab may appear yellow while moist, don't confuse this with infection. If the wound is infection pus will drain from the site. If this treatment was for a large wart you may note that a plug of skin may fall out of the area that was treated. That is the center of the wart and it is appropriate for it to come out. If exposed skin remains, treat that area as you would a ruptured blister as mentioned above. Bacitracin sample supplied                  DO NOT USE TRIPLE ANTIBIOTIC ON THE WOUND    It is best to leave blisters alone if they form. They should be covered with a bandage to prevent blister breakage and dirt exposure. The wounds should remain dry while there is a blister, therefore if this is a sweaty location like the foot you may need to change socks multiple times per day. If blister(s) pop or patient pops with a sterilized needle, apply antibiotic (NOT triple antibiotic, one brand is Neosporin) ointment and a bandage to affected area(s). The ointment should be applied to the open area as long as it is not covered with skin. Exposed tissue is meant to be moist.  Once a scab formed she may stop applying ointment. If this treatment was for a large wart you may note that a plug of skin may fall out of the area that was treated.   That is the center of the wart and it is appropriate for it to come out. If exposed skin remains, treat that area as you would a ruptured blister as mentioned above.

## 2022-04-05 NOTE — PROGRESS NOTES
Diagnosis Orders   1. Type 2 diabetes mellitus without complication, without long-term current use of insulin (HCC)  POCT glycosylated hemoglobin (Hb A1C)     metFORMIN (GLUCOPHAGE) 500 MG tablet   2. Prostate CA ([address]      3. Mild persistent asthma without complication  albuterol sulfate HFA (VENTOLIN HFA) 108 (90 Base) MCG/ACT inhaler   4. Uncontrolled type 2 diabetes mellitus with hyperglycemia (HCC)  metFORMIN (GLUCOPHAGE) 500 MG tablet   5. Actinic keratoses      6. Abrasion of left lower extremity, initial encounter         Return in about 4 weeks (around 4/4/2022) for cryo for scalp and, POCT HbA1c. Patient Instructions   Patient denies using carb counting or any sort of dietary monitoring. He just feels well so he did not think his sugar was abnormal.  He does not check it routinely. Hemoglobin A1c came up elevated is 10.4. Will increase patient's Metformin to 500 mg 3 times a day and follow-up in 1 month for repeat hemoglobin A1c. Information provided about carbohydrate counting and evening snacks. Patient will attempt to make these adjustments in his diet and recheck in a month. Offered patient the option of for shave removal of the actinic keratosis on his head because we do not have liquid nitrogen today. He opts to have that done in the future with liquid nitrogen. We will also recheck leg lesion to confirm healing abrasion versus an early cancer at the next appointment. Inhaler refilled. Patient symptoms overall controlled.

## 2022-04-12 ENCOUNTER — TELEPHONE (OUTPATIENT)
Dept: FAMILY MEDICINE CLINIC | Age: 67
End: 2022-04-12

## 2022-04-30 DIAGNOSIS — I10 ESSENTIAL HYPERTENSION: ICD-10-CM

## 2022-04-30 NOTE — TELEPHONE ENCOUNTER
Encounter Information    Provider Department Appt Notes   7/5/2022 Kathy Santiago MD [facility] Return in about 3 months (around 7/5/2022) for POCT HbA1c.    LM TO RESCHEDULE 7/5 APPT 4/12 AA       Past Visits    Date Provider Specialty Visit Type Primary Dx   04/05/2022 Kathy Santiago MD Family Medicine Office Visit Type 2 diabetes mellitus without complication, without long-term current use of insulin ([address]   03/07/2022 Kathy Santiago MD Family Medicine Office Visit Type 2 diabetes mellitus without complication, without long-term current use of insulin ([address]   12/27/2021 Kathy Santiago MD Family Medicine Office Visit Cough   12/07/2021 Kathy Santiago MD Family Medicine Office Visit Persistent cough   11/22/2021 CALIXTO Saeed - CNP Family Medicine Office Visit Mid back

## 2022-05-02 RX ORDER — IRBESARTAN 150 MG/1
TABLET ORAL
Qty: 90 TABLET | Refills: 0 | Status: SHIPPED | OUTPATIENT
Start: 2022-05-02 | End: 2022-08-23

## 2022-06-01 DIAGNOSIS — M1A.0720 CHRONIC GOUT OF LEFT FOOT, UNSPECIFIED CAUSE: ICD-10-CM

## 2022-06-01 DIAGNOSIS — I10 ESSENTIAL HYPERTENSION: ICD-10-CM

## 2022-06-02 RX ORDER — METOPROLOL SUCCINATE 100 MG/1
TABLET, EXTENDED RELEASE ORAL
Qty: 90 TABLET | Refills: 3 | Status: SHIPPED | OUTPATIENT
Start: 2022-06-02

## 2022-06-02 RX ORDER — ALLOPURINOL 300 MG/1
TABLET ORAL
Qty: 90 TABLET | Refills: 3 | Status: SHIPPED | OUTPATIENT
Start: 2022-06-02

## 2022-07-06 ENCOUNTER — OFFICE VISIT (OUTPATIENT)
Dept: FAMILY MEDICINE CLINIC | Age: 67
End: 2022-07-06
Payer: MEDICARE

## 2022-07-06 VITALS
TEMPERATURE: 98.5 F | HEIGHT: 73 IN | SYSTOLIC BLOOD PRESSURE: 136 MMHG | OXYGEN SATURATION: 98 % | DIASTOLIC BLOOD PRESSURE: 78 MMHG | WEIGHT: 233.8 LBS | BODY MASS INDEX: 30.99 KG/M2 | HEART RATE: 83 BPM

## 2022-07-06 DIAGNOSIS — E11.9 TYPE 2 DIABETES MELLITUS WITHOUT COMPLICATION, WITHOUT LONG-TERM CURRENT USE OF INSULIN (HCC): Primary | ICD-10-CM

## 2022-07-06 DIAGNOSIS — E55.9 VITAMIN D DEFICIENCY: ICD-10-CM

## 2022-07-06 DIAGNOSIS — I10 ESSENTIAL HYPERTENSION: ICD-10-CM

## 2022-07-06 DIAGNOSIS — C61 PROSTATE CA (HCC): ICD-10-CM

## 2022-07-06 LAB — HBA1C MFR BLD: 5.9 %

## 2022-07-06 PROCEDURE — 2022F DILAT RTA XM EVC RTNOPTHY: CPT | Performed by: FAMILY MEDICINE

## 2022-07-06 PROCEDURE — 3017F COLORECTAL CA SCREEN DOC REV: CPT | Performed by: FAMILY MEDICINE

## 2022-07-06 PROCEDURE — G8417 CALC BMI ABV UP PARAM F/U: HCPCS | Performed by: FAMILY MEDICINE

## 2022-07-06 PROCEDURE — 3044F HG A1C LEVEL LT 7.0%: CPT | Performed by: FAMILY MEDICINE

## 2022-07-06 PROCEDURE — 83036 HEMOGLOBIN GLYCOSYLATED A1C: CPT | Performed by: FAMILY MEDICINE

## 2022-07-06 PROCEDURE — G8427 DOCREV CUR MEDS BY ELIG CLIN: HCPCS | Performed by: FAMILY MEDICINE

## 2022-07-06 PROCEDURE — 1036F TOBACCO NON-USER: CPT | Performed by: FAMILY MEDICINE

## 2022-07-06 PROCEDURE — 3288F FALL RISK ASSESSMENT DOCD: CPT | Performed by: FAMILY MEDICINE

## 2022-07-06 PROCEDURE — 99214 OFFICE O/P EST MOD 30 MIN: CPT | Performed by: FAMILY MEDICINE

## 2022-07-06 PROCEDURE — 1123F ACP DISCUSS/DSCN MKR DOCD: CPT | Performed by: FAMILY MEDICINE

## 2022-07-06 ASSESSMENT — PATIENT HEALTH QUESTIONNAIRE - PHQ9
SUM OF ALL RESPONSES TO PHQ QUESTIONS 1-9: 0
2. FEELING DOWN, DEPRESSED OR HOPELESS: 0
SUM OF ALL RESPONSES TO PHQ9 QUESTIONS 1 & 2: 0
1. LITTLE INTEREST OR PLEASURE IN DOING THINGS: 0

## 2022-07-06 NOTE — PROGRESS NOTES
Diagnosis Orders   1. Type 2 diabetes mellitus without complication, without long-term current use of insulin (HCC)  POCT glycosylated hemoglobin (Hb A1C)    Hemoglobin A1C    MICROALBUMIN, RANDOM URINE (W/O CREATININE)    Lipid, Fasting    TSH with Reflex    Comprehensive Metabolic Panel     DIABETES FOOT EXAM     DIABETES EYE EXAM   2. Prostate CA ([facility]ly [facility])  PSA Screening   3. Essential hypertension  CBC with Auto Differential   4. Vitamin D deficiency  Vitamin D 25 Hydroxy     Return in about 4 months (around 10/25/2022) for for routine major medical condition management. Patient Instructions       Patient Education        Noninsulin Medicines for Type 2 Diabetes: Care Instructions  Overview     There are different types of noninsulin medicines for diabetes. Each works in a different way. But they all help you control your blood sugar. Some types help your body make insulin to lower your blood sugar. Others lower how much insulin your body needs. Some can slow how fast your body digests sugars. And some canremove extra glucose through your urine. You may need to take more than one medicine for diabetes. Two or more medicinesmay work better to lower your blood sugar level than just one does.  Metformin. This lowers how much glucose your liver makes. And it helps you respond better to insulin. It also lowers the amount of stored sugar that your liver releases when you are not eating.  Sulfonylureas. These help your body release more insulin. Some work for many hours. They can cause low blood sugar if you don't eat as you planned. An example is glipizide.  Thiazolidinediones. These reduce the amount of blood glucose. They also help you respond better to insulin. An example is pioglitazone.  SGLT2 inhibitors. These help to remove extra glucose through your urine. They may also help some people lose weight. An example is ertugliflozin.  DPP-4 inhibitors.  These help your body raise the level of insulin after you eat. They also help your body make less of a hormone that raises blood sugar. An example is alogliptin.  GLP-1 receptor agonists. These help your body make a protein that can raise your insulin level and make you less hungry. They're given as shots or pills. An example is semaglutide.  Meglitinides. These help your body release insulin. They also help slow how your body digests sugars. So they can keep your blood sugar from rising too fast after you eat.  Alpha-glucosidase inhibitors. These keep starches from breaking down. This means that they lower the amount of glucose absorbed when you eat. They don't help your body make more insulin. So they will not cause low blood sugar unless you use them with other medicines for diabetes. Follow-up care is a key part of your treatment and safety. Be sure to make and go to all appointments, and call your doctor if you are having problems. It's also a good idea to know your test results and keep alist of the medicines you take. How can you care for yourself at home?  Eat a healthy diet. Get some exercise each day. This may help you to reduce how much medicine you need.  Do not take other prescription or over-the-counter medicines, vitamins, herbal products, or supplements without talking to your doctor first. Some medicines for type 2 diabetes can cause problems with other medicines or supplements.  Tell your doctor if you plan to get pregnant. Some of these drugs are not safe for pregnant women.  Be safe with medicines. Take your medicines exactly as prescribed. Meglitinides and sulfonylureas can cause your blood sugar to drop very low. Call your doctor if you think you are having a problem with your medicine.  Check your blood sugar often. You can use a glucose monitor. Keeping track can help you know how certain foods, activities, and medicines affect your blood sugar.  And it can help you keep your blood sugar from getting so low that it's not safe.  When should you call for help? Call 911 anytime you think you may need emergency care. For example, call if:     You passed out (lost consciousness).      You are confused or cannot think clearly.      Your blood sugar is very high or very low. Watch closely for changes in your health, and be sure to contact your doctor if:     Your blood sugar stays outside the level your doctor set for you.      You have any problems. Where can you learn more? Go to https://The [employer]'s CardpeAdhereTx.GeoPalz. org and sign in to your [employer] account. Enter H153 in the its learning box to learn more about \"Noninsulin Medicines for Type 2 Diabetes: Care Instructions. \"     If you do not have an account, please click on the \"Sign Up Now\" link. Current as of: July 28, 2021               Content Version: 13.3  © 2006-2022 [employer]. Care instructions adapted under license by [facility] ([facility]). If you have questions about a medical condition or this instruction, always ask your healthcare professional. [place] [address] any warranty or liability for your use of this information. Patient Education        Learning About Meal Planning for Diabetes  Why plan your meals? Meal planning can be a key part of managing diabetes. Planning meals and snacks with the right balance of carbohydrate, protein, and fat can help you keep yourblood sugar at the target level you set with your doctor. You don't have to eat special foods. You can eat what your family eats, including sweets once in a while. But you do have to pay attention to how oftenyou eat and how much you eat of certain foods. You may want to work with a dietitian or a diabetes educator. They can give you tips and meal ideas and can answer your questions about meal planning. This health professional can also help you reach a healthy weight if that is one ofyour goals. What plan is right for you?   Your dietitian or diabetes educator may suggest that you start with the plateformat or carbohydrate counting. The plate format  The plate format is a simple way to help you manage how you eat. You plan meals by learning how much space each food should take on a plate. Using the plate format helps you manage the amount of carbohydrate you eat. It can make it easier to keep your blood sugar level within your target range. It also helpsyou see if you're eating healthy portion sizes. To use the plate format, you put non-starchy vegetables on half your plate. Add lean protein foods, such as fish, lean meats and poultry, or soy products, on one-quarter of the plate. Put a grain or starchy vegetable (such as brown rice or a potato) on the final quarter of the plate. You can add a small piece of fruit and some low-fat or fat-free milk or yogurt, depending on yourcarbohydrate goal for each meal.  Here are some tips for using the plate format:   Make sure that you are not using an oversized plate. A 9-inch plate is best. Many restaurants use larger plates.  Get used to using the plate format at home. Then you can use it when you eat out.  Write down your questions about using the plate format. Talk to your doctor, a dietitian, or a diabetes educator about your concerns. Carbohydrate counting  With carbohydrate counting, you plan meals based on the amount of carbohydrate in each food. Carbohydrate raises blood sugar higher and more quickly than any other nutrient. It is found in desserts, breads and cereals, and fruit. It's also found in starchy vegetables such as potatoes and corn, grains such as rice and pasta, and milk and yogurt. You can help keep your blood sugar levels within your target range by planning how much carbohydrate to have at meals andsnacks. The amount you need depends on several things. These include your weight, how active you are, which diabetes medicines you take, and what your goals are for your blood sugar levels.  A registered dietitian or diabetes educator can helpyou plan how much carbohydrate to include in each meal and snack. An example of a carbohydrate counting plan is:   45 to 60 grams at each meal. That's about the same as 3 to 4 carbohydrate servings.  15 to 20 grams at each snack. That's about the same as 1 carbohydrate serving. The Nutrition Facts label on packaged foods tells you how much carbohydrate is in a serving of the food. First, look at the serving size on the food label. Is that the amount you eat in a serving? All of the nutrition information on a food label is based on that serving size. So if you eat more or less than that, you'll need to adjust the other numbers. Total carbohydrate is the next thing you need to look for on the label. If you count carbohydrate servings, oneserving of carbohydrate is 15 grams. For foods that don't come with labels, such as fresh fruits and vegetables, you'll need a guide that lists carbohydrate in these foods. Ask your doctor, dietitian, or diabetes educator about books or other nutrition guides you canuse. If you take insulin, you need to know how many grams of carbohydrate are in a meal. This lets you know how much rapid-acting insulin to take before you eat. If you use an insulin pump, you get a constant rate of insulin during the day. So the pump must be programmed at meals to give you extra insulin to cover therise in blood sugar after meals. When you know how much carbohydrate you will eat, you can take the right amount of insulin. Or, if you always use the same amount of insulin, you need to [facility] that you eat the same amount of carbohydrate at meals. If you need more help to understand carbohydrate counting and food labels, askyour doctor, dietitian, or diabetes educator. How can you plan healthy meals? Here are some tips to get started:  [facility] your meals a week at a time. Don't forget to include snacks too.    Use cookbooks or online recipes to plan several main meals. Plan some quick meals for busy nights. You also can double some recipes that freeze well. Then you can save half for other busy nights when you don't have time to cook.  Make sure you have the ingredients you need for your recipes. If you're running low on basic items, put these items on your shopping list too.  List foods that you use to make breakfasts, lunches, and snacks. List plenty of fruits and vegetables.  Post this list on the refrigerator. Add to it as you think of more things you need.  Take the list to the store to do your weekly shopping. Follow-up care is a key part of your treatment and safety. Be sure to make and go to all appointments, and call your doctor if you are having problems. It's also a good idea to know your test results and keep alist of the medicines you take. Where can you learn more? Go to https://Yieldr.markedup. org and sign in to your [employer] account. Enter J132 in the [employer] box to learn more about \"Learning About Meal Planning for Diabetes. \"     If you do not have an account, please click on the \"Sign Up Now\" link. Current as of: September 8, 2021               Content Version: 13.3  © 9235-1232 Healthwise, Incorporated. Care instructions adapted under license by [facility] ([facility]). If you have questions about a medical condition or this instruction, always ask your healthcare professional. [place] [address] any warranty or liability for your use of this information. Subjective:      Patient ID: Iraj Abdi is a 75 y.o. male who presents for:  Chief Complaint   Patient presents with    Diabetes     x 3 month follow up  4/5/2022 Hemoglobin A1C % 7.8     Health Maintenance     will be due for AWV        Prostate cancer 2014. It has been years since he saw any specialist.  Remembers spacing out appts  They are doing yearly PSAs in November.     Patient denies any difficulty with diabetic diet.  Mostly cut out breads. Is taking metformin 3 times a day most of the time. Forgets the midday dose on occasion. Hemoglobin A1c down to 5.9. Denies cardiovascular symptoms. See review of systems. Current Outpatient Medications on File Prior to Visit   Medication Sig Dispense Refill    allopurinol (ZYLOPRIM) 300 MG tablet TAKE ONE TABLET BY MOUTH DAILY 90 tablet 3    metoprolol succinate (TOPROL XL) 100 MG extended release tablet TAKE ONE TABLET BY MOUTH DAILY 90 tablet 3    irbesartan (AVAPRO) 150 MG tablet TAKE ONE TABLET BY MOUTH DAILY 90 tablet 0    amLODIPine (NORVASC) 5 MG tablet Take 1 tablet by mouth daily 30 tablet 11    albuterol sulfate HFA (VENTOLIN HFA) 108 (90 Base) MCG/ACT inhaler Inhale 2 puffs into the lungs every 6 hours as needed for Wheezing 18 g 1    metFORMIN (GLUCOPHAGE) 500 MG tablet Take 1 tablet by mouth 3 times daily 90 tablet 3    fluticasone (FLONASE) 50 MCG/ACT nasal spray 2 sprays by Nasal route daily 32 g 0    Cholecalciferol (D3-1000 PO) Take by mouth      simvastatin (ZOCOR) 20 MG tablet Take 1 tablet by mouth daily 90 tablet 3    fluticasone (FLOVENT HFA) 110 MCG/ACT inhaler Inhale 2 puffs into the lungs 2 times daily 1 Inhaler 3    Spacer/Aero-Hold Chamber Bags MISC 1 applicator by Does not apply route 4 times daily 1 each 0    Blood Pressure KIT 1 applicator by Does not apply route 4 times daily 1 kit 0    Coenzyme Q10-Vitamin E (QUNOL ULTRA COQ10) 100-150 MG-UNIT CAPS       aspirin 81 MG chewable tablet Take 81 mg by mouth daily      vitamin C (ASCORBIC ACID) 500 MG tablet Take 500 mg by mouth daily      Multiple Vitamins-Minerals (MULTIVITAMIN MEN 50+ PO) Take by mouth       No current facility-administered medications on file prior to visit.      Past Medical History:   Diagnosis Date    Actinic keratoses     has had LN2 and used Efudex    Alcohol consumption of one to four drinks per day on alcohol screening     Allergic rhinitis     cats, Future     Standing Expiration Date:   7/6/2023    MICROALBUMIN, RANDOM URINE (W/O CREATININE)     Standing Status:   Future     Standing Expiration Date:   1/6/2023    PSA Screening     Standing Status:   Future     Standing Expiration Date:   7/6/2023    Lipid, Fasting     Standing Status:   Future     Standing Expiration Date:   7/6/2023    CBC with Auto Differential     Standing Status:   Future     Standing Expiration Date:   7/6/2023    TSH with Reflex     Standing Status:   Future     Standing Expiration Date:   7/6/2023    Comprehensive Metabolic Panel     Standing Status:   Future     Standing Expiration Date:   7/6/2023    Vitamin D 25 Hydroxy     Standing Status:   Future     Standing Expiration Date:   7/6/2023    POCT glycosylated hemoglobin (Hb A1C)    HM DIABETES FOOT EXAM    HM DIABETES EYE EXAM       No orders of the defined types were placed in this encounter. Medication List          Accurate as of July 6, 2022 11:37 AM. If you have any questions, ask your nurse or doctor.             CONTINUE taking these medications    albuterol sulfate  (90 Base) MCG/ACT inhaler  Commonly known as: Ventolin HFA  Inhale 2 puffs into the lungs every 6 hours as needed for Wheezing     allopurinol 300 MG tablet  Commonly known as: ZYLOPRIM  TAKE ONE TABLET BY MOUTH DAILY     amLODIPine 5 MG tablet  Commonly known as: NORVASC  Take 1 tablet by mouth daily     aspirin 81 MG chewable tablet     Blood Pressure Kit  1 applicator by Does not apply route 4 times daily     D3-1000 PO     fluticasone 110 MCG/ACT inhaler  Commonly known as: Flovent HFA  Inhale 2 puffs into the lungs 2 times daily     fluticasone 50 MCG/ACT nasal spray  Commonly known as: FLONASE  2 sprays by Nasal route daily     irbesartan 150 MG tablet  Commonly known as: AVAPRO  TAKE ONE TABLET BY MOUTH DAILY     metFORMIN 500 MG tablet  Commonly known as: GLUCOPHAGE  Take 1 tablet by mouth 3 times daily     metoprolol succinate 100 MG extended release tablet  Commonly known as: TOPROL XL  TAKE ONE TABLET BY MOUTH DAILY     MULTIVITAMIN MEN 50+ PO     Qunol Ultra CoQ10 100-150 MG-UNIT Caps  Generic drug: Coenzyme Q10-Vitamin E     simvastatin 20 MG tablet  Commonly known as: ZOCOR  Take 1 tablet by mouth daily     Spacer/Aero-Hold Chamber Bags Misc  1 applicator by Does not apply route 4 times daily     vitamin C 500 MG tablet  Commonly known as: ASCORBIC ACID              Plan:   Return in about 4 months (around 10/25/2022) for for routine major medical condition management. Patient Instructions       Patient Education        Noninsulin Medicines for Type 2 Diabetes: Care Instructions  Overview     There are different types of noninsulin medicines for diabetes. Each works in a different way. But they all help you control your blood sugar. Some types help your body make insulin to lower your blood sugar. Others lower how much insulin your body needs. Some can slow how fast your body digests sugars. And some canremove extra glucose through your urine. You may need to take more than one medicine for diabetes. Two or more medicinesmay work better to lower your blood sugar level than just one does.  Metformin. This lowers how much glucose your liver makes. And it helps you respond better to insulin. It also lowers the amount of stored sugar that your liver releases when you are not eating.  Sulfonylureas. These help your body release more insulin. Some work for many hours. They can cause low blood sugar if you don't eat as you planned. An example is glipizide.  Thiazolidinediones. These reduce the amount of blood glucose. They also help you respond better to insulin. An example is pioglitazone.  SGLT2 inhibitors. These help to remove extra glucose through your urine. They may also help some people lose weight. An example is ertugliflozin.  DPP-4 inhibitors. These help your body raise the level of insulin after you eat.  They also help your body make less of a hormone that raises blood sugar. An example is alogliptin.  GLP-1 receptor agonists. These help your body make a protein that can raise your insulin level and make you less hungry. They're given as shots or pills. An example is semaglutide.  Meglitinides. These help your body release insulin. They also help slow how your body digests sugars. So they can keep your blood sugar from rising too fast after you eat.  Alpha-glucosidase inhibitors. These keep starches from breaking down. This means that they lower the amount of glucose absorbed when you eat. They don't help your body make more insulin. So they will not cause low blood sugar unless you use them with other medicines for diabetes. Follow-up care is a key part of your treatment and safety. Be sure to make and go to all appointments, and call your doctor if you are having problems. It's also a good idea to know your test results and keep alist of the medicines you take. How can you care for yourself at home?  Eat a healthy diet. Get some exercise each day. This may help you to reduce how much medicine you need.  Do not take other prescription or over-the-counter medicines, vitamins, herbal products, or supplements without talking to your doctor first. Some medicines for type 2 diabetes can cause problems with other medicines or supplements.  Tell your doctor if you plan to get pregnant. Some of these drugs are not safe for pregnant women.  Be safe with medicines. Take your medicines exactly as prescribed. Meglitinides and sulfonylureas can cause your blood sugar to drop very low. Call your doctor if you think you are having a problem with your medicine.  Check your blood sugar often. You can use a glucose monitor. Keeping track can help you know how certain foods, activities, and medicines affect your blood sugar. And it can help you keep your blood sugar from getting so low that it's not safe.   When should you call for help? Call 911 anytime you think you may need emergency care. For example, call if:     You passed out (lost consciousness).      You are confused or cannot think clearly.      Your blood sugar is very high or very low. Watch closely for changes in your health, and be sure to contact your doctor if:     Your blood sugar stays outside the level your doctor set for you.      You have any problems. Where can you learn more? Go to https://Egnytepepiceweb.[email or web address]. org and sign in to your [employer] account. Enter H153 in the [employer] box to learn more about \"Noninsulin Medicines for Type 2 Diabetes: Care Instructions. \"     If you do not have an account, please click on the \"Sign Up Now\" link. Current as of: July 28, 2021               Content Version: 13.3  © 2006-2022 Healthwise, [employer] Care instructions adapted under license by [facility] ([facility]). If you have questions about a medical condition or this instruction, always ask your healthcare professional. [place] [address] any warranty or liability for your use of this information. Patient Education        Learning About Meal Planning for Diabetes  Why plan your meals? Meal planning can be a key part of managing diabetes. Planning meals and snacks with the right balance of carbohydrate, protein, and fat can help you keep yourblood sugar at the target level you set with your doctor. You don't have to eat special foods. You can eat what your family eats, including sweets once in a while. But you do have to pay attention to how oftenyou eat and how much you eat of certain foods. You may want to work with a dietitian or a diabetes educator. They can give you tips and meal ideas and can answer your questions about meal planning. This health professional can also help you reach a healthy weight if that is one ofyour goals. What plan is right for you?   Your dietitian or diabetes educator may suggest that you start with the plateformat or carbohydrate counting. The plate format  The plate format is a simple way to help you manage how you eat. You plan meals by learning how much space each food should take on a plate. Using the plate format helps you manage the amount of carbohydrate you eat. It can make it easier to keep your blood sugar level within your target range. It also helpsyou see if you're eating healthy portion sizes. To use the plate format, you put non-starchy vegetables on half your plate. Add lean protein foods, such as fish, lean meats and poultry, or soy products, on one-quarter of the plate. Put a grain or starchy vegetable (such as brown rice or a potato) on the final quarter of the plate. You can add a small piece of fruit and some low-fat or fat-free milk or yogurt, depending on yourcarbohydrate goal for each meal.  Here are some tips for using the plate format:   Make sure that you are not using an oversized plate. A 9-inch plate is best. Many restaurants use larger plates.  Get used to using the plate format at home. Then you can use it when you eat out.  Write down your questions about using the plate format. Talk to your doctor, a dietitian, or a diabetes educator about your concerns. Carbohydrate counting  With carbohydrate counting, you plan meals based on the amount of carbohydrate in each food. Carbohydrate raises blood sugar higher and more quickly than any other nutrient. It is found in desserts, breads and cereals, and fruit. It's also found in starchy vegetables such as potatoes and corn, grains such as rice and pasta, and milk and yogurt. You can help keep your blood sugar levels within your target range by planning how much carbohydrate to have at meals andsnacks. The amount you need depends on several things. These include your weight, how active you are, which diabetes medicines you take, and what your goals are for your blood sugar levels.  A registered dietitian or diabetes educator can helpyou plan how much carbohydrate to include in each meal and snack. An example of a carbohydrate counting plan is:   45 to 60 grams at each meal. That's about the same as 3 to 4 carbohydrate servings.  15 to 20 grams at each snack. That's about the same as 1 carbohydrate serving. The Nutrition Facts label on packaged foods tells you how much carbohydrate is in a serving of the food. First, look at the serving size on the food label. Is that the amount you eat in a serving? All of the nutrition information on a food label is based on that serving size. So if you eat more or less than that, you'll need to adjust the other numbers. Total carbohydrate is the next thing you need to look for on the label. If you count carbohydrate servings, oneserving of carbohydrate is 15 grams. For foods that don't come with labels, such as fresh fruits and vegetables, you'll need a guide that lists carbohydrate in these foods. Ask your doctor, dietitian, or diabetes educator about books or other nutrition guides you canuse. If you take insulin, you need to know how many grams of carbohydrate are in a meal. This lets you know how much rapid-acting insulin to take before you eat. If you use an insulin pump, you get a constant rate of insulin during the day. So the pump must be programmed at meals to give you extra insulin to cover therise in blood sugar after meals. When you know how much carbohydrate you will eat, you can take the right amount of insulin. Or, if you always use the same amount of insulin, you need to [facility] that you eat the same amount of carbohydrate at meals. If you need more help to understand carbohydrate counting and food labels, askyour doctor, dietitian, or diabetes educator. How can you plan healthy meals? Here are some tips to get started:  [facility] your meals a week at a time. Don't forget to include snacks too.  Use cookbooks or online recipes to plan several main meals. Plan some quick meals for busy nights. You also can double some recipes that freeze well. Then you can save half for other busy nights when you don't have time to cook.  Make sure you have the ingredients you need for your recipes. If you're running low on basic items, put these items on your shopping list too.  List foods that you use to make breakfasts, lunches, and snacks. List plenty of fruits and vegetables.  Post this list on the refrigerator. Add to it as you think of more things you need.  Take the list to the store to do your weekly shopping. Follow-up care is a key part of your treatment and safety. Be sure to make and go to all appointments, and call your doctor if you are having problems. It's also a good idea to know your test results and keep alist of the medicines you take. Where can you learn more? Go to https://iStreamPlanetpeAbazabeweb.Iterable. org and sign in to your [employer] account. Enter J499 in the TagSeats box to learn more about \"Learning About Meal Planning for Diabetes. \"     If you do not have an account, please click on the \"Sign Up Now\" link. Current as of: September 8, 2021               Content Version: 13.3  © 6115-8519 Healthwise, Incorporated. Care instructions adapted under license by [facility] ([facility]). If you have questions about a medical condition or this instruction, always ask your healthcare professional. [place] [address] any warranty or liability for your use of this information. This note was partially created with the assistance of dictation. This may lead to grammatical or spelling errors. Mukesh Muñiz M.D.

## 2022-07-06 NOTE — PROGRESS NOTES
1. Type 2 diabetes mellitus without complication, without long-term current use of insulin (HCC)  POCT glycosylated hemoglobin (Hb A1C)   2. Actinic keratoses  84309 - [place] DESTRUC PREMALIGNANT,15+ LESIONS         Return in about 3 months (around 7/5/2022) for POCT HbA1c.

## 2022-07-06 NOTE — PATIENT INSTRUCTIONS
Continue with carb counting. Start working on the fall plan for diet and activity to keep weight under control. Labs ordered for his yearly labs which are due in the fall. Patient Education        Noninsulin Medicines for Type 2 Diabetes: Care Instructions  Overview     There are different types of noninsulin medicines for diabetes. Each works in a different way. But they all help you control your blood sugar. Some types help your body make insulin to lower your blood sugar. Others lower how much insulin your body needs. Some can slow how fast your body digests sugars. And some canremove extra glucose through your urine. You may need to take more than one medicine for diabetes. Two or more medicinesmay work better to lower your blood sugar level than just one does.  Metformin. This lowers how much glucose your liver makes. And it helps you respond better to insulin. It also lowers the amount of stored sugar that your liver releases when you are not eating.  Sulfonylureas. These help your body release more insulin. Some work for many hours. They can cause low blood sugar if you don't eat as you planned. An example is glipizide.  Thiazolidinediones. These reduce the amount of blood glucose. They also help you respond better to insulin. An example is pioglitazone.  SGLT2 inhibitors. These help to remove extra glucose through your urine. They may also help some people lose weight. An example is ertugliflozin.  DPP-4 inhibitors. These help your body raise the level of insulin after you eat. They also help your body make less of a hormone that raises blood sugar. An example is alogliptin.  GLP-1 receptor agonists. These help your body make a protein that can raise your insulin level and make you less hungry. They're given as shots or pills. An example is semaglutide.  Meglitinides. These help your body release insulin. They also help slow how your body digests sugars.  So they can keep your blood sugar from rising too fast after you eat.  Alpha-glucosidase inhibitors. These keep starches from breaking down. This means that they lower the amount of glucose absorbed when you eat. They don't help your body make more insulin. So they will not cause low blood sugar unless you use them with other medicines for diabetes. Follow-up care is a key part of your treatment and safety. Be sure to make and go to all appointments, and call your doctor if you are having problems. It's also a good idea to know your test results and keep alist of the medicines you take. How can you care for yourself at home?  Eat a healthy diet. Get some exercise each day. This may help you to reduce how much medicine you need.  Do not take other prescription or over-the-counter medicines, vitamins, herbal products, or supplements without talking to your doctor first. Some medicines for type 2 diabetes can cause problems with other medicines or supplements.  Tell your doctor if you plan to get pregnant. Some of these drugs are not safe for pregnant women.  Be safe with medicines. Take your medicines exactly as prescribed. Meglitinides and sulfonylureas can cause your blood sugar to drop very low. Call your doctor if you think you are having a problem with your medicine.  Check your blood sugar often. You can use a glucose monitor. Keeping track can help you know how certain foods, activities, and medicines affect your blood sugar. And it can help you keep your blood sugar from getting so low that it's not safe. When should you call for help? Call 911 anytime you think you may need emergency care. For example, call if:     You passed out (lost consciousness).      You are confused or cannot think clearly.      Your blood sugar is very high or very low.    Watch closely for changes in your health, and be sure to contact your doctor if:     Your blood sugar stays outside the level your doctor set for you.      You have any problems. Where can you learn more? Go to https://chpepiceweb.Event [email or web address] org and sign in to your [email or web address] account. Enter H153 in the Minterahire box to learn more about \"Noninsulin Medicines for Type 2 Diabetes: Care Instructions. \"     If you do not have an account, please click on the \"Sign Up Now\" link. Current as of: July 28, 2021               Content Version: 13.3  © 2006-2022 Healthwise, Ext[employer]. Care instructions adapted under license by [facility] ([facility]). If you have questions about a medical condition or this instruction, always ask your healthcare professional. [address] any warranty or liability for your use of this information. Patient Education        Learning About Meal Planning for Diabetes  Why plan your meals? Meal planning can be a key part of managing diabetes. Planning meals and snacks with the right balance of carbohydrate, protein, and fat can help you keep yourblood sugar at the target level you set with your doctor. You don't have to eat special foods. You can eat what your family eats, including sweets once in a while. But you do have to pay attention to how oftenyou eat and how much you eat of certain foods. You may want to work with a dietitian or a diabetes educator. They can give you tips and meal ideas and can answer your questions about meal planning. This health professional can also help you reach a healthy weight if that is one ofyour goals. What plan is right for you? Your dietitian or diabetes educator may suggest that you start with the plateformat or carbohydrate counting. The plate format  The plate format is a simple way to help you manage how you eat. You plan meals by learning how much space each food should take on a plate. Using the plate format helps you manage the amount of carbohydrate you eat. It can make it easier to keep your blood sugar level within your target range.  It also helpsyou see if you're eating healthy portion sizes. To use the plate format, you put non-starchy vegetables on half your plate. Add lean protein foods, such as fish, lean meats and poultry, or soy products, on one-quarter of the plate. Put a grain or starchy vegetable (such as brown rice or a potato) on the final quarter of the plate. You can add a small piece of fruit and some low-fat or fat-free milk or yogurt, depending on yourcarbohydrate goal for each meal.  Here are some tips for using the plate format:   Make sure that you are not using an oversized plate. A 9-inch plate is best. Many restaurants use larger plates.  Get used to using the plate format at home. Then you can use it when you eat out.  Write down your questions about using the plate format. Talk to your doctor, a dietitian, or a diabetes educator about your concerns. Carbohydrate counting  With carbohydrate counting, you plan meals based on the amount of carbohydrate in each food. Carbohydrate raises blood sugar higher and more quickly than any other nutrient. It is found in desserts, breads and cereals, and fruit. It's also found in starchy vegetables such as potatoes and corn, grains such as rice and pasta, and milk and yogurt. You can help keep your blood sugar levels within your target range by planning how much carbohydrate to have at meals andsnacks. The amount you need depends on several things. These include your weight, how active you are, which diabetes medicines you take, and what your goals are for your blood sugar levels. A registered dietitian or diabetes educator can helpyou plan how much carbohydrate to include in each meal and snack. An example of a carbohydrate counting plan is:   45 to 60 grams at each meal. That's about the same as 3 to 4 carbohydrate servings.  15 to 20 grams at each snack. That's about the same as 1 carbohydrate serving.   The Nutrition Facts label on packaged foods tells you how much carbohydrate is in a serving of the

## 2022-07-26 DIAGNOSIS — E11.65 UNCONTROLLED TYPE 2 DIABETES MELLITUS WITH HYPERGLYCEMIA (HCC): ICD-10-CM

## 2022-07-26 DIAGNOSIS — E11.9 TYPE 2 DIABETES MELLITUS WITHOUT COMPLICATION, WITHOUT LONG-TERM CURRENT USE OF INSULIN (HCC): ICD-10-CM

## 2022-08-08 DIAGNOSIS — E78.2 MIXED HYPERLIPIDEMIA: ICD-10-CM

## 2022-08-08 RX ORDER — SIMVASTATIN 20 MG
TABLET ORAL
Qty: 90 TABLET | Refills: 0 | Status: SHIPPED | OUTPATIENT
Start: 2022-08-08

## 2022-08-08 NOTE — TELEPHONE ENCOUNTER
1201 Dr. Pa told patient that he could go downstairs to meet his mother. Patient is fully dressed with coat and shoes, still has PIV and has not been discharged. Discharge orders will be placed now to d/c patient when he returns to the floor.    [phone] - F [phone]              Medication Refill  (Newest Message First)  LavonMelania hassanscrijaspreet In routed conversation to Elbert Wick Pcp Clinical Staff 45 minutes ago (11:39 AM)                  Future Appointments    Encounter Information    Provider Department Appt Notes   10/25/2022 Blank Emery MD [facility] Return in about 4 months (around 10/25/2022) for for routine major medica       Past Visits    Date Provider Specialty Visit Type Primary Dx   07/06/2022 Blank Emery MD Family Medicine Office Visit Type 2 diabetes mellitus without complication, without long-term current use of insulin ([address]   04/05/2022 Blank Emery MD Family Medicine Office Visit Type 2 diabetes mellitus without complication, without long-term current use of insulin ([address]   03/07/2022 Blank Emery MD Family Medicine Office Visit Type 2 diabetes mellitus without complication, without long-term current use of insulin ([address]   12/27/2021 Blank Emery MD Family Medicine Office Visit Cough   12/07/2021 Blank Emery MD Family Medicine Office Visit Persistent cough

## 2022-08-22 DIAGNOSIS — E78.2 MIXED HYPERLIPIDEMIA: ICD-10-CM

## 2022-08-22 DIAGNOSIS — I10 ESSENTIAL HYPERTENSION: ICD-10-CM

## 2022-08-23 RX ORDER — SIMVASTATIN 20 MG
20 TABLET ORAL NIGHTLY
Qty: 90 TABLET | Refills: 1 | OUTPATIENT
Start: 2022-08-23

## 2022-08-23 RX ORDER — IRBESARTAN 150 MG/1
TABLET ORAL
Qty: 90 TABLET | Refills: 3 | Status: SHIPPED | OUTPATIENT
Start: 2022-08-23

## 2022-08-23 NOTE — TELEPHONE ENCOUNTER
Provider Department Appt Notes   10/25/2022 Gila Dickerson MD [facility] Primary Care Return in about 4 months (around 10/25/2022) for for routine major medica

## 2022-08-23 NOTE — TELEPHONE ENCOUNTER
Provider Department Appt Notes   10/25/2022 Luis Brizuela MD [facility] Primary Care Return in about 4 months (around 10/25/2022) for for routine major medica

## 2022-08-24 DIAGNOSIS — E11.65 UNCONTROLLED TYPE 2 DIABETES MELLITUS WITH HYPERGLYCEMIA (HCC): ICD-10-CM

## 2022-08-24 DIAGNOSIS — J45.30 MILD PERSISTENT ASTHMA WITHOUT COMPLICATION: ICD-10-CM

## 2022-08-24 DIAGNOSIS — E11.9 TYPE 2 DIABETES MELLITUS WITHOUT COMPLICATION, WITHOUT LONG-TERM CURRENT USE OF INSULIN (HCC): ICD-10-CM

## 2022-08-24 DIAGNOSIS — J45.41 MODERATE PERSISTENT ASTHMA WITH ACUTE EXACERBATION: ICD-10-CM

## 2022-08-24 RX ORDER — FLUTICASONE PROPIONATE 110 UG/1
2 AEROSOL, METERED RESPIRATORY (INHALATION) 2 TIMES DAILY
Qty: 1 EACH | Refills: 3 | Status: CANCELLED | OUTPATIENT
Start: 2022-08-24 | End: 2023-08-24

## 2022-08-24 NOTE — TELEPHONE ENCOUNTER
Future Appointments    Encounter Information    Provider Department Appt Notes   10/25/2022 Rose Acuña MD [facility] Return in about 4 months (around 10/25/2022) for for routine major medica     Past Visits    Date Provider Specialty Visit Type Primary Dx   07/06/2022 Rose Acuña MD Family Medicine Office Visit Type 2 diabetes mellitus without complication, without long-term current use of insulin ([address]   04/05/2022 Rose Acuña MD Family Medicine Office Visit Type 2 diabetes mellitus without complication, without long-term current use of insulin ([address]   03/07/2022 Rose Acuña MD Family Medicine Office Visit Type 2 diabetes mellitus without complication, without long-term current use of insulin ([address]

## 2022-08-25 ENCOUNTER — PATIENT MESSAGE (OUTPATIENT)
Dept: FAMILY MEDICINE CLINIC | Age: 67
End: 2022-08-25

## 2022-08-25 DIAGNOSIS — J45.41 MODERATE PERSISTENT ASTHMA WITH ACUTE EXACERBATION: ICD-10-CM

## 2022-08-25 RX ORDER — ALBUTEROL SULFATE 90 UG/1
2 AEROSOL, METERED RESPIRATORY (INHALATION) EVERY 6 HOURS PRN
Qty: 18 G | Refills: 1 | Status: SHIPPED | OUTPATIENT
Start: 2022-08-25

## 2022-08-25 RX ORDER — FLUTICASONE PROPIONATE 110 UG/1
2 AEROSOL, METERED RESPIRATORY (INHALATION) 2 TIMES DAILY
Qty: 1 EACH | Refills: 5 | Status: SHIPPED | OUTPATIENT
Start: 2022-08-25 | End: 2023-08-25

## 2022-10-05 ENCOUNTER — OFFICE VISIT (OUTPATIENT)
Dept: FAMILY MEDICINE CLINIC | Age: 67
End: 2022-10-05
Payer: MEDICARE

## 2022-10-05 VITALS
BODY MASS INDEX: 30.88 KG/M2 | WEIGHT: 233 LBS | DIASTOLIC BLOOD PRESSURE: 84 MMHG | SYSTOLIC BLOOD PRESSURE: 130 MMHG | TEMPERATURE: 99.6 F | HEART RATE: 84 BPM | HEIGHT: 73 IN | OXYGEN SATURATION: 96 %

## 2022-10-05 DIAGNOSIS — U07.1 COVID-19: Primary | ICD-10-CM

## 2022-10-05 LAB
Lab: ABNORMAL
PERFORMING INSTRUMENT: ABNORMAL
QC PASS/FAIL: ABNORMAL
SARS-COV-2, POC: DETECTED

## 2022-10-05 PROCEDURE — G8427 DOCREV CUR MEDS BY ELIG CLIN: HCPCS

## 2022-10-05 PROCEDURE — G8484 FLU IMMUNIZE NO ADMIN: HCPCS

## 2022-10-05 PROCEDURE — 99213 OFFICE O/P EST LOW 20 MIN: CPT

## 2022-10-05 PROCEDURE — 3017F COLORECTAL CA SCREEN DOC REV: CPT

## 2022-10-05 PROCEDURE — 1036F TOBACCO NON-USER: CPT

## 2022-10-05 PROCEDURE — G8417 CALC BMI ABV UP PARAM F/U: HCPCS

## 2022-10-05 PROCEDURE — 87426 SARSCOV CORONAVIRUS AG IA: CPT

## 2022-10-05 PROCEDURE — 1123F ACP DISCUSS/DSCN MKR DOCD: CPT

## 2022-10-05 NOTE — PATIENT INSTRUCTIONS
If you decide to take antiviral, discontinue simvastatin 12 hours before beginning antiviral medication. You may continue it 12 hours after your last dose of antiviral medication.      - OTC tylenol/motrin for pain/fevers  - OTC mucinex for congestion  - Increase fluids + rest  - Go to ED if you develop severe shortness of breath or chest pains     Learning About Coronavirus (COVID-19)  What is coronavirus (COVID-19)? COVID-19 is a disease caused by a type of coronavirus. This illness was firstfound in December 2019. It has since spread worldwide. Coronaviruses are a large group of viruses. They cause the common cold. They also cause more serious illnesses like [place] respiratory syndrome (MERS) and severe acute respiratory syndrome (SARS). COVID-19 is caused by a novelcoronavirus. That means it's a new type that has not been seen in people before. What are the symptoms? COVID-19 symptoms may include:  Fever. Cough. Trouble breathing. Chills or repeated shaking with chills. Muscle and body aches. Headache. Sore throat. New loss of taste or smell. Vomiting. Diarrhea. In severe cases, COVID-19 can cause pneumonia and make it hard to breathewithout help from a machine. It can cause death. How is it diagnosed? COVID-19 is diagnosed with a viral test. This may also be called a PCR test or antigen test. It looks for evidence of the virus in your breathing passages orlungs (respiratory system). The test is most often done on a sample from the nose, throat, or lungs. It's sometimes done on a sample of saliva. One way a sample is collected is byputting a long swab into the back of your nose. If you have questions about COVID-19 testing, ask your doctor or go to cdc.govto use the COVID-19 Viral Testing Tool. How is it treated? Mild cases of COVID-19 can be treated at home.  Serious cases need treatment in the hospital. Treatment may include medicines, plus breathing support such as oxygen therapy or a ventilator. Some people may be placed on their belly tohelp their oxygen levels. Treatments that may help people who have COVID-19 include:  Antiviral medicines. These medicines treat viral infections. Immune-based therapy. These medicines help the immune system fight COVID-19. Examples include monoclonal antibodies. Blood thinners. These medicines help prevent blood clots. People with severe illness are at risk for blood clots. How can you protect yourself and others? Stay up to date on your COVID-19 vaccines. Avoid sick people, and stay away from others if you are sick. Stay at least 6 feet away from other people. Avoid crowds, especially inside. Get tested for COVID-19 before you have an indoor visit with people who don't live with you. Improve the airflow when you spend time indoors with people who don't live with you. If you can, open windows and doors. Or you can use a fan to blow air away from people and out a window. Cover your mouth with a tissue when you cough or sneeze. Wash your hands often, especially after you cough or sneeze. Use soap and water, and scrub for at least 20 seconds. If soap and water aren't available, use an alcohol-based hand . Avoid touching your mouth, nose, and eyes. Check the CDC website at cdc.gov for the most current information on how to protect yourself. And if you have questions, ask your doctor or go to cdc.govto use the COVID-19 Quarantine and Isolation Calculator. Here are some other steps you may need to take. If you are not up to date on your COVID-19 vaccines:  Wear a mask with the best fit, protection, and comfort for you. A mask can protect you even when others aren't wearing one. This might be especially important if you:  Have certain health conditions. Live with someone who has a compromised immune system. Live with someone who is not up to date on their COVID-19 vaccines.   If you have been exposed to the virus AND are not up to date on your COVID-19 vaccines:  Talk to your doctor as soon as you can. Your doctor might have you take medicine to help prevent serious illness. Get a COVID-19 test. You may need to be tested more than once. And if your test is positive, follow the instructions below. Stay home. Try to separate from other people where you live. Don't go to school, work, or public areas. Wear a well-fitting mask around other people for a full 10 days. Avoid travel, and stay away from people at high risk for serious illness. Watch for symptoms. If you have been exposed AND either tested positive for COVID-19 in the last 90 days and have recovered or you are up to date on your COVID-19 vaccines:  Talk to your doctor as soon as you can. Your doctor may have you take medicine to help prevent serious illness. Get a COVID-19 test. Wait 5 days after you were last exposed. You may need to be tested more than once. And if your test is positive, follow the instructions below. Wear a well-fitting mask around other people for a full 10 days. Avoid travel and stay away from people at high risk for serious illness. Watch for symptoms. If you tested positive for COVID-19 in the last 90 days and have not recovered, another COVID-19 test may not be needed. If you're sick or test positive for COVID-19:  Talk to your doctor as soon as you can. Your doctor may have you take medicine to help prevent serious illness. Get a COVID-19 test unless you have already been tested. You may need to be tested more than once. Stay home. Leave only if you need to get medical care. If you were seriously ill or if you have a weakened immune system, you may need to isolate for several weeks. For a full 10 days, wear a well-fitting mask whenever you're around other people. Avoid travel and stay away from people at high risk for serious illness. Limit contact with pets and people in your home.  If possible, stay in a separate bedroom and use a separate bathroom. Clean and disinfect your home every day. Use household  and disinfectant wipes or sprays. Take special care to clean things that you touch with your hands. How can you self-isolate when you have COVID-19? If you have COVID-19, there are things you can do to help avoid spreading thevirus to others. Stay home, and avoid contact with other people. Limit contact with people in your home. If possible, stay in a separate bedroom and use a separate bathroom. Wear a well-fitting mask when you are around other people. Avoid contact with pets and other animals. Cover your mouth and nose with a tissue when you cough or sneeze. Then throw it in the trash right away. Wash your hands often, especially after you cough or sneeze. Use soap and water, and scrub for at least 20 seconds. If soap and water aren't available, use an alcohol-based hand . Don't share personal household items. These include bedding, towels, cups and glasses, and eating utensils. [address] in the warmest water allowed for the fabric type, and dry it completely. It's okay to wash other people's laundry with yours. Clean and disinfect your home. Use household  and disinfectant wipes or sprays. If you have questions, visit cdc.gov to check the Quarantine and IsolationCalculator. When should you call for help? Call 911 anytime you think you may need emergency care. For example, call if you have life-threatening symptoms, such as: You have severe trouble breathing. (You can't talk at all.)     You have constant chest pain or pressure. You are severely dizzy or lightheaded. You are confused or can't think clearly. You have pale, gray, or blue-colored skin or lips. You pass out (lose consciousness) or are very hard to wake up. You have loss of balance or trouble walking. You have trouble seeing out of one or both eyes. You have weakness or drooping on one side of the face.      You have weakness or numbness in an arm or a leg. You have trouble speaking. You have a severe headache. You have a seizure. Call your doctor now or seek immediate medical care if:    You have moderate trouble breathing. (You can't speak a full sentence.)     You are coughing up blood. You have signs of low blood pressure. These include feeling lightheaded; being too weak to stand; and having cold, pale, clammy skin. Watch closely for changes in your health, and be sure to contact your doctor if:    Your symptoms get worse. You are not getting better as expected. You have new or worse symptoms of anxiety, depression, nightmares, or flashbacks. Call before you go to the doctor's office. Follow their instructions. And wear a mask. Where can you learn more? Go to [email or web address]://[email or web address] [email or web address].[email or web address] org and sign in to your [employer] account. Enter C008 in the [employer] box to learn more about \"Learning About Coronavirus (COVID-19). \"     If you do not have an account, please click on the \"Sign Up Now\" link. Current as of: May 28, 2022               Content Version: 13.3  © 3236-2813 Healthwise, [employer]. Care instructions adapted under license by [facility] ([facility]). If you have questions about a medical condition or this instruction, always ask your healthcare professional. Patrice Edwards disclaims any warranty or liability for your use of this information. - OTC tylenol/motrin for pain/fevers  - OTC mucinex for congestion  - Increase fluids + rest  - Go to ED if you develop severe shortness of breath or chest pains     Learning About Coronavirus (COVID-19)  What is coronavirus (COVID-19)? COVID-19 is a disease caused by a type of coronavirus. This illness was firstfound in December 2019. It has since spread worldwide. Coronaviruses are a large group of viruses. They cause the common cold.  They also cause more serious illnesses like [place] respiratory syndrome (MERS) and severe acute respiratory syndrome (SARS). COVID-19 is caused by a novelcoronavirus. That means it's a new type that has not been seen in people before. What are the symptoms? COVID-19 symptoms may include:  Fever. Cough. Trouble breathing. Chills or repeated shaking with chills. Muscle and body aches. Headache. Sore throat. New loss of taste or smell. Vomiting. Diarrhea. In severe cases, COVID-19 can cause pneumonia and make it hard to breathewithout help from a machine. It can cause death. How is it diagnosed? COVID-19 is diagnosed with a viral test. This may also be called a PCR test or antigen test. It looks for evidence of the virus in your breathing passages orlungs (respiratory system). The test is most often done on a sample from the nose, throat, or lungs. It's sometimes done on a sample of saliva. One way a sample is collected is byputting a long swab into the back of your nose. If you have questions about COVID-19 testing, ask your doctor or go to cdc.govto use the COVID-19 Viral Testing Tool. How is it treated? Mild cases of COVID-19 can be treated at home. Serious cases need treatment in the hospital. Treatment may include medicines, plus breathing support such as oxygen therapy or a ventilator. Some people may be placed on their belly tohelp their oxygen levels. Treatments that may help people who have COVID-19 include:  Antiviral medicines. These medicines treat viral infections. Immune-based therapy. These medicines help the immune system fight COVID-19. Examples include monoclonal antibodies. Blood thinners. These medicines help prevent blood clots. People with severe illness are at risk for blood clots. How can you protect yourself and others? Stay up to date on your COVID-19 vaccines. Avoid sick people, and stay away from others if you are sick. Stay at least 6 feet away from other people. Avoid crowds, especially inside.   Get tested for COVID-23 before you have an indoor visit with people who don't live with you. Improve the airflow when you spend time indoors with people who don't live with you. If you can, open windows and doors. Or you can use a fan to blow air away from people and out a window. Cover your mouth with a tissue when you cough or sneeze. Wash your hands often, especially after you cough or sneeze. Use soap and water, and scrub for at least 20 seconds. If soap and water aren't available, use an alcohol-based hand . Avoid touching your mouth, nose, and eyes. Check the CDC website at cdc.gov for the most current information on how to protect yourself. And if you have questions, ask your doctor or go to cdc.govto use the COVID-19 Quarantine and Isolation Calculator. Here are some other steps you may need to take. If you are not up to date on your COVID-19 vaccines:  Wear a mask with the best fit, protection, and comfort for you. A mask can protect you even when others aren't wearing one. This might be especially important if you:  Have certain health conditions. Live with someone who has a compromised immune system. Live with someone who is not up to date on their COVID-19 vaccines. If you have been exposed to the virus AND are not up to date on your COVID-19 vaccines:  Talk to your doctor as soon as you can. Your doctor might have you take medicine to help prevent serious illness. Get a COVID-19 test. You may need to be tested more than once. And if your test is positive, follow the instructions below. Stay home. Try to separate from other people where you live. Don't go to school, work, or public areas. Wear a well-fitting mask around other people for a full 10 days. Avoid travel, and stay away from people at high risk for serious illness. Watch for symptoms.   If you have been exposed AND either tested positive for COVID-19 in the last 90 days and have recovered or you are up to date on your COVID-19 vaccines:  Talk to your doctor as soon as you can. Your doctor may have you take medicine to help prevent serious illness. Get a COVID-19 test. Wait 5 days after you were last exposed. You may need to be tested more than once. And if your test is positive, follow the instructions below. Wear a well-fitting mask around other people for a full 10 days. Avoid travel and stay away from people at high risk for serious illness. Watch for symptoms. If you tested positive for COVID-19 in the last 90 days and have not recovered, another COVID-19 test may not be needed. If you're sick or test positive for COVID-19:  Talk to your doctor as soon as you can. Your doctor may have you take medicine to help prevent serious illness. Get a COVID-19 test unless you have already been tested. You may need to be tested more than once. Stay home. Leave only if you need to get medical care. If you were seriously ill or if you have a weakened immune system, you may need to isolate for several weeks. For a full 10 days, wear a well-fitting mask whenever you're around other people. Avoid travel and stay away from people at high risk for serious illness. Limit contact with pets and people in your home. If possible, stay in a separate bedroom and use a separate bathroom. Clean and disinfect your home every day. Use household  and disinfectant wipes or sprays. Take special care to clean things that you touch with your hands. How can you self-isolate when you have COVID-19? If you have COVID-19, there are things you can do to help avoid spreading thevirus to others. Stay home, and avoid contact with other people. Limit contact with people in your home. If possible, stay in a separate bedroom and use a separate bathroom. Wear a well-fitting mask when you are around other people. Avoid contact with pets and other animals. Cover your mouth and nose with a tissue when you cough or sneeze. Then throw it in the trash right away.   Wash your hands often, especially after you cough or sneeze. Use soap and water, and scrub for at least 20 seconds. If soap and water aren't available, use an alcohol-based hand . Don't share personal household items. These include bedding, towels, cups and glasses, and eating utensils. [address] in the warmest water allowed for the fabric type, and dry it completely. It's okay to wash other people's laundry with yours. Clean and disinfect your home. Use household  and disinfectant wipes or sprays. If you have questions, visit cdc.gov to check the Quarantine and IsolationCalculator. When should you call for help? Call 911 anytime you think you may need emergency care. For example, call if you have life-threatening symptoms, such as: You have severe trouble breathing. (You can't talk at all.)     You have constant chest pain or pressure. You are severely dizzy or lightheaded. You are confused or can't think clearly. You have pale, gray, or blue-colored skin or lips. You pass out (lose consciousness) or are very hard to wake up. You have loss of balance or trouble walking. You have trouble seeing out of one or both eyes. You have weakness or drooping on one side of the face. You have weakness or numbness in an arm or a leg. You have trouble speaking. You have a severe headache. You have a seizure. Call your doctor now or seek immediate medical care if:    You have moderate trouble breathing. (You can't speak a full sentence.)     You are coughing up blood. You have signs of low blood pressure. These include feeling lightheaded; being too weak to stand; and having cold, pale, clammy skin. Watch closely for changes in your health, and be sure to contact your doctor if:    Your symptoms get worse. You are not getting better as expected. You have new or worse symptoms of anxiety, depression, nightmares, or flashbacks.    Call before you go to the doctor's office. Follow their instructions. And wear a mask. Where can you learn more? Go to [email or web address]://[email or web address] [email or web address]. org and sign in to your [employer] account. Enter C008 in the [facility] box to learn more about \"Learning About Coronavirus (COVID-19). \"     If you do not have an account, please click on the \"Sign Up Now\" link. Current as of: May 28, 2022               Content Version: 13.3  © 1808-1100 Healthwise, [employer]. Care instructions adapted under license by [facility] ([facility]). If you have questions about a medical condition or this instruction, always ask your healthcare professional. [address] any warranty or liability for your use of this information.

## 2022-10-05 NOTE — PROGRESS NOTES
[facility] Encounter    SUBJECTIVE    CHIEF COMPLAINT:   Chief Complaint   Patient presents with    Positive For Covid-19     Pt tested at home and is positive for covid. He had symptoms start Monday. He tried taking cough medicine and some acetaminophen. He has 102 deg and 101 deg fevers yesterday and today. HPI:  Haily Messina is a 75 y.o. male who presents to the walk-in clinic today for:     COVID +, tested positive at home yesterday and today. Has scant clear productive cough, fever 101 yesterday, clear rhinorrhea. Symptom onset 2 days ago. Received first two vaccines. Girlfriend at home positive. Denies sore throat, ear pain, sob. Took tylenol at home with improvement.     Past Medical History:   Diagnosis Date    Actinic keratoses     has had LN2 and used Efudex    Alcohol consumption of one to four drinks per day on alcohol screening     Allergic rhinitis     cats, weeds, grasses, ragweed    Asthma     Bilateral knee pain     Diabetes mellitus ([address]     Gout     History of colon polyps 02/2016    needs f/u 5 years [place]    History of type 2 diabetes mellitus     about 15 years    Hyperlipidemia     Hypertension     Keratosis, inflamed seborrheic     Osteoarthritis, localized, shoulder, right     Polyp of transverse colon f/u due 2021 8/9/2014    Prediabetes     Pro[address] [facility] [address] [address] 2014    s/p prostatectomy    Syncope and collapse     Varicose vein of leg        Current Outpatient Medications on File Prior to Visit   Medication Sig Dispense Refill    metFORMIN (GLUCOPHAGE) 500 MG tablet Take 1 tablet by mouth 3 times daily 270 tablet 3    albuterol sulfate HFA (VENTOLIN HFA) 108 (90 Base) MCG/ACT inhaler Inhale 2 puffs into the lungs every 6 hours as needed for Wheezing 18 g 1    fluticasone (FLOVENT HFA) 110 MCG/ACT inhaler Inhale 2 puffs into the lungs 2 times daily 1 each 5    irbesartan (AVAPRO) 150 MG tablet TAKE ONE TABLET BY MOUTH DAILY 90 tablet 3 simvastatin (ZOCOR) 20 MG tablet TAKE ONE TABLET BY MOUTH EVERY DAY 90 tablet 0    allopurinol (ZYLOPRIM) 300 MG tablet TAKE ONE TABLET BY MOUTH DAILY 90 tablet 3    metoprolol succinate (TOPROL XL) 100 MG extended release tablet TAKE ONE TABLET BY MOUTH DAILY 90 tablet 3    amLODIPine (NORVASC) 5 MG tablet Take 1 tablet by mouth daily 30 tablet 11    fluticasone (FLONASE) 50 MCG/ACT nasal spray 2 sprays by Nasal route daily 32 g 0    Cholecalciferol (D3-1000 PO) Take by mouth      Spacer/Aero-Hold Chamber Bags MISC 1 applicator by Does not apply route 4 times daily 1 each 0    Blood Pressure KIT 1 applicator by Does not apply route 4 times daily 1 kit 0    Coenzyme Q10-Vitamin E (QUNOL ULTRA COQ10) 100-150 MG-UNIT CAPS       vitamin C (ASCORBIC ACID) 500 MG tablet Take 500 mg by mouth daily      Multiple Vitamins-Minerals (MULTIVITAMIN MEN 50+ PO) Take by mouth      aspirin 81 MG chewable tablet Take 81 mg by mouth daily (Patient not taking: Reported on 10/5/2022)       No current facility-administered medications on file prior to visit.        Family History   Problem Relation Age of Onset    Alzheimer's Disease Mother     Heart Disease Father     Cancer Father     Diabetes Father     Cancer Brother     Colon Cancer Neg Hx     Celiac Disease Neg Hx     Crohn's Disease Neg Hx        Social History     Socioeconomic History    Marital status: Single     Spouse name: Not on file    Number of children: Not on file    Years of education: 0    Highest education level: Not on file   Occupational History    Not on file   Tobacco Use    Smoking status: Former     Packs/day: 0.50     Years: 12.00     Pack years: 6.00     Types: Cigarettes     Quit date: 1982     Years since quittin.6    Smokeless tobacco: Never   Vaping Use    Vaping Use: Never used   Substance and Sexual Activity    Alcohol use: Yes     Comment: daily 5 vodka drinks per day    Drug use: No    Sexual activity: Yes     Partners: Female   Other Topics Concern    Not on file   Social History Narrative    Engaged. No children. Social Determinants of Health     Financial Resource Strain: Low Risk     Difficulty of Paying Living Expenses: Not hard at all   Food Insecurity: No Food Insecurity    Worried About Running Out of Food in the Last Year: Never true    Ran Out of Food in the Last Year: Never true   Transportation Needs: Not on file   Physical Activity: Not on file   Stress: Not on file   Social Connections: Not on file   Intimate Partner Violence: Not on file   Housing Stability: Not on file       No Known Allergies    Review of Systems   Constitutional:  Positive for fever. Negative for chills and fatigue. HENT:          See HPI   Respiratory:  Positive for cough. Negative for shortness of breath and wheezing. Cardiovascular:  Negative for chest pain and palpitations. Gastrointestinal:  Negative for abdominal pain, diarrhea, nausea and vomiting. Musculoskeletal:  Negative for arthralgias and myalgias. Skin:  Negative for color change, pallor and rash. Neurological:  Negative for dizziness, weakness and headaches. OBJECTIVE:  VITALS:  /84 (Site: Right Upper Arm, Position: Sitting, Cuff Size: Medium Adult)   Pulse 84   Temp 99.6 °F (37.6 °C)   Ht 6' 1\" (1.854 m)   Wt 233 lb (105.7 kg)   SpO2 96%   BMI 30.74 kg/m²     Physical Exam  Vitals reviewed. Constitutional:       General: He is not in acute distress. Appearance: He is obese. HENT:      Head: Normocephalic. Right Ear: Tympanic membrane, ear canal and external ear normal. There is no impacted cerumen. Left Ear: Tympanic membrane, ear canal and external ear normal. There is no impacted cerumen. Nose: Rhinorrhea present. No congestion. Mouth/Throat:      Mouth: Mucous membranes are moist.      Pharynx: Oropharynx is clear. No oropharyngeal exudate or posterior oropharyngeal erythema. Eyes:      General: No scleral icterus.         Right eye: No discharge. Left eye: No discharge. Extraocular Movements: Extraocular movements intact. Conjunctiva/sclera: Conjunctivae normal.      Pupils: Pupils are equal, round, and reactive to light. Cardiovascular:      Rate and Rhythm: Normal rate and regular rhythm. Pulses: Normal pulses. Heart sounds: Normal heart sounds. No murmur heard. No friction rub. No gallop. Pulmonary:      Effort: Pulmonary effort is normal.      Breath sounds: Normal breath sounds. No wheezing, rhonchi or rales. Musculoskeletal:      Cervical back: Normal range of motion and neck supple. No rigidity. Lymphadenopathy:      Cervical: No cervical adenopathy. Skin:     General: Skin is warm and dry. Findings: No erythema or rash. Neurological:      Mental Status: He is alert and oriented to person, place, and time. ASSESSMENT/PLAN:    1. COVID-19  - POCT COVID - positive  - nirmatrelvir/ritonavir (PAXLOVID) 20 x 150 MG & 10 x 100MG TBPK; Take 3 tablets (two 150 mg nirmatrelvir and one 100 mg ritonavir tablets) by mouth every 12 hours for 5 days. Dispense: 30 tablet; Refill: 0  - OTC tylenol/motrin for fevers/pain  - Increase fluid + rest    LABS:  No results found for this visit on 10/05/22. Return in about 4 weeks (around 11/2/2022) for reassessment with PCP. Follow up with PCP to evaluate treatment results or return if symptoms worsen or fail to improve. Discussed signs and symptoms which require immediate follow-up in ED/call to 911. Understanding verbalized. All prescribed medication today including purpose, proper use, and side effects discussed. Treatment plan/rationale and result expectations have been discussed with the patient who expresses understanding and desires to proceed. An electronic signature was used to authenticate this note.   Marc Lindquist, APRN - CNP

## 2022-10-06 ASSESSMENT — ENCOUNTER SYMPTOMS
VOMITING: 0
ABDOMINAL PAIN: 0
WHEEZING: 0
NAUSEA: 0
COLOR CHANGE: 0
SHORTNESS OF BREATH: 0
COUGH: 1
DIARRHEA: 0

## 2022-10-25 ENCOUNTER — OFFICE VISIT (OUTPATIENT)
Dept: FAMILY MEDICINE CLINIC | Age: 67
End: 2022-10-25
Payer: MEDICARE

## 2022-10-25 VITALS
DIASTOLIC BLOOD PRESSURE: 72 MMHG | BODY MASS INDEX: 30.6 KG/M2 | OXYGEN SATURATION: 98 % | TEMPERATURE: 98 F | SYSTOLIC BLOOD PRESSURE: 130 MMHG | HEIGHT: 73 IN | WEIGHT: 230.9 LBS | HEART RATE: 97 BPM

## 2022-10-25 DIAGNOSIS — L57.0 ACTINIC KERATOSES: ICD-10-CM

## 2022-10-25 DIAGNOSIS — C61 PROSTATE CA (HCC): ICD-10-CM

## 2022-10-25 DIAGNOSIS — L82.0 KERATOSIS, INFLAMED SEBORRHEIC: ICD-10-CM

## 2022-10-25 DIAGNOSIS — R79.89 ELEVATED LFTS: Primary | ICD-10-CM

## 2022-10-25 PROBLEM — M25.561 BILATERAL KNEE PAIN: Status: ACTIVE | Noted: 2017-12-14

## 2022-10-25 PROBLEM — E78.2 MIXED HYPERLIPIDEMIA: Status: ACTIVE | Noted: 2018-12-17

## 2022-10-25 PROBLEM — I83.812 VARICOSE VEINS OF LEFT LOWER EXTREMITY WITH PAIN: Status: ACTIVE | Noted: 2018-05-07

## 2022-10-25 PROBLEM — M25.562 BILATERAL KNEE PAIN: Status: ACTIVE | Noted: 2017-12-14

## 2022-10-25 PROBLEM — M10.9 GOUT: Status: ACTIVE | Noted: 2018-12-17

## 2022-10-25 PROBLEM — J45.909 ASTHMA: Status: ACTIVE | Noted: 2017-02-09

## 2022-10-25 PROBLEM — I10 ESSENTIAL HYPERTENSION: Status: ACTIVE | Noted: 2018-12-17

## 2022-10-25 PROBLEM — J30.9 ALLERGIC RHINITIS: Status: ACTIVE | Noted: 2018-12-17

## 2022-10-25 PROBLEM — R55 SYNCOPE AND COLLAPSE: Status: ACTIVE | Noted: 2018-02-05

## 2022-10-25 PROCEDURE — 99213 OFFICE O/P EST LOW 20 MIN: CPT | Performed by: FAMILY MEDICINE

## 2022-10-25 PROCEDURE — 17003 DESTRUCT PREMALG LES 2-14: CPT | Performed by: FAMILY MEDICINE

## 2022-10-25 PROCEDURE — 1123F ACP DISCUSS/DSCN MKR DOCD: CPT | Performed by: FAMILY MEDICINE

## 2022-10-25 PROCEDURE — 1036F TOBACCO NON-USER: CPT | Performed by: FAMILY MEDICINE

## 2022-10-25 PROCEDURE — G8484 FLU IMMUNIZE NO ADMIN: HCPCS | Performed by: FAMILY MEDICINE

## 2022-10-25 PROCEDURE — 17000 DESTRUCT PREMALG LESION: CPT | Performed by: FAMILY MEDICINE

## 2022-10-25 PROCEDURE — 3017F COLORECTAL CA SCREEN DOC REV: CPT | Performed by: FAMILY MEDICINE

## 2022-10-25 PROCEDURE — G8417 CALC BMI ABV UP PARAM F/U: HCPCS | Performed by: FAMILY MEDICINE

## 2022-10-25 PROCEDURE — G8427 DOCREV CUR MEDS BY ELIG CLIN: HCPCS | Performed by: FAMILY MEDICINE

## 2022-10-25 ASSESSMENT — ENCOUNTER SYMPTOMS: COLOR CHANGE: 1

## 2022-10-25 NOTE — PATIENT INSTRUCTIONS
Patient will have his labs completed and then schedule follow-up appointment within a couple days of that. Skin appointment again in 3 months. Cryotherapy instructions    Post op instructions given. A printed copy provided. It is best to leave blisters alone if they form for the first 1-3 days to allow the desired damaged tissue (precancer lesion, wart, or whatever lesion is being removed) to separate from healthy tissue. The area should be covered with a bandage to prevent blister breakage and dirt exposure. The wounds should remain dry while there is a blister, therefore if this is a sweaty location, like the foot ,you may need to change clothing, such as socks, multiple times per day. When the blister(s) pop or patient removes the top as instructed between day 3-5, apply antibiotic (NOT triple antibiotic, one brand is Neosporin) ointment, usually Bacitracin, and a bandage to affected area(s). The ointment should be applied to the open area as long as it is not covered with skin. Exposed tissue is meant to be moist.      Once a scab is formed the patient may stop applying ointment. The scab may appear yellow while moist, don't confuse this with infection. If the wound is infection pus will drain from the site. If this treatment was for a large wart you may note that a plug of skin may fall out of the area that was treated. That is the center of the wart and it is appropriate for it to come out. If exposed skin remains, treat that area as you would a ruptured blister as mentioned above.     Bacitracin sample supplied

## 2022-10-25 NOTE — PROGRESS NOTES
Diagnosis Orders   1. Elevated LFTs        2. Prostate CA (HCC)        3. Keratosis, inflamed seborrheic        4. Actinic keratoses  [facility] DESTRUC PREMALIGNANT, FIRST LESION    [facility] DESTRUC PREMALIGNANT,2-14 LESIONS          Return in about 3 months (around 1/25/2023) for 15 min skin check. Orders Placed This Encounter   Procedures     [address], FIRST LESION    [place] Pradeep Pinto was seen today for diabetes, discuss labs and health maintenance. Diagnoses and all orders for this visit:    Elevated LFTs    Prostate CA (HCC)    Keratosis, inflamed seborrheic    Actinic keratoses  -     [facility] DESTRUC PREMALIGNANT, FIRST LESION  -     [facility] DESTRUC PREMALIGNANT,2-14 LESIONS      Return in about 3 months (around 1/25/2023) for 15 min skin check. Patient Instructions   Cryotherapy instructions    Post op instructions given. A printed copy provided. It is best to leave blisters alone if they form for the first 1-3 days to allow the desired damaged tissue (precancer lesion, wart, or whatever lesion is being removed) to separate from healthy tissue. The area should be covered with a bandage to prevent blister breakage and dirt exposure. The wounds should remain dry while there is a blister, therefore if this is a sweaty location, like the foot ,you may need to change clothing, such as socks, multiple times per day. When the blister(s) pop or patient removes the top as instructed between day 3-5, apply antibiotic (NOT triple antibiotic, one brand is Neosporin) ointment, usually Bacitracin, and a bandage to affected area(s). The ointment should be applied to the open area as long as it is not covered with skin. Exposed tissue is meant to be moist.      Once a scab is formed the patient may stop applying ointment. The scab may appear yellow while moist, don't confuse this with infection. If the wound is infection pus will drain from the site.  If this treatment was for a large wart you may note that a plug of skin may fall out of the area that was treated. That is the center of the wart and it is appropriate for it to come out. If exposed skin remains, treat that area as you would a ruptured blister as mentioned above. Bacitracin sample supplied                 Patient recently had COVID and had not been able to get his labs done yet. He no longer follows with the cancer-list due to having prostate removal and no further concerns. He was released.   He has noted skin changes and has a history of actinic keratosis he would like to have that looked at today      Current Outpatient Medications on File Prior to Visit   Medication Sig Dispense Refill    metFORMIN (GLUCOPHAGE) 500 MG tablet Take 1 tablet by mouth 3 times daily 270 tablet 3    albuterol sulfate HFA (VENTOLIN HFA) 108 (90 Base) MCG/ACT inhaler Inhale 2 puffs into the lungs every 6 hours as needed for Wheezing 18 g 1    fluticasone (FLOVENT HFA) 110 MCG/ACT inhaler Inhale 2 puffs into the lungs 2 times daily 1 each 5    irbesartan (AVAPRO) 150 MG tablet TAKE ONE TABLET BY MOUTH DAILY 90 tablet 3    simvastatin (ZOCOR) 20 MG tablet TAKE ONE TABLET BY MOUTH EVERY DAY 90 tablet 0    allopurinol (ZYLOPRIM) 300 MG tablet TAKE ONE TABLET BY MOUTH DAILY 90 tablet 3    metoprolol succinate (TOPROL XL) 100 MG extended release tablet TAKE ONE TABLET BY MOUTH DAILY 90 tablet 3    amLODIPine (NORVASC) 5 MG tablet Take 1 tablet by mouth daily 30 tablet 11    fluticasone (FLONASE) 50 MCG/ACT nasal spray 2 sprays by Nasal route daily 32 g 0    Cholecalciferol (D3-1000 PO) Take by mouth      Spacer/Aero-Hold Chamber Bags MISC 1 applicator by Does not apply route 4 times daily 1 each 0    Blood Pressure KIT 1 applicator by Does not apply route 4 times daily 1 kit 0    Coenzyme Q10-Vitamin E (QUNOL ULTRA COQ10) 100-150 MG-UNIT CAPS       vitamin C (ASCORBIC ACID) 500 MG tablet Take 500 mg by mouth daily      Multiple Vitamins-Minerals (MULTIVITAMIN MEN 50+ PO) Take by mouth       No current facility-administered medications on file prior to visit. Patient has no known allergies. Review of Systems   Constitutional:  Negative for chills and fever. Skin:  Positive for color change. Allergic/Immunologic: Negative for environmental allergies, food allergies and immunocompromised state. Hematological:  Negative for adenopathy. Does not bruise/bleed easily. Psychiatric/Behavioral:  Negative for behavioral problems and sleep disturbance. /72   Pulse 97   Temp 98 °F (36.7 °C)   Ht 6' 1\" (1.854 m)   Wt 230 lb 14.4 oz (104.7 kg)   SpO2 98%   BMI 30.46 kg/m²   Physical Exam  Constitutional:       General: He is not in acute distress. Appearance: Normal appearance. He is well-developed. He is not toxic-appearing. HENT:      Head: Normocephalic and atraumatic. Right Ear: Hearing and tympanic membrane normal.      Left Ear: Hearing and tympanic membrane normal.      Nose: Nose normal. No nasal deformity. Eyes:      General: Lids are normal.         Right eye: No discharge. Left eye: No discharge. Conjunctiva/sclera: Conjunctivae normal.      Pupils: Pupils are equal, round, and reactive to light. Neck:      Thyroid: No thyroid mass or thyromegaly. Vascular: No JVD. Trachea: Trachea and phonation normal.   Cardiovascular:      Rate and Rhythm: Normal rate and regular rhythm. Pulmonary:      Effort: No accessory muscle usage or respiratory distress. Musculoskeletal:      Cervical back: Full passive range of motion without pain. Comments: FROM all large muscle groups and joints as witnessed when walking to exam table, getting on, and getting off the exam table. Skin:     General: Skin is warm and dry. Findings: No rash. Comments: Erythematous base rough white flake lesions x9 see consent for locations   Neurological:      Mental Status: He is alert. Motor: No tremor or atrophy. Gait: Gait normal.   Psychiatric:         Speech: Speech normal.         Behavior: Behavior normal.         Thought Content: Thought content normal.         Procedure consent AKs  The procedure was discussed with the patient. All questions were answered and alternative options discussed. The patient is aware of the risks of bleeding, infection, unsatisfactory scar result. Informed consent paperwork was signed by the patient. Liquid nitrogen was applied for 2-6 seconds to affected areas with thaw allowed between dosing for a total of 2 applications. Applied to 9 lesion(s). The patient tolerated the procedure well. Diagnosis Orders   1. Elevated LFTs        2. Prostate CA (HCC)        3. Keratosis, inflamed seborrheic        4. Actinic keratoses  [facility] DESTRUC PREMALIGNANT, FIRST LESION    [facility] DESTRUC PREMALIGNANT,2-14 LESIONS          Return in about 3 months (around 1/25/2023) for 15 min skin check. Orders Placed This Encounter   Procedures    [facility] DESTRUC PREMALIGNANT, FIRST LESION     [facility]               Patient Instructions   Cryotherapy instructions    Post op instructions given. A printed copy provided. It is best to leave blisters alone if they form for the first 1-3 days to allow the desired damaged tissue (precancer lesion, wart, or whatever lesion is being removed) to separate from healthy tissue. The area should be covered with a bandage to prevent blister breakage and dirt exposure. The wounds should remain dry while there is a blister, therefore if this is a sweaty location, like the foot ,you may need to change clothing, such as socks, multiple times per day. When the blister(s) pop or patient removes the top as instructed between day 3-5, apply antibiotic (NOT triple antibiotic, one brand is Neosporin) ointment, usually Bacitracin, and a bandage to affected area(s).   The ointment should be applied to the open area as long as it is not covered with skin. Exposed tissue is meant to be moist.      Once a scab is formed the patient may stop applying ointment. The scab may appear yellow while moist, don't confuse this with infection. If the wound is infection pus will drain from the site. If this treatment was for a large wart you may note that a plug of skin may fall out of the area that was treated. That is the center of the wart and it is appropriate for it to come out. If exposed skin remains, treat that area as you would a ruptured blister as mentioned above. Bacitracin sample supplied             DO NOT USE TRIPLE ANTIBIOTIC ON THE WOUND    It is best to leave blisters alone if they form. They should be covered with a bandage to prevent blister breakage and dirt exposure. The wounds should remain dry while there is a blister, therefore if this is a sweaty location like the foot you may need to change socks multiple times per day. If blister(s) pop or patient pops with a sterilized needle, apply antibiotic (NOT triple antibiotic, one brand is Neosporin) ointment and a bandage to affected area(s). The ointment should be applied to the open area as long as it is not covered with skin. Exposed tissue is meant to be moist.  Once a scab formed she may stop applying ointment. If this treatment was for a large wart you may note that a plug of skin may fall out of the area that was treated. That is the center of the wart and it is appropriate for it to come out. If exposed skin remains, treat that area as you would a ruptured blister as mentioned above.

## 2022-10-27 DIAGNOSIS — E11.9 TYPE 2 DIABETES MELLITUS WITHOUT COMPLICATION, WITHOUT LONG-TERM CURRENT USE OF INSULIN (HCC): ICD-10-CM

## 2022-10-27 DIAGNOSIS — I10 ESSENTIAL HYPERTENSION: ICD-10-CM

## 2022-10-27 DIAGNOSIS — C61 PROSTATE CA (HCC): ICD-10-CM

## 2022-10-27 DIAGNOSIS — E55.9 VITAMIN D DEFICIENCY: ICD-10-CM

## 2022-10-27 LAB
ALBUMIN SERPL-MCNC: 4 G/DL (ref 3.5–4.6)
ALP BLD-CCNC: 85 U/L (ref 35–104)
ALT SERPL-CCNC: 63 U/L (ref 0–41)
ANION GAP SERPL CALCULATED.3IONS-SCNC: 16 MEQ/L (ref 9–15)
AST SERPL-CCNC: 68 U/L (ref 0–40)
BASOPHILS ABSOLUTE: 0 K/UL (ref 0–0.2)
BASOPHILS RELATIVE PERCENT: 0.5 %
BILIRUB SERPL-MCNC: 1 MG/DL (ref 0.2–0.7)
BUN BLDV-MCNC: 7 MG/DL (ref 8–23)
CALCIUM SERPL-MCNC: 9.3 MG/DL (ref 8.5–9.9)
CHLORIDE BLD-SCNC: 96 MEQ/L (ref 95–107)
CHOLESTEROL, FASTING: 163 MG/DL (ref 0–199)
CO2: 26 MEQ/L (ref 20–31)
CREAT SERPL-MCNC: 0.66 MG/DL (ref 0.7–1.2)
CREATININE URINE: 248.9 MG/DL
EOSINOPHILS ABSOLUTE: 0.1 K/UL (ref 0–0.7)
EOSINOPHILS RELATIVE PERCENT: 1.1 %
GFR SERPL CREATININE-BSD FRML MDRD: >60 ML/MIN/{1.73_M2}
GLOBULIN: 3.2 G/DL (ref 2.3–3.5)
GLUCOSE BLD-MCNC: 153 MG/DL (ref 70–99)
HBA1C MFR BLD: 6.3 % (ref 4.8–5.9)
HCT VFR BLD CALC: 45.6 % (ref 42–52)
HDLC SERPL-MCNC: 57 MG/DL (ref 40–59)
HEMOGLOBIN: 15.4 G/DL (ref 14–18)
LDL CHOLESTEROL CALCULATED: 81 MG/DL (ref 0–129)
LYMPHOCYTES ABSOLUTE: 1.2 K/UL (ref 1–4.8)
LYMPHOCYTES RELATIVE PERCENT: 20.5 %
MCH RBC QN AUTO: 34.5 PG (ref 27–31.3)
MCHC RBC AUTO-ENTMCNC: 33.8 % (ref 33–37)
MCV RBC AUTO: 102.2 FL (ref 79–92.2)
MICROALBUMIN UR-MCNC: 2.7 MG/DL
MICROALBUMIN/CREAT UR-RTO: 10.8 MG/G (ref 0–30)
MONOCYTES ABSOLUTE: 0.6 K/UL (ref 0.2–0.8)
MONOCYTES RELATIVE PERCENT: 10.8 %
NEUTROPHILS ABSOLUTE: 3.9 K/UL (ref 1.4–6.5)
NEUTROPHILS RELATIVE PERCENT: 67.1 %
PDW BLD-RTO: 13 % (ref 11.5–14.5)
PLATELET # BLD: 198 K/UL (ref 130–400)
POTASSIUM SERPL-SCNC: 4.1 MEQ/L (ref 3.4–4.9)
PROSTATE SPECIFIC ANTIGEN: 0.01 NG/ML (ref 0–4)
RBC # BLD: 4.46 M/UL (ref 4.7–6.1)
SODIUM BLD-SCNC: 138 MEQ/L (ref 135–144)
TOTAL PROTEIN: 7.2 G/DL (ref 6.3–8)
TRIGLYCERIDE, FASTING: 127 MG/DL (ref 0–150)
TSH REFLEX: 1.98 UIU/ML (ref 0.44–3.86)
WBC # BLD: 5.8 K/UL (ref 4.8–10.8)

## 2022-10-27 NOTE — RESULT ENCOUNTER NOTE
Patient has a number of labs that are slightly off on value. Would like to schedule appointment to discuss treatment options in detail or adjusting medications. This is not emergent.   Can be done in the next month

## 2022-10-28 LAB — VITAMIN D 25-HYDROXY: 56.7 NG/ML

## 2022-11-08 ENCOUNTER — OFFICE VISIT (OUTPATIENT)
Dept: FAMILY MEDICINE CLINIC | Age: 67
End: 2022-11-08
Payer: MEDICARE

## 2022-11-08 VITALS
DIASTOLIC BLOOD PRESSURE: 70 MMHG | WEIGHT: 234.1 LBS | HEIGHT: 73 IN | BODY MASS INDEX: 31.03 KG/M2 | OXYGEN SATURATION: 98 % | HEART RATE: 84 BPM | TEMPERATURE: 97.6 F | SYSTOLIC BLOOD PRESSURE: 124 MMHG

## 2022-11-08 DIAGNOSIS — E11.9 TYPE 2 DIABETES MELLITUS WITHOUT COMPLICATION, WITHOUT LONG-TERM CURRENT USE OF INSULIN (HCC): ICD-10-CM

## 2022-11-08 DIAGNOSIS — D75.89 MACROCYTOSIS: Primary | ICD-10-CM

## 2022-11-08 DIAGNOSIS — R80.9 MICROALBUMINURIA: ICD-10-CM

## 2022-11-08 PROCEDURE — 99214 OFFICE O/P EST MOD 30 MIN: CPT | Performed by: FAMILY MEDICINE

## 2022-11-08 PROCEDURE — G8484 FLU IMMUNIZE NO ADMIN: HCPCS | Performed by: FAMILY MEDICINE

## 2022-11-08 PROCEDURE — 3017F COLORECTAL CA SCREEN DOC REV: CPT | Performed by: FAMILY MEDICINE

## 2022-11-08 PROCEDURE — 1123F ACP DISCUSS/DSCN MKR DOCD: CPT | Performed by: FAMILY MEDICINE

## 2022-11-08 PROCEDURE — G8417 CALC BMI ABV UP PARAM F/U: HCPCS | Performed by: FAMILY MEDICINE

## 2022-11-08 PROCEDURE — 3044F HG A1C LEVEL LT 7.0%: CPT | Performed by: FAMILY MEDICINE

## 2022-11-08 PROCEDURE — G8427 DOCREV CUR MEDS BY ELIG CLIN: HCPCS | Performed by: FAMILY MEDICINE

## 2022-11-08 PROCEDURE — 3078F DIAST BP <80 MM HG: CPT | Performed by: FAMILY MEDICINE

## 2022-11-08 PROCEDURE — 3074F SYST BP LT 130 MM HG: CPT | Performed by: FAMILY MEDICINE

## 2022-11-08 PROCEDURE — 2022F DILAT RTA XM EVC RTNOPTHY: CPT | Performed by: FAMILY MEDICINE

## 2022-11-08 PROCEDURE — 1036F TOBACCO NON-USER: CPT | Performed by: FAMILY MEDICINE

## 2022-11-08 SDOH — ECONOMIC STABILITY: FOOD INSECURITY: WITHIN THE PAST 12 MONTHS, YOU WORRIED THAT YOUR FOOD WOULD RUN OUT BEFORE YOU GOT MONEY TO BUY MORE.: NEVER TRUE

## 2022-11-08 SDOH — ECONOMIC STABILITY: FOOD INSECURITY: WITHIN THE PAST 12 MONTHS, THE FOOD YOU BOUGHT JUST DIDN'T LAST AND YOU DIDN'T HAVE MONEY TO GET MORE.: NEVER TRUE

## 2022-11-08 ASSESSMENT — ENCOUNTER SYMPTOMS
ABDOMINAL PAIN: 0
PHOTOPHOBIA: 0
ABDOMINAL DISTENTION: 0
SHORTNESS OF BREATH: 0
CHEST TIGHTNESS: 0

## 2022-11-08 ASSESSMENT — SOCIAL DETERMINANTS OF HEALTH (SDOH): HOW HARD IS IT FOR YOU TO PAY FOR THE VERY BASICS LIKE FOOD, HOUSING, MEDICAL CARE, AND HEATING?: NOT HARD AT ALL

## 2022-11-08 NOTE — PATIENT INSTRUCTIONS
Patient is going to work on increasing cardiovascular activity in his day with either walking [place] at a facility or on a treadmill or cycling for 20 to 30 minutes/day. He is going to be certain that he is taking in at least 1000 mcg of vitamin B12 daily and 927 mg of folic acid for the macrocytosis change in the size of his blood cells.     Repeat labs in 3 months

## 2022-11-08 NOTE — PROGRESS NOTES
Diagnosis Orders   1. Macrocytosis  CBC with Auto Differential    Vitamin B12 & Folate      2. Microalbuminuria  Microalbumin / Creatinine Urine Ratio      3. Type 2 diabetes mellitus without complication, without long-term current use of insulin (HCC)  Microalbumin / Creatinine Urine Ratio    Basic Metabolic Panel        Return in 3 months (on 2/8/2023) for AWV DUE 4/2022. Patient Instructions   Patient is going to work on increasing cardiovascular activity in his day with either walking [place] at a facility or on a treadmill or cycling for 20 to 30 minutes/day. He is going to be certain that he is taking in at least 1000 mcg of vitamin B12 daily and 170 mg of folic acid for the macrocytosis change in the size of his blood cells. Repeat labs in 3 months    Subjective:      Patient ID: Abran Juarez is a 75 y.o. male who presents for:  Chief Complaint   Patient presents with    Discuss Labs    Flu Vaccine     Declined       Patient here to review lab results. States he does take a men's multivitamin daily. Is not doing any physical activity or exercise.   Has access to exercise bicycle at home and a fitness gym nearby      Current Outpatient Medications on File Prior to Visit   Medication Sig Dispense Refill    metFORMIN (GLUCOPHAGE) 500 MG tablet Take 1 tablet by mouth 3 times daily 270 tablet 3    albuterol sulfate HFA (VENTOLIN HFA) 108 (90 Base) MCG/ACT inhaler Inhale 2 puffs into the lungs every 6 hours as needed for Wheezing 18 g 1    fluticasone (FLOVENT HFA) 110 MCG/ACT inhaler Inhale 2 puffs into the lungs 2 times daily 1 each 5    irbesartan (AVAPRO) 150 MG tablet TAKE ONE TABLET BY MOUTH DAILY 90 tablet 3    simvastatin (ZOCOR) 20 MG tablet TAKE ONE TABLET BY MOUTH EVERY DAY 90 tablet 0    allopurinol (ZYLOPRIM) 300 MG tablet TAKE ONE TABLET BY MOUTH DAILY 90 tablet 3    metoprolol succinate (TOPROL XL) 100 MG extended release tablet TAKE ONE TABLET BY MOUTH DAILY 90 tablet 3    amLODIPine (NORVASC) 5 MG tablet Take 1 tablet by mouth daily 30 tablet 11    fluticasone (FLONASE) 50 MCG/ACT nasal spray 2 sprays by Nasal route daily 32 g 0    Cholecalciferol (D3-1000 PO) Take by mouth      Spacer/Aero-Hold Chamber Bags MISC 1 applicator by Does not apply route 4 times daily 1 each 0    Blood Pressure KIT 1 applicator by Does not apply route 4 times daily 1 kit 0    Coenzyme Q10-Vitamin E (QUNOL ULTRA COQ10) 100-150 MG-UNIT CAPS       vitamin C (ASCORBIC ACID) 500 MG tablet Take 500 mg by mouth daily      Multiple Vitamins-Minerals (MULTIVITAMIN MEN 50+ PO) Take by mouth       No current facility-administered medications on file prior to visit.      Past Medical History:   Diagnosis Date    Actinic keratoses     has had LN2 and used Efudex    Alcohol consumption of one to four drinks per day on alcohol screening     Allergic rhinitis     cats, weeds, grasses, ragweed    Asthma     Bilateral knee pain     Diabetes mellitus ([address]     Gout     History of colon polyps 02/2016    needs f/u 5 years [facility]    History of type 2 diabetes mellitus     about 15 years    Hyperlipidemia     Hypertension     Keratosis, inflamed seborrheic     Osteoarthritis, localized, shoulder, right     Polyp of transverse colon f/u due 2021 8/9/2014    Prediabetes     Prostate cancer ([facility] [address] 2014    s/p prostatectomy    Syncope and collapse     Varicose vein of leg      Past Surgical History:   Procedure Laterality Date    COLONOSCOPY  02/04/2016    Dr. Sulema Bailey; polyps f/u in 5 years    COLONOSCOPY N/A 10/2/2020    COLONOSCOPY DIAGNOSTIC performed by Jordan Ramirez MD at [facility]  11/2014    ULNAR TUNNEL RELEASE Bilateral     VARICOSE VEIN SURGERY       Social History     Socioeconomic History    Marital status: Single     Spouse name: Not on file    Number of children: Not on file    Years of education: 0    Highest education level: Not on file   Occupational History    Not on file   Tobacco Use    Smoking status: Former     Packs/day: 0.50     Years: 12.00     Pack years: 6.00     Types: Cigarettes     Quit date: 1982     Years since quittin.7    Smokeless tobacco: Never   Vaping Use    Vaping Use: Never used   Substance and Sexual Activity    Alcohol use: Yes     Comment: daily 5 vodka drinks per day    Drug use: No    Sexual activity: Yes     Partners: Female   Other Topics Concern    Not on file   Social History Narrative    Engaged. No children. Social Determinants of Health     Financial Resource Strain: Low Risk     Difficulty of Paying Living Expenses: Not hard at all   Food Insecurity: No Food Insecurity    Worried About [address] in the Last Year: Never true    [address] in the Last Year: Never true   Transportation Needs: No Transportation Needs    Lack of Transportation (Medical): No    Lack of Transportation (Non-Medical): No   Physical Activity: Not on file   Stress: Not on file   Social Connections: Not on file   Intimate Partner Violence: Not on file   Housing Stability: Not on file     Family History   Problem Relation Age of Onset    Alzheimer's Disease Mother     Heart Disease Father     Cancer Father     Diabetes Father     Cancer Brother     Colon Cancer Neg Hx     Celiac Disease Neg Hx     Crohn's Disease Neg Hx      Allergies:  Patient has no known allergies. Review of Systems   Constitutional:  Negative for activity change, appetite change, diaphoresis and unexpected weight change. Eyes:  Negative for photophobia and visual disturbance. Respiratory:  Negative for chest tightness and shortness of breath. No orthopnea   Cardiovascular:  Negative for chest pain, palpitations and leg swelling. Gastrointestinal:  Negative for abdominal distention and abdominal pain. Genitourinary:  Negative for flank pain and frequency. Musculoskeletal:  Negative for gait problem and joint swelling.    Neurological:  Negative for dizziness, weakness, light-headedness and headaches. Psychiatric/Behavioral:  Negative for confusion. Objective:   /70   Pulse 84   Temp 97.6 °F (36.4 °C)   Ht 6' 1\" (1.854 m)   Wt 234 lb 1.6 oz (106.2 kg)   SpO2 98%   BMI 30.89 kg/m²     Physical Exam  Vitals reviewed. Constitutional:       General: He is not in acute distress. Appearance: He is well-developed. HENT:      Head: Normocephalic and atraumatic. Right Ear: External ear normal.      Left Ear: External ear normal.      Nose: Nose normal.   Eyes:      General:         Right eye: No discharge. Left eye: No discharge. Conjunctiva/sclera: Conjunctivae normal.      Pupils: Pupils are equal, round, and reactive to light. Neck:      Thyroid: No thyromegaly. Cardiovascular:      Rate and Rhythm: Normal rate and regular rhythm. Pulmonary:      Effort: Pulmonary effort is normal. No respiratory distress. Abdominal:      General: There is no distension. Musculoskeletal:      Cervical back: Neck supple. Skin:     General: Skin is warm and dry. Neurological:      Mental Status: He is alert and oriented to person, place, and time. Coordination: Coordination normal.   Psychiatric:         Thought Content: Thought content normal.         Judgment: Judgment normal.       No results found for this visit on 11/08/22.     Recent Results (from the past 2016 hour(s))   POCT COVID-19, Antigen    Collection Time: 10/05/22  4:03 PM   Result Value Ref Range    SARS-COV-2, POC Detected (A) Not Detected    Lot Number [id number]     QC Pass/Fail pass     Performing Instrument BD Veritor    Vitamin D 25 Hydroxy    Collection Time: 10/27/22  9:42 AM   Result Value Ref Range    Vit D, 25-Hydroxy 56.7 >29.9 ng/mL   Comprehensive Metabolic Panel    Collection Time: 10/27/22  9:42 AM   Result Value Ref Range    Sodium 138 135 - 144 mEq/L    Potassium 4.1 3.4 - 4.9 mEq/L    Chloride 96 95 - 107 mEq/L    CO2 26 20 - 31 mEq/L    Anion Gap 16 (H) 9 - 15 mEq/L    Glucose 153 (H) 70 - 99 mg/dL    BUN 7 (L) 8 - 23 mg/dL    Creatinine 0.66 (L) 0.70 - 1.20 mg/dL    Est, Glom Filt Rate >60.0 >60    Calcium 9.3 8.5 - 9.9 mg/dL    Total Protein 7.2 6.3 - 8.0 g/dL    Albumin 4.0 3.5 - 4.6 g/dL    Total Bilirubin 1.0 (H) 0.2 - 0.7 mg/dL    Alkaline Phosphatase 85 35 - 104 U/L    ALT 63 (H) 0 - 41 U/L    AST 68 (H) 0 - 40 U/L    Globulin 3.2 2.3 - 3.5 g/dL   TSH with Reflex    Collection Time: 10/27/22  9:42 AM   Result Value Ref Range    TSH 1.980 0.440 - 3.860 uIU/mL   CBC with Auto Differential    Collection Time: 10/27/22  9:42 AM   Result Value Ref Range    WBC 5.8 4.8 - 10.8 K/uL    RBC 4.46 (L) 4.70 - 6.10 M/uL    Hemoglobin 15.4 14.0 - 18.0 g/dL    Hematocrit 45.6 42.0 - 52.0 %    .2 (H) 79.0 - 92.2 fL    MCH 34.5 (H) 27.0 - 31.3 pg    MCHC 33.8 33.0 - 37.0 %    RDW 13.0 11.5 - 14.5 %    Platelets 610 760 - 479 K/uL    Neutrophils % 67.1 %    Lymphocytes % 20.5 %    Monocytes % 10.8 %    Eosinophils % 1.1 %    Basophils % 0.5 %    Neutrophils Absolute 3.9 1.4 - 6.5 K/uL    Lymphocytes Absolute 1.2 1.0 - 4.8 K/uL    Monocytes Absolute 0.6 0.2 - 0.8 K/uL    Eosinophils Absolute 0.1 0.0 - 0.7 K/uL    Basophils Absolute 0.0 0.0 - 0.2 K/uL   Lipid, Fasting    Collection Time: 10/27/22  9:42 AM   Result Value Ref Range    Cholesterol, Fasting 163 0 - 199 mg/dL    Triglyceride, Fasting 127 0 - 150 mg/dL    HDL 57 40 - 59 mg/dL    LDL Calculated 81 0 - 129 mg/dL   PSA Screening    Collection Time: 10/27/22  9:42 AM   Result Value Ref Range    PSA 0.01 0.00 - 4.00 ng/mL   Hemoglobin A1C    Collection Time: 10/27/22  9:42 AM   Result Value Ref Range    Hemoglobin A1C 6.3 (H) 4.8 - 5.9 %   Microalbumin / Creatinine Urine Ratio    Collection Time: 10/27/22  9:42 AM   Result Value Ref Range    Microalbumin, Random Urine 2.70 (H) Not Established mg/dL    Creatinine, Ur 248.9 Not Established mg/dL    Microalbumin Creatinine Ratio 10.8 0.0 - 30.0 mg/G       [] Pt was seen by provider for Minutes  Counseling and coordination of care was done for all assessment diagnosis listed for today with patient and any family/friend present. More than 50% of this visit was spent coordinating current care, obtaining information for prior records, and counseling for current plan of action. Assessment:       Diagnosis Orders   1. Macrocytosis  CBC with Auto Differential    Vitamin B12 & Folate      2. Microalbuminuria  Microalbumin / Creatinine Urine Ratio      3. Type 2 diabetes mellitus without complication, without long-term current use of insulin (HCC)  Microalbumin / Creatinine Urine Ratio    Basic Metabolic Panel            Orders Placed This Encounter   Procedures    CBC with Auto Differential     Standing Status:   Future     Standing Expiration Date:   11/8/2023    Microalbumin / Creatinine Urine Ratio     Standing Status:   Future     Standing Expiration Date:   58/3/3310    Basic Metabolic Panel     Standing Status:   Future     Standing Expiration Date:   11/8/2023    Vitamin B12 & Folate     Standing Status:   Future     Standing Expiration Date:   11/8/2023         No orders of the defined types were placed in this encounter. Medication List            Accurate as of November 8, 2022  1:19 PM. If you have any questions, ask your nurse or doctor.                 CONTINUE taking these medications      albuterol sulfate  (90 Base) MCG/ACT inhaler  Commonly known as: Ventolin HFA  Inhale 2 puffs into the lungs every 6 hours as needed for Wheezing     allopurinol 300 MG tablet  Commonly known as: ZYLOPRIM  TAKE ONE TABLET BY MOUTH DAILY     amLODIPine 5 MG tablet  Commonly known as: NORVASC  Take 1 tablet by mouth daily     Blood Pressure Kit  1 applicator by Does not apply route 4 times daily     D3-1000 PO     fluticasone 110 MCG/ACT inhaler  Commonly known as: Flovent HFA  Inhale 2 puffs into the lungs 2 times daily     fluticasone 50 MCG/ACT nasal spray  Commonly known as: FLONASE  2 sprays by Nasal route daily     irbesartan 150 MG tablet  Commonly known as: AVAPRO  TAKE ONE TABLET BY MOUTH DAILY     metFORMIN 500 MG tablet  Commonly known as: GLUCOPHAGE  Take 1 tablet by mouth 3 times daily     metoprolol succinate 100 MG extended release tablet  Commonly known as: TOPROL XL  TAKE ONE TABLET BY MOUTH DAILY     MULTIVITAMIN MEN 50+ PO     Qunol Ultra CoQ10 100-150 MG-UNIT Caps  Generic drug: Coenzyme Q10-Vitamin E     simvastatin 20 MG tablet  Commonly known as: ZOCOR  TAKE ONE TABLET BY MOUTH EVERY DAY     Spacer/Aero-Hold Chamber Bags Misc  1 applicator by Does not apply route 4 times daily     vitamin C 500 MG tablet  Commonly known as: ASCORBIC ACID                Plan:   Return in 3 months (on 2/8/2023) for AWV DUE 4/2022. Patient Instructions   Patient is going to work on increasing cardiovascular activity in his day with either walking [place] at a facility or on a treadmill or cycling for 20 to 30 minutes/day. He is going to be certain that he is taking in at least 1000 mcg of vitamin B12 daily and 850 mg of folic acid for the macrocytosis change in the size of his blood cells. Repeat labs in 3 months    This note was partially created with the assistance of dictation. This may lead to grammatical or spelling errors. Mukesh Devine M.D.

## 2022-11-21 DIAGNOSIS — E78.2 MIXED HYPERLIPIDEMIA: ICD-10-CM

## 2022-11-21 RX ORDER — SIMVASTATIN 20 MG
TABLET ORAL
Qty: 90 TABLET | Refills: 3 | Status: SHIPPED | OUTPATIENT
Start: 2022-11-21

## 2022-11-21 NOTE — TELEPHONE ENCOUNTER
Pt calling back stating he is out of this medication as of yesterday. Comments:     Last Office Visit (last PCP visit):   11/8/2022    Next Visit Date:  Future Appointments   Date Time Provider Odessa Peres   1/25/2023 10:15 AM Toby Mckeon MD [facility] [place]   2/8/2023 11:00 AM Toby Mckeon MD [facility]ENC[facility] [place]       **If hasn't been seen in over a year OR hasn't followed up according to last diabetes/ADHD visit, make appointment for patient before sending refill to provider.     Rx requested:  Requested Prescriptions     Pending Prescriptions Disp Refills    simvastatin (ZOCOR) 20 MG tablet [Pharmacy Med Name: Simvastatin Oral Tablet 20 MG] 90 tablet 0     Sig: TAKE ONE TABLET BY MOUTH EVERY DAY

## 2023-01-01 NOTE — PROGRESS NOTES
Efudex    Alcohol consumption of one to four drinks per day on alcohol screening     Allergic rhinitis     cats, weeds, grasses, ragweed    Asthma     Bilateral knee pain     Gout     History of colon polyps 2016    needs f/u 5 years [facility]    History of type 2 diabetes mellitus     about 15 years    Hyperlipidemia     Hypertension     Keratosis, inflamed seborrheic     Osteoarthritis, localized, shoulder, right     Prediabetes     Prostate cancer ([facility] [address] 2014    s/p prostatectomy    Syncope and collapse     Varicose vein of leg      Past Surgical History:   Procedure Laterality Date    COLONOSCOPY  2016    Dr. Sveta Zhang; polyps f/u in 5 years    PROSTATECTOMY  2014    [address]       Social History     Socioeconomic History    Marital status: Single     Spouse name: Not on file    Number of children: Not on file    Years of education: 0    Highest education level: Not on file   Occupational History    Not on file   Social Needs    Financial resource strain: Not on file    Food insecurity:     Worry: Not on file     Inability: Not on file    Transportation needs:     Medical: Not on file     Non-medical: Not on file   Tobacco Use    Smoking status: Former Smoker     Packs/day: 0.50     Years: 12.00     Pack years: 6.00     Types: Cigarettes     Last attempt to quit: 1982     Years since quittin.1    Smokeless tobacco: Never Used   Substance and Sexual Activity    Alcohol use: Yes     Comment: daily 3 vodka drinks per day    Drug use: No    Sexual activity: Yes     Partners: Female   Lifestyle    Physical activity:     Days per week: Not on file     Minutes per session: Not on file    Stress: Not on file   Relationships    Social connections:     Talks on phone: Not on file     Gets together: Not on file     Attends Restorationist service: Not on file     Active member of club or organization: Not on file     Attends meetings of clubs or organizations: Not on file     Relationship status: Not on file    Intimate partner violence:     Fear of current or ex partner: Not on file     Emotionally abused: Not on file     Physically abused: Not on file     Forced sexual activity: Not on file   Other Topics Concern    Not on file   Social History Narrative    Engaged. No children. Family History   Problem Relation Age of Onset    Alzheimer's Disease Mother     Heart Disease Father     Cancer Father     Diabetes Father     Cancer Brother      Allergies:  Patient has no known allergies. Review of Systems   Constitutional: Negative for activity change, appetite change, fatigue and unexpected weight change. HENT: Negative for congestion, dental problem, trouble swallowing and voice change. Eyes: Negative for discharge and visual disturbance. Respiratory: Negative for cough and shortness of breath. Cardiovascular: Negative for chest pain. Gastrointestinal: Negative for abdominal distention, abdominal pain, constipation, diarrhea and nausea. Endocrine: Negative for polydipsia and polyuria. Genitourinary: Negative for dysuria and urgency. Musculoskeletal: Negative for gait problem and joint swelling. Skin: Positive for rash. Negative for color change. Allergic/Immunologic: Negative for environmental allergies and food allergies. Neurological: Negative for speech difficulty and weakness. Hematological: Does not bruise/bleed easily. Psychiatric/Behavioral: Negative for agitation and behavioral problems. Objective:   /64   Pulse 72   Temp 97.6 °F (36.4 °C)   Ht 6' 1\" (1.854 m)   Wt 227 lb (103 kg)   SpO2 98%   BMI 29.95 kg/m²     Physical Exam   Constitutional: Vital signs are normal. He appears well-developed and well-nourished. Non-toxic appearance. No distress. HENT:   Head: Normocephalic and atraumatic.    Right Ear: Hearing and tympanic membrane normal.   Left Ear: Hearing and tympanic membrane normal.   Nose: Nose normal. No nasal deformity. Mouth/Throat: Oropharynx is clear and moist and mucous membranes are normal.   Eyes: Pupils are equal, round, and reactive to light. Conjunctivae, EOM and lids are normal. Right eye exhibits no discharge. Left eye exhibits no discharge. Neck: Trachea normal, full passive range of motion without pain and phonation normal. No JVD present. No thyroid mass and no thyromegaly present. Cardiovascular: Normal rate and regular rhythm. Pulmonary/Chest: No accessory muscle usage. No respiratory distress. Abdominal: Normal appearance. Musculoskeletal:   FROM all large muscle groups and joints as witnessed when walking to exam table, getting on, and getting off the exam table. Neurological: He is alert. He displays no atrophy and no tremor. Gait normal.   Skin: Skin is warm, dry and intact. No rash noted. Right earlobe on the inner aspect of the    Has a 1 x 2 mm raised pink lesion with white rough flake    Right scalp frontal region just within the hairline there is a 1 mm raised pink region with rough white flake    Left scalp frontal region just within the hairline there are 2 lesions each 2 mm diameter raised pink with rough white flake   Psychiatric: He has a normal mood and affect. His speech is normal and behavior is normal. Thought content normal.       No results found for this visit on 04/03/19.     Recent Results (from the past 2016 hour(s))   Comprehensive Metabolic Panel    Collection Time: 01/23/19  5:45 PM   Result Value Ref Range    Sodium 140 132 - 144 mEq/L    Potassium 3.9 3.5 - 5.1 mEq/L    Chloride 101 98 - 107 mEq/L    CO2 27 22 - 29 mEq/L    Anion Gap 12 7 - 13 mEq/L    Glucose 109 74 - 109 mg/dL    BUN 14 8 - 23 mg/dL    CREATININE 0.74 0.70 - 1.20 mg/dL    GFR Non-African American >60.0 >60    GFR  >60.0 >60    Calcium 9.4 8.6 - 10.2 mg/dL    Total Protein 7.3 6.4 - 8.1 g/dL    Alb 4.5 3.9 - 4.9 g/dL    Total Bilirubin 0.4 0.0 - 1.2 mg/dL    Alkaline 01/08/2022/negative

## 2023-01-13 ENCOUNTER — OFFICE VISIT (OUTPATIENT)
Dept: FAMILY MEDICINE CLINIC | Age: 68
End: 2023-01-13

## 2023-01-13 VITALS
OXYGEN SATURATION: 96 % | HEART RATE: 83 BPM | DIASTOLIC BLOOD PRESSURE: 80 MMHG | TEMPERATURE: 97.8 F | SYSTOLIC BLOOD PRESSURE: 122 MMHG

## 2023-01-13 DIAGNOSIS — E11.9 TYPE 2 DIABETES MELLITUS WITHOUT COMPLICATION, WITHOUT LONG-TERM CURRENT USE OF INSULIN (HCC): ICD-10-CM

## 2023-01-13 DIAGNOSIS — J45.31 MILD PERSISTENT ASTHMA WITH EXACERBATION: ICD-10-CM

## 2023-01-13 DIAGNOSIS — J40 BRONCHITIS: Primary | ICD-10-CM

## 2023-01-13 DIAGNOSIS — H61.23 BILATERAL IMPACTED CERUMEN: ICD-10-CM

## 2023-01-13 RX ORDER — CYANOCOBALAMIN (VITAMIN B-12) 500 MCG
1000 TABLET ORAL DAILY
COMMUNITY

## 2023-01-13 RX ORDER — ALBUTEROL SULFATE 90 UG/1
2 AEROSOL, METERED RESPIRATORY (INHALATION) EVERY 6 HOURS PRN
Qty: 18 G | Refills: 1 | Status: SHIPPED | OUTPATIENT
Start: 2023-01-13

## 2023-01-13 RX ORDER — AZITHROMYCIN 250 MG/1
250 TABLET, FILM COATED ORAL SEE ADMIN INSTRUCTIONS
Qty: 6 TABLET | Refills: 0 | Status: SHIPPED | OUTPATIENT
Start: 2023-01-13 | End: 2023-01-18

## 2023-01-13 RX ORDER — FOLIC ACID 1 MG/1
1 TABLET ORAL DAILY
COMMUNITY

## 2023-01-13 RX ORDER — PREDNISONE 20 MG/1
20 TABLET ORAL DAILY
Qty: 5 TABLET | Refills: 0 | Status: SHIPPED | OUTPATIENT
Start: 2023-01-13 | End: 2023-01-18

## 2023-01-13 ASSESSMENT — PATIENT HEALTH QUESTIONNAIRE - PHQ9
2. FEELING DOWN, DEPRESSED OR HOPELESS: 0
SUM OF ALL RESPONSES TO PHQ QUESTIONS 1-9: 0
SUM OF ALL RESPONSES TO PHQ9 QUESTIONS 1 & 2: 0
1. LITTLE INTEREST OR PLEASURE IN DOING THINGS: 0
SUM OF ALL RESPONSES TO PHQ QUESTIONS 1-9: 0

## 2023-01-13 NOTE — PROGRESS NOTES
Jacklyn Pierson (:  1955) is a 75 y.o. male,Established patient, here for evaluation of the following chief complaint(s):  Congestion (Chest congestion since Wednesday. Productive cough, has tried otc Mucinex and tessalon pearls with no change. Pt declines testing done right now)        SUBJECTIVE    History of present illness:     Has hx of asthma, allergic rhinitis, NIDDM-2  Had COVID in Oct 2022  Presents with symptoms of:   Chest congestion, cough with sputum production, wheezing, and mild dyspnea  Duration of symptoms 3 days  States is impacting his sleep  Has tried:  Using his albuterol inhaler, continuing his Flovent  Associated symptoms:  Denies fevers, recent sick contacts, chest pain    Review of Symptoms: As per HPI.      Past Medical History:   Diagnosis Date    Actinic keratoses     has had LN2 and used Efudex    Alcohol consumption of one to four drinks per day on alcohol screening     Allergic rhinitis     cats, weeds, grasses, ragweed    Asthma     Bilateral knee pain     Diabetes mellitus ([address]     Gout     History of colon polyps 2016    needs f/u 5 years [facility]    History of type 2 diabetes mellitus     about 15 years    Hyperlipidemia     Hypertension     Keratosis, inflamed seborrheic     Osteoarthritis, localized, shoulder, right     Polyp of transverse colon f/u due 2014    Prediabetes     Prostate cancer ([address]     s/p prostatectomy    Syncope and collapse     Varicose vein of leg      Past Surgical History:   Procedure Laterality Date    COLONOSCOPY  2016    Dr. Sabine Sheth; polyps f/u in 5 years    COLONOSCOPY N/A 10/2/2020    COLONOSCOPY DIAGNOSTIC performed by Leslie Garza MD at [facility]  2014    ULNAR TUNNEL RELEASE Bilateral     VARICOSE VEIN SURGERY       Family History   Problem Relation Age of Onset    Alzheimer's Disease Mother     Heart Disease Father     Cancer Father     Diabetes Father     Cancer Brother     Colon Cancer Neg Hx     Celiac Disease Neg Hx     Crohn's Disease Neg Hx      Social History     Socioeconomic History    Marital status: Single     Spouse name: Not on file    Number of children: Not on file    Years of education: 0    Highest education level: Not on file   Occupational History    Not on file   Tobacco Use    Smoking status: Former     Packs/day: 0.50     Years: 12.00     Pack years: 6.00     Types: Cigarettes     Quit date: 1982     Years since quittin.9    Smokeless tobacco: Never   Vaping Use    Vaping Use: Never used   Substance and Sexual Activity    Alcohol use: Yes     Comment: daily 5 vodka drinks per day    Drug use: No    Sexual activity: Yes     Partners: Female   Other Topics Concern    Not on file   Social History Narrative    Engaged. No children. Social Determinants of Health     Financial Resource Strain: Low Risk     Difficulty of Paying Living Expenses: Not hard at all   Food Insecurity: No Food Insecurity    Worried About [address] [employer] in the Last Year: Never true    [address] [email or web address] in the Last Year: Never true   Transportation Needs: No Transportation Needs    Lack of Transportation (Medical): No    Lack of Transportation (Non-Medical): No   Physical Activity: Not on file   Stress: Not on file   Social Connections: Not on file   Intimate Partner Violence: Not on file   Housing Stability: Not on file     Patient has no known allergies. Prior to Admission medications    Medication Sig Start Date End Date Taking?  Authorizing Provider   folic acid (FOLVITE) 1 MG tablet Take 1 mg by mouth daily Pt is taking   Yes Historical Provider, MD   Cyanocobalamin (VITAMIN B 12) 500 MCG TABS Take 1,000 mcg by mouth daily   Yes Historical Provider, MD   predniSONE (DELTASONE) 20 MG tablet Take 1 tablet by mouth daily for 5 days 23 Yes Marc Goetz DO   azithromycin (ZITHROMAX) 250 MG tablet Take 1 tablet by mouth See Admin Instructions for 5 days 500mg on day 1 followed by 250mg on days 2 - 5 1/13/23 1/18/23 Yes Ange So, DO   albuterol sulfate HFA (VENTOLIN HFA) 108 (90 Base) MCG/ACT inhaler Inhale 2 puffs into the lungs every 6 hours as needed for Wheezing 1/13/23  Yes Ange So, DO   simvastatin (ZOCOR) 20 MG tablet TAKE ONE TABLET BY MOUTH EVERY DAY 11/21/22  Yes Rachelle Ortiz MD   metFORMIN (GLUCOPHAGE) 500 MG tablet Take 1 tablet by mouth 3 times daily 8/25/22  Yes Anderson Peña MD   fluticasone (FLOVENT HFA) 110 MCG/ACT inhaler Inhale 2 puffs into the lungs 2 times daily 8/25/22 8/25/23 Yes Anderson Peña MD   irbesartan (AVAPRO) 150 MG tablet TAKE ONE TABLET BY MOUTH DAILY 8/23/22  Yes Ayla Mejia DO   allopurinol (ZYLOPRIM) 300 MG tablet TAKE ONE TABLET BY MOUTH DAILY 6/2/22  Yes Rachelle Ortiz MD   metoprolol succinate (TOPROL XL) 100 MG extended release tablet TAKE ONE TABLET BY MOUTH DAILY 6/2/22  Yes Rachelle Ortiz MD   amLODIPine (NORVASC) 5 MG tablet Take 1 tablet by mouth daily 4/2/22  Yes Rachelle Ortiz MD   fluticasone [facility]) 50 MCG/ACT nasal spray 2 sprays by Nasal route daily 2/1/22  Yes CALIXTO Lake CNP   Cholecalciferol (D3-1000 PO) Take by mouth   Yes Historical Provider, MD   Spacer/Aero-Hold Chamber Bags MISC 1 applicator by Does not apply route 4 times daily 8/25/20  Yes Rachelle Ortiz MD   Blood Pressure KIT 1 applicator by Does not apply route 4 times daily 8/25/20  Yes Rachelle Ortiz MD   Coenzyme Q10-Vitamin E (QUNOL ULTRA COQ10) 100-150 MG-UNIT CAPS  3/10/20  Yes Historical Provider, MD   vitamin C (ASCORBIC ACID) 500 MG tablet Take 500 mg by mouth daily   Yes Historical Provider, MD   Multiple Vitamins-Minerals (MULTIVITAMIN MEN 50+ PO) Take by mouth   Yes Historical Provider, MD       OBJECTIVE     /80 (Site: Right Upper Arm, Position: Sitting, Cuff Size: Medium Adult)   Pulse 83   Temp 97.8 °F (36.6 °C) (Tympanic)   SpO2 96%     General: alert and oriented, NAD  Head: normocephalic, atraumatic  Ears: Cerumen obstructing external auditory canal bilaterally  Nose: Dry nasal mucosa  Throat: Postnasal drip appreciated no tonsillar hypertrophy exudates or pharyngeal erythema  Neck: No adenopathy  Cardiovascular: regular rate and rhythm; no murmurs, gallops, or rubs  Respiratory diffuse rhonchi bilaterally, with moderate expiratory wheezing bilaterally  Neurological: no focal neurological deficits  Psychological: normal mood and affect      ASSESSMENT/PLAN    Cough with sputum production    Acute. Differential diagnosis: Bronchitis, pneumonia, asthma exacerbation  Will obtain chest x-ray to rule out pneumonia  Continue albuterol inhaler for dyspnea symptoms  Will give course of azithromycin to treat presumed bacterial bronchitis    Asthma exacerbation    Acute. Mild: With increased cough and wheezing  Oxygen saturation preserved  We will treat with course of prednisone    Cerumen impaction    Uncontrolled. Advised patient to try over-the-counter Debrox  If no improvement may come back to have ears irrigated in office    None insulin-dependent type 2 diabetes    Controlled. Last A1c less than 7.0 at goal  Management per primary care physician      Return if symptoms worsen or fail to improve. An electronic signature was used to authenticate this note.     --Kelsie Maagna, DO

## 2023-01-23 ENCOUNTER — OFFICE VISIT (OUTPATIENT)
Dept: FAMILY MEDICINE CLINIC | Age: 68
End: 2023-01-23
Payer: MEDICARE

## 2023-01-23 VITALS
BODY MASS INDEX: 31.01 KG/M2 | HEIGHT: 73 IN | RESPIRATION RATE: 16 BRPM | TEMPERATURE: 97.9 F | DIASTOLIC BLOOD PRESSURE: 84 MMHG | HEART RATE: 99 BPM | SYSTOLIC BLOOD PRESSURE: 130 MMHG | WEIGHT: 234 LBS | OXYGEN SATURATION: 99 %

## 2023-01-23 DIAGNOSIS — R80.9 MICROALBUMINURIA: ICD-10-CM

## 2023-01-23 DIAGNOSIS — D75.89 MACROCYTOSIS: ICD-10-CM

## 2023-01-23 DIAGNOSIS — J45.21 MILD INTERMITTENT ASTHMA WITH EXACERBATION: ICD-10-CM

## 2023-01-23 DIAGNOSIS — J40 BRONCHITIS: ICD-10-CM

## 2023-01-23 DIAGNOSIS — E11.9 TYPE 2 DIABETES MELLITUS WITHOUT COMPLICATION, WITHOUT LONG-TERM CURRENT USE OF INSULIN (HCC): ICD-10-CM

## 2023-01-23 DIAGNOSIS — J06.9 URI WITH COUGH AND CONGESTION: Primary | ICD-10-CM

## 2023-01-23 LAB
ANION GAP SERPL CALCULATED.3IONS-SCNC: 17 MEQ/L (ref 9–15)
BASOPHILS ABSOLUTE: 0.1 K/UL (ref 0–0.2)
BASOPHILS RELATIVE PERCENT: 0.6 %
BUN BLDV-MCNC: 8 MG/DL (ref 8–23)
CALCIUM SERPL-MCNC: 9.6 MG/DL (ref 8.5–9.9)
CHLORIDE BLD-SCNC: 99 MEQ/L (ref 95–107)
CO2: 24 MEQ/L (ref 20–31)
CREAT SERPL-MCNC: 0.72 MG/DL (ref 0.7–1.2)
CREATININE URINE: 552 MG/DL
EOSINOPHILS ABSOLUTE: 0.2 K/UL (ref 0–0.7)
EOSINOPHILS RELATIVE PERCENT: 2.2 %
GFR SERPL CREATININE-BSD FRML MDRD: >60 ML/MIN/{1.73_M2}
GLUCOSE BLD-MCNC: 181 MG/DL (ref 70–99)
HCT VFR BLD CALC: 44.6 % (ref 42–52)
HEMOGLOBIN: 15.1 G/DL (ref 14–18)
LYMPHOCYTES ABSOLUTE: 2.1 K/UL (ref 1–4.8)
LYMPHOCYTES RELATIVE PERCENT: 21.8 %
MCH RBC QN AUTO: 34.2 PG (ref 27–31.3)
MCHC RBC AUTO-ENTMCNC: 33.8 % (ref 33–37)
MCV RBC AUTO: 101.3 FL (ref 79–92.2)
MICROALBUMIN UR-MCNC: 20.9 MG/DL
MICROALBUMIN/CREAT UR-RTO: 37.9 MG/G (ref 0–30)
MONOCYTES ABSOLUTE: 0.8 K/UL (ref 0.2–0.8)
MONOCYTES RELATIVE PERCENT: 8.5 %
NEUTROPHILS ABSOLUTE: 6.3 K/UL (ref 1.4–6.5)
NEUTROPHILS RELATIVE PERCENT: 66.9 %
PDW BLD-RTO: 12.8 % (ref 11.5–14.5)
PLATELET # BLD: 222 K/UL (ref 130–400)
POTASSIUM SERPL-SCNC: 3.9 MEQ/L (ref 3.4–4.9)
RBC # BLD: 4.4 M/UL (ref 4.7–6.1)
SODIUM BLD-SCNC: 140 MEQ/L (ref 135–144)
WBC # BLD: 9.4 K/UL (ref 4.8–10.8)

## 2023-01-23 PROCEDURE — 3075F SYST BP GE 130 - 139MM HG: CPT | Performed by: NURSE PRACTITIONER

## 2023-01-23 PROCEDURE — 3017F COLORECTAL CA SCREEN DOC REV: CPT | Performed by: NURSE PRACTITIONER

## 2023-01-23 PROCEDURE — G8484 FLU IMMUNIZE NO ADMIN: HCPCS | Performed by: NURSE PRACTITIONER

## 2023-01-23 PROCEDURE — 3079F DIAST BP 80-89 MM HG: CPT | Performed by: NURSE PRACTITIONER

## 2023-01-23 PROCEDURE — G8417 CALC BMI ABV UP PARAM F/U: HCPCS | Performed by: NURSE PRACTITIONER

## 2023-01-23 PROCEDURE — 1036F TOBACCO NON-USER: CPT | Performed by: NURSE PRACTITIONER

## 2023-01-23 PROCEDURE — G8427 DOCREV CUR MEDS BY ELIG CLIN: HCPCS | Performed by: NURSE PRACTITIONER

## 2023-01-23 PROCEDURE — 99213 OFFICE O/P EST LOW 20 MIN: CPT | Performed by: NURSE PRACTITIONER

## 2023-01-23 PROCEDURE — 1123F ACP DISCUSS/DSCN MKR DOCD: CPT | Performed by: NURSE PRACTITIONER

## 2023-01-23 RX ORDER — PREDNISONE 20 MG/1
20 TABLET ORAL 2 TIMES DAILY
Qty: 10 TABLET | Refills: 0 | Status: SHIPPED | OUTPATIENT
Start: 2023-01-23 | End: 2023-01-28

## 2023-01-23 RX ORDER — DOXYCYCLINE HYCLATE 100 MG
100 TABLET ORAL 2 TIMES DAILY
Qty: 20 TABLET | Refills: 0 | Status: SHIPPED | OUTPATIENT
Start: 2023-01-23 | End: 2023-02-02

## 2023-01-23 ASSESSMENT — ENCOUNTER SYMPTOMS
WHEEZING: 1
NAUSEA: 0
VOMITING: 0
RHINORRHEA: 0
SORE THROAT: 0
SINUS PRESSURE: 0
SINUS PAIN: 0
CHEST TIGHTNESS: 1
SHORTNESS OF BREATH: 1
ABDOMINAL PAIN: 0
DIARRHEA: 0
COUGH: 1

## 2023-01-23 NOTE — RESULT ENCOUNTER NOTE
Notify patient there is evidence of increased protein leakage into the urine. This is a sign that comes from damage from the diabetes. His hemoglobin A1c was increasing back in October and we have discussed working on diabetic control.   Appointment in the next week or 2 to do point-of-care hemoglobin A1c and check in on how he is doing and discuss this further

## 2023-01-23 NOTE — PROGRESS NOTES
Subjective:      Patient ID: Viktoriya Park is a 75 y.o. male who presents today for:  Chief Complaint   Patient presents with    Cough     Congestion. Was just seen by DR. Goetz for bronchitis on 1/13/23- states he has a productive cough finished z pack 5 days ago. Declines covid and flu test due to sx being so long. Did have chest xray done as well       HPI  Patient is here with c/o cough and chest congestion. Says he is getting up from thick sputum, thicker and colored now. Says he has felt SOB at times. Says he can rattle in the chest.   Says he is not a smoker. Says he has HX asthma. Takes Flovent and has albuterol inhaler. Says he is not taking the albuterol more than normal.   Says he was seen 1/13 for cough and congestion.      Past Medical History:   Diagnosis Date    Actinic keratoses     has had LN2 and used Efudex    Alcohol consumption of one to four drinks per day on alcohol screening     Allergic rhinitis     cats, weeds, grasses, ragweed    Asthma     Bilateral knee pain     Diabetes mellitus ([address]     Gout     History of colon polyps 02/2016    needs f/u 5 years [facility]    History of type 2 diabetes mellitus     about 15 years    Hyperlipidemia     Hypertension     Keratosis, inflamed seborrheic     Osteoarthritis, localized, shoulder, right     Polyp of transverse colon f/u due 2021 8/9/2014    Prediabetes     Prostate cancer ([address] 2014    s/p prostatectomy    Syncope and collapse     Varicose vein of leg      Past Surgical History:   Procedure Laterality Date    COLONOSCOPY  02/04/2016    Dr. Anisa Paz; polyps f/u in 5 years    COLONOSCOPY N/A 10/2/2020    COLONOSCOPY DIAGNOSTIC performed by Jaskaran Sahu MD at [facility]  11/2014    ULNAR TUNNEL RELEASE Bilateral     VARICOSE VEIN SURGERY       Social History     Socioeconomic History    Marital status: Single     Spouse name: Not on file    Number of children: Not on file    Years of education: 0    Highest education level: Not on file   Occupational History    Not on file   Tobacco Use    Smoking status: Former     Packs/day: 0.50     Years: 12.00     Pack years: 6.00     Types: Cigarettes     Quit date: 1982     Years since quittin.9    Smokeless tobacco: Never   Vaping Use    Vaping Use: Never used   Substance and Sexual Activity    Alcohol use: Yes     Comment: daily 5 vodka drinks per day    Drug use: No    Sexual activity: Yes     Partners: Female   Other Topics Concern    Not on file   Social History Narrative    Engaged. No children. Social Determinants of Health     Financial Resource Strain: Low Risk     Difficulty of Paying Living Expenses: Not hard at all   Food Insecurity: No Food Insecurity    Worried About [address] [employer] in the Last Year: Never true    [address] in the Last Year: Never true   Transportation Needs: No Transportation Needs    Lack of Transportation (Medical): No    Lack of Transportation (Non-Medical):  No   Physical Activity: Not on file   Stress: Not on file   Social Connections: Not on file   Intimate Partner Violence: Not on file   Housing Stability: Not on file     Family History   Problem Relation Age of Onset    Alzheimer's Disease Mother     Heart Disease Father     Cancer Father     Diabetes Father     Cancer Brother     Colon Cancer Neg Hx     Celiac Disease Neg Hx     Crohn's Disease Neg Hx      No Known Allergies  Current Outpatient Medications   Medication Sig Dispense Refill    doxycycline hyclate (VIBRA-TABS) 100 MG tablet Take 1 tablet by mouth 2 times daily for 10 days 20 tablet 0    predniSONE (DELTASONE) 20 MG tablet Take 1 tablet by mouth 2 times daily for 5 days 10 tablet 0    folic acid (FOLVITE) 1 MG tablet Take 1 mg by mouth daily Pt is taking      Cyanocobalamin (VITAMIN B 12) 500 MCG TABS Take 1,000 mcg by mouth daily      albuterol sulfate HFA (VENTOLIN HFA) 108 (90 Base) MCG/ACT inhaler Inhale 2 puffs into the lungs every 6 hours as needed for Wheezing 18 g 1    simvastatin (ZOCOR) 20 MG tablet TAKE ONE TABLET BY MOUTH EVERY DAY 90 tablet 3    metFORMIN (GLUCOPHAGE) 500 MG tablet Take 1 tablet by mouth 3 times daily 270 tablet 3    fluticasone (FLOVENT HFA) 110 MCG/ACT inhaler Inhale 2 puffs into the lungs 2 times daily 1 each 5    irbesartan (AVAPRO) 150 MG tablet TAKE ONE TABLET BY MOUTH DAILY 90 tablet 3    allopurinol (ZYLOPRIM) 300 MG tablet TAKE ONE TABLET BY MOUTH DAILY 90 tablet 3    metoprolol succinate (TOPROL XL) 100 MG extended release tablet TAKE ONE TABLET BY MOUTH DAILY 90 tablet 3    amLODIPine (NORVASC) 5 MG tablet Take 1 tablet by mouth daily 30 tablet 11    fluticasone (FLONASE) 50 MCG/ACT nasal spray 2 sprays by Nasal route daily 32 g 0    Cholecalciferol (D3-1000 PO) Take by mouth      Spacer/Aero-Hold Chamber Bags MISC 1 applicator by Does not apply route 4 times daily 1 each 0    Blood Pressure KIT 1 applicator by Does not apply route 4 times daily 1 kit 0    Coenzyme Q10-Vitamin E (QUNOL ULTRA COQ10) 100-150 MG-UNIT CAPS       vitamin C (ASCORBIC ACID) 500 MG tablet Take 500 mg by mouth daily      Multiple Vitamins-Minerals (MULTIVITAMIN MEN 50+ PO) Take by mouth       No current facility-administered medications for this visit.          Review of Systems   Constitutional:  Negative for activity change, appetite change, chills, diaphoresis, fatigue, fever and unexpected weight change.   HENT:  Negative for congestion, ear discharge, ear pain, postnasal drip, rhinorrhea, sinus pressure, sinus pain, sneezing and sore throat.    Respiratory:  Positive for cough, chest tightness, shortness of breath and wheezing.    Cardiovascular:  Negative for chest pain.   Gastrointestinal:  Negative for abdominal pain, diarrhea, nausea and vomiting.   Musculoskeletal:  Negative for arthralgias and myalgias.   Skin:  Negative for rash.   Neurological:  Negative for dizziness, weakness, light-headedness and headaches.   Hematological:  Negative  for adenopathy. Objective:   /84 (Site: Left Upper Arm, Position: Sitting, Cuff Size: Medium Adult)   Pulse 99   Temp 97.9 °F (36.6 °C) (Temporal)   Resp 16   Ht 6' 1\" (1.854 m)   Wt 234 lb (106.1 kg)   SpO2 99%   BMI 30.87 kg/m²     Physical Exam  Vitals reviewed. Constitutional:       General: He is awake. He is not in acute distress. Appearance: Normal appearance. He is well-developed, well-groomed and normal weight. He is not ill-appearing, toxic-appearing or diaphoretic. HENT:      Head: Normocephalic and atraumatic. Right Ear: Hearing, tympanic membrane, ear canal and external ear normal. There is no impacted cerumen. Left Ear: Hearing, tympanic membrane, ear canal and external ear normal. There is no impacted cerumen. Nose: Nose normal.      Mouth/Throat:      Lips: Pink. Mouth: Mucous membranes are moist. No oral lesions. Tongue: No lesions. Palate: No lesions. Pharynx: Oropharynx is clear. Uvula midline. Tonsils: No tonsillar exudate or tonsillar abscesses. 0 on the right. 0 on the left. Eyes:      General: Lids are normal.      Extraocular Movements: Extraocular movements intact. Conjunctiva/sclera: Conjunctivae normal.   Neck:      Trachea: Trachea normal.   Cardiovascular:      Rate and Rhythm: Normal rate and regular rhythm. Pulses: Normal pulses. Heart sounds: Normal heart sounds, S1 normal and S2 normal.   Pulmonary:      Effort: Pulmonary effort is normal.      Breath sounds: Examination of the right-upper field reveals wheezing. Examination of the left-upper field reveals wheezing. Wheezing and rhonchi present. Abdominal:      General: Bowel sounds are normal.      Palpations: Abdomen is soft. Musculoskeletal:         General: Normal range of motion. Cervical back: Normal range of motion and neck supple. No tenderness. Lymphadenopathy:      Cervical: No cervical adenopathy.    Skin:     General: Skin is warm and dry. Capillary Refill: Capillary refill takes less than 2 seconds. Neurological:      General: No focal deficit present. Mental Status: He is alert and oriented to person, place, and time. Mental status is at baseline. Psychiatric:         Attention and Perception: Attention and perception normal.         Mood and Affect: Mood and affect normal.         Speech: Speech normal.         Behavior: Behavior normal. Behavior is cooperative. Thought Content: Thought content normal.         Cognition and Memory: Cognition and memory normal.         Judgment: Judgment normal.       Assessment:       Diagnosis Orders   1. URI with cough and congestion        2. Bronchitis  doxycycline hyclate (VIBRA-TABS) 100 MG tablet    predniSONE (DELTASONE) 20 MG tablet      3. Mild intermittent asthma with exacerbation  doxycycline hyclate (VIBRA-TABS) 100 MG tablet    predniSONE (DELTASONE) 20 MG tablet        No results found for this visit on 01/23/23. Plan:     Assessment & Plan   Gunnar Shah was seen today for cough. Diagnoses and all orders for this visit:    Will treat with additional ANTB and another round of steroids. Discussed to cont with albuterol inhaler as well. Reviewed previous CXR. Advised to follow up if symptoms are not improving with medications. Discussed administration and SE of the medications. Antibiotic Instructions: Complete the full course of antibiotics as ordered. Take each dose with a small snack or meal to lessen potential GI upset. To prevent antibiotic resistance, please take medication as ordered and for the full duration even if you start to feel better. Consider intake of yogurt or probiotic during antibiotic use and for a few days after to help reduce the risk of developing a secondary infection. Separate the yogurt and antibiotic by at least 1 hour. Avoid alcohol while taking antibiotics.             No orders of the defined types were placed in this encounter.    Orders Placed This Encounter   Medications    doxycycline hyclate (VIBRA-TABS) 100 MG tablet     Sig: Take 1 tablet by mouth 2 times daily for 10 days     Dispense:  20 tablet     Refill:  0    predniSONE (DELTASONE) 20 MG tablet     Sig: Take 1 tablet by mouth 2 times daily for 5 days     Dispense:  10 tablet     Refill:  0     There are no discontinued medications.  Return for if symptoms do not improve in 3-5 days, worsening of condition.        Reviewed with the patient/family: current clinical status & medications.  Side effects of the medication prescribed today, as well as treatment plan/rationale and result expectations have been discussed with the patient/family who expresses understanding. Patient will be discharged home in stable condition.    Follow up with PCP to evaluate treatment results or return if symptoms worsen or fail to improve. Discussed signs and symptoms which require immediate follow-up in ED/call to 911.  Understanding verbalized.     I have reviewed the patient's medical history in detail and updated the computerized patient record.    JELENA LENNON, APRN - CNP

## 2023-01-24 LAB
FOLATE: >20 NG/ML
VITAMIN B-12: 1124 PG/ML (ref 232–1245)

## 2023-01-25 ENCOUNTER — OFFICE VISIT (OUTPATIENT)
Dept: FAMILY MEDICINE CLINIC | Age: 68
End: 2023-01-25

## 2023-01-25 VITALS
BODY MASS INDEX: 31.54 KG/M2 | HEART RATE: 72 BPM | TEMPERATURE: 98.9 F | OXYGEN SATURATION: 98 % | HEIGHT: 73 IN | DIASTOLIC BLOOD PRESSURE: 72 MMHG | SYSTOLIC BLOOD PRESSURE: 118 MMHG | WEIGHT: 238 LBS

## 2023-01-25 DIAGNOSIS — D75.89 MACROCYTOSIS: ICD-10-CM

## 2023-01-25 DIAGNOSIS — M25.559 HIP PAIN: ICD-10-CM

## 2023-01-25 DIAGNOSIS — E11.65 UNCONTROLLED TYPE 2 DIABETES MELLITUS WITH HYPERGLYCEMIA (HCC): ICD-10-CM

## 2023-01-25 DIAGNOSIS — L57.0 ACTINIC KERATOSES: ICD-10-CM

## 2023-01-25 DIAGNOSIS — J40 BRONCHITIS: ICD-10-CM

## 2023-01-25 DIAGNOSIS — Z00.00 MEDICARE ANNUAL WELLNESS VISIT, SUBSEQUENT: Primary | ICD-10-CM

## 2023-01-25 DIAGNOSIS — M25.562 ACUTE PAIN OF BOTH KNEES: ICD-10-CM

## 2023-01-25 DIAGNOSIS — R80.9 MICROALBUMINURIA: ICD-10-CM

## 2023-01-25 DIAGNOSIS — C61 PROSTATE CA (HCC): ICD-10-CM

## 2023-01-25 DIAGNOSIS — M25.561 ACUTE PAIN OF BOTH KNEES: ICD-10-CM

## 2023-01-25 LAB — HBA1C MFR BLD: 6.5 %

## 2023-01-25 ASSESSMENT — LIFESTYLE VARIABLES
HOW OFTEN DURING THE LAST YEAR HAVE YOU FOUND THAT YOU WERE NOT ABLE TO STOP DRINKING ONCE YOU HAD STARTED: 0
HAVE YOU OR SOMEONE ELSE BEEN INJURED AS A RESULT OF YOUR DRINKING: 0
HOW MANY STANDARD DRINKS CONTAINING ALCOHOL DO YOU HAVE ON A TYPICAL DAY: 3 OR 4
HOW OFTEN DURING THE LAST YEAR HAVE YOU HAD A FEELING OF GUILT OR REMORSE AFTER DRINKING: 0
HOW OFTEN DURING THE LAST YEAR HAVE YOU BEEN UNABLE TO REMEMBER WHAT HAPPENED THE NIGHT BEFORE BECAUSE YOU HAD BEEN DRINKING: 0
HOW OFTEN DURING THE LAST YEAR HAVE YOU NEEDED AN ALCOHOLIC DRINK FIRST THING IN THE MORNING TO GET YOURSELF GOING AFTER A NIGHT OF HEAVY DRINKING: 0
HAS A RELATIVE, FRIEND, DOCTOR, OR ANOTHER HEALTH PROFESSIONAL EXPRESSED CONCERN ABOUT YOUR DRINKING OR SUGGESTED YOU CUT DOWN: 0
HOW OFTEN DO YOU HAVE A DRINK CONTAINING ALCOHOL: 4 OR MORE TIMES A WEEK
HOW OFTEN DURING THE LAST YEAR HAVE YOU FAILED TO DO WHAT WAS NORMALLY EXPECTED FROM YOU BECAUSE OF DRINKING: 0

## 2023-01-25 ASSESSMENT — PATIENT HEALTH QUESTIONNAIRE - PHQ9
SUM OF ALL RESPONSES TO PHQ QUESTIONS 1-9: 0
2. FEELING DOWN, DEPRESSED OR HOPELESS: 0
1. LITTLE INTEREST OR PLEASURE IN DOING THINGS: 0
SUM OF ALL RESPONSES TO PHQ QUESTIONS 1-9: 0
SUM OF ALL RESPONSES TO PHQ QUESTIONS 1-9: 0
SUM OF ALL RESPONSES TO PHQ9 QUESTIONS 1 & 2: 0
SUM OF ALL RESPONSES TO PHQ QUESTIONS 1-9: 0

## 2023-01-25 NOTE — PATIENT INSTRUCTIONS
Substance Use Disorder: Care Instructions  Overview     You can improve your life and health by stopping your use of alcohol or drugs. When you don't drink or use drugs, you may feel and sleep better. You may get along better with your family, friends, and coworkers. There are medicines and programs that can help with substance use disorder. How can you care for yourself at home? If you have been given medicine to help keep you sober or reduce your cravings, be sure to take it exactly as prescribed. Talk to your doctor about programs that can help you stop using drugs or drinking alcohol. Do not tempt yourself by keeping alcohol or drugs in your home. Learn how to say no when other people drink or use drugs. Or don't spend time with people who drink or use drugs. Use the time and money spent on drinking or drugs to do something fun with your family or friends. Preventing a relapse  Do not drink alcohol or use drugs at all. Using any amount of alcohol or drugs greatly increases your risk for relapse. Seek help from organizations such as Alcoholics Anonymous, Narcotics Anonymous, or treatment facilities if you feel the need to drink alcohol or use drugs again. Remember that recovery is a lifelong process. Stay away from situations, friends, or places that may lead you to drink or use drugs. Have a plan to spot and deal with relapse. Learn to recognize changes in your thinking that lead you to drink or use drugs. These are warning signs. Get help before you start to drink or use drugs again. Get help as soon as you can if you relapse. Some people make a plan with another person that outlines what they want that person to do for them if they relapse. The plan usually includes how to handle the relapse and who to notify in case of relapse. Don't give up. Remember that a relapse does not mean that you have failed. Use the experience to learn the triggers that lead you to drink or use drugs.  Then quit again. Many people have several relapses before they are able to quit for good. Follow-up care is a key part of your treatment and safety. Be sure to make and go to all appointments, and call your doctor if you are having problems. It's also a good idea to know your test results and keep a list of the medicines you take. When should you call for help? Call 911  anytime you think you may need emergency care. For example, call if:    You feel you cannot stop from hurting yourself or someone else. Call your doctor now or seek immediate medical care if:    You have serious withdrawal symptoms, such as confusion, hallucinations, severe trembling, or seizures. Watch closely for changes in your health, and be sure to contact your doctor if:    You have a relapse.     You need more help or support to stop. Where can you learn more? Go to http://[employer].villatoro.com/ and enter H573 to learn more about \"Substance Use Disorder: Care Instructions. \"  Current as of: August 2, 2022               Content Version: 13.5  © 2006-2022 [employer]. Care instructions adapted under license by [facility] ([facility]). If you have questions about a medical condition or this instruction, always ask your healthcare professional. [place] [address] any warranty or liability for your use of this information. Fatigue: Care Instructions  Your Care Instructions     Fatigue is a feeling of tiredness, exhaustion, or lack of energy. You may feel fatigue because of too much or not enough activity. It can also come from stress, lack of sleep, boredom, and poor diet. Many medical problems, such as viral infections, can cause fatigue. Emotional problems, especially depression, are often the cause of fatigue. Fatigue is most often a symptom of another problem. Treatment for fatigue depends on the cause.  For example, if you have fatigue because you have a certain health problem, treating this problem also treats your fatigue. If depression or anxiety is the cause, treatment may help. Follow-up care is a key part of your treatment and safety. Be sure to make and go to all appointments, and call your doctor if you are having problems. It's also a good idea to know your test results and keep a list of the medicines you take. How can you care for yourself at home? Get regular exercise. But don't overdo it. Go back and forth between rest and exercise. Get plenty of rest.  Eat a healthy diet. Do not skip meals, especially breakfast.  Reduce your use of caffeine, tobacco, and alcohol. Caffeine is most often found in coffee, tea, cola drinks, and chocolate. Limit medicines that can cause fatigue. This includes tranquilizers and cold and allergy medicines. When should you call for help? Watch closely for changes in your health, and be sure to contact your doctor if:    You have new symptoms such as fever or a rash.     Your fatigue gets worse.     You have been feeling down, depressed, or hopeless. Or you may have lost interest in things that you usually enjoy.     You are not getting better as expected. Where can you learn more? Go to http://www.woods.com/ and enter T491 to learn more about \"Fatigue: Care Instructions. \"  Current as of: February 9, 2022               Content Version: 13.5  © 7269-6461 Healthwise, Incorporated. Care instructions adapted under license by [facility] ([facility]). If you have questions about a medical condition or this instruction, always ask your healthcare professional. [place] [address] any warranty or liability for your use of this information. Learning About Being Active as an Older Adult  Why is being active important as you get older? Being active is one of the best things you can do for your health. And it's never too late to start. Being active--or getting active, if you aren't already--has definite benefits.  It can:  Give you more energy,  Keep your mind sharp. Improve balance to reduce your risk of falls. Help you manage chronic illness with fewer medicines. No matter how old you are, how fit you are, or what health problems you have, there is a form of activity that will work for you. And the more physical activity you can do, the better your overall health will be. What kinds of activity can help you stay healthy? Being more active will make your daily activities easier. Physical activity includes planned exercise and things you do in daily life. There are four types of activity:  Aerobic. Doing aerobic activity makes your heart and lungs strong. Includes walking, dancing, and gardening. Aim for at least 2½ hours spread throughout the week. It improves your energy and can help you sleep better. Muscle-strengthening. This type of activity can help maintain muscle and strengthen bones. Includes climbing stairs, using resistance bands, and lifting or carrying heavy loads. Aim for at least twice a week. It can help protect the knees and other joints. Stretching. Stretching gives you better range of motion in joints and muscles. Includes upper arm stretches, calf stretches, and gentle yoga. Aim for at least twice a week, preferably after your muscles are warmed up from other activities. It can help you function better in daily life. Balancing. This helps you stay coordinated and have good posture. Includes heel-to-toe walking, leesa chi, and certain types of yoga. Aim for at least 3 days a week. It can reduce your risk of falling. Even if you have a hard time meeting the recommendations, it's better to be more active than less active. All activity done in each category counts toward your weekly total. You'd be surprised how daily things like carrying groceries, keeping up with grandchildren, and taking the stairs can add up. What keeps you from being active? If you've had a hard time being more active, you're not alone. Maybe you remember being able to do more. Or maybe you've never thought of yourself as being active. It's frustrating when you can't do the things you want. Being more active can help. What's holding you back? Getting started. Have a goal, but break it into easy tasks. Small steps build into big accomplishments. Staying motivated. If you feel like skipping your activity, remember your goal. Maybe you want to move better and stay independent. Every activity gets you one step closer. Not feeling your best.  Start with 5 minutes of an activity you enjoy. Prove to yourself you can do it. As you get comfortable, increase your time. You may not be where you want to be. But you're in the process of getting there. Everyone starts somewhere. How can you find safe ways to stay active? Talk with your doctor about any physical challenges you're facing. Make a plan with your doctor if you have a health problem or aren't sure how to get started with activity. If you're already active, ask your doctor if there is anything you should change to stay safe as your body and health change. If you tend to feel dizzy after you take medicine, avoid activity at that time. Try being active before you take your medicine. This will reduce your risk of falls. If you plan to be active at home, make sure to clear your space before you get started. Remove things like TV cords, coffee tables, and throw rugs. It's safest to have plenty of space to move freely. The key to getting more active is to take it slow and steady. Try to improve only a little bit at a time. Pick just one area to improve on at first. And if an activity hurts, stop and talk to your doctor. Where can you learn more? Go to http://www.villatoro.com/ and enter P600 to learn more about \"Learning About Being Active as an Older Adult. \"  Current as of: October 10, 2022               Content Version: 13.5  © 0733-4202 Healthwise, [facility].    Care instructions adapted under license by [facility] ([facility]). If you have questions about a medical condition or this instruction, always ask your healthcare professional. [address] any warranty or liability for your use of this information. Learning About Dental Care for Older Adults  Dental care for older adults: Overview  Dental care for older people is much the same as for younger adults. But older adults do have concerns that younger adults do not. Older adults may have problems with gum disease and decay on the roots of their teeth. They may need missing teeth replaced or broken fillings fixed. Or they may have dentures that need to be cared for. Some older adults may have trouble holding a toothbrush. You can help remind the person you are caring for to brush and floss their teeth or to clean their dentures. In some cases, you may need to do the brushing and other dental care tasks. People who have trouble using their hands or who have dementia may need this extra help. How can you help with dental care? Normal dental care  To keep the teeth and gums healthy:  Brush the teeth with fluoride toothpaste twice a day--in the morning and at night--and floss at least once a day. Plaque can quickly build up on the teeth of older adults. Watch for the signs of gum disease. These signs include gums that bleed after brushing or after eating hard foods, such as apples. See a dentist regularly. Many experts recommend checkups every 6 months. Keep the dentist up to date on any new medications the person is taking. Encourage a balanced diet that includes whole grains, vegetables, and fruits, and that is low in saturated fat and sodium. Encourage the person you're caring for not to use tobacco products. They can affect dental and general health. Many older adults have a fixed income and feel that they can't afford dental care.  But most [facility] and [facility] have programs in which dentists help older adults by lowering fees. Contact your area's public health offices or  for information about dental care in your area. Using a toothbrush  Older adults with arthritis sometimes have trouble brushing their teeth because they can't easily hold the toothbrush. Their hands and fingers may be stiff, painful, or weak. If this is the case, you can: Offer an electric toothbrush. Enlarge the handle of a non-electric toothbrush by wrapping a sponge, an elastic bandage, or adhesive tape around it. Push the toothbrush handle through a ball made of rubber or soft foam.  Make the handle longer and thicker by taping Popsicle sticks or tongue depressors to it. You may also be able to buy special toothbrushes, toothpaste dispensers, and floss holders. Your doctor may recommend a soft-bristle toothbrush if the person you care for bleeds easily. Bleeding can happen because of a health problem or from certain medicines. A toothpaste for sensitive teeth may help if the person you care for has sensitive teeth. How do you brush and floss someone's teeth? If the person you are caring for has a hard time cleaning their teeth on their own, you may need to brush and floss their teeth for them. It may be easiest to have the person sit and face away from you, and to sit or stand behind them. That way you can steady their head against your arm as you reach around to floss and brush their teeth. Choose a place that has good lighting and is comfortable for both of you. Before you begin, gather your supplies. You will need gloves, floss, a toothbrush, and a container to hold water if you are not near a sink. Wash and dry your hands well and put on gloves. Start by flossing:  Gently work a piece of floss between each of the teeth toward the gums. A plastic flossing tool may make this easier, and they are available at most [facility].   Curve the floss around each tooth into a U-shape and gently slide it under the gum line.  Move the floss firmly up and down several times to scrape off the plaque.  After you've finished flossing, throw away the used floss and begin brushing:  Wet the brush and apply toothpaste.  Place the brush at a 45-degree angle where the teeth meet the gums. Press firmly, and move the brush in small circles over the surface of the teeth.  Be careful not to brush too hard. Vigorous brushing can make the gums pull away from the teeth and can scratch the tooth enamel.  Brush all surfaces of the teeth, on the tongue side and on the cheek side. Pay special attention to the front teeth and all surfaces of the back teeth.  Brush chewing surfaces with short back-and-forth strokes.  After you've finished, help the person rinse the remaining toothpaste from their mouth.  Where can you learn more?  Go to https://www.[employer].net/patientEd and enter F944 to learn more about \"Learning About Dental Care for Older Adults.\"  Current as of: June 16, 2022               Content Version: 13.5  © 0230-9050 [employer].   Care instructions adapted under license by [employer]. If you have questions about a medical condition or this instruction, always ask your healthcare professional. [employer] disclaims any warranty or liability for your use of this information.           Learning About Vision Tests  What are vision tests?     The four most common vision tests are visual acuity tests, refraction, visual field tests, and color vision tests.  Visual acuity (sharpness) tests  These tests are used:  To see if you need glasses or contact lenses.  To monitor an eye problem.  To check an eye injury.  Visual acuity tests are done as part of routine exams. You may also have this test when you get your 's license or apply for some types of jobs.  Visual field tests  These tests are used:  To check for vision loss in any area of your range of vision.  To screen for certain eye diseases.  To look for  nerve damage after a stroke, head injury, or other problem that could reduce blood flow to the brain. Refraction and color tests  A refraction test is done to find the right prescription for glasses and contact lenses. A color vision test is done to check for color blindness. Color vision is often tested as part of a routine exam. You may also have this test when you apply for a job where recognizing different colors is important, such as , electronics, or the [employer]. How are vision tests done? Visual acuity test   You cover one eye at a time. You read aloud from a wall chart across the room. You read aloud from a small card that you hold in your hand. Refraction   You look into a special device. The device puts lenses of different strengths in front of each eye to see how strong your glasses or contact lenses need to be. Visual field tests   Your doctor may have you look through special machines. Or your doctor may simply have you stare straight ahead while they move a finger into and out of your field of vision. Color vision test   You look at pieces of printed test patterns in various colors. You say what number or symbol you see. Your doctor may have you trace the number or symbol using a pointer. How do these tests feel? There is very little chance of having a problem from this test. If dilating drops are used for a vision test, they may make the eyes sting and cause a medicine taste in the mouth. Follow-up care is a key part of your treatment and safety. Be sure to make and go to all appointments, and call your doctor if you are having problems. It's also a good idea to know your test results and keep a list of the medicines you take. Where can you learn more? Go to http://www.villatoro.com/ and enter G551 to learn more about \"Learning About Vision Tests. \"  Current as of: October 12, 2022               Content Version: 13.5  © 0585-0539 Healthwise, Incorporated. Care instructions adapted under license by [facility] ([facility]). If you have questions about a medical condition or this instruction, always ask your healthcare professional. [address] any warranty or liability for your use of this information. Advance Directives: Care Instructions  Overview  An advance directive is a legal way to state your wishes at the end of your life. It tells your family and your doctor what to do if you can't say what you want. There are two main types of advance directives. You can change them any time your wishes change. Living will. This form tells your family and your doctor your wishes about life support and other treatment. The form is also called a declaration. Medical power of . This form lets you name a person to make treatment decisions for you when you can't speak for yourself. This person is called a health care agent (health care proxy, health care surrogate). The form is also called a durable power of  for health care. If you do not have an advance directive, decisions about your medical care may be made by a family member, or by a doctor or a  who doesn't know you. It may help to think of an advance directive as a gift to the people who care for you. If you have one, they won't have to make tough decisions by themselves. For more information, including forms for your state, see the [address] website (www.caringinfo.org/planning/advance-directives/). Follow-up care is a key part of your treatment and safety. Be sure to make and go to all appointments, and call your doctor if you are having problems. It's also a good idea to know your test results and keep a list of the medicines you take. What should you include in an advance directive? Many states have a unique advance directive form. (It may ask you to address specific issues.) Or you might use a universal form that's approved by many states.   If your form doesn't tell you what to address, it may be hard to know what to include in your advance directive. Use the questions below to help you get started. Who do you want to make decisions about your medical care if you are not able to? What life-support measures do you want if you have a serious illness that gets worse over time or can't be cured? What are you most afraid of that might happen? (Maybe you're afraid of having pain, losing your independence, or being kept alive by machines.)  Where would you prefer to die? (Your home? A hospital? A nursing home?)  Do you want to donate your organs when you die? Do you want certain Caodaism practices performed before you die? When should you call for help? Be sure to contact your doctor if you have any questions. Where can you learn more? Go to http://www.villatoro.com/ and enter R264 to learn more about \"Advance Directives: Care Instructions. \"  Current as of: June 16, 2022               Content Version: 13.5  © 4373-1011 Healthwise, Incorporated. Care instructions adapted under license by [facility] ([facility]). If you have questions about a medical condition or this instruction, always ask your healthcare professional. [place] [address] any warranty or liability for your use of this information. Personalized Preventive Plan for Dean Bernabe - 1/25/2023  Medicare offers a range of preventive health benefits. Some of the tests and screenings are paid in full while other may be subject to a deductible, co-insurance, and/or copay. Some of these benefits include a comprehensive review of your medical history including lifestyle, illnesses that may run in your family, and various assessments and screenings as appropriate. After reviewing your medical record and screening and assessments performed today your provider may have ordered immunizations, labs, imaging, and/or referrals for you.   A list of these orders (if applicable) as well as your Preventive Care list are included within your After Visit Summary for your review. Other Preventive Recommendations:    A preventive eye exam performed by an eye specialist is recommended every 1-2 years to screen for glaucoma; cataracts, macular degeneration, and other eye disorders. A preventive dental visit is recommended every 6 months. Try to get at least 150 minutes of exercise per week or 10,000 steps per day on a pedometer . Order or download the FREE \"Exercise & Physical Activity: Your Everyday Guide\" from [employer]. Call 0-227.301.9610 or search [employer] online. You need 6549-1942 mg of calcium and 9058-6122 IU of vitamin D per day. It is possible to meet your calcium requirement with diet alone, but a vitamin D supplement is usually necessary to meet this goal.  When exposed to the sun, use a sunscreen that protects against both UVA and UVB radiation with an SPF of 30 or greater. Reapply every 2 to 3 hours or after sweating, drying off with a towel, or swimming. Always wear a seat belt when traveling in a car. Always wear a helmet when riding a bicycle or motorcycle.

## 2023-01-25 NOTE — PROGRESS NOTES
Medicare Annual Wellness Visit    Ju Kaufman is here for Medicare AWV and Discuss Labs    Assessment & Plan   Medicare annual wellness visit, subsequent  Uncontrolled type 2 diabetes mellitus with hyperglycemia ([facility] [address]  -     POCT glycosylated hemoglobin (Hb A1C)  Prostate CA (HCC)  Actinic keratoses  -     RI DESTRUCTION PREMALIGNANT LESION 1ST  -     RI DESTRUCTION PREMALIGNANT LESION 2-14 EA  Bronchitis  Macrocytosis  Microalbuminuria  Hip pain  -     [facility] [place]  Acute pain of both knees  -     [facility] [place]      Recommendations for Preventive Services Due: see orders and patient instructions/AVS.  Recommended screening schedule for the next 5-10 years is provided to the patient in written form: see Patient Instructions/AVS.     Return for Medicare Annual Wellness Visit in 1 year, also separate 3 month f/u DM POCT Hba1c . Subjective   The following acute and/or chronic problems were also addressed today:  2014 prostate surgery and completed f/u 10/2022 lab psa normal    Feeling better since he has been treated for his bronchitis. No longer coughing    Pt has not been taking his metformin 3 times daily, only 2 times. Increased aches and pain in hips, low back and knees    Noting some skin changes consistent with his prior skin cancers he thinks    Patient's complete Health Risk Assessment and screening values have been reviewed and are found in Flowsheets. The following problems were reviewed today and where indicated follow up appointments were made and/or referrals ordered.     Hemoglobin A1c is 6.5 today    Advance Care Planning     Advance Care Planning (ACP) Physician/NP/PA Conversation    Date of Conversation: 1/25/2023  Conducted with: Patient with [address] Decision Maker:    Primary Decision Maker: Thang Sher - [phone]    Secondary Decision Maker: Lucille Davis - Brother/Sister  Click here to complete Healthcare Decision Makers including selection of the Healthcare Decision Maker Relationship (ie \"Primary\"). Today we documented Decision Maker(s) consistent with Legal Next of Kin hierarchy. Care Preferences:    Hospitalization: \"If your health worsens and it becomes clear that your chance of recovery is unlikely, what would be your preference regarding hospitalization? \"  The patient would prefer hospitalization. Ventilation: \"If you were unable to breath on your own and your chance of recovery was unlikely, what would be your preference about the use of a ventilator (breathing machine) if it was available to you? \"  The patient would desire the use of a ventilator. Resuscitation: \"In the event your heart stopped as a result of an underlying serious health condition, would you want attempts made to restart your heart, or would you prefer a natural death? \"  Yes, attempt to resuscitate. treatment goals    Conversation Outcomes / Follow-Up Plan:  ACP complete - no further action today  Reviewed DNR/DNI and patient elects Full Code (Attempt Resuscitation)    Length of Voluntary ACP Conversation in minutes:  16 minutes    Wendy Castaneda MD               Positive Risk Factor Screenings with Interventions:         Alcohol Screening:  Alcohol Use: Heavy Drinker    Frequency of Alcohol Consumption: 4 or more times a week    Average Number of Drinks: 3 or 4    Frequency of Binge Drinking: Never      AUDIT-C Score: 5   AUDIT Total Score: 5    Interpretation of AUDIT-C score:   3-7 indicates potential alcohol risk. 8 or more is associated with harmful or hazardous drinking. 15 or more in women, and 15 or more in men, is likely to indicate alcohol dependence. Interventions:  Patient comments: pt would like to cut down on drinking. Maybe from boredom.   Not reaching for alcohol due to stress             General HRA Questions:  Select all that apply: (!) New or Increased Fatigue    Fatigue Interventions:  Patient comments: pt falls asleep frequently after eating. Not waking for pain but has pain that makes him reposition disturbs sleeps. Weight and Activity:  Physical Activity: Inactive    Days of Exercise per Week: 0 days    Minutes of Exercise per Session: 0 min     On average, how many days per week do you engage in moderate to strenuous exercise (like a brisk walk)?: 0 days  Have you lost any weight without trying in the past 3 months?: No  Body mass index: (!) 31.4      Inactivity Interventions:  Patient declined any further interventions or treatment  Obesity Interventions:  Patient declines any further evaluation or treatment          Dentist Screen:  Have you seen the dentist within the past year?: (!) No    Intervention:  Advised to schedule with their dentist     Vision Screen:  Do you have difficulty driving, watching TV, or doing any of your daily activities because of your eyesight?: No  Have you had an eye exam within the past year?: (!) No  No results found. Interventions:   Patient encouraged to make appointment with their eye specialist      Advanced Directives:  Do you have a Living Will?: (!) No    Intervention:  has NO advanced directive - not interested in additional information    Physical Exam  Constitutional:       General: He is not in acute distress. Appearance: Normal appearance. He is well-developed. He is not toxic-appearing. HENT:      Head: Normocephalic and atraumatic. Right Ear: Hearing and tympanic membrane normal.      Left Ear: Hearing and tympanic membrane normal.      Nose: Nose normal. No nasal deformity. Eyes:      General: Lids are normal.         Right eye: No discharge. Left eye: No discharge. Conjunctiva/sclera: Conjunctivae normal.      Pupils: Pupils are equal, round, and reactive to light. Neck:      Thyroid: No thyroid mass or thyromegaly. Vascular: No JVD.       Trachea: Trachea and phonation normal. Cardiovascular:      Rate and Rhythm: Normal rate and regular rhythm. Pulmonary:      Effort: No accessory muscle usage or respiratory distress. Musculoskeletal:      Cervical back: Full passive range of motion without pain. Comments: FROM all large muscle groups and joints as witnessed when walking to exam table, getting on, and getting off the exam table. Skin:     General: Skin is warm and dry. Findings: No rash. Comments: Erythematous based rough flake lesion  See consent for # and location   Neurological:      Mental Status: He is alert. Motor: No tremor or atrophy. Gait: Gait normal.   Psychiatric:         Speech: Speech normal.         Behavior: Behavior normal.         Thought Content: Thought content normal.        PROCEDURE:  The procedure was discussed with the patient. All questions were answered and alternative options discussed. The patient is aware of the risks of bleeding, infection, unsatisfactory scar result. Informed consent paperwork was signed by the patient. Liquid nitrogen was applied to the affected areas until freezing was noted then a thaw was allowed between dosing for a total of 2 applications. Applied to 9 lesion(s). The patient tolerated the procedure well. Post op instructions given. A printed copy provided. Objective   Vitals:    01/25/23 1024   BP: 118/72   Pulse: 72   Temp: 98.9 °F (37.2 °C)   SpO2: 98%   Weight: 238 lb (108 kg)   Height: 6' 1\" (1.854 m)      Body mass index is 31.4 kg/m². No Known Allergies  Prior to Visit Medications    Medication Sig Taking?  Authorizing Provider   doxycycline hyclate (VIBRA-TABS) 100 MG tablet Take 1 tablet by mouth 2 times daily for 10 days Yes Manisha Milks, APRN - CNP   predniSONE (DELTASONE) 20 MG tablet Take 1 tablet by mouth 2 times daily for 5 days Yes Manisha Milks, APRN - CNP   folic acid (FOLVITE) 1 MG tablet Take 1 mg by mouth daily Pt is taking Yes Historical Provider, MD   Cyanocobalamin (VITAMIN B 12) 500 MCG TABS Take 1,000 mcg by mouth daily Yes Historical Provider, MD   albuterol sulfate HFA (VENTOLIN HFA) 108 (90 Base) MCG/ACT inhaler Inhale 2 puffs into the lungs every 6 hours as needed for Wheezing Yes Kalen Brand DO   simvastatin (ZOCOR) 20 MG tablet TAKE ONE TABLET BY MOUTH EVERY DAY Yes Kelsie Jones MD   metFORMIN (GLUCOPHAGE) 500 MG tablet Take 1 tablet by mouth 3 times daily Yes Mariana Corrigan MD   fluticasone (FLOVENT HFA) 110 MCG/ACT inhaler Inhale 2 puffs into the lungs 2 times daily Yes Mariana Corrigan MD   irbesartan (AVAPRO) 150 MG tablet TAKE ONE TABLET BY MOUTH DAILY Yes Garcia Siegel DO   allopurinol (ZYLOPRIM) 300 MG tablet TAKE ONE TABLET BY MOUTH DAILY Yes Kelsie Jones MD   metoprolol succinate (TOPROL XL) 100 MG extended release tablet TAKE ONE TABLET BY MOUTH DAILY Yes Kelsie Jones MD   amLODIPine (NORVASC) 5 MG tablet Take 1 tablet by mouth daily Yes Kelsie Jones MD   fluticasone (FLONASE) 50 MCG/ACT nasal spray 2 sprays by Nasal route daily Yes Ulysenrrique Sajuver, APRN - CNP   Cholecalciferol (D3-1000 PO) Take by mouth Yes Historical Provider, MD   Spacer/Aero-Hold Chamber Bags MISC 1 applicator by Does not apply route 4 times daily Yes Kelsie Jones MD   Blood Pressure KIT 1 applicator by Does not apply route 4 times daily Yes Kelsie Jones MD   Coenzyme Q10-Vitamin E (QUNOL ULTRA COQ10) 100-150 MG-UNIT CAPS  Yes Historical Provider, MD   vitamin C (ASCORBIC ACID) 500 MG tablet Take 500 mg by mouth daily Yes Historical Provider, MD   Multiple Vitamins-Minerals (MULTIVITAMIN MEN 50+ PO) Take by mouth Yes Historical Provider, MD Fields (Including outside providers/suppliers regularly involved in providing care):   Patient Care Team:  Kelsie Jones MD as PCP - General (Family Medicine)  Kelsie Jones MD as PCP - [facility] Empaneled Provider     Reviewed and updated this visit:  Tobacco Allergies  Meds  Med Hx  Surg Hx  Soc Hx  Fam Hx

## 2023-02-06 ENCOUNTER — HOSPITAL ENCOUNTER (OUTPATIENT)
Dept: PHYSICAL THERAPY | Age: 68
Setting detail: THERAPIES SERIES
Discharge: HOME | End: 2023-02-06
Payer: MEDICARE

## 2023-02-06 PROCEDURE — 97110 THERAPEUTIC EXERCISES: CPT

## 2023-02-06 PROCEDURE — 97162 PT EVAL MOD COMPLEX 30 MIN: CPT

## 2023-02-06 ASSESSMENT — PAIN DESCRIPTION - DESCRIPTORS: DESCRIPTORS: SHARP

## 2023-02-06 ASSESSMENT — PAIN DESCRIPTION - LOCATION: LOCATION: KNEE

## 2023-02-06 ASSESSMENT — PAIN DESCRIPTION - ORIENTATION: ORIENTATION: RIGHT;LEFT

## 2023-02-06 ASSESSMENT — PAIN SCALES - GENERAL: PAINLEVEL_OUTOF10: 0

## 2023-02-06 ASSESSMENT — PAIN DESCRIPTION - PAIN TYPE: TYPE: CHRONIC PAIN

## 2023-02-06 NOTE — PROGRESS NOTES
Formerly [facility]) Physical Therapy-  [facility]   PHYSICAL THERAPY EVALUATION      Physical Therapy: Initial Evaluation    Patient: Rosa Valdez (46 y.o.     male)   Examination Date:   Plan of Care Certification Period: 2023 to 23  Progress Note Counter: 1/10   :  1955 ;    Confirmed: Yes MRN: [id number]  CSN: [id number]   Insurance: Payor: MEDICARE / Plan: MEDICARE PART A AND B / Product Type: *No Product type* /   Insurance ID: [id number] - (Medicare)  PT Insurance Information: Medicare  Secondary Insurance (if applicable):  [employer]   Referring Physician: Alfred Guerin MD     PCP: Tatiana Cui MD Visits to Date/Visits Approved:   (based on medical necessity)    No Show/Cancelled Appts: 0 / 0     Medical Diagnosis: Hip pain [M25.559]  Acute pain of both knees [M25.561, M25.562]    Treatment Diagnosis: hip pain, knee pain, impaired mobility     PERTINENT MEDICAL HISTORY           Medical History: Chart Reviewed: Yes   Past Medical History:   Diagnosis Date    Actinic keratoses     has had LN2 and used Efudex    Alcohol consumption of one to four drinks per day on alcohol screening     Allergic rhinitis     cats, weeds, grasses, ragweed    Asthma     Bilateral knee pain     Diabetes mellitus ([address]     Gout     History of colon polyps 2016    needs f/u 5 years [place]    History of type 2 diabetes mellitus     about 15 years    Hyperlipidemia     Hypertension     Keratosis, inflamed seborrheic     Osteoarthritis, localized, shoulder, right     Polyp of transverse colon f/u due 2014    Prediabetes     Prostate cancer ([facility] [address] 2014    s/p prostatectomy    Syncope and collapse     Varicose vein of leg      Surgical History:   Past Surgical History:   Procedure Laterality Date    COLONOSCOPY  2016    Dr. Christopher Serrano; polyps f/u in 5 years    COLONOSCOPY N/A 10/2/2020    COLONOSCOPY DIAGNOSTIC performed by Deedee Landa MD at [facility]    PROSTATECTOMY  11/2014    ULNAR TUNNEL RELEASE Bilateral     VARICOSE VEIN SURGERY         Medications:   Current Outpatient Medications:     folic acid (FOLVITE) 1 MG tablet, Take 1 mg by mouth daily Pt is taking, Disp: , Rfl:     Cyanocobalamin (VITAMIN B 12) 500 MCG TABS, Take 1,000 mcg by mouth daily, Disp: , Rfl:     albuterol sulfate HFA (VENTOLIN HFA) 108 (90 Base) MCG/ACT inhaler, Inhale 2 puffs into the lungs every 6 hours as needed for Wheezing, Disp: 18 g, Rfl: 1    simvastatin (ZOCOR) 20 MG tablet, TAKE ONE TABLET BY MOUTH EVERY DAY, Disp: 90 tablet, Rfl: 3    metFORMIN (GLUCOPHAGE) 500 MG tablet, Take 1 tablet by mouth 3 times daily, Disp: 270 tablet, Rfl: 3    fluticasone (FLOVENT HFA) 110 MCG/ACT inhaler, Inhale 2 puffs into the lungs 2 times daily, Disp: 1 each, Rfl: 5    irbesartan (AVAPRO) 150 MG tablet, TAKE ONE TABLET BY MOUTH DAILY, Disp: 90 tablet, Rfl: 3    allopurinol (ZYLOPRIM) 300 MG tablet, TAKE ONE TABLET BY MOUTH DAILY, Disp: 90 tablet, Rfl: 3    metoprolol succinate (TOPROL XL) 100 MG extended release tablet, TAKE ONE TABLET BY MOUTH DAILY, Disp: 90 tablet, Rfl: 3    amLODIPine (NORVASC) 5 MG tablet, Take 1 tablet by mouth daily, Disp: 30 tablet, Rfl: 11    fluticasone (FLONASE) 50 MCG/ACT nasal spray, 2 sprays by Nasal route daily, Disp: 32 g, Rfl: 0    Cholecalciferol (D3-1000 PO), Take by mouth, Disp: , Rfl:     Spacer/Aero-Hold Chamber Bags MISC, 1 applicator by Does not apply route 4 times daily, Disp: 1 each, Rfl: 0    Blood Pressure KIT, 1 applicator by Does not apply route 4 times daily, Disp: 1 kit, Rfl: 0    Coenzyme Q10-Vitamin E (QUNOL ULTRA COQ10) 100-150 MG-UNIT CAPS, , Disp: , Rfl:     vitamin C (ASCORBIC ACID) 500 MG tablet, Take 500 mg by mouth daily, Disp: , Rfl:     Multiple Vitamins-Minerals (MULTIVITAMIN MEN 50+ PO), Take by mouth, Disp: , Rfl:   Allergies: Patient has no known allergies.       SUBJECTIVE EXAMINATION     History obtained from[de-identified] Patient, Family/Caregiver Present: No    Subjective History:    Subjective: Pt reports onset of arthritic pain in hips and knees \"many months\" ago. Pt reports knees > hips. Pt states he has developed a \"limp\" and has to go up and down stairs one step at a time. Reports difficulty with transfers. Pt states no longer able to bowl. Additional Pertinent Hx (if applicable): HTN, OA, prostate CA, DM, plantarfasciitis Lt > Rt   Prior diagnostic testing[de-identified] X-ray (2018 bilateral knees with mild to mod DJD)  Previous treatments prior to current episode?: Outpatient PT, Injections (dry needling?)      Learning/Language: Learning  Does the patient/guardian have any barriers to learning?: No barriers  Will there be a co-learner?: No  What is the preferred language of the patient/guardian?: English  Is an  required?: No  How does the patient/guardian prefer to learn new concepts?: Listening, Reading, Demonstration     Pain Screening    Pain Screening  Patient Currently in Pain: Yes  Pain Assessment: 0-10  Pain Level: 0 (Hip pain 1/10, best 0/10, 5/10 worst)  Best Pain Level: 0  Worst Pain Level: 6  Pain Type: Chronic pain  Pain Location: Knee (bilateral hip/glutes, aching)  Pain Orientation: Right, Left  Pain Descriptors: [facility]    Functional Status    Social History:    Social History  Lives With: Spouse  Type of Home: House  Home Layout: One level  Home Access: Stairs to enter with rails  Entrance Stairs - Rails: Both  Entrance Stairs - Number of Steps: 3  Bathroom Shower/Tub: Tub/Shower unit    Occupation/Interests:   Occupation: Retired  Type of Occupation: accounting  Leisure & Hobbies: sedentary, golfing    Prior Level of Function:     Independent        Current Level of Function:   Independent with ADLs, increased difficulty, time and pain with LE dressing.  LE  Transfers: uses UEs for sit to stand transfers    ADL Assistance: [address] [place] [address] Avenue: Independent  Homemaking Responsibilities: Yes  Ambulation Assistance: Independent  Transfer Assistance: Independent  Active : Yes         OBJECTIVE EXAMINATION     Restrictions:          DM    Review of Systems:  Vision: Within Functional Limits (needs cataract surgery Lt eye)  Hearing: Within functional limits  Overall Orientation Status: Within Functional Limits  Patient affect[de-identified] Normal  Follows Commands: Within Functional Limits    VBI Screening / Lumbar Screening:         Regional Screen:         Observations:   General Observations  Description: Lt knee hyperextension, slight Rt knee flexion, pt reports he wears arch supports due to pronation, no sig postural asymmetries    Palpation:   Lumbar Spine Palpation: no pain or tenderness with palpation lumbar spine or paraspinals, no pain or tenderness PSIS, iliac crest, sacrum  Right Knee Palpation: mild tenderness with patellar mobs, decreased medial patellar mobs, crepitus noted, tenderness distal ITB  Left Knee Palpation: no sig crepitus, tenderness distal ITB    Mobility:            Ambulation  Surface: Carpet  Device: No Device  Assistance: Independent  Quality of Gait: increased frontal plane motions - compensated Trendelenburg, decreased step length and step height  Distance: unlimited within clinic    Left AROM  Right AROM         AROM LLE (degrees)  L Hip Flexion (0-125): 112  L Hip ABduction (0-45): 40  L Knee Flexion (0-145): 114, prone knee flexion 92  L Knee Extension (0): lacking 5 from neutral sx    AROM RLE (degrees)  R Hip Flexion (0-125): 106  R Hip ABduction (0-45): 45  R Knee Flexion (0-145): 107, prone knee flexion 84  R Knee Extension (0): lacking 8 from neutral        Left PROM  Right PROM         PROM LLE (degrees)  L SLR: 56    PROM RLE (degrees)  R SLR: 54        Left Strength  Right Strength         Strength LLE  L Hip Flexion: 4/5  L Hip Extension: 4/5  L Hip ABduction: 3+/5  L Hip Internal Rotation: 4+/5  L Hip External Rotation: 3+/5  L Knee Flexion: 4+/5  L Knee Extension: 5/5  L Ankle Dorsiflexion: 5/5    Strength RLE  R Hip Flexion: 3+/5  R Hip Extension: 4-/5  R Hip ABduction: 3+/5  R Hip Internal Rotation: 4+/5  R Hip External Rotation: 3+/5  R Knee Flexion: 4+/5 (increased pain)  R Knee Extension: 5/5  R Ankle Dorsiflexion: 5/5     Special Tests:   Special Tests Lumbar Spine  Pelvic Compression: (-)  Pelvic Distraction: (-)    Additional Finding(s): Outcomes Score:  Exam: musculoskeletal, pain and sensory systems involved impacting strength, ROM, gait, transfers, ADLs, IADLs; LEFS: 35/80     Treatment:    Exercises:   Exercises  Exercise 1: 3 way SLR on mat x10 - VCs to keep within tolerable range  Exercise 2: clams x10  Exercise 3: LTR x10  Exercise 4: * reverse clam  Exercise 5: * Nu step  Exercise 6: * sink exs  Exercise 7: * gait drills  Exercise 8: * QS, GS  Exercise 9: * hams str  Exercise 10: * gastroc str  Exercise 11: * TFL str  Exercise 12: * Prone knee flexion str  Exercise 20: HEP: 3 way SLR, LTR  Treatment Reasoning  Limitations addressed: Mobility, Strength, Coordination, Activity tolerance, Flexibility  Modalities:   Heat/ Cold PRN     Manual:  Manual Therapy  Joint Mobilization: * patellar mobs  Soft Tissue Mobilizaton: * foam roller/ tiger tail hams, TFL, quads  Other: * consider IDN superior cluneals, TFL; consider MFD cupping dynamic on quads, hams, TFL     *Indicates exercise,modality, or manual techniques to be initiated when appropriate       ASSESSMENT     Impression: Assessment: Pt presents with c/o bilateral hip and knee pain, insidious onset with gradual increased symptoms now affecting mobility and activity tolerance. Pt demonstrates decreased overall flexibility and strength. Rt involvement greater than Lt with proximal strength more impaired than distal. Pt reports hip pain primarily posterolateral in gluteal area. Denies radicular symptoms. Denies groin pain.  Pt would benefit from skilled PT to address deficits and return to previous function with minimal c/o pain. Body Structures, Functions, Activity Limitations Requiring Skilled Therapeutic Intervention: Decreased functional mobility , Decreased ADL status, Decreased ROM, Decreased strength, Increased pain    Statement of Medical Necessity: Physical Therapy is both indicated and medically necessary as outlined in the POC to increase the likelihood of meeting the functionally related goals stated below. Patient's Activity Tolerance: Patient tolerated evaluation without incident      Patient's rehabilitation potential/prognosis is considered to be: Good    Factors which may impact rehabilitation potential include: None     Patient Education: Goals, PT Role, Plan of Care, Evaluative findings, Home Exercise Program, Disease Specific Education      GOALS   Patient Goal(s): Patient Goals : eliminate limp, hip pain, knee pain; easier on stairs and getting up from chair.     Short Term Goals Completed by 3 wks Goal Status   Independent with HEP to promote home management of symptoms New   Report 25% reduction in symptoms with improved ease with transfers New   Improve LE strength 1/2 grade to improve ease with transfers       Long Term Goals Completed by 6 wks Goal Status   Improve LE ROM 5-10 degrees to improve step length with gait and pain </= 2/10 with mobility New   LEFS >/= 45/80 to demonstrate improved overall activity tolerance New   Improve LE strength >/= 4/5 to be able to transition sit to stand from clinic chair without use of UEs New   Ambulate 100 feet without AD with decreased Trendelenburg, increased step length and step height New        TREATMENT PLAN       Requires PT Follow-Up: Yes  Specific Instructions for Next Treatment: patient would like to start 1x/ wk, may progress to two as needed    Treatment may include any combination of the following: Strengthening, ROM, Manual, Safety education & training, Home exercise program, Patient/Caregiver education & training, Equipment evaluation, education, & procurement, Modalities, Dry needling     Frequency / Duration:  Patient to be seen 1-2 times per week for 6 weeks          Eval Complexity:   Decision Making: Medium Complexity  History: personal factors: age, sedentary; contributing PMH: HTN, OA, prostate CA, DM, plantarfasciitis Lt > Rt  Examination of body system(s) including body structures and functions, activity limitations, and/or participation restrictions: Medium  Exam: musculoskeletal, pain and sensory systems involved impacting strength, ROM, gait, transfers, ADLs, IADLs; LEFS: 35/80  Clinical Presentation: Medium  Clinical Presentation: evolving, worsening of symptoms    POST-PAIN     Pain Rating (0-10 pain scale):   3/10  Location and pain description same as pre-treatment unless indicated. Action: [] NA  [] Call Physician  [x] Perform HEP  [] Meds as prescribed    Evaluation and patient rights have been reviewed and patient agrees with plan of care. Yes  [x]  No  []   Explain:     Ha Fall Risk Assessment  Risk Factor Scale  Score   History of Falls [] Yes  [x] No 25  0 0   Secondary Diagnosis [] Yes  [x] No 15  0 0   Ambulatory Aid [] Furniture  [] Crutches/cane/walker  [x] None/bedrest/wheelchair/nurse 30  15  0 0   IV/Heparin Lock [] Yes  [x] No 20  0 0   Gait/Transferring [] Impaired  [x] Weak  [] Normal/bedrest/immobile 20  10  0 10   Mental Status [] Forgets limitations  [x] Oriented to own ability 15  0 0      Total: 10     Based on the Assessment score: check the appropriate box.   [x]  No intervention needed   Low =   Score of 0-24  []  Use standard prevention interventions Moderate =  Score of 24-44   [] Discuss fall prevention strategies   [] Indicate moderate falls risk on eval  []  Use high risk prevention interventions High = Score of 45 and higher   [] Discuss fall prevention strategies   [] Provide supervision during treatment time      Minutes:  PT Individual Minutes  Time In: 1550  Time Out: [address]  Minutes: 53  Timed Code Treatment Minutes: 15 Minutes  Procedure Minutes: 38 eval      Timed Activity Minutes Units   Ther Ex 15 1     Electronically signed by Ollie Samuel PT on 2/6/23 at 5:07 PM EST

## 2023-02-06 NOTE — PLAN OF CARE
[facility]  PHYSICAL THERAPY PLAN OF CARE   [facility]      [address], [address] [facility], [address]     Ph: [phone] Fax: [phone]      [x] Certification  [] Recertification [x]  Plan of Care  [] Progress Note [] Discharge      Referring Provider: Kuldeep Sahu MD      From:  Veronica Winston PT   Patient: Ivan Quintana (56 y.o. male) : 1955 Date: 2023   Medical Diagnosis: Hip pain [M25.559]  Acute pain of both knees [M25.561, M25.562]    Treatment Diagnosis: hip pain, knee pain, impaired mobility    Plan of Care/Certification Expiration Date: : 23   Progress Report Period from:  2023  to 2023    Visits to Date: 1 No Show: 0 Cancelled Appts: 0    OBJECTIVE:   Short Term Goals - Time Frame for Short Term Goals: 3 wks    Goals Current/Discharge status  Status   Short Term Goal 1: Independent with HEP to promote home management of symptoms  Need for HEP  New   Short Term Goal 2: Report 25% reduction in symptoms with improved ease with transfers  Reports pain 0-6/10 knees, 0-5/10 hip, difficulty with all transfers without use of UEs New   Short Term Goal 3: Improve LE strength 1/2 grade to improve ease with transfers  Strength RLE  R Hip Flexion: 3+/5  R Hip Extension: 4-/5  R Hip ABduction: 3+/5  R Hip Internal Rotation: 4+/5  R Hip External Rotation: 3+/5  R Knee Flexion: 4+/5 (increased pain)  R Knee Extension: 5/5  R Ankle Dorsiflexion: 5/5  Strength LLE  L Hip Flexion: 4/5  L Hip Extension: 4/5  L Hip ABduction: 3+/5  L Hip Internal Rotation: 4+/5  L Hip External Rotation: 3+/5  L Knee Flexion: 4+/5  L Knee Extension: 5/5  L Ankle Dorsiflexion: 5/5          Long Term Goals - Time Frame for Long Term Goals : 6 wks  Goals Current/ Discharge status Status   Long Term Goal 1: Improve LE ROM 5-10 degrees to improve step length with gait and pain </= 2/10 with mobility PROM RLE (degrees)  R SLR: 54  AROM RLE (degrees)  R Hip Flexion (0-125): 106  R Hip ABduction (0-45): 45  R Knee Flexion (0-145): 107, prone knee flexion 84  R Knee Extension (0): lacking 8 from neutral  PROM LLE (degrees)  L SLR: 56  AROM LLE (degrees)  L Hip Flexion (0-125): 112  L Hip ABduction (0-45): 40  L Knee Flexion (0-145): 114, prone knee flexion 92  L Knee Extension (0): lacking 5 from neutral sx    New   Long Term Goal 2: LEFS >/= 45/80 to demonstrate improved overall activity tolerance 35/80 New   Long Term Goal 3: Improve LE strength >/= 4/5 to be able to transition sit to stand from clinic chair without use of UEs As above  New   Long Term Goal 4: Ambulate 100 feet without AD with decreased Trendelenburg, increased step length and step height Ambulates independently without AD with increased frontal plane motions - compensated Trendelenburg, decreased step length and step height New     Body Structures, Functions, Activity Limitations Requiring Skilled Therapeutic Intervention: Decreased functional mobility , Decreased ADL status, Decreased ROM, Decreased strength, Increased pain  Assessment: Pt presents with c/o bilateral hip and knee pain, insidious onset with gradual increased symptoms now affecting mobility and activity tolerance. Pt demonstrates decreased overall flexibility and strength. Rt involvement greater than Lt with proximal strength more impaired than distal. Pt reports hip pain primarily posterolateral in gluteal area. Denies radicular symptoms. Denies groin pain. Pt would benefit from skilled PT to address deficits and return to previous function with minimal c/o pain.   Specific Instructions for Next Treatment: patient would like to start 1x/ wk, may progress to two as needed  Therapy Prognosis: Good    PT Education: Goals;PT Role;Plan of Care;Evaluative findings;Home Exercise Program;Disease Specific Education    PLAN: [x] Evaluate and Treat  Frequency/Duration:  Plan Frequency: 1-2  Plan weeks: 6  Specific Instructions for Next Treatment: patient would like to start 1x/ wk, may progress to two as needed  Current Treatment Recommendations: Strengthening, ROM, Manual, Safety education & training, Home exercise program, Patient/Caregiver education & training, Equipment evaluation, education, & procurement, Modalities, Dry needling  Modalities: E-stim - unattended, Heat/Cold     Precautions:   NA                         Patient Status:[x] Continue/ Initiate plan of Care    [] Discharge PT. Recommend pt continue with HEP. [] Additional visits requested, Please re-certify for additional visits:    [] Hold         Signature: Electronically signed by Marian Baird PT on 2/6/23 at 5:09 PM EST      If you have any questions or concerns, please don't hesitate to call.   Thank you for your referral.

## 2023-02-14 ENCOUNTER — HOSPITAL ENCOUNTER (OUTPATIENT)
Dept: PHYSICAL THERAPY | Age: 68
Setting detail: THERAPIES SERIES
Discharge: HOME | End: 2023-02-14
Payer: MEDICARE

## 2023-02-14 PROCEDURE — 97110 THERAPEUTIC EXERCISES: CPT

## 2023-02-14 NOTE — PROGRESS NOTES
[address] [facility]  Outpatient Physical Therapy    Treatment Note        Date: 2023  Patient: Vinnie Garcia  : 1955   Confirmed: Yes  MRN: [id number]  Referring Provider: Donny Del Castillo MD   Secondary Referring Provider (If applicable):     Medical Diagnosis: No admission diagnoses are documented for this encounter. Treatment Diagnosis: hip pain, knee pain, impaired mobility    Visit Information:  Insurance: Payor: MEDICARE / Plan: MEDICARE PART A AND B / Product Type: *No Product type* /   PT Visit Information  PT Insurance Information: Medicare  Total # of Visits to Date: 2  Plan of Care/Certification Expiration Date: 23  No Show: 0  Progress Note Due Date: 23  Canceled Appointment: 0  Progress Note Counter: 2/10    Subjective Information:     HEP Compliance:  [] Good [] Fair [] Poor [x] Reports not doing due to: not being able to lay on the floor, modifications given to improve compliance    Pain Screening  Patient Currently in Pain: Denies    Treatment:  Exercises:  Exercises  Exercise 1: 3 way SLR on mat x10 - VCs to keep within tolerable range  Exercise 2: clams x10  Exercise 3: LTR x10  Exercise 4: reverse clam x10  Exercise 5: Nu step L1x5 minutes  Exercise 6: sink exs x5 bilaterally  Exercise 7: * gait drills  Exercise 8: * QS, GS  Exercise 9: hams str 3/30\" bilaterally  Exercise 10: gastroc str 3/30\" bilaterally  Exercise 11: * TFL str  Exercise 12: * Prone knee flexion str  Exercise 20: HEP: 3 way SLR, LTR  Treatment Reasoning  Limitations addressed: Mobility, Strength, Coordination, Activity tolerance, Flexibility    Manual:   Manual Therapy  Joint Mobilization: * patellar mobs  Soft Tissue Mobilizaton: foam roller/ tiger tail hams, TFL  Other: * consider IDN superior cluneals, TFL; consider MFD cupping dynamic on quads, hams, TFL         Assessment:    Body Structures, Functions, Activity Limitations Requiring Skilled Therapeutic Intervention: Decreased functional mobility , Decreased ADL status, Decreased ROM, Decreased strength, Increased pain  Assessment: Initiated treatment to address areas of deficits and return pt to PLOL with minimal pain. HEP established to promote home management of symptoms and increase strength. Cues for improved technique and to avoid substtiution when performing exercses/stretches with good follow through. Treatment concluded with foam rolling to bilateral HS and TFL to improve muscle elasticity. Treatment Diagnosis: hip pain, knee pain, impaired mobility  Therapy Prognosis: Good       Post-Pain Assessment:       Pain Rating (0-10 pain scale):   0/10   Location and pain description same as pre-treatment unless indicated. Action: [] NA   [x] Perform HEP  [] Meds as prescribed  [] Modalities as prescribed   [] Call Physician     GOALS   Patient Goal(s): Patient Goals : eliminate limp, hip pain, knee pain; easier on stairs and getting up from chair.     Short Term Goals Completed by 3 wks Goal Status   STG 1 Independent with HEP to promote home management of symptoms In progress   STG 2 Report 25% reduction in symptoms with improved ease with transfers In progress   STG 3 Improve LE strength 1/2 grade to improve ease with transfers         Long Term Goals Completed by 6 wks Goal Status   LTG 1 Improve LE ROM 5-10 degrees to improve step length with gait and pain </= 2/10 with mobility In progress   LTG 2 LEFS >/= 45/80 to demonstrate improved overall activity tolerance In progress   LTG 3 Improve LE strength >/= 4/5 to be able to transition sit to stand from clinic chair without use of UEs In progress   LTG 4 Ambulate 100 feet without AD with decreased Trendelenburg, increased step length and step height In progress            Plan:  Frequency/Duration:  Plan  Plan Frequency: 1-2  Plan weeks: 6  Specific Instructions for Next Treatment: patient would like to start 1x/ wk, may progress to two as needed  Current Treatment Recommendations: Strengthening, ROM, Manual, Safety education & training, Home exercise program, Patient/Caregiver education & training, Equipment evaluation, education, & procurement, Modalities, Dry needling  Modalities: E-stim - unattended, Heat/Cold  Pt to continue current HEP. See objective section for any therapeutic exercise changes, additions or modifications this date.     Therapy Time:      PT Individual Minutes  Time In: 9216  Time Out: [address]  Minutes: 43  Timed Code Treatment Minutes: 43 Minutes  Procedure Minutes:  Timed Activity Minutes Units   Ther Ex 38 3   Manual  5 0     Electronically signed by Alyson rBown PTA on 2/14/23 at 11:16 AM EST

## 2023-02-21 ENCOUNTER — HOSPITAL ENCOUNTER (OUTPATIENT)
Dept: PHYSICAL THERAPY | Age: 68
Setting detail: THERAPIES SERIES
Discharge: HOME | End: 2023-02-21
Payer: MEDICARE

## 2023-02-21 PROCEDURE — 97110 THERAPEUTIC EXERCISES: CPT

## 2023-02-21 NOTE — PROGRESS NOTES
[address] [facility]  Outpatient Physical Therapy    Treatment Note        Date: 2023  Patient: Doni Lynn  : 1955   Confirmed: Yes  MRN: [id number]  Referring Provider: Ulysses Campbell, MD   Secondary Referring Provider (If applicable):     Medical Diagnosis: Pain in unspecified hip [M25.559]  Pain in right knee [M25.561]  Pain in left knee [M25.562]    Treatment Diagnosis: hip pain, knee pain, impaired mobility    Visit Information:  Insurance: Payor: MEDICARE / Plan: MEDICARE PART A AND B / Product Type: *No Product type* /   PT Visit Information  PT Insurance Information: Medicare  Total # of Visits to Date: 3  Plan of Care/Certification Expiration Date: 23  No Show: 0  Progress Note Due Date: 23  Canceled Appointment: 0  Progress Note Counter: 3/10    Subjective Information:  Subjective: Pt reports doing HEP without issues. HEP Compliance:  [x] Good [] Fair [] Poor [] Reports not doing due to:    Pain Screening  Patient Currently in Pain: Denies    Treatment:  Exercises:  Exercises  Exercise 1: 3 way SLR on mat x10 - VCs to keep within tolerable range  Exercise 2: clams x10  Exercise 3: LTR x10  Exercise 5: Nu step L1x5 minutes  Exercise 7: * gait drills  Exercise 8: QS, GS 5\"/10  Exercise 9: hams str 3/30\" bilaterally  Exercise 10: gastroc str 3/30\" bilaterally  Exercise 11: * TFL str  Exercise 12: Prone knee flexion str \" bilaterally  Exercise 20: HEP: 3 way SLR, LTR  Treatment Reasoning  Limitations addressed: Mobility, Strength, Coordination, Activity tolerance, Flexibility    Manual:   Manual Therapy  Joint Mobilization: * patellar mobs  Soft Tissue Mobilizaton: foam roller/ tiger tail hams, no releif  Other: * consider IDN superior cluneals, TFL; consider MFD cupping dynamic on quads, hams, TFL      Assessment:    Body Structures, Functions, Activity Limitations Requiring Skilled Therapeutic Intervention: Decreased functional mobility , Decreased ADL status, Decreased ROM, Decreased strength, Increased pain  Assessment: Pt presenting in therapy without c/o pain. Supine exercises completed with increased muscle fatigue, but wihtout c/o pian. Cues provided to perform exercises with correct form as well as decreasing number of reps with break in form. Initiated prone knee flexion with strap with limited ROM Rt >Lt. Attempted s/l TFL stretch with c/o pain to right knee, exercise discontinued. Foam rolling to bilateral quads, pt reports limited improvement. Treatment Diagnosis: hip pain, knee pain, impaired mobility  Therapy Prognosis: Good      Post-Pain Assessment:       Pain Rating (0-10 pain scale):   0/10   Location and pain description same as pre-treatment unless indicated. Action: [x] NA   [] Perform HEP  [] Meds as prescribed  [] Modalities as prescribed   [] Call Physician     GOALS   Patient Goal(s): Patient Goals : eliminate limp, hip pain, knee pain; easier on stairs and getting up from chair. Short Term Goals Completed by 3 wks Goal Status   STG 1 Independent with HEP to promote home management of symptoms In progress   STG 2 Report 25% reduction in symptoms with improved ease with transfers In progress   STG 3 Improve LE strength 1/2 grade to improve ease with transfers In progress       Long Term Goals Completed by 6 wks Goal Status   LTG 1 Improve LE ROM 5-10 degrees to improve step length with gait and pain </= 2/10 with mobility In progress   LTG 2 LEFS >/= 45/80 to demonstrate improved overall activity tolerance In progress   LTG 3 Improve LE strength >/= 4/5 to be able to transition sit to stand from clinic chair without use of UEs In progress   LTG 4 Ambulate 100 feet without AD with decreased Trendelenburg, increased step length and step height In progress            Plan:  Frequency/Duration:     Pt to continue current HEP.   See objective section for any therapeutic exercise changes, additions or modifications this date.    Therapy Time:      PT Individual Minutes  Time In: 1345  Time Out: 1430  Minutes: 45  Timed Code Treatment Minutes: 45 Minutes  Procedure Minutes:0  Timed Activity Minutes Units   Ther Ex 40 3   Manual  5 0     Electronically signed by Laura Pineda PTA on 2/21/23 at 3:28 PM EST

## 2023-02-28 ENCOUNTER — APPOINTMENT (OUTPATIENT)
Dept: PHYSICAL THERAPY | Age: 68
End: 2023-02-28
Payer: MEDICARE

## 2023-03-02 ENCOUNTER — HOSPITAL ENCOUNTER (OUTPATIENT)
Dept: PHYSICAL THERAPY | Age: 68
Setting detail: THERAPIES SERIES
Discharge: HOME | End: 2023-03-02

## 2023-03-02 NOTE — PROGRESS NOTES
Therapy                            Cancellation/No-show Note    Date: 2023  Patient: Marylen Cara (73 y.o. male)  : 1955  MRN:  [id number]  Referring Physician: Julia Arango MD    Medical Diagnosis: No admission diagnoses are documented for this encounter. Visit Information:  Insurance: Payor: MEDICARE / Plan: MEDICARE PART A AND B / Product Type: *No Product type* /   Visits to Date: 3   No Show/Cancelled Appts: 0 / 0      For today's appointment patient:  [x]  Cancelled  []  Rescheduled appointment  []  No-show   []  Called pt to remind of next appointment     Reason given by patient:  []  Patient ill  []  Conflicting appointment  []  No transportation    []  Conflict with work  [x]  No reason given  []  Other:      [x] Pt has future appointments scheduled, no follow up needed  [] Pt requests to be on hold.     Reason:   If > 2 weeks please discuss with therapist.  [] Therapist to call pt for follow up     Comments:       Signature: Electronically signed by Carol Hurley PT on 3/2/23 at 9:26 AM EST

## 2023-03-07 ENCOUNTER — HOSPITAL ENCOUNTER (OUTPATIENT)
Dept: PHYSICAL THERAPY | Age: 68
Setting detail: THERAPIES SERIES
Discharge: HOME | End: 2023-03-07

## 2023-03-18 NOTE — TELEPHONE ENCOUNTER
Comments:     Last Office Visit (last PCP visit):   1/25/2023    Next Visit Date:  Future Appointments   Date Time Provider [place] Escobari   4/25/2023 12:30 PM Priscila Buitrago MD [facility] EM[facility]GENC[facility] [place]   1/29/2024 12:00 PM Priscila Buitrago MD [facility] [place]       **If hasn't been seen in over a year OR hasn't followed up according to last diabetes/ADHD visit, make appointment for patient before sending refill to provider.     Rx requested:  Requested Prescriptions     Pending Prescriptions Disp Refills    amLODIPine (NORVASC) 5 MG tablet [Pharmacy Med Name: amLODIPine Besylate Oral Tablet 5 MG] 30 tablet 0     Sig: TAKE ONE TABLET BY MOUTH DAILY

## 2023-03-20 RX ORDER — AMLODIPINE BESYLATE 5 MG/1
TABLET ORAL
Qty: 30 TABLET | Refills: 0 | Status: SHIPPED | OUTPATIENT
Start: 2023-03-20

## 2023-04-20 DIAGNOSIS — J45.41 MODERATE PERSISTENT ASTHMA WITH ACUTE EXACERBATION: ICD-10-CM

## 2023-04-20 RX ORDER — FLUTICASONE PROPIONATE 110 UG/1
2 AEROSOL, METERED RESPIRATORY (INHALATION) 2 TIMES DAILY
Qty: 1 EACH | Refills: 5 | Status: SHIPPED | OUTPATIENT
Start: 2023-04-20 | End: 2024-04-19

## 2023-04-20 NOTE — TELEPHONE ENCOUNTER
Future Appointments    Encounter Information    Provider Department Appt Notes   4/25/2023 Travis Cota MD [facility] Return  also separate 3 month f/u DM POCT Hba1c .   1/29/2024 Travis Cota MD [facility] Return for [facility] Annual Wellness Visit in 1 year,     Past Visits    Date Provider Specialty Visit Type Primary Dx   01/25/2023 Travis Cota MD Family Medicine Office Visit Medicare annual wellness visit, subsequent   01/23/2023 CALIXTO Sexton - CNP Family Medicine Office Visit URI with cough and congestion   01/13/2023 Stephan Spring DO Family Medicine Office Visit Bronchitis   11/08/2022 Travis Cota MD Family Medicine Office Visit Macrocytosis   10/25/2022 Travis Cota MD Family Medicine Office Visit Elevated LFTs

## 2023-04-24 DIAGNOSIS — J45.41 MODERATE PERSISTENT ASTHMA WITH ACUTE EXACERBATION: ICD-10-CM

## 2023-04-24 RX ORDER — FLUTICASONE PROPIONATE 110 UG/1
2 AEROSOL, METERED RESPIRATORY (INHALATION) 2 TIMES DAILY
Qty: 1 EACH | Refills: 5 | Status: SHIPPED | OUTPATIENT
Start: 2023-04-24 | End: 2024-04-23

## 2023-04-25 ENCOUNTER — OFFICE VISIT (OUTPATIENT)
Dept: FAMILY MEDICINE CLINIC | Age: 68
End: 2023-04-25

## 2023-04-25 VITALS
HEIGHT: 73 IN | WEIGHT: 243 LBS | OXYGEN SATURATION: 97 % | TEMPERATURE: 97 F | SYSTOLIC BLOOD PRESSURE: 130 MMHG | HEART RATE: 78 BPM | DIASTOLIC BLOOD PRESSURE: 80 MMHG | BODY MASS INDEX: 32.2 KG/M2

## 2023-04-25 DIAGNOSIS — M1A.0720 CHRONIC GOUT OF LEFT FOOT, UNSPECIFIED CAUSE: ICD-10-CM

## 2023-04-25 DIAGNOSIS — R60.0 EDEMA OF RIGHT LOWER EXTREMITY: ICD-10-CM

## 2023-04-25 DIAGNOSIS — M79.604 RIGHT LEG PAIN: Primary | ICD-10-CM

## 2023-04-25 DIAGNOSIS — M79.89 SWELLING OF LOWER LEG: ICD-10-CM

## 2023-04-25 DIAGNOSIS — E11.65 UNCONTROLLED TYPE 2 DIABETES MELLITUS WITH HYPERGLYCEMIA (HCC): ICD-10-CM

## 2023-04-25 DIAGNOSIS — I10 ESSENTIAL HYPERTENSION: ICD-10-CM

## 2023-04-25 LAB — HBA1C MFR BLD: 6.6 %

## 2023-04-25 RX ORDER — METOPROLOL SUCCINATE 100 MG/1
100 TABLET, EXTENDED RELEASE ORAL DAILY
Qty: 90 TABLET | Refills: 3 | Status: SHIPPED | OUTPATIENT
Start: 2023-04-25

## 2023-04-25 RX ORDER — ALLOPURINOL 300 MG/1
300 TABLET ORAL DAILY
Qty: 90 TABLET | Refills: 3 | Status: SHIPPED | OUTPATIENT
Start: 2023-04-25

## 2023-04-25 SDOH — ECONOMIC STABILITY: HOUSING INSECURITY
IN THE LAST 12 MONTHS, WAS THERE A TIME WHEN YOU DID NOT HAVE A STEADY PLACE TO SLEEP OR SLEPT IN A SHELTER (INCLUDING NOW)?: NO

## 2023-04-25 SDOH — ECONOMIC STABILITY: FOOD INSECURITY: WITHIN THE PAST 12 MONTHS, THE FOOD YOU BOUGHT JUST DIDN'T LAST AND YOU DIDN'T HAVE MONEY TO GET MORE.: NEVER TRUE

## 2023-04-25 SDOH — ECONOMIC STABILITY: INCOME INSECURITY: HOW HARD IS IT FOR YOU TO PAY FOR THE VERY BASICS LIKE FOOD, HOUSING, MEDICAL CARE, AND HEATING?: NOT HARD AT ALL

## 2023-04-25 SDOH — ECONOMIC STABILITY: FOOD INSECURITY: WITHIN THE PAST 12 MONTHS, YOU WORRIED THAT YOUR FOOD WOULD RUN OUT BEFORE YOU GOT MONEY TO BUY MORE.: NEVER TRUE

## 2023-04-25 ASSESSMENT — ENCOUNTER SYMPTOMS
CHEST TIGHTNESS: 0
ABDOMINAL DISTENTION: 0
SHORTNESS OF BREATH: 0
ABDOMINAL PAIN: 0
PHOTOPHOBIA: 0

## 2023-04-25 NOTE — PATIENT INSTRUCTIONS
Pt will have ultrasound to rule out DVT stat. If negative will work on increased activity during the day in order to improve circulation    No change in diabetic meds. F/u 3 months for [address] and skin.   No labs due    Med refill sent

## 2023-04-25 NOTE — PROGRESS NOTES
Diagnosis Orders   1. Right leg pain  US DUP LOWER EXTREMITY RIGHT ARTERIES      2. Swelling of lower leg  US DUP LOWER EXTREMITY RIGHT ARTERIES      3. Uncontrolled type 2 diabetes mellitus with hyperglycemia (HCC)  POCT glycosylated hemoglobin (Hb A1C)      4. Chronic gout of left foot, unspecified cause  allopurinol (ZYLOPRIM) 300 MG tablet      5. Essential hypertension  metoprolol succinate (TOPROL XL) 100 MG extended release tablet        Return in about 3 months (around 7/25/2023) for for routine major medical condition management and skin . Patient Instructions   Pt will have ultrasound to rule out DVT stat. If negative will work on increased activity during the day in order to improve circulation    No change in diabetic meds. F/u 3 months for [address] and skin. No labs due    Med refill sent     Subjective:      Patient ID: Cal Ordonez is a 70 y.o. male who presents for:  Chief Complaint   Patient presents with    Diabetes       Patient here for routine diabetic follow-up but has noted that he has continued swelling in his legs. Feels like the right leg is very tense and firm. Pain noted.   Patient does spend most of the day sitting with his legs down occasionally with the legs      Current Outpatient Medications on File Prior to Visit   Medication Sig Dispense Refill    fluticasone (FLOVENT HFA) 110 MCG/ACT inhaler Inhale 2 puffs into the lungs 2 times daily 1 each 5    amLODIPine (NORVASC) 5 MG tablet TAKE ONE TABLET BY MOUTH DAILY 30 tablet 0    folic acid (FOLVITE) 1 MG tablet Take 1 tablet by mouth daily Pt is taking      Cyanocobalamin (VITAMIN B 12) 500 MCG TABS Take 1,000 mcg by mouth daily      albuterol sulfate HFA (VENTOLIN HFA) 108 (90 Base) MCG/ACT inhaler Inhale 2 puffs into the lungs every 6 hours as needed for Wheezing 18 g 1    simvastatin (ZOCOR) 20 MG tablet TAKE ONE TABLET BY MOUTH EVERY DAY 90 tablet 3    metFORMIN (GLUCOPHAGE) 500 MG tablet Take 1 tablet by mouth 3 times daily

## 2023-04-27 DIAGNOSIS — M79.89 SWELLING OF LOWER LEG: ICD-10-CM

## 2023-04-27 DIAGNOSIS — M79.604 RIGHT LEG PAIN: Primary | ICD-10-CM

## 2023-04-27 DIAGNOSIS — R60.0 EDEMA OF RIGHT LOWER EXTREMITY: ICD-10-CM

## 2023-04-27 RX ORDER — AMLODIPINE BESYLATE 5 MG/1
TABLET ORAL
Qty: 30 TABLET | Refills: 0 | OUTPATIENT
Start: 2023-04-27

## 2023-05-01 ENCOUNTER — HOSPITAL ENCOUNTER (OUTPATIENT)
Dept: ULTRASOUND IMAGING | Age: 68
Discharge: HOME | End: 2023-05-03
Payer: MEDICARE

## 2023-05-01 DIAGNOSIS — R60.0 EDEMA OF RIGHT LOWER EXTREMITY: ICD-10-CM

## 2023-05-01 DIAGNOSIS — M79.89 SWELLING OF LOWER LEG: ICD-10-CM

## 2023-05-01 DIAGNOSIS — M79.604 RIGHT LEG PAIN: ICD-10-CM

## 2023-05-01 PROCEDURE — 93971 EXTREMITY STUDY: CPT

## 2023-05-01 RX ORDER — AMLODIPINE BESYLATE 5 MG/1
5 TABLET ORAL DAILY
Qty: 30 TABLET | Refills: 0 | Status: SHIPPED | OUTPATIENT
Start: 2023-05-01

## 2023-05-09 ENCOUNTER — OFFICE VISIT (OUTPATIENT)
Dept: FAMILY MEDICINE CLINIC | Age: 68
End: 2023-05-09
Payer: MEDICARE

## 2023-05-09 VITALS
SYSTOLIC BLOOD PRESSURE: 136 MMHG | HEART RATE: 94 BPM | TEMPERATURE: 98.9 F | WEIGHT: 238 LBS | OXYGEN SATURATION: 96 % | DIASTOLIC BLOOD PRESSURE: 80 MMHG | BODY MASS INDEX: 31.54 KG/M2 | HEIGHT: 73 IN

## 2023-05-09 DIAGNOSIS — I87.2 VENOUS INSUFFICIENCY OF RIGHT LOWER EXTREMITY: ICD-10-CM

## 2023-05-09 DIAGNOSIS — B37.0 ORAL THRUSH: Primary | ICD-10-CM

## 2023-05-09 DIAGNOSIS — L03.115 CELLULITIS OF RIGHT LOWER EXTREMITY: ICD-10-CM

## 2023-05-09 DIAGNOSIS — R05.1 ACUTE COUGH: ICD-10-CM

## 2023-05-09 PROCEDURE — G8427 DOCREV CUR MEDS BY ELIG CLIN: HCPCS | Performed by: NURSE PRACTITIONER

## 2023-05-09 PROCEDURE — 3074F SYST BP LT 130 MM HG: CPT | Performed by: NURSE PRACTITIONER

## 2023-05-09 PROCEDURE — 99213 OFFICE O/P EST LOW 20 MIN: CPT | Performed by: NURSE PRACTITIONER

## 2023-05-09 PROCEDURE — 3017F COLORECTAL CA SCREEN DOC REV: CPT | Performed by: NURSE PRACTITIONER

## 2023-05-09 PROCEDURE — 1123F ACP DISCUSS/DSCN MKR DOCD: CPT | Performed by: NURSE PRACTITIONER

## 2023-05-09 PROCEDURE — 3078F DIAST BP <80 MM HG: CPT | Performed by: NURSE PRACTITIONER

## 2023-05-09 PROCEDURE — 1036F TOBACCO NON-USER: CPT | Performed by: NURSE PRACTITIONER

## 2023-05-09 PROCEDURE — G8417 CALC BMI ABV UP PARAM F/U: HCPCS | Performed by: NURSE PRACTITIONER

## 2023-05-09 RX ORDER — CEPHALEXIN 500 MG/1
1 TABLET ORAL 2 TIMES DAILY
Qty: 20 TABLET | Refills: 0 | Status: SHIPPED | OUTPATIENT
Start: 2023-05-09 | End: 2023-05-19

## 2023-05-09 RX ORDER — GREEN TEA/HOODIA GORDONII 315-12.5MG
1 CAPSULE ORAL DAILY
Qty: 30 TABLET | Refills: 0 | Status: SHIPPED | OUTPATIENT
Start: 2023-05-09 | End: 2023-06-08

## 2023-05-09 RX ORDER — BENZONATATE 100 MG/1
100 CAPSULE ORAL 3 TIMES DAILY PRN
Qty: 2 CAPSULE | Refills: 0 | Status: SHIPPED | OUTPATIENT
Start: 2023-05-09 | End: 2023-05-16

## 2023-05-09 RX ORDER — PREDNISONE 20 MG/1
20 TABLET ORAL 2 TIMES DAILY
Qty: 10 TABLET | Refills: 0 | Status: CANCELLED | OUTPATIENT
Start: 2023-05-09 | End: 2023-05-14

## 2023-05-09 RX ORDER — DOXYCYCLINE HYCLATE 100 MG
100 TABLET ORAL 2 TIMES DAILY
Qty: 20 TABLET | Refills: 0 | Status: CANCELLED | OUTPATIENT
Start: 2023-05-09 | End: 2023-05-19

## 2023-05-09 ASSESSMENT — ENCOUNTER SYMPTOMS
RHINORRHEA: 1
COUGH: 1

## 2023-05-11 RX ORDER — AMLODIPINE BESYLATE 5 MG/1
5 TABLET ORAL DAILY
Qty: 30 TABLET | Refills: 0 | OUTPATIENT
Start: 2023-05-11

## 2023-05-11 RX ORDER — AMLODIPINE BESYLATE 5 MG/1
TABLET ORAL
Qty: 30 TABLET | Refills: 0 | OUTPATIENT
Start: 2023-05-11

## 2023-05-15 ASSESSMENT — ENCOUNTER SYMPTOMS
WHEEZING: 0
TROUBLE SWALLOWING: 0
SINUS PAIN: 0
VOMITING: 0
NAIL CHANGES: 0
CHEST TIGHTNESS: 0
DIARRHEA: 0
APNEA: 0
NAUSEA: 0
ABDOMINAL DISTENTION: 0
COLOR CHANGE: 1
CONSTIPATION: 0
EYE REDNESS: 0
SORE THROAT: 0
EYE ITCHING: 0
SHORTNESS OF BREATH: 0

## 2023-05-18 ENCOUNTER — OFFICE VISIT (OUTPATIENT)
Dept: FAMILY MEDICINE CLINIC | Age: 68
End: 2023-05-18

## 2023-05-18 VITALS
DIASTOLIC BLOOD PRESSURE: 74 MMHG | WEIGHT: 239 LBS | TEMPERATURE: 98.2 F | OXYGEN SATURATION: 96 % | HEART RATE: 82 BPM | SYSTOLIC BLOOD PRESSURE: 128 MMHG | BODY MASS INDEX: 31.53 KG/M2

## 2023-05-18 DIAGNOSIS — J40 BRONCHITIS: Primary | ICD-10-CM

## 2023-05-18 RX ORDER — PREDNISONE 10 MG/1
TABLET ORAL
Qty: 57 TABLET | Refills: 0 | Status: SHIPPED | OUTPATIENT
Start: 2023-05-18

## 2023-05-18 ASSESSMENT — ENCOUNTER SYMPTOMS
ABDOMINAL PAIN: 0
CHEST TIGHTNESS: 0
COUGH: 1
PHOTOPHOBIA: 0
WHEEZING: 1
RHINORRHEA: 0
ABDOMINAL DISTENTION: 0
SHORTNESS OF BREATH: 1

## 2023-05-18 NOTE — PROGRESS NOTES
Diagnosis Orders   1. Bronchitis  predniSONE (DELTASONE) 10 MG tablet        Return for 1-2 weeks recheck legs and bronchitis. Patient Instructions   Patient will be placed on prednisone taper for bronchitis. Patient is going to utilize compression socks starting first thing in the morning with a minimum pressure of 15 to 20 mmHg. Consider purchasing the Sigvaris  Doff and Linda Rose to ease with putting on socks    Consider frequently rinsing the mouth 2-3 times dialy with any sort of acidic solution the patient can tolerate such as water with lemon juice, water with apple cider vinegar, full-strength cranberry juice. Subjective:      Patient ID: Milly Puga is a 70 y.o. male who presents for:  Chief Complaint   Patient presents with    Thrush     10 day f/u from walk in. Pt noted thrush that was treated and denies any current mouth burning, plaques, or raw sensaiton    Pt it still coughing up tan phlegm with tightness in the chest.  Completeing  cephalexin     Legs less swollen and less red. Not wearing compression socks      Current Outpatient Medications on File Prior to Visit   Medication Sig Dispense Refill    nystatin (MYCOSTATIN) 544217 UNIT/ML suspension Take 5 mLs by mouth 4 times daily for 10 days Retain in mouth as long as possible 200 mL 0    Cephalexin 500 MG TABS Take 1 tablet by mouth in the morning and 1 tablet in the evening. Do all this for 10 days.  20 tablet 0    Probiotic Acidophilus (FLORANEX) TABS Take 1 tablet by mouth daily 30 tablet 0    amLODIPine (NORVASC) 5 MG tablet Take 1 tablet by mouth daily 30 tablet 0    allopurinol (ZYLOPRIM) 300 MG tablet Take 1 tablet by mouth daily 90 tablet 3    metoprolol succinate (TOPROL XL) 100 MG extended release tablet Take 1 tablet by mouth daily 90 tablet 3    fluticasone (FLOVENT HFA) 110 MCG/ACT inhaler Inhale 2 puffs into the lungs 2 times daily 1 each 5    folic acid (FOLVITE) 1 MG tablet Take 1 tablet by mouth daily Pt is taking

## 2023-05-18 NOTE — PATIENT INSTRUCTIONS
Patient will be placed on prednisone taper for bronchitis. Patient is going to utilize compression socks starting first thing in the morning with a minimum pressure of 15 to 20 mmHg. Consider purchasing the Sigvaris  Doff and Mayme Cluck to ease with putting on socks    Consider frequently rinsing the mouth 2-3 times dialy with any sort of acidic solution the patient can tolerate such as water with lemon juice, water with apple cider vinegar, full-strength cranberry juice.

## 2023-05-30 RX ORDER — AMLODIPINE BESYLATE 5 MG/1
5 TABLET ORAL DAILY
Qty: 30 TABLET | Refills: 0 | Status: SHIPPED | OUTPATIENT
Start: 2023-05-30

## 2023-06-02 DIAGNOSIS — I10 ESSENTIAL HYPERTENSION: ICD-10-CM

## 2023-06-02 DIAGNOSIS — M1A.0720 CHRONIC GOUT OF LEFT FOOT, UNSPECIFIED CAUSE: ICD-10-CM

## 2023-06-02 DIAGNOSIS — L03.115 CELLULITIS OF RIGHT LOWER EXTREMITY: ICD-10-CM

## 2023-06-02 DIAGNOSIS — B37.0 ORAL THRUSH: ICD-10-CM

## 2023-06-02 NOTE — TELEPHONE ENCOUNTER
Comments:     Last Office Visit (last PCP visit):   5/18/2023    Next Visit Date:  Future Appointments   Date Time Provider Odessa Peres   6/5/2023 12:30 PM Jerri Banks MD [facility]   7/25/2023  1:15 PM Jerri Banks MD [facility]   1/29/2024 12:00 PM Jerri Banks MD [facility]       **If hasn't been seen in over a year OR hasn't followed up according to last diabetes/ADHD visit, make appointment for patient before sending refill to provider.     Rx requested:  Requested Prescriptions     Pending Prescriptions Disp Refills    allopurinol (ZYLOPRIM) 300 MG tablet 90 tablet 3     Sig: Take 1 tablet by mouth daily    metoprolol succinate (TOPROL XL) 100 MG extended release tablet 90 tablet 3     Sig: Take 1 tablet by mouth daily

## 2023-06-02 NOTE — TELEPHONE ENCOUNTER
Comments:     Last Office Visit (last PCP visit):   5/9/2023    Next Visit Date:  Future Appointments   Date Time Provider Odessa Larisa   6/5/2023 12:30 PM Maximino Stephens MD [facility]   7/25/2023  1:15 PM Maximino Stephens MD [facility] [place]   1/29/2024 12:00 PM Maximino Stephens MD [facility]       **If hasn't been seen in over a year OR hasn't followed up according to last diabetes/ADHD visit, make appointment for patient before sending refill to provider.     Rx requested:  Requested Prescriptions     Pending Prescriptions Disp Refills    Probiotic Acidophilus (FLORANEX) TABS 30 tablet 0     Sig: Take 1 tablet by mouth daily

## 2023-06-04 RX ORDER — ALLOPURINOL 300 MG/1
300 TABLET ORAL DAILY
Qty: 90 TABLET | Refills: 3 | Status: SHIPPED | OUTPATIENT
Start: 2023-06-04

## 2023-06-04 RX ORDER — METOPROLOL SUCCINATE 100 MG/1
100 TABLET, EXTENDED RELEASE ORAL DAILY
Qty: 90 TABLET | Refills: 3 | Status: SHIPPED | OUTPATIENT
Start: 2023-06-04

## 2023-06-04 RX ORDER — GREEN TEA/HOODIA GORDONII 315-12.5MG
1 CAPSULE ORAL DAILY
Qty: 30 TABLET | Refills: 0 | Status: SHIPPED | OUTPATIENT
Start: 2023-06-04 | End: 2023-07-04

## 2023-06-05 ENCOUNTER — OFFICE VISIT (OUTPATIENT)
Dept: FAMILY MEDICINE CLINIC | Age: 68
End: 2023-06-05
Payer: MEDICARE

## 2023-06-05 VITALS
HEIGHT: 73 IN | OXYGEN SATURATION: 97 % | BODY MASS INDEX: 30.75 KG/M2 | HEART RATE: 89 BPM | TEMPERATURE: 97.8 F | DIASTOLIC BLOOD PRESSURE: 74 MMHG | SYSTOLIC BLOOD PRESSURE: 128 MMHG | WEIGHT: 232 LBS

## 2023-06-05 DIAGNOSIS — R05.3 CHRONIC COUGH: Primary | ICD-10-CM

## 2023-06-05 DIAGNOSIS — R68.84 JAW PAIN: ICD-10-CM

## 2023-06-05 DIAGNOSIS — R06.02 SOB (SHORTNESS OF BREATH): ICD-10-CM

## 2023-06-05 DIAGNOSIS — H69.81 EUSTACHIAN TUBE DYSFUNCTION, RIGHT: ICD-10-CM

## 2023-06-05 PROCEDURE — 3074F SYST BP LT 130 MM HG: CPT | Performed by: FAMILY MEDICINE

## 2023-06-05 PROCEDURE — 99214 OFFICE O/P EST MOD 30 MIN: CPT | Performed by: FAMILY MEDICINE

## 2023-06-05 PROCEDURE — 1036F TOBACCO NON-USER: CPT | Performed by: FAMILY MEDICINE

## 2023-06-05 PROCEDURE — 3017F COLORECTAL CA SCREEN DOC REV: CPT | Performed by: FAMILY MEDICINE

## 2023-06-05 PROCEDURE — G8427 DOCREV CUR MEDS BY ELIG CLIN: HCPCS | Performed by: FAMILY MEDICINE

## 2023-06-05 PROCEDURE — G8417 CALC BMI ABV UP PARAM F/U: HCPCS | Performed by: FAMILY MEDICINE

## 2023-06-05 PROCEDURE — 1123F ACP DISCUSS/DSCN MKR DOCD: CPT | Performed by: FAMILY MEDICINE

## 2023-06-05 PROCEDURE — 3078F DIAST BP <80 MM HG: CPT | Performed by: FAMILY MEDICINE

## 2023-06-05 ASSESSMENT — ENCOUNTER SYMPTOMS
CHEST TIGHTNESS: 0
SHORTNESS OF BREATH: 1
ABDOMINAL DISTENTION: 0
PHOTOPHOBIA: 0
ABDOMINAL PAIN: 0

## 2023-06-05 NOTE — PROGRESS NOTES
Diagnosis Orders   1. Chronic cough  Full PFT Study With Bronchodilator      2. SOB (shortness of breath)  Full PFT Study With Bronchodilator      3. Eustachian tube dysfunction, right        4. Jaw pain          No follow-ups on file. Patient Instructions   Patient can do a trial of 800 mg of ibuprofen every 8 hours for 2 to 3 days for the jaw pain and will contact his dentist to get in for dental evaluation. Eustachian tube dysfunction has been discussed with the patient and Valsalva maneuver reviewed    Patient will be referred to PFT for evaluation to determine if worsening asthma is occurring and medication regimen needs to be adjusted. Subjective:      Patient ID: Karine Hanson is a 70 y.o. male who presents for:  Chief Complaint   Patient presents with    Breathing Problem     X 1-2 week follow up - still experiencing SOB on a regular occation    Leg Pain    Jaw Pain     Right side- cant chew  x 2 days        Patient denies any injury to the jaw. Woke up 2 days ago with increased jaw pain. States overall he has been feeling significantly better status post steroid treatment and his leg swelling had decreased significantly. Still continues to have the sense of shortness of breath.       Current Outpatient Medications on File Prior to Visit   Medication Sig Dispense Refill    allopurinol (ZYLOPRIM) 300 MG tablet Take 1 tablet by mouth daily 90 tablet 3    metoprolol succinate (TOPROL XL) 100 MG extended release tablet Take 1 tablet by mouth daily 90 tablet 3    Probiotic Acidophilus (FLORANEX) TABS Take 1 tablet by mouth daily 30 tablet 0    amLODIPine (NORVASC) 5 MG tablet Take 1 tablet by mouth daily 30 tablet 0    fluticasone (FLOVENT HFA) 110 MCG/ACT inhaler Inhale 2 puffs into the lungs 2 times daily 1 each 5    folic acid (FOLVITE) 1 MG tablet Take 1 tablet by mouth daily Pt is taking      Cyanocobalamin (VITAMIN B 12) 500 MCG TABS Take 1,000 mcg by mouth daily      albuterol sulfate HFA

## 2023-06-05 NOTE — PATIENT INSTRUCTIONS
Patient can do a trial of 800 mg of ibuprofen every 8 hours for 2 to 3 days for the jaw pain and will contact his dentist to get in for dental evaluation. Eustachian tube dysfunction has been discussed with the patient and Valsalva maneuver reviewed    Patient will be referred to PFT for evaluation to determine if worsening asthma is occurring and medication regimen needs to be adjusted.

## 2023-06-22 ENCOUNTER — HOSPITAL ENCOUNTER (OUTPATIENT)
Dept: PULMONOLOGY | Age: 68
Discharge: HOME | End: 2023-06-22

## 2023-06-22 DIAGNOSIS — R06.02 SOB (SHORTNESS OF BREATH): ICD-10-CM

## 2023-06-22 DIAGNOSIS — R05.3 CHRONIC COUGH: ICD-10-CM

## 2023-06-22 RX ORDER — ALBUTEROL SULFATE 2.5 MG/3ML
SOLUTION RESPIRATORY (INHALATION)
Status: DISPENSED
Start: 2023-06-22 | End: 2023-06-22

## 2023-06-23 ENCOUNTER — HOSPITAL ENCOUNTER (OUTPATIENT)
Dept: PULMONOLOGY | Age: 68
Discharge: HOME | End: 2023-06-23
Payer: MEDICARE

## 2023-06-23 PROCEDURE — 94726 PLETHYSMOGRAPHY LUNG VOLUMES: CPT

## 2023-06-23 PROCEDURE — 94729 DIFFUSING CAPACITY: CPT

## 2023-06-23 PROCEDURE — 6360000002 HC RX W HCPCS: Performed by: FAMILY MEDICINE

## 2023-06-23 PROCEDURE — 94060 EVALUATION OF WHEEZING: CPT

## 2023-06-23 RX ORDER — ALBUTEROL SULFATE 2.5 MG/3ML
2.5 SOLUTION RESPIRATORY (INHALATION) ONCE
Status: COMPLETED | OUTPATIENT
Start: 2023-06-23 | End: 2023-06-23

## 2023-06-23 RX ADMIN — ALBUTEROL SULFATE 2.5 MG: 2.5 SOLUTION RESPIRATORY (INHALATION) at 13:09

## 2023-06-24 RX ORDER — AMLODIPINE BESYLATE 5 MG/1
TABLET ORAL
Qty: 30 TABLET | Refills: 12 | Status: SHIPPED | OUTPATIENT
Start: 2023-06-24

## 2023-07-05 ENCOUNTER — TELEPHONE (OUTPATIENT)
Dept: FAMILY MEDICINE CLINIC | Age: 68
End: 2023-07-05

## 2023-07-06 PROBLEM — R05.3 CHRONIC COUGH: Status: ACTIVE | Noted: 2023-07-06

## 2023-07-06 PROBLEM — R06.02 SOB (SHORTNESS OF BREATH): Status: ACTIVE | Noted: 2023-07-06

## 2023-07-10 DIAGNOSIS — R94.2 ABNORMAL PFT: Primary | ICD-10-CM

## 2023-07-25 ENCOUNTER — OFFICE VISIT (OUTPATIENT)
Dept: FAMILY MEDICINE CLINIC | Age: 68
End: 2023-07-25
Payer: MEDICARE

## 2023-07-25 VITALS
BODY MASS INDEX: 31.2 KG/M2 | TEMPERATURE: 97.7 F | SYSTOLIC BLOOD PRESSURE: 132 MMHG | DIASTOLIC BLOOD PRESSURE: 68 MMHG | OXYGEN SATURATION: 98 % | HEART RATE: 89 BPM | WEIGHT: 235.4 LBS | HEIGHT: 73 IN

## 2023-07-25 DIAGNOSIS — C61 PROSTATE CA (HCC): ICD-10-CM

## 2023-07-25 DIAGNOSIS — L57.0 ACTINIC KERATOSES: ICD-10-CM

## 2023-07-25 DIAGNOSIS — L98.9 CHANGING SKIN LESION: ICD-10-CM

## 2023-07-25 DIAGNOSIS — K63.5 POLYP OF COLON, UNSPECIFIED PART OF COLON, UNSPECIFIED TYPE: ICD-10-CM

## 2023-07-25 DIAGNOSIS — D49.2 ABNORMAL SKIN GROWTH: ICD-10-CM

## 2023-07-25 DIAGNOSIS — E11.65 UNCONTROLLED TYPE 2 DIABETES MELLITUS WITH HYPERGLYCEMIA (HCC): ICD-10-CM

## 2023-07-25 DIAGNOSIS — I83.93 VARICOSE VEINS OF BOTH LOWER EXTREMITIES, UNSPECIFIED WHETHER COMPLICATED: ICD-10-CM

## 2023-07-25 DIAGNOSIS — E11.65 UNCONTROLLED TYPE 2 DIABETES MELLITUS WITH HYPERGLYCEMIA (HCC): Primary | ICD-10-CM

## 2023-07-25 DIAGNOSIS — I10 ESSENTIAL HYPERTENSION: ICD-10-CM

## 2023-07-25 LAB
ALBUMIN SERPL-MCNC: 4.6 G/DL (ref 3.5–4.6)
ANION GAP SERPL CALCULATED.3IONS-SCNC: 19 MEQ/L (ref 9–15)
BUN SERPL-MCNC: 6 MG/DL (ref 8–23)
CALCIUM SERPL-MCNC: 9.2 MG/DL (ref 8.5–9.9)
CHLORIDE SERPL-SCNC: 94 MEQ/L (ref 95–107)
CO2 SERPL-SCNC: 20 MEQ/L (ref 20–31)
CREAT SERPL-MCNC: 0.91 MG/DL (ref 0.7–1.2)
CREAT UR-MCNC: 67.4 MG/DL
GLUCOSE SERPL-MCNC: 116 MG/DL (ref 70–99)
HBA1C MFR BLD: 6.4 % (ref 4.8–5.9)
MICROALBUMIN UR-MCNC: <1.2 MG/DL
MICROALBUMIN/CREAT UR-RTO: NORMAL MG/G (ref 0–30)
PHOSPHATE SERPL-MCNC: 3.4 MG/DL (ref 2.3–4.8)
POTASSIUM SERPL-SCNC: 4.7 MEQ/L (ref 3.4–4.9)
SODIUM SERPL-SCNC: 133 MEQ/L (ref 135–144)

## 2023-07-25 PROCEDURE — 3044F HG A1C LEVEL LT 7.0%: CPT | Performed by: FAMILY MEDICINE

## 2023-07-25 PROCEDURE — 99214 OFFICE O/P EST MOD 30 MIN: CPT | Performed by: FAMILY MEDICINE

## 2023-07-25 PROCEDURE — G8427 DOCREV CUR MEDS BY ELIG CLIN: HCPCS | Performed by: FAMILY MEDICINE

## 2023-07-25 PROCEDURE — 3075F SYST BP GE 130 - 139MM HG: CPT | Performed by: FAMILY MEDICINE

## 2023-07-25 PROCEDURE — 11300 SHAVE SKIN LESION 0.5 CM/<: CPT | Performed by: FAMILY MEDICINE

## 2023-07-25 PROCEDURE — 3017F COLORECTAL CA SCREEN DOC REV: CPT | Performed by: FAMILY MEDICINE

## 2023-07-25 PROCEDURE — 2022F DILAT RTA XM EVC RTNOPTHY: CPT | Performed by: FAMILY MEDICINE

## 2023-07-25 PROCEDURE — G8417 CALC BMI ABV UP PARAM F/U: HCPCS | Performed by: FAMILY MEDICINE

## 2023-07-25 PROCEDURE — 1036F TOBACCO NON-USER: CPT | Performed by: FAMILY MEDICINE

## 2023-07-25 PROCEDURE — 17003 DESTRUCT PREMALG LES 2-14: CPT | Performed by: FAMILY MEDICINE

## 2023-07-25 PROCEDURE — 1123F ACP DISCUSS/DSCN MKR DOCD: CPT | Performed by: FAMILY MEDICINE

## 2023-07-25 PROCEDURE — 3078F DIAST BP <80 MM HG: CPT | Performed by: FAMILY MEDICINE

## 2023-07-25 PROCEDURE — 17000 DESTRUCT PREMALG LESION: CPT | Performed by: FAMILY MEDICINE

## 2023-07-25 ASSESSMENT — ENCOUNTER SYMPTOMS
ABDOMINAL DISTENTION: 0
CHEST TIGHTNESS: 0
ABDOMINAL PAIN: 0
PHOTOPHOBIA: 0
COLOR CHANGE: 1
SHORTNESS OF BREATH: 0

## 2023-07-25 NOTE — PROGRESS NOTES
Diagnosis Orders   1. Uncontrolled type 2 diabetes mellitus with hyperglycemia (HCC)  Hemoglobin A1C    Microalbumin / Creatinine Urine Ratio    Renal Function Panel      2. Prostate CA ([address])        3. Varicose veins of both lower extremities, unspecified whether complicated        4. Essential hypertension        5. Polyp of colon, unspecified part of colon, unspecified type  Ambulatory referral to Gastroenterology      6. Abnormal skin growth  Specimen to Pathology Outpatient    WA SHAVING SKIN LESION 1 TRUNK/ARM/LEG DIAM 0.5CM/<      7. Changing skin lesion  Specimen to Pathology Outpatient    WA SHAVING SKIN LESION 1 TRUNK/ARM/LEG DIAM 0.5CM/<      8. Actinic keratoses  WA DESTRUCTION PREMALIGNANT LESION 1ST    WA DESTRUCTION PREMALIGNANT LESION 2-14 EA        Return in about 6 months (around 1/25/2024) for for routine major medical condition management and skin. Patient Instructions   Patient is due for labs monitoring medical conditions today. He will have those drawn. Prostate cancer has been monitored with labs for many years now continues to be negative for any abnormalities. Patient continues to wear compression socks for his varicose veins. No change in blood pressure management at this time. Patient is due for follow-up of polyp of the colon that referral has been made. Multiple abnormal skin lesions were noted today and 2 techniques were utilized. Liquid nitrogen for the actinic keratosis and shave biopsy for the pigmented lesion.     Incision/ Shave biopsy/ Laceration repair    -Clean surgical area with antibacterial soap and water once daily.    -Keep surgical site moist with vaseline or antibiotic ointment (single- Bacitracin, not triple-Neosporin) and apply a fresh bandage daily until a solid scab forms or if the wound is at risk for trauma or dirt.   -Follow up immediately if any growing redness (minimal redness or pale pink is normal along wound edges) surrounds the surgical site

## 2023-07-25 NOTE — PATIENT INSTRUCTIONS
Patient is due for labs monitoring medical conditions today. He will have those drawn. Prostate cancer has been monitored with labs for many years now continues to be negative for any abnormalities. Patient continues to wear compression socks for his varicose veins. No change in blood pressure management at this time. Patient is due for follow-up of polyp of the colon that referral has been made. Multiple abnormal skin lesions were noted today and 2 techniques were utilized. Liquid nitrogen for the actinic keratosis and shave biopsy for the pigmented lesion. Incision/ Shave biopsy/ Laceration repair    -Clean surgical area with antibacterial soap and water once daily.    -Keep surgical site moist with vaseline or antibiotic ointment (single- Bacitracin, not triple-Neosporin) and apply a fresh bandage daily until a solid scab forms or if the wound is at risk for trauma or dirt.   -Follow up immediately if any growing redness (minimal redness or pale pink is normal along wound edges) surrounds the surgical site or if dripping drainage occurs at surgical site. Once a solid scab forms no more bandage needed. A wet scab can look yellow. This is not infection, just moisture.  -You may be instructed to soak the wound with Hydrogen Peroxide to loosen scabbing around sutures, this is not to be done more often that every 3 days, should be for 30 seconds-1 min and then rinsed off with water.   -Once the lesion is healed be sure to apply sunscreen to the area to prevent burn of the newer and more delicate skin during the first 6 months of healing.    -If the scar begins to be raised you may massage the area firmly twice a day to help break down scar tissue and help the area become a flat scar. There is some evidence that Mederma applied to a scar daily for the first few months can help shrink and fade it more quickly then without intervention. Cryotherapy instructions    Post op instructions given.  A

## 2023-07-26 DIAGNOSIS — L98.9 CHANGING SKIN LESION: ICD-10-CM

## 2023-07-26 DIAGNOSIS — D49.2 ABNORMAL SKIN GROWTH: ICD-10-CM

## 2023-08-03 ENCOUNTER — OFFICE VISIT (OUTPATIENT)
Dept: PULMONOLOGY | Age: 68
End: 2023-08-03
Payer: MEDICARE

## 2023-08-03 VITALS
SYSTOLIC BLOOD PRESSURE: 126 MMHG | HEART RATE: 70 BPM | WEIGHT: 235 LBS | BODY MASS INDEX: 31 KG/M2 | TEMPERATURE: 97.3 F | OXYGEN SATURATION: 96 % | DIASTOLIC BLOOD PRESSURE: 74 MMHG

## 2023-08-03 DIAGNOSIS — J45.20 MILD INTERMITTENT REACTIVE AIRWAY DISEASE WITHOUT COMPLICATION: Primary | ICD-10-CM

## 2023-08-03 DIAGNOSIS — R94.2 ABNORMAL PFT: ICD-10-CM

## 2023-08-03 DIAGNOSIS — J30.1 NON-SEASONAL ALLERGIC RHINITIS DUE TO POLLEN: ICD-10-CM

## 2023-08-03 PROCEDURE — 3078F DIAST BP <80 MM HG: CPT | Performed by: INTERNAL MEDICINE

## 2023-08-03 PROCEDURE — G8427 DOCREV CUR MEDS BY ELIG CLIN: HCPCS | Performed by: INTERNAL MEDICINE

## 2023-08-03 PROCEDURE — G8417 CALC BMI ABV UP PARAM F/U: HCPCS | Performed by: INTERNAL MEDICINE

## 2023-08-03 PROCEDURE — 3074F SYST BP LT 130 MM HG: CPT | Performed by: INTERNAL MEDICINE

## 2023-08-03 PROCEDURE — 99203 OFFICE O/P NEW LOW 30 MIN: CPT | Performed by: INTERNAL MEDICINE

## 2023-08-03 RX ORDER — FLUTICASONE PROPIONATE 110 UG/1
2 AEROSOL, METERED RESPIRATORY (INHALATION) 2 TIMES DAILY
Qty: 12 G | Refills: 4 | Status: SHIPPED | OUTPATIENT
Start: 2023-08-03 | End: 2024-08-02

## 2023-08-03 NOTE — PROGRESS NOTES
Subjective:     Crystal Youngblood is a 70 y.o. male who complains today of:     Chief Complaint   Patient presents with    New Patient     Ref by PCP Conner for PFT results       HPI  He was sent here due to abnormal PFT  He has been on Flovent  HFA  BID  most of the time , albuterol  HFA prn more than 3 years  Her is not using any inhaler in last 2 weeks     C/o shortness of breath with exertion. Occasional Wheezing. C/o Cough dry No Hemoptysis. No Chest tightness. No Chest pain with radiation  or pleuritic pain. No  leg edema. No orthopnea. No Fever or chills. No Rhinorrhea and postnasal drip. H/o smoking , quit 1982        Allergies:  Patient has no known allergies.   Past Medical History:   Diagnosis Date    Actinic keratoses     has had LN2 and used Efudex    Alcohol consumption of one to four drinks per day on alcohol screening     Allergic rhinitis     cats, weeds, grasses, ragweed    Asthma     Bilateral knee pain     Diabetes mellitus ([address])     Gout     History of colon polyps 02/2016    needs f/u 5 years [facility]    History of type 2 diabetes mellitus     about 15 years    Hyperlipidemia     Hypertension     Keratosis, inflamed seborrheic     Osteoarthritis, localized, shoulder, right     Polyp of transverse colon f/u due 2021 8/9/2014    Prediabetes     Prostate cancer ([address] 2014    s/p prostatectomy    Syncope and collapse     Varicose vein of leg      Past Surgical History:   Procedure Laterality Date    COLONOSCOPY  02/04/2016    Dr. Danielle Ruiz; polyps f/u in 5 years    COLONOSCOPY N/A 10/2/2020    COLONOSCOPY DIAGNOSTIC performed by Wilver Mays MD at [address] [place] [address]  11/2014    ULNAR TUNNEL RELEASE Bilateral     VARICOSE VEIN SURGERY       Family History   Problem Relation Age of Onset    Alzheimer's Disease Mother     Heart Disease Father     Cancer Father     Diabetes Father     Cancer Brother     Colon Cancer Neg Hx     Celiac Disease Neg Hx     Crohn's Disease Neg Hx

## 2023-08-10 ASSESSMENT — ENCOUNTER SYMPTOMS
NAUSEA: 0
SHORTNESS OF BREATH: 1
VOMITING: 0
WHEEZING: 1
VOICE CHANGE: 0
CHEST TIGHTNESS: 0
RHINORRHEA: 0
EYE ITCHING: 0
SORE THROAT: 0
ABDOMINAL PAIN: 0
DIARRHEA: 0
COUGH: 1

## 2023-08-11 DIAGNOSIS — I10 ESSENTIAL HYPERTENSION: ICD-10-CM

## 2023-08-11 NOTE — TELEPHONE ENCOUNTER
Future Appointments    Encounter Information    Provider Department Appt Notes   1/29/2024 Safia Talavera MD [facility] Primary Care Return for Medicare Annual Wellness Visit in 1 Radha Null in about 6 months (around 1/25/2024) for for routine major medical condition management and skin.    2/5/2024 Bernice Mariano, [address] [place] [address] Pulmonology 6M FU     Past Visits    Date Provider Specialty Visit Type Primary Dx   08/03/2023 Bernice Mariano MD Pulmonology Office Visit Mild intermittent reactive airway disease without complication   13/80/6161 Safia Talavera MD Family Medicine Office Visit Uncontrolled type 2 diabetes mellitus with hyperglycemia ([address])

## 2023-08-12 DIAGNOSIS — I10 ESSENTIAL HYPERTENSION: ICD-10-CM

## 2023-08-13 DIAGNOSIS — I10 ESSENTIAL HYPERTENSION: ICD-10-CM

## 2023-08-13 RX ORDER — IRBESARTAN 150 MG/1
TABLET ORAL
Qty: 90 TABLET | Refills: 0 | OUTPATIENT
Start: 2023-08-13

## 2023-08-14 RX ORDER — IRBESARTAN 150 MG/1
150 TABLET ORAL DAILY
Qty: 90 TABLET | Refills: 3 | Status: SHIPPED | OUTPATIENT
Start: 2023-08-14

## 2023-08-14 NOTE — TELEPHONE ENCOUNTER
Future Appointments    Encounter Information    Provider Department Appt Notes   1/29/2024 Itz Russo MD [facility] Primary Care Return for Medicare Annual Wellness Visit in 1 Jennifer Cassidy in about 6 months (around 1/25/2024) for for routine major medical condition management and skin.    2/5/2024 Lindsey Kim [address] [place] [address] Pulmonology 6M FU     Past Visits    Date Provider Specialty Visit Type Primary Dx   08/03/2023 Lindsey Kim MD Pulmonology Office Visit Mild intermittent reactive airway disease without complication   09/53/3959 Itz Russo MD Family Medicine Office Visit Uncontrolled type 2 diabetes mellitus with hyperglycemia ([address])

## 2023-11-09 DIAGNOSIS — D17.1 LIPOMA OF BACK: ICD-10-CM

## 2023-11-09 DIAGNOSIS — M25.561 ACUTE PAIN OF BOTH KNEES: Primary | ICD-10-CM

## 2023-11-09 DIAGNOSIS — M25.562 ACUTE PAIN OF BOTH KNEES: Primary | ICD-10-CM

## 2023-11-16 NOTE — PROGRESS NOTES
GENERAL SURGERY  NEW PATIENT HISTORY AND PHYSICAL NOTE    Pt Name: Melissa Kelly  MRN: [id number]    Date: 11/17/2023    Primary Care Physician: Letitia Fowler MD  Referring Physician: Dr. Carol Ace    Reason for evaluation: Large back mass      SUBJECTIVE:     History of Chief Complaint:    Calderon Miles is a 70 y.o. male with a PMH of snycope, HTN, HLD, asthma, DM, knee pain/ osteoarthritis, prostate cancer, gout, and varicose veins who presents with a large back mass. He reports having known about the mass for the past 1.5-2 years now. He reports it is gradually getting bigger but is not causing pain. It was the size of a golfball and is now more than 2 times bigger than that. Denies any drainage, fevers, chills, night sweats. Past Medical History:   Diagnosis Date    Actinic keratoses     has had LN2 and used Efudex    Alcohol consumption of one to four drinks per day on alcohol screening     Allergic rhinitis     cats, weeds, grasses, ragweed    Asthma     Bilateral knee pain     Diabetes mellitus ([address] [facility])     Gout     History of colon polyps 02/2016    needs f/u 5 years [facility]    History of type 2 diabetes mellitus     about 15 years    Hyperlipidemia     Hypertension     Keratosis, inflamed seborrheic     Osteoarthritis, localized, shoulder, right     Polyp of transverse colon f/u due 2021 8/9/2014    Prediabetes     Prostate cancer ([address] [facility]) 2014    s/p prostatectomy    Syncope and collapse     Varicose vein of leg      Past Surgical History:   Procedure Laterality Date    COLONOSCOPY  02/04/2016    Dr. Brendan Mcleod; polyps f/u in 5 years    COLONOSCOPY N/A 10/2/2020    COLONOSCOPY DIAGNOSTIC performed by Scar Segal MD at [address] [place] [address]  11/2014    ULNAR TUNNEL RELEASE Bilateral     [address] [facility]       Prior to Admission medications    Medication Sig Start Date End Date Taking?  Authorizing Provider   irbesartan (AVAPRO) 150 MG tablet Take 1 tablet by mouth daily 8/14/23  Yes Conner

## 2023-11-17 ENCOUNTER — OFFICE VISIT (OUTPATIENT)
Dept: SURGERY | Age: 68
End: 2023-11-17
Payer: MEDICARE

## 2023-11-17 VITALS
TEMPERATURE: 97.6 F | DIASTOLIC BLOOD PRESSURE: 80 MMHG | SYSTOLIC BLOOD PRESSURE: 120 MMHG | WEIGHT: 214 LBS | HEART RATE: 96 BPM | OXYGEN SATURATION: 97 % | HEIGHT: 73 IN | BODY MASS INDEX: 28.36 KG/M2

## 2023-11-17 DIAGNOSIS — R22.2 MASS ON BACK: ICD-10-CM

## 2023-11-17 PROCEDURE — 3017F COLORECTAL CA SCREEN DOC REV: CPT | Performed by: SURGERY

## 2023-11-17 PROCEDURE — G8484 FLU IMMUNIZE NO ADMIN: HCPCS | Performed by: SURGERY

## 2023-11-17 PROCEDURE — 99203 OFFICE O/P NEW LOW 30 MIN: CPT | Performed by: SURGERY

## 2023-11-17 PROCEDURE — G8427 DOCREV CUR MEDS BY ELIG CLIN: HCPCS | Performed by: SURGERY

## 2023-11-17 PROCEDURE — 3078F DIAST BP <80 MM HG: CPT | Performed by: SURGERY

## 2023-11-17 PROCEDURE — G8417 CALC BMI ABV UP PARAM F/U: HCPCS | Performed by: SURGERY

## 2023-11-17 PROCEDURE — 1123F ACP DISCUSS/DSCN MKR DOCD: CPT | Performed by: SURGERY

## 2023-11-17 PROCEDURE — 1036F TOBACCO NON-USER: CPT | Performed by: SURGERY

## 2023-11-17 PROCEDURE — 3074F SYST BP LT 130 MM HG: CPT | Performed by: SURGERY

## 2023-11-17 RX ORDER — SODIUM CHLORIDE 9 MG/ML
INJECTION, SOLUTION INTRAVENOUS PRN
OUTPATIENT
Start: 2023-11-17

## 2023-11-17 RX ORDER — SODIUM CHLORIDE 0.9 % (FLUSH) 0.9 %
5-40 SYRINGE (ML) INJECTION PRN
OUTPATIENT
Start: 2023-11-17

## 2023-11-17 RX ORDER — SODIUM CHLORIDE 0.9 % (FLUSH) 0.9 %
5-40 SYRINGE (ML) INJECTION EVERY 12 HOURS SCHEDULED
OUTPATIENT
Start: 2023-11-17

## 2023-11-20 DIAGNOSIS — E78.2 MIXED HYPERLIPIDEMIA: ICD-10-CM

## 2023-11-20 DIAGNOSIS — E11.65 UNCONTROLLED TYPE 2 DIABETES MELLITUS WITH HYPERGLYCEMIA (HCC): ICD-10-CM

## 2023-11-20 DIAGNOSIS — E11.9 TYPE 2 DIABETES MELLITUS WITHOUT COMPLICATION, WITHOUT LONG-TERM CURRENT USE OF INSULIN (HCC): ICD-10-CM

## 2023-11-20 NOTE — TELEPHONE ENCOUNTER
Future Appointments    Encounter Information    Provider Department Appt Notes   11/21/2023 Sridhar Emery MD [facility] Primary Care rapid weightloss   11/29/2023 MLOZ PAT RM 1 NP J Carlosy Pre-Admission Testing    12/13/2023 Caroline Vera, [address] [facility] General Surgery Post Op Excision Back Mass 12/5/23 1/29/2024 Sridhar Emery, [address] [facility] Primary Care Return for Medicare Annual Wellness Visit in [address] [place] [address] in about 6 months (around 1/25/2024) for for routine major medical condition management and skin.    2/5/2024 Chapito Ovalle MD [facility] Pulmonology 6M FU     Past Visits    Date Provider Specialty Visit Type Primary Dx   11/17/2023 Caroline Vera MD General Surgery Office Visit Mass on back   08/03/2023 Chapito Ovalle MD Pulmonology Office Visit Mild intermittent reactive airway disease without complication   02/30/0678 Sridhar Emery MD Family Medicine Office Visit Uncontrolled type 2 diabetes mellitus with hyperglycemia ([address])   06/05/2023 Sridhar Emery MD Family Medicine Office Visit Chronic cough   05/18/2023 Sridhar Emery MD Family Medicine Office Visit Bronchitis

## 2023-11-20 NOTE — TELEPHONE ENCOUNTER
Comments:     Last Office Visit (last PCP visit):   Visit date not found    Next Visit Date:  Future Appointments   Date Time Provider [address]   11/21/2023 10:15 AM Vernal Night, MD [facility]   11/29/2023  2:00 PM MLSEGUN PAT RM 1 NP [address]   12/13/2023 11:00 AM Dank Salgado MD [address]   1/29/2024 12:00 PM Craola Guerrero MD [facility]   2/5/2024 11:30 AM Maria Victoria Shirley MD [facility]       **If hasn't been seen in over a year OR hasn't followed up according to last diabetes/ADHD visit, make appointment for patient before sending refill to provider.     Rx requested:  Requested Prescriptions     Pending Prescriptions Disp Refills    metFORMIN (GLUCOPHAGE) 500 MG tablet 270 tablet 3     Sig: Take 1 tablet by mouth 3 times daily

## 2023-11-21 ENCOUNTER — TELEPHONE (OUTPATIENT)
Dept: FAMILY MEDICINE CLINIC | Age: 68
End: 2023-11-21

## 2023-11-21 ENCOUNTER — OFFICE VISIT (OUTPATIENT)
Dept: FAMILY MEDICINE CLINIC | Age: 68
End: 2023-11-21
Payer: MEDICARE

## 2023-11-21 VITALS
OXYGEN SATURATION: 98 % | BODY MASS INDEX: 28.15 KG/M2 | DIASTOLIC BLOOD PRESSURE: 68 MMHG | HEIGHT: 73 IN | SYSTOLIC BLOOD PRESSURE: 92 MMHG | WEIGHT: 212.4 LBS | HEART RATE: 95 BPM | TEMPERATURE: 98.7 F

## 2023-11-21 DIAGNOSIS — R13.10 PAINFUL SWALLOWING: ICD-10-CM

## 2023-11-21 DIAGNOSIS — D75.89 MACROCYTOSIS: ICD-10-CM

## 2023-11-21 DIAGNOSIS — R79.89 ABNORMAL BILIRUBIN TEST: Primary | ICD-10-CM

## 2023-11-21 DIAGNOSIS — B37.0 THRUSH: Primary | ICD-10-CM

## 2023-11-21 DIAGNOSIS — M62.81 MUSCLE WEAKNESS: ICD-10-CM

## 2023-11-21 DIAGNOSIS — R63.4 WEIGHT LOSS, ABNORMAL: ICD-10-CM

## 2023-11-21 DIAGNOSIS — R94.5 ABNORMAL LIVER FUNCTION: ICD-10-CM

## 2023-11-21 DIAGNOSIS — R09.82 POSTNASAL DRIP: ICD-10-CM

## 2023-11-21 DIAGNOSIS — B37.0 THRUSH: ICD-10-CM

## 2023-11-21 DIAGNOSIS — Z80.0 FAMILY HISTORY OF THROAT CANCER: ICD-10-CM

## 2023-11-21 LAB
ALBUMIN SERPL-MCNC: 4.1 G/DL (ref 3.5–4.6)
ALP SERPL-CCNC: 235 U/L (ref 35–104)
ALT SERPL-CCNC: 94 U/L (ref 0–41)
ANION GAP SERPL CALCULATED.3IONS-SCNC: 23 MEQ/L (ref 9–15)
ANISOCYTOSIS BLD QL SMEAR: ABNORMAL
AST SERPL-CCNC: 152 U/L (ref 0–40)
BASOPHILS # BLD: 0.1 K/UL (ref 0–0.2)
BASOPHILS NFR BLD: 0.9 %
BILIRUB SERPL-MCNC: 1.7 MG/DL (ref 0.2–0.7)
BUN SERPL-MCNC: 7 MG/DL (ref 8–23)
CALCIUM SERPL-MCNC: 9.8 MG/DL (ref 8.5–9.9)
CHLORIDE SERPL-SCNC: 96 MEQ/L (ref 95–107)
CO2 SERPL-SCNC: 21 MEQ/L (ref 20–31)
CREAT SERPL-MCNC: 0.95 MG/DL (ref 0.7–1.2)
EOSINOPHIL # BLD: 0 K/UL (ref 0–0.7)
EOSINOPHIL NFR BLD: 0.4 %
ERYTHROCYTE [DISTWIDTH] IN BLOOD BY AUTOMATED COUNT: 13.5 % (ref 11.5–14.5)
GLOBULIN SER CALC-MCNC: 2.9 G/DL (ref 2.3–3.5)
GLUCOSE SERPL-MCNC: 161 MG/DL (ref 70–99)
HCT VFR BLD AUTO: 40.6 % (ref 42–52)
HGB BLD-MCNC: 13.3 G/DL (ref 14–18)
LYMPHOCYTES # BLD: 1.1 K/UL (ref 1–4.8)
LYMPHOCYTES NFR BLD: 14.1 %
MACROCYTES BLD QL SMEAR: ABNORMAL
MCH RBC QN AUTO: 34.5 PG (ref 27–31.3)
MCHC RBC AUTO-ENTMCNC: 32.8 % (ref 33–37)
MCV RBC AUTO: 105.2 FL (ref 79–92.2)
MONOCYTES # BLD: 1.1 K/UL (ref 0.2–0.8)
MONOCYTES NFR BLD: 13.8 %
NEUTROPHILS # BLD: 5.5 K/UL (ref 1.4–6.5)
NEUTS SEG NFR BLD: 69.7 %
PLATELET # BLD AUTO: 262 K/UL (ref 130–400)
PLATELET BLD QL SMEAR: ADEQUATE
POTASSIUM SERPL-SCNC: 4.1 MEQ/L (ref 3.4–4.9)
PROT SERPL-MCNC: 7 G/DL (ref 6.3–8)
RBC # BLD AUTO: 3.86 M/UL (ref 4.7–6.1)
SLIDE REVIEW: ABNORMAL
SODIUM SERPL-SCNC: 140 MEQ/L (ref 135–144)
TSH REFLEX: 1.96 UIU/ML (ref 0.44–3.86)
WBC # BLD AUTO: 7.9 K/UL (ref 4.8–10.8)

## 2023-11-21 PROCEDURE — G8417 CALC BMI ABV UP PARAM F/U: HCPCS | Performed by: FAMILY MEDICINE

## 2023-11-21 PROCEDURE — 3078F DIAST BP <80 MM HG: CPT | Performed by: FAMILY MEDICINE

## 2023-11-21 PROCEDURE — 99215 OFFICE O/P EST HI 40 MIN: CPT | Performed by: FAMILY MEDICINE

## 2023-11-21 PROCEDURE — 1123F ACP DISCUSS/DSCN MKR DOCD: CPT | Performed by: FAMILY MEDICINE

## 2023-11-21 PROCEDURE — 3017F COLORECTAL CA SCREEN DOC REV: CPT | Performed by: FAMILY MEDICINE

## 2023-11-21 PROCEDURE — 1036F TOBACCO NON-USER: CPT | Performed by: FAMILY MEDICINE

## 2023-11-21 PROCEDURE — G8427 DOCREV CUR MEDS BY ELIG CLIN: HCPCS | Performed by: FAMILY MEDICINE

## 2023-11-21 PROCEDURE — G8484 FLU IMMUNIZE NO ADMIN: HCPCS | Performed by: FAMILY MEDICINE

## 2023-11-21 PROCEDURE — 3074F SYST BP LT 130 MM HG: CPT | Performed by: FAMILY MEDICINE

## 2023-11-21 NOTE — TELEPHONE ENCOUNTER
Pt calling to see if he should post pone his surgery on December 3 to remove lump on back wants to know if he should hold off?     Please can we call patient back please and thank you     Pt phone [phone]

## 2023-11-21 NOTE — PROGRESS NOTES
Diagnosis Orders   1. Elvis Mcneill MD, Otolaryngology, [facility]    CBC with Auto Differential    nystatin (MYCOSTATIN) 239465 UNIT/ML suspension    Comprehensive Metabolic Panel    TSH with Reflex      2. Painful swallowing  GRAYD Yepez MD, Otolaryngology, [facility]    Comprehensive Metabolic Panel    TSH with Reflex    CK    TASH Screen With Reflex    C-Reactive Protein    Sedimentation Rate      3. Macrocytosis  CBC with Auto Differential    Vitamin B12 & Folate      4. Postnasal drip  GRADY Yepez MD, Otolaryngology, [facility]      5. Family history of throat cancer  GRADY Yepez MD, Otolaryngology, [facility]      6. Weight loss, abnormal  CBC with Auto Differential    Comprehensive Metabolic Panel    TSH with Reflex      7. Muscle weakness  CK    TASH Screen With Reflex    C-Reactive Protein    Sedimentation Rate        Return in about 2 weeks (around 12/5/2023) for for review of outcome of today's recommendation. Patient Instructions   Patient will stop amlodipine as blood pressure is lower with current weight loss. Will recheck in 2 weeks and reevaluate other medications for blood pressure. This is likely causing his dizziness. Nystatin swish and swallow for oral thrush. Rediscussed proper technique for utilizing inhalers. Talked about method of getting swish and swallow treatment complete if patient is not able to take large amounts of liquid at 1 time. Referring to ear nose and throat for evaluation due to history of family throat cancer as well as current difficulty swallowing, thrush, postnasal drip. Patient has had weight loss combined with history of macrocytosis in the last 2 blood draws will repeat CBC with peripheral smear to evaluate for abnormalities. Finishing this note on November 28, 2023 I also reevaluated the symptoms and I am concerned for possibility of dermatomyositis or polymyositis.   I am adding on more labs and

## 2023-11-21 NOTE — PATIENT INSTRUCTIONS
Patient will stop amlodipine as blood pressure is lower with current weight loss. Will recheck in 2 weeks and reevaluate other medications for blood pressure. This is likely causing his dizziness. Nystatin swish and swallow for oral thrush. Rediscussed proper technique for utilizing inhalers. Talked about method of getting swish and swallow treatment complete if patient is not able to take large amounts of liquid at 1 time. Referring to ear nose and throat for evaluation due to history of family throat cancer as well as current difficulty swallowing, thrush, postnasal drip. Patient has had weight loss combined with history of macrocytosis in the last 2 blood draws will repeat CBC with peripheral smear to evaluate for abnormalities. Finishing this note on November 28, 2023 I also reevaluated the symptoms and I am concerned for possibility of dermatomyositis or polymyositis.   I am adding on more labs and patient has been notified

## 2023-11-22 LAB
FOLATE: 10.7 NG/ML (ref 4.8–24.2)
VITAMIN B-12: >2000 PG/ML (ref 232–1245)

## 2023-11-24 RX ORDER — SIMVASTATIN 20 MG
20 TABLET ORAL DAILY
Qty: 90 TABLET | Refills: 3 | Status: SHIPPED | OUTPATIENT
Start: 2023-11-24

## 2023-11-28 DIAGNOSIS — R74.8 ALKALINE PHOSPHATASE ELEVATION: ICD-10-CM

## 2023-11-28 DIAGNOSIS — R63.0 DECREASED APPETITE: ICD-10-CM

## 2023-11-28 DIAGNOSIS — R17 ELEVATED BILIRUBIN: Primary | ICD-10-CM

## 2023-11-28 ASSESSMENT — ENCOUNTER SYMPTOMS
SHORTNESS OF BREATH: 0
NAUSEA: 0
ABDOMINAL PAIN: 0
VOMITING: 0
VOICE CHANGE: 0
CONSTIPATION: 0
DIARRHEA: 0
CHEST TIGHTNESS: 0
TROUBLE SWALLOWING: 1
BLOOD IN STOOL: 0
ABDOMINAL DISTENTION: 0
SORE THROAT: 0
PHOTOPHOBIA: 0

## 2023-11-29 ENCOUNTER — HOSPITAL ENCOUNTER (OUTPATIENT)
Dept: PREADMISSION TESTING | Age: 68
Discharge: HOME | End: 2023-12-03
Payer: MEDICARE

## 2023-11-29 ENCOUNTER — APPOINTMENT (OUTPATIENT)
Dept: ORTHOPEDIC SURGERY | Facility: CLINIC | Age: 68
End: 2023-11-29
Payer: MEDICARE

## 2023-11-29 VITALS
WEIGHT: 207.4 LBS | HEIGHT: 72 IN | TEMPERATURE: 98.4 F | HEART RATE: 86 BPM | BODY MASS INDEX: 28.09 KG/M2 | RESPIRATION RATE: 18 BRPM | SYSTOLIC BLOOD PRESSURE: 106 MMHG | DIASTOLIC BLOOD PRESSURE: 66 MMHG | OXYGEN SATURATION: 97 %

## 2023-11-29 LAB
ALBUMIN SERPL-MCNC: 3.9 G/DL (ref 3.5–4.6)
ALP SERPL-CCNC: 215 U/L (ref 35–104)
ALT SERPL-CCNC: 67 U/L (ref 0–41)
ANION GAP SERPL CALCULATED.3IONS-SCNC: 21 MEQ/L (ref 9–15)
AST SERPL-CCNC: 85 U/L (ref 0–40)
BILIRUB DIRECT SERPL-MCNC: 0.6 MG/DL (ref 0–0.4)
BILIRUB INDIRECT SERPL-MCNC: 0.6 MG/DL (ref 0–0.6)
BILIRUB SERPL-MCNC: 1.2 MG/DL (ref 0.2–0.7)
BUN SERPL-MCNC: 11 MG/DL (ref 8–23)
C-REACTIVE PROTEIN, HIGH SENSITIVITY: 43.9 MG/L (ref 0–5)
CALCIUM SERPL-MCNC: 9.8 MG/DL (ref 8.5–9.9)
CHLORIDE SERPL-SCNC: 95 MEQ/L (ref 95–107)
CK SERPL-CCNC: 20 U/L (ref 0–190)
CO2 SERPL-SCNC: 21 MEQ/L (ref 20–31)
CREAT SERPL-MCNC: 1.16 MG/DL (ref 0.7–1.2)
ERYTHROCYTE [SEDIMENTATION RATE] IN BLOOD BY WESTERGREN METHOD: 23 MM (ref 0–20)
GLUCOSE SERPL-MCNC: 147 MG/DL (ref 70–99)
HBA1C MFR BLD: 5.6 % (ref 4.8–5.9)
POTASSIUM SERPL-SCNC: 3.6 MEQ/L (ref 3.4–4.9)
PROT SERPL-MCNC: 6.5 G/DL (ref 6.3–8)
SODIUM SERPL-SCNC: 137 MEQ/L (ref 135–144)

## 2023-11-29 PROCEDURE — 82977 ASSAY OF GGT: CPT

## 2023-11-29 PROCEDURE — 85652 RBC SED RATE AUTOMATED: CPT

## 2023-11-29 PROCEDURE — 93005 ELECTROCARDIOGRAM TRACING: CPT | Performed by: FAMILY MEDICINE

## 2023-11-29 PROCEDURE — 80048 BASIC METABOLIC PNL TOTAL CA: CPT

## 2023-11-29 PROCEDURE — 83036 HEMOGLOBIN GLYCOSYLATED A1C: CPT

## 2023-11-29 PROCEDURE — 82550 ASSAY OF CK (CPK): CPT

## 2023-11-29 PROCEDURE — 86038 ANTINUCLEAR ANTIBODIES: CPT

## 2023-11-29 PROCEDURE — 80076 HEPATIC FUNCTION PANEL: CPT

## 2023-11-29 PROCEDURE — 86141 C-REACTIVE PROTEIN HS: CPT

## 2023-11-29 RX ORDER — CYANOCOBALAMIN (VITAMIN B-12) 1000 MCG
1000 TABLET, EXTENDED RELEASE ORAL DAILY
COMMUNITY

## 2023-11-29 ASSESSMENT — ENCOUNTER SYMPTOMS
SHORTNESS OF BREATH: 1
CONSTIPATION: 0
EYE REDNESS: 0
EYE ITCHING: 0
NAUSEA: 0
FACIAL SWELLING: 0
VOMITING: 0
PHOTOPHOBIA: 0
BACK PAIN: 1
SINUS PAIN: 0
APNEA: 0
CHEST TIGHTNESS: 0
SINUS PRESSURE: 0
EYE PAIN: 0
EYE DISCHARGE: 0
RHINORRHEA: 1
DIARRHEA: 1
ABDOMINAL DISTENTION: 1
WHEEZING: 0
CHOKING: 0
SORE THROAT: 0
COUGH: 0
TROUBLE SWALLOWING: 1
ABDOMINAL PAIN: 0

## 2023-11-29 NOTE — H&P
PRE ADMISSION TESTING    HISTORY AND PHYSICAL EXAM    PATIENT NAME:  Matilde Casarez    YOB: 1955  MRN:  [id number]  SERVICE DATE:  11/29/2023     Primary Care Physician: Ange Escalona MD   Surgeon: Dr. Chad Torres:  Mass on back    The patient is a 70 y.o. male with significant past medical history of asthma, DM, HTN, HLD, Gout, varicose veins, Hx of prostate cancer who presents for a preoperative consultation at the request of surgeon, Dr. Gypsy Pollard, who plans on performing Back mass excision on 12/5/23 at [facility]. The current problem began approximately 1.5 years ago when he noticed a mass on his back but it was not causing any symptoms or pain so he didn't need any treatment or evaluation. Recently, he asked his PCP for a referral because the mass had grown in size but still remained pain free/non-tender. His PCP referred him to Dr. Shahrzad Vega for surgical management. He saw Dr. Shahrzad Vega on 11/17/23 and she assessed the mass and took measurements. She recommended surgery for removal and patient has elected to proceed. Patient was recently his PCP, Dr. Hamlet Howe, on 11/21/23 due to weight loss, possible thrush and macrocytosis on last 2 blood draws. She ordered some blood work. She is concerned for possible dx of polymyositis or dermatomyositis. She referred him to Dr. Facundo Barksdale, ENT for painful swallowing and thrush. She prescribed Nystatin swish and swallow and patient reports that his thrush is slowly getting better. Patient was referred to Dr. Christos Quinonez for abnormal PFT's. His office note reports that the patient has normal spirometry, static lung volume suggests air trapping and hyperinflation. F/U in 6 months.        Known Anesthesia Problems: None  Patient Objection to Receiving Blood Products: No  Personal or FH of DVT/PE: No    Medical/Cardiac Clearance Needed:  Will reach out to Dr. Hamlet Howe to make sure patient can still proceed

## 2023-11-30 ENCOUNTER — TELEPHONE (OUTPATIENT)
Dept: FAMILY MEDICINE CLINIC | Age: 68
End: 2023-11-30

## 2023-11-30 LAB — GGT, 20027: 867 U/L (ref 8–61)

## 2023-11-30 NOTE — PROGRESS NOTES
Reached out to Dr. Santhosh Harden to make sure that patient can proceed with planned surgery due to recent work up for possible diagnosis's. Dr. Santhosh Harden replied that she does not think the patient can tolerate prone for 2 hours and the surgery should be cancelled at this time. Dr. Anne Fagan office was notified of Dr. Stas coulter.

## 2023-11-30 NOTE — TELEPHONE ENCOUNTER
Dr. Paola Cole is asking that Dr. Haq Alt call her back in regards to the pt. Pt is not able to be in pron position for two hours. Please call her back at [phone].

## 2023-11-30 NOTE — TELEPHONE ENCOUNTER
Mark Dear concerning abnormal LFTs and sed rate etc. concern for possibility of polymyositis and/or hepatitis. .    Discussed holding onto the surgical appointment for the possibility of muscle biopsy if hepatitis is negative.

## 2023-12-01 LAB
EKG ATRIAL RATE: 82 BPM
EKG P AXIS: 34 DEGREES
EKG P-R INTERVAL: 124 MS
EKG Q-T INTERVAL: 436 MS
EKG QRS DURATION: 82 MS
EKG QTC CALCULATION (BAZETT): 509 MS
EKG R AXIS: 23 DEGREES
EKG T AXIS: 31 DEGREES
EKG VENTRICULAR RATE: 82 BPM
NUCLEAR IGG SER QL IA: NORMAL

## 2023-12-01 PROCEDURE — 93010 ELECTROCARDIOGRAM REPORT: CPT | Performed by: INTERNAL MEDICINE

## 2023-12-02 LAB
HAV IGM SER IA-ACNC: NONREACTIVE
HEPATITIS B CORE IGM ANTIBODY: NONREACTIVE
HEPATITIS B SURF AG,XHBAGS: NONREACTIVE
HEPATITIS C ANTIBODY: NONREACTIVE

## 2023-12-04 ENCOUNTER — ANESTHESIA EVENT (OUTPATIENT)
Dept: OPERATING ROOM | Age: 68
End: 2023-12-04
Payer: MEDICARE

## 2023-12-04 ASSESSMENT — ENCOUNTER SYMPTOMS: SHORTNESS OF BREATH: 1

## 2023-12-04 NOTE — ANESTHESIA PRE PROCEDURE
Department of Anesthesiology  Preprocedure Note       Name:  Thiago Richey   Age:  70 y.o.  :  1955                                          MRN:  [id number]         Date:  2023      Surgeon: Olimpia Espinoza):  Huang Trevino MD    Procedure: Procedure(s):  quadriecep muscle biopsy supine    Medications prior to admission:   Prior to Admission medications    Medication Sig Start Date End Date Taking?  Authorizing Provider   Cyanocobalamin (VITAMIN B-12) 1000 MCG extended release tablet Take 1 tablet by mouth daily Chewable    ProviderJoaquín MD   simvastatin (ZOCOR) 20 MG tablet Take 1 tablet by mouth daily 23   Salvatore Luna MD   metFORMIN (GLUCOPHAGE) 500 MG tablet Take 1 tablet by mouth 3 times daily 23   Salvatore Luna MD   irbesartan (AVAPRO) 150 MG tablet Take 1 tablet by mouth daily 23   Salvatore Luna MD   allopurinol (ZYLOPRIM) 300 MG tablet Take 1 tablet by mouth daily 23   Salvatore Luna MD   metoprolol succinate (TOPROL XL) 100 MG extended release tablet Take 1 tablet by mouth daily 23   Salvatore Luna MD   fluticasone (FLOVENT HFA) 110 MCG/ACT inhaler Inhale 2 puffs into the lungs 2 times daily 23  Salvatore Luna MD   folic acid (FOLVITE) 1 MG tablet Take 1 tablet by mouth daily Pt is taking    Joaquín Joe MD   albuterol sulfate HFA (VENTOLIN HFA) 108 (90 Base) MCG/ACT inhaler Inhale 2 puffs into the lungs every 6 hours as needed for Wheezing 23   Tamera Goetz DO   fluticasone Makenzie Graft) 50 MCG/ACT nasal spray 2 sprays by Nasal route daily 22   CALIXTO Pereira - CNP   Spacer/Aero-Hold Chamber Bags MISC 1 applicator by Does not apply route 4 times daily 20   Salvatore Luna MD   Blood Pressure KIT 1 applicator by Does not apply route 4 times daily 20   Salvatore Luna MD   Coenzyme Q10-Vitamin E (QUNOL ULTRA COQ10) 100-150 MG-UNIT CAPS  3/10/20   ProviderJoaquín MD   vitamin

## 2023-12-05 ENCOUNTER — ANESTHESIA (OUTPATIENT)
Dept: OPERATING ROOM | Age: 68
End: 2023-12-05
Payer: MEDICARE

## 2023-12-05 ENCOUNTER — LAB REQUISITION (OUTPATIENT)
Dept: LAB | Facility: HOSPITAL | Age: 68
End: 2023-12-05
Payer: MEDICARE

## 2023-12-05 ENCOUNTER — HOSPITAL ENCOUNTER (OUTPATIENT)
Age: 68
Setting detail: OUTPATIENT SURGERY
Discharge: HOME | End: 2023-12-05
Attending: SURGERY | Admitting: SURGERY
Payer: MEDICARE

## 2023-12-05 VITALS
SYSTOLIC BLOOD PRESSURE: 144 MMHG | HEART RATE: 81 BPM | DIASTOLIC BLOOD PRESSURE: 94 MMHG | WEIGHT: 209 LBS | BODY MASS INDEX: 28.31 KG/M2 | RESPIRATION RATE: 15 BRPM | OXYGEN SATURATION: 96 % | TEMPERATURE: 97.5 F | HEIGHT: 72 IN

## 2023-12-05 DIAGNOSIS — R22.2 MASS ON BACK: ICD-10-CM

## 2023-12-05 DIAGNOSIS — R22.2 LOCALIZED SWELLING, MASS AND LUMP, TRUNK: ICD-10-CM

## 2023-12-05 DIAGNOSIS — G89.18 POST-OP PAIN: Primary | ICD-10-CM

## 2023-12-05 LAB
GLUCOSE BLD-MCNC: 130 MG/DL (ref 70–99)
GLUCOSE BLD-MCNC: 151 MG/DL (ref 70–99)
PERFORMED ON: ABNORMAL
PERFORMED ON: ABNORMAL

## 2023-12-05 PROCEDURE — 3600000004 HC SURGERY LEVEL 4 BASE: Performed by: SURGERY

## 2023-12-05 PROCEDURE — 2709999900 HC NON-CHARGEABLE SUPPLY: Performed by: SURGERY

## 2023-12-05 PROCEDURE — 88348 ELECTRON MICROSCOPY DX: CPT

## 2023-12-05 PROCEDURE — 88305 TISSUE EXAM BY PATHOLOGIST: CPT

## 2023-12-05 PROCEDURE — 3700000001 HC ADD 15 MINUTES (ANESTHESIA): Performed by: SURGERY

## 2023-12-05 PROCEDURE — 88341 IMHCHEM/IMCYTCHM EA ADD ANTB: CPT | Performed by: PATHOLOGY

## 2023-12-05 PROCEDURE — 20200 MUSCLE BIOPSY SUPERFICIAL: CPT | Performed by: SURGERY

## 2023-12-05 PROCEDURE — 3600000014 HC SURGERY LEVEL 4 ADDTL 15MIN: Performed by: SURGERY

## 2023-12-05 PROCEDURE — 88348 ELECTRON MICROSCOPY DX: CPT | Performed by: PATHOLOGY

## 2023-12-05 PROCEDURE — 2580000003 HC RX 258: Performed by: SURGERY

## 2023-12-05 PROCEDURE — 7100000011 HC PHASE II RECOVERY - ADDTL 15 MIN: Performed by: SURGERY

## 2023-12-05 PROCEDURE — 7100000010 HC PHASE II RECOVERY - FIRST 15 MIN: Performed by: SURGERY

## 2023-12-05 PROCEDURE — 3700000000 HC ANESTHESIA ATTENDED CARE: Performed by: SURGERY

## 2023-12-05 PROCEDURE — 6360000002 HC RX W HCPCS: Performed by: STUDENT IN AN ORGANIZED HEALTH CARE EDUCATION/TRAINING PROGRAM

## 2023-12-05 PROCEDURE — 6360000002 HC RX W HCPCS: Performed by: SURGERY

## 2023-12-05 PROCEDURE — 88305 TISSUE EXAM BY PATHOLOGIST: CPT | Performed by: PATHOLOGY

## 2023-12-05 PROCEDURE — 88319 ENZYME HISTOCHEMISTRY: CPT | Performed by: PATHOLOGY

## 2023-12-05 PROCEDURE — 7100000000 HC PACU RECOVERY - FIRST 15 MIN: Performed by: SURGERY

## 2023-12-05 PROCEDURE — 7100000001 HC PACU RECOVERY - ADDTL 15 MIN: Performed by: SURGERY

## 2023-12-05 PROCEDURE — 88341 IMHCHEM/IMCYTCHM EA ADD ANTB: CPT

## 2023-12-05 PROCEDURE — 88313 SPECIAL STAINS GROUP 2: CPT | Performed by: PATHOLOGY

## 2023-12-05 PROCEDURE — A4217 STERILE WATER/SALINE, 500 ML: HCPCS | Performed by: SURGERY

## 2023-12-05 PROCEDURE — 88342 IMHCHEM/IMCYTCHM 1ST ANTB: CPT

## 2023-12-05 PROCEDURE — 88300 SURGICAL PATH GROSS: CPT

## 2023-12-05 PROCEDURE — 88342 IMHCHEM/IMCYTCHM 1ST ANTB: CPT | Performed by: PATHOLOGY

## 2023-12-05 PROCEDURE — 88313 SPECIAL STAINS GROUP 2: CPT

## 2023-12-05 PROCEDURE — 88319 ENZYME HISTOCHEMISTRY: CPT

## 2023-12-05 RX ORDER — PROPOFOL 10 MG/ML
INJECTION, EMULSION INTRAVENOUS PRN
Status: DISCONTINUED | OUTPATIENT
Start: 2023-12-05 | End: 2023-12-05 | Stop reason: SDUPTHER

## 2023-12-05 RX ORDER — ONDANSETRON 2 MG/ML
INJECTION INTRAMUSCULAR; INTRAVENOUS PRN
Status: DISCONTINUED | OUTPATIENT
Start: 2023-12-05 | End: 2023-12-05 | Stop reason: SDUPTHER

## 2023-12-05 RX ORDER — DIPHENHYDRAMINE HYDROCHLORIDE 50 MG/ML
12.5 INJECTION INTRAMUSCULAR; INTRAVENOUS
Status: DISCONTINUED | OUTPATIENT
Start: 2023-12-05 | End: 2023-12-05 | Stop reason: HOSPADM

## 2023-12-05 RX ORDER — SODIUM CHLORIDE 9 MG/ML
INJECTION, SOLUTION INTRAVENOUS
Status: COMPLETED
Start: 2023-12-05 | End: 2023-12-05

## 2023-12-05 RX ORDER — ONDANSETRON 2 MG/ML
4 INJECTION INTRAMUSCULAR; INTRAVENOUS
Status: DISCONTINUED | OUTPATIENT
Start: 2023-12-05 | End: 2023-12-05 | Stop reason: HOSPADM

## 2023-12-05 RX ORDER — SODIUM CHLORIDE 9 MG/ML
INJECTION, SOLUTION INTRAVENOUS PRN
Status: DISCONTINUED | OUTPATIENT
Start: 2023-12-05 | End: 2023-12-05 | Stop reason: HOSPADM

## 2023-12-05 RX ORDER — OXYCODONE HYDROCHLORIDE 5 MG/1
5 TABLET ORAL PRN
Status: DISCONTINUED | OUTPATIENT
Start: 2023-12-05 | End: 2023-12-05 | Stop reason: HOSPADM

## 2023-12-05 RX ORDER — HYDRALAZINE HYDROCHLORIDE 20 MG/ML
10 INJECTION INTRAMUSCULAR; INTRAVENOUS
Status: DISCONTINUED | OUTPATIENT
Start: 2023-12-05 | End: 2023-12-05 | Stop reason: HOSPADM

## 2023-12-05 RX ORDER — MIDAZOLAM HYDROCHLORIDE 1 MG/ML
INJECTION INTRAMUSCULAR; INTRAVENOUS PRN
Status: DISCONTINUED | OUTPATIENT
Start: 2023-12-05 | End: 2023-12-05 | Stop reason: SDUPTHER

## 2023-12-05 RX ORDER — FENTANYL CITRATE 0.05 MG/ML
50 INJECTION, SOLUTION INTRAMUSCULAR; INTRAVENOUS EVERY 10 MIN PRN
Status: DISCONTINUED | OUTPATIENT
Start: 2023-12-05 | End: 2023-12-05 | Stop reason: HOSPADM

## 2023-12-05 RX ORDER — LIDOCAINE HYDROCHLORIDE 20 MG/ML
INJECTION, SOLUTION INTRAVENOUS PRN
Status: DISCONTINUED | OUTPATIENT
Start: 2023-12-05 | End: 2023-12-05 | Stop reason: SDUPTHER

## 2023-12-05 RX ORDER — SODIUM CHLORIDE 0.9 % (FLUSH) 0.9 %
5-40 SYRINGE (ML) INJECTION EVERY 12 HOURS SCHEDULED
Status: DISCONTINUED | OUTPATIENT
Start: 2023-12-05 | End: 2023-12-05 | Stop reason: HOSPADM

## 2023-12-05 RX ORDER — BUPIVACAINE HYDROCHLORIDE 5 MG/ML
INJECTION, SOLUTION EPIDURAL; INTRACAUDAL PRN
Status: DISCONTINUED | OUTPATIENT
Start: 2023-12-05 | End: 2023-12-05 | Stop reason: HOSPADM

## 2023-12-05 RX ORDER — LABETALOL HYDROCHLORIDE 5 MG/ML
10 INJECTION, SOLUTION INTRAVENOUS
Status: DISCONTINUED | OUTPATIENT
Start: 2023-12-05 | End: 2023-12-05 | Stop reason: HOSPADM

## 2023-12-05 RX ORDER — MEPERIDINE HYDROCHLORIDE 25 MG/ML
12.5 INJECTION INTRAMUSCULAR; INTRAVENOUS; SUBCUTANEOUS EVERY 5 MIN PRN
Status: DISCONTINUED | OUTPATIENT
Start: 2023-12-05 | End: 2023-12-05 | Stop reason: HOSPADM

## 2023-12-05 RX ORDER — IPRATROPIUM BROMIDE AND ALBUTEROL SULFATE 2.5; .5 MG/3ML; MG/3ML
1 SOLUTION RESPIRATORY (INHALATION)
Status: DISCONTINUED | OUTPATIENT
Start: 2023-12-05 | End: 2023-12-05 | Stop reason: HOSPADM

## 2023-12-05 RX ORDER — MAGNESIUM HYDROXIDE 1200 MG/15ML
LIQUID ORAL CONTINUOUS PRN
Status: DISCONTINUED | OUTPATIENT
Start: 2023-12-05 | End: 2023-12-05 | Stop reason: HOSPADM

## 2023-12-05 RX ORDER — PROCHLORPERAZINE EDISYLATE 5 MG/ML
5 INJECTION INTRAMUSCULAR; INTRAVENOUS
Status: DISCONTINUED | OUTPATIENT
Start: 2023-12-05 | End: 2023-12-05 | Stop reason: HOSPADM

## 2023-12-05 RX ORDER — SODIUM CHLORIDE 0.9 % (FLUSH) 0.9 %
5-40 SYRINGE (ML) INJECTION PRN
Status: DISCONTINUED | OUTPATIENT
Start: 2023-12-05 | End: 2023-12-05 | Stop reason: HOSPADM

## 2023-12-05 RX ORDER — TRAMADOL HYDROCHLORIDE 50 MG/1
25 TABLET ORAL EVERY 6 HOURS PRN
Qty: 3 TABLET | Refills: 0 | Status: SHIPPED | OUTPATIENT
Start: 2023-12-05 | End: 2023-12-08

## 2023-12-05 RX ORDER — OXYCODONE HYDROCHLORIDE 5 MG/1
10 TABLET ORAL PRN
Status: DISCONTINUED | OUTPATIENT
Start: 2023-12-05 | End: 2023-12-05 | Stop reason: HOSPADM

## 2023-12-05 RX ORDER — FENTANYL CITRATE 50 UG/ML
INJECTION, SOLUTION INTRAMUSCULAR; INTRAVENOUS PRN
Status: DISCONTINUED | OUTPATIENT
Start: 2023-12-05 | End: 2023-12-05 | Stop reason: SDUPTHER

## 2023-12-05 RX ORDER — ACETAMINOPHEN 325 MG/1
650 TABLET ORAL
Status: DISCONTINUED | OUTPATIENT
Start: 2023-12-05 | End: 2023-12-05 | Stop reason: HOSPADM

## 2023-12-05 RX ADMIN — Medication 0.1 MG: at 08:12

## 2023-12-05 RX ADMIN — Medication 0.1 MG: at 08:15

## 2023-12-05 RX ADMIN — FENTANYL CITRATE 50 MCG: 50 INJECTION, SOLUTION INTRAMUSCULAR; INTRAVENOUS at 07:40

## 2023-12-05 RX ADMIN — SODIUM CHLORIDE: 9 INJECTION, SOLUTION INTRAVENOUS at 07:30

## 2023-12-05 RX ADMIN — Medication 0.1 MG: at 07:46

## 2023-12-05 RX ADMIN — Medication 0.1 MG: at 08:01

## 2023-12-05 RX ADMIN — FENTANYL CITRATE 25 MCG: 50 INJECTION, SOLUTION INTRAMUSCULAR; INTRAVENOUS at 07:58

## 2023-12-05 RX ADMIN — Medication 0.15 MG: at 08:21

## 2023-12-05 RX ADMIN — ONDANSETRON 4 MG: 2 INJECTION INTRAMUSCULAR; INTRAVENOUS at 07:53

## 2023-12-05 RX ADMIN — Medication 0.1 MG: at 08:14

## 2023-12-05 RX ADMIN — CEFAZOLIN 2 MG: 2 INJECTION, POWDER, FOR SOLUTION INTRAMUSCULAR; INTRAVENOUS at 07:45

## 2023-12-05 RX ADMIN — Medication 0.05 MG: at 08:04

## 2023-12-05 RX ADMIN — Medication 0.1 MG: at 08:18

## 2023-12-05 RX ADMIN — Medication 0.1 MG: at 08:06

## 2023-12-05 RX ADMIN — LIDOCAINE HYDROCHLORIDE 80 MG: 20 INJECTION, SOLUTION INTRAVENOUS at 07:41

## 2023-12-05 RX ADMIN — Medication 0.15 MG: at 08:24

## 2023-12-05 RX ADMIN — MIDAZOLAM HYDROCHLORIDE 2 MG: 1 INJECTION, SOLUTION INTRAMUSCULAR; INTRAVENOUS at 07:30

## 2023-12-05 RX ADMIN — FENTANYL CITRATE 25 MCG: 50 INJECTION, SOLUTION INTRAMUSCULAR; INTRAVENOUS at 08:00

## 2023-12-05 RX ADMIN — PROPOFOL 200 MG: 10 INJECTION, EMULSION INTRAVENOUS at 07:42

## 2023-12-05 RX ADMIN — FENTANYL CITRATE 50 MCG: 50 INJECTION, SOLUTION INTRAMUSCULAR; INTRAVENOUS at 07:56

## 2023-12-05 RX ADMIN — FENTANYL CITRATE 50 MCG: 50 INJECTION, SOLUTION INTRAMUSCULAR; INTRAVENOUS at 08:35

## 2023-12-05 RX ADMIN — Medication 0.2 MG: at 08:30

## 2023-12-05 ASSESSMENT — PAIN SCALES - GENERAL
PAINLEVEL_OUTOF10: 0

## 2023-12-05 ASSESSMENT — PAIN - FUNCTIONAL ASSESSMENT: PAIN_FUNCTIONAL_ASSESSMENT: 0-10

## 2023-12-05 NOTE — PROGRESS NOTES
Patient's wife and patient given dc instructions and verbalized understanding. Wife assisting patient with getting dressed at this time. Wife will go to [facility] to  patient's prescription prior to discharge.

## 2023-12-05 NOTE — ANESTHESIA POSTPROCEDURE EVALUATION
Department of Anesthesiology  Postprocedure Note    Patient: Kira Garcia  MRN: [id number]  YOB: 1955  Date of evaluation: 12/5/2023      Procedure Summary       Date: 12/05/23 Room / Location: [address]    Anesthesia Start: 0730 Anesthesia Stop:     Procedure: Left quadriecep muscle biopsy (Left: Thigh) Diagnosis:       Mass on back      (Mass on back [R22.2])    Surgeons: Sebastián Griffith MD Responsible Provider: oCdy Gray DO    Anesthesia Type: general ASA Status: 3            Anesthesia Type: No value filed.     Isabella Phase I: Isabella Score: 10    Isabella Phase II:        Anesthesia Post Evaluation    Patient location during evaluation: PACU  Patient participation: complete - patient participated  Level of consciousness: sleepy but conscious  Pain score: 0  Airway patency: patent  Nausea & Vomiting: nausea and no nausea  Complications: no  Cardiovascular status: blood pressure returned to baseline and hemodynamically stable  Respiratory status: acceptable, face mask, nonlabored ventilation and spontaneous ventilation  Hydration status: euvolemic  Multimodal analgesia pain management approach  Pain management: adequate and satisfactory to patient

## 2023-12-05 NOTE — PROGRESS NOTES
CLINICAL PHARMACY NOTE: MEDS TO BEDS    Total # of Prescriptions Filled: 1   The following medications were delivered to the patient:  Tramadol hcl 50mg tab    Additional Documentation:

## 2023-12-05 NOTE — DISCHARGE INSTRUCTIONS
MD   metoprolol succinate (TOPROL XL) 100 MG extended release tablet Take 1 tablet by mouth daily 6/4/23   Ange Escalona MD   fluticasone [facility] [place] HFA) 110 MCG/ACT inhaler Inhale 2 puffs into the lungs 2 times daily 4/24/23 4/23/24  Ange Escalona MD   folic acid (FOLVITE) 1 MG tablet Take 1 tablet by mouth daily Pt is taking    Joaquín Joe MD   albuterol sulfate HFA (VENTOLIN HFA) 108 (90 Base) MCG/ACT inhaler Inhale 2 puffs into the lungs every 6 hours as needed for Wheezing 1/13/23   Bishnu, [place], DO   fluticasone [place]) 50 MCG/ACT nasal spray 2 sprays by Nasal route daily 2/1/22   CALIXTO Hammond - CITLALY   Spacer/Aero-Hold Chamber Bags MISC 1 applicator by Does not apply route 4 times daily 8/25/20   Ange Escalona MD   Blood Pressure KIT 1 applicator by Does not apply route 4 times daily 8/25/20   Ange Escalnoa MD   Coenzyme Q10-Vitamin E (QUNOL ULTRA COQ10) 100-150 MG-UNIT CAPS  3/10/20   Joaquín Joe MD   vitamin C (ASCORBIC ACID) 500 MG tablet Take 1 tablet by mouth daily    Joaquín Joe MD   Multiple Vitamins-Minerals (MULTIVITAMIN MEN 50+ PO) Take by mouth    Joaquín Joe MD       Electronically SIGNED:  Paco Jacobs MD

## 2023-12-05 NOTE — PROGRESS NOTES
GENERAL SURGERY  PATIENT UPDATED HISTORY AND PHYSICAL NOTE    Pt Name: Tete Blum  MRN: [id number]    Date: 12/5/2023    Primary Care Physician: Safia Talavera MD  Referring Physician: Dr. Carly Lemos:     History of Chief Complaint:    Watson Bourgeois is a 70 y.o. male with a PMH of snycope, HTN, HLD, asthma, DM, knee pain/ osteoarthritis, prostate cancer, gout, and varicose veins who initially presented to me with a large back mass. He was scheduled for surgical excision but in the interim started having progressive muscle weakness, concerning for possible dermatomyositis or polymyositis. Patient reports difficulty walking with bilateral leg weakness and some left thigh numbness. His PCP is requesting a muscle biopsy to further investigate this. Past Medical History:   Diagnosis Date    Actinic keratoses     has had LN2 and used Efudex    Alcohol consumption of one to four drinks per day on alcohol screening     Allergic rhinitis     cats, weeds, grasses, ragweed    Asthma     Bilateral knee pain     Diabetes mellitus ([address] )     Gout     History of colon polyps 02/2016    needs f/u 5 years [place]    History of type 2 diabetes mellitus     about 15 years    Hyperlipidemia     Hypertension     Keratosis, inflamed seborrheic     Osteoarthritis, localized, shoulder, right     Polyp of transverse colon f/u due 2021 08/09/2014    Prediabetes     Prostate cancer ([address]) 2014    s/p prostatectomy    Syncope and collapse     Varicose vein of leg      Past Surgical History:   Procedure Laterality Date    COLONOSCOPY  02/04/2016    Dr. Al Files; polyps f/u in 5 years    COLONOSCOPY N/A 10/02/2020    COLONOSCOPY DIAGNOSTIC performed by Saul Hamm MD at [address] [place] [address]  11/2014    [address] [place] [address]     [address] [facility]      x2     Prior to Admission medications    Medication Sig Start Date End Date Taking?  Authorizing Provider   Cyanocobalamin (VITAMIN

## 2023-12-05 NOTE — BRIEF OP NOTE
Brief Postoperative Note      Patient: Tete Blum  YOB: 1955  MRN: [id number]    Date of Procedure: 12/5/2023  Procedure Start Time: 7:59am  Procedure End Time: 8:27am    Pre-Op Diagnosis: Bilateral leg weakness  Post-Op Diagnosis: Same       Procedure: Left quadriecep muscle biopsy    Surgeon: Keyonna Ramey MD   First Assistant: Maegan Mccormick    Anesthesia: General    Estimated Blood Loss (mL): Minimal    Complications: None    Specimens:   ID Type Source Tests Collected by Time Destination   A : Left Quadriceps muscle biopsy Tissue Thigh SURGICAL PATHOLOGY Keyonna Ramey MD 12/5/2023 5905      Implants: None      Drains: None    Findings: Pale appearing muscle.       Electronically signed by Keyonna Ramey MD on 12/5/2023 at 8:29 AM

## 2023-12-06 ENCOUNTER — ANCILLARY PROCEDURE (OUTPATIENT)
Dept: RADIOLOGY | Facility: CLINIC | Age: 68
End: 2023-12-06
Payer: MEDICARE

## 2023-12-06 ENCOUNTER — OFFICE VISIT (OUTPATIENT)
Dept: ORTHOPEDIC SURGERY | Facility: CLINIC | Age: 68
End: 2023-12-06
Payer: MEDICARE

## 2023-12-06 DIAGNOSIS — G89.29 BILATERAL CHRONIC KNEE PAIN: ICD-10-CM

## 2023-12-06 DIAGNOSIS — M25.562 BILATERAL CHRONIC KNEE PAIN: ICD-10-CM

## 2023-12-06 DIAGNOSIS — M25.561 BILATERAL CHRONIC KNEE PAIN: ICD-10-CM

## 2023-12-06 DIAGNOSIS — M17.0 PRIMARY OSTEOARTHRITIS OF BOTH KNEES: Primary | ICD-10-CM

## 2023-12-06 PROCEDURE — 1159F MED LIST DOCD IN RCRD: CPT | Performed by: ORTHOPAEDIC SURGERY

## 2023-12-06 PROCEDURE — 73564 X-RAY EXAM KNEE 4 OR MORE: CPT | Mod: 50

## 2023-12-06 PROCEDURE — 99214 OFFICE O/P EST MOD 30 MIN: CPT | Mod: PO | Performed by: ORTHOPAEDIC SURGERY

## 2023-12-06 PROCEDURE — 73564 X-RAY EXAM KNEE 4 OR MORE: CPT | Mod: BILATERAL PROCEDURE | Performed by: ORTHOPAEDIC SURGERY

## 2023-12-06 PROCEDURE — 1036F TOBACCO NON-USER: CPT | Performed by: ORTHOPAEDIC SURGERY

## 2023-12-06 PROCEDURE — 99204 OFFICE O/P NEW MOD 45 MIN: CPT | Performed by: ORTHOPAEDIC SURGERY

## 2023-12-06 NOTE — PROGRESS NOTES
New patient to me 68-year-old gentleman presents complaint of bilateral knee pain right worse than left he states he had trouble with both knees for greater than 10 years seems to be getting progressively worse he is having a lot of popping clicking grinding some swelling more so on his right knee he had extensive conservative treatment with cortisone injections he had them drained before uses a cane for assistive device he is taking multiple medications the knee pain is causing him severe impairment of his daily activities.    Location of pain: Bilateral knee right worse than left  Quality of pain: Chronic pain greater than 10 years getting progressively worse  Modifying factors: Worse when he goes up and down stairs or stands for long period time better with rest  Associated signs and symptoms: Noticing decreased range of motion more more varus deformity with a lot of popping clicking grinding and swelling  Previous treatment: Tried anti-inflammatory cortisone gel injections knee drainage and a cane        The patient's past medical history, family history, social history, and review of systems were documented on the patient's medical intake form.  The medical intake form was reviewed and scanned into the electronic medical record for future use.  History is otherwise negative except as stated in the HPI.    Physical exam    General: Alert and oriented to place, person, and time.  No acute distress and breathing comfortably; pleasant and cooperative with the examination.  HEENT: Head is normocephalic and atraumatic.  Neck: Supple, no visible swelling.  Cardiovascular: Good perfusion to the affected extremity.  Lungs: No audible wheezing or labored breathing.  Abdomen: Nondistended  HEME/Lymph : No visible abnormalities bilateral lower extremity    Extremity:  Right knee the affected knee was examined and inspected and was tender to touch along the medial aspect.  The patient has catching/locking and occasional  mechanical symptoms.  The skin was intact without breakdown or open wounds.  There was a mild Shamir exam seen with mild evidence of instability and weakness in the collateral ligaments with laxity to varus valgus stress and in the anterior posterior plane.  There was a negative Lachman's test, pivot shift test, and posterior drawer sign.  There was no foot drop, numbness or tingling.  Sensation, reflexes, and pulses in the foot and ankle were present.  There was an effusion but range of motion was good and straight leg raise testing was normal.   The patient had the ability to bear weight but with discomfort.  The patient's gait was antalgic secondary to the discomfort. Knee range of motion was   Left knee the affected knee was examined and inspected and was tender to touch along the medial aspect.  The patient has catching/locking and occasional mechanical symptoms.  The skin was intact without breakdown or open wounds.  There was a mild Shamir exam seen with mild evidence of instability and weakness in the collateral ligaments with laxity to varus valgus stress and in the anterior posterior plane.  There was a negative Lachman's test, pivot shift test, and posterior drawer sign.  There was no foot drop, numbness or tingling.  Sensation, reflexes, and pulses in the foot and ankle were present.  There was an effusion but range of motion was good and straight leg raise testing was normal.   The patient had the ability to bear weight but with discomfort.  The patient's gait was antalgic secondary to the discomfort. Knee range of motion was 5-110    Diagnostics:      XR knee 4+ views bilateral    Result Date: 12/6/2023  Interpreted By:  Santi Haddad, STUDY: XR KNEE 4+ VIEWS BILATERAL;  ;  12/6/2023 2:11 pm   INDICATION: Signs/Symptoms:pain.   ACCESSION NUMBER(S): [id number]   ORDERING CLINICIAN: SANTI HADDAD   FINDINGS: Weightbearing four views both knees. Severe bilateral knee arthritis worse  on the right than the left with varus deformity joint space narrowing marginal osteophyte formation moderate knee effusion no signs of fracture dislocation or other bony abnormality     Signed by: Gonzalo Haddad 12/6/2023 3:32 PM Dictation workstation:   [id number]         Procedures  [none   ]    Assessment:  Severe bilateral knee arthritis    Treatment plan:  1.  The natural history of the condition and its associated treatment alternatives including surgical and nonsurgical options were discussed with the patient at length.  2.  Patient with severe impairment related to his bilateral knee arthritis we discussed surgical and nonsurgical options he like to consider total knee replacement on his right knee.  The procedures risk and benefits were discussed with him he understand and wish to proceed  3.  Patient has failed all forms of conservative treatment.  The joint pain is significantly affecting quality of life and activities of daily living.    The patient has pain in the joint that is increased with activity and weightbearing, and walks with an antalgic gait.  The pain is interfering with activities of daily living.  Patient has limited range of motion and pain with passive range of motion.  X-rays demonstrate joint space narrowing, subchondral sclerosis and osteophyte formation.  Symptoms are not improving with medication, physical therapy or supportive device for a period of at least 3 months.      Patient would like to go and schedule joint replacement surgery.  The procedure its risks, benefits, and treatment alternatives were discussed at length and patient would like to proceed.  Patient is going to schedule the procedure at their earliest convenience and see me back for a preop visit just prior.  Preadmission testing, medical checkup, and insurance authorization will be performed in the meantime.  Patient understands a joint replacement surgery is a last resort, although a very successful operation  results are not guaranteed.  4.  All of the patient's questions were answered.    This note was prepared using voice recognition software.  The details of this note are correct and have been reviewed, and corrected to the best of my ability.  Some grammatical areas may persist related to the Dragon software    Gonzalo Haddad MD  Senior Attending Physician  [facility]  [facility] [place]    [phone]

## 2023-12-07 PROBLEM — G89.18 POST-OP PAIN: Status: ACTIVE | Noted: 2023-12-07

## 2023-12-08 NOTE — OP NOTE
Operative Note        Patient: Yani Sosa  YOB: 1955  MRN: [id number]     Date of Procedure: 12/5/2023  Procedure Start Time: 7:59am  Procedure End Time: 8:27am     Pre-Op Diagnosis: Bilateral leg weakness  Post-Op Diagnosis: Same       Procedure: Left quadriecep muscle biopsy     Surgeon: Gabbi Jefferson MD   First Assistant: Ciara Lopez     Anesthesia: General     Estimated Blood Loss (mL): Minimal     Specimens: Left quadriceps muscle biopsy    Findings: Pale appearing muscle. Description of Procedure: With the patient in supine position, adequate anesthesia obtained. He was given preoperative ancef and SCDs plugged in. The left thigh was prepped with ChloraPrep and draped in sterile fashion. A time-out was performed to verify patient, procedure, and laterality. A 3.5 longitudinal incision was made on the left thigh. I dissected down to the muscle fascia which was incised sharply. The exposed muscle appeared somewhat pale. We took a biopsy with the 12mm muscle biopsy clamp off stretch. The ends were secured with  2-0 vicryl ties then the muscle divided on either side with Metzenbaum scissors. The specimen was sent to pathology with the appropriate form. The wound was irrigated with saline. Hemostasis ensured. The fascia was closed with a running 3-0 vicryl. Deep dermal stitches using 3-0 vicryl were placed followed by a running subcutaneous 4-0 monocryl to close the wound. Surgical glue was applied. The patient tolerated the procedure well and was transferred to the PACU in stable condition. Sponge, tape, needle, and instrument counts were correct x2.      Drains: None    Complications: None      Electronically signed by Gabbi Jefferson MD

## 2023-12-12 ENCOUNTER — OFFICE VISIT (OUTPATIENT)
Dept: OTOLARYNGOLOGY | Facility: CLINIC | Age: 68
End: 2023-12-12
Payer: MEDICARE

## 2023-12-12 VITALS — HEIGHT: 73 IN | WEIGHT: 212 LBS | BODY MASS INDEX: 28.1 KG/M2

## 2023-12-12 DIAGNOSIS — R13.10 DYSPHAGIA, UNSPECIFIED TYPE: Primary | ICD-10-CM

## 2023-12-12 PROCEDURE — 1036F TOBACCO NON-USER: CPT | Performed by: PHYSICIAN ASSISTANT

## 2023-12-12 PROCEDURE — 99203 OFFICE O/P NEW LOW 30 MIN: CPT | Performed by: PHYSICIAN ASSISTANT

## 2023-12-12 PROCEDURE — 1159F MED LIST DOCD IN RCRD: CPT | Performed by: PHYSICIAN ASSISTANT

## 2023-12-12 PROCEDURE — 31575 DIAGNOSTIC LARYNGOSCOPY: CPT | Performed by: PHYSICIAN ASSISTANT

## 2023-12-12 NOTE — PROGRESS NOTES
David Roca is a 68 y.o. year old male patient with Thrush, POST NASAL DRIP, and Dysphagia       The patient presents to the office today for assessment of his throat.  Patient complaining of difficulty swallowing for approximately 2 months.  The patient reports that he has lost 30 to 35 pounds in the last 2 months.  He states that he is not attempting to lose weight.  He states that he does have an appointment coming up with gastroenterology in the near future.  Patient states that he has had a significant difficult time swallowing solids.  He states that he has been eating predominantly Jell-O and other soft foods and liquids.  The patient has a known alcohol user and estimates he drinks at least 5 vodka drinks a day.    Review of Systems   All other systems reviewed and are negative.        Physical Exam:   General appearance: No acute distress. Normal facies. Symmetric facial movement. No gross lesions of the face are noted.  The external ear structures appear normal. The ear canals patent and the tympanic membranes are intact without evidence of air-fluid levels, retraction, or congenital defects.  Anterior rhinoscopy notes essentially a midline nasal septum. Examination is noted for normal healthy mucosal membranes without any evidence of lesions, polyps, or exudate. The tongue is normally mobile. There are no lesions on the gingiva, buccal, or oral mucosa. There are no oral cavity masses.  The neck is negative for mass lymphadenopathy. The trachea and parotid are clear. The thyroid bed is grossly unremarkable. The salivary gland structures are grossly unremarkable.  In order to assess the larynx, flexible laryngoscopy was performed based on the patient's history.    Procedure: After topical anesthesia, a very complete flexible laryngoscopy was performed. This examination reveals a normal appearance to the laryngeal structures including the true cords, false cords, epiglottis, base of tongue, and piriform  sinus, except as noted.    Assessment/Plan   1.  Dysphagia  Patient seen in the office today for assessment of difficulty swallowing.  I did perform flexible laryngoscope which I did not find any worrisome findings today.  Certainly the patient has significant weight loss which is quite concerning over the last 2 months.  He will be set up for modified barium swallow study and I encouraged the patient to see gastroenterology for further assessment of possible esophageal issues especially with excessive alcohol intake.

## 2023-12-14 ENCOUNTER — OFFICE VISIT (OUTPATIENT)
Dept: FAMILY MEDICINE CLINIC | Age: 68
End: 2023-12-14
Payer: MEDICARE

## 2023-12-14 VITALS
WEIGHT: 208 LBS | SYSTOLIC BLOOD PRESSURE: 130 MMHG | DIASTOLIC BLOOD PRESSURE: 78 MMHG | HEIGHT: 72 IN | HEART RATE: 90 BPM | BODY MASS INDEX: 28.17 KG/M2 | OXYGEN SATURATION: 97 %

## 2023-12-14 DIAGNOSIS — R74.8 ELEVATED LIVER ENZYMES: Primary | ICD-10-CM

## 2023-12-14 DIAGNOSIS — R79.82 ELEVATED C-REACTIVE PROTEIN (CRP): ICD-10-CM

## 2023-12-14 DIAGNOSIS — M62.81 MUSCLE WEAKNESS: ICD-10-CM

## 2023-12-14 DIAGNOSIS — R74.8 ELEVATED LIVER ENZYMES: ICD-10-CM

## 2023-12-14 LAB
ALT SERPL-CCNC: 57 U/L (ref 0–41)
AST SERPL-CCNC: 100 U/L (ref 0–40)

## 2023-12-14 PROCEDURE — 3078F DIAST BP <80 MM HG: CPT | Performed by: FAMILY MEDICINE

## 2023-12-14 PROCEDURE — 1123F ACP DISCUSS/DSCN MKR DOCD: CPT | Performed by: FAMILY MEDICINE

## 2023-12-14 PROCEDURE — 1036F TOBACCO NON-USER: CPT | Performed by: FAMILY MEDICINE

## 2023-12-14 PROCEDURE — 3075F SYST BP GE 130 - 139MM HG: CPT | Performed by: FAMILY MEDICINE

## 2023-12-14 PROCEDURE — 99214 OFFICE O/P EST MOD 30 MIN: CPT | Performed by: FAMILY MEDICINE

## 2023-12-14 PROCEDURE — G8427 DOCREV CUR MEDS BY ELIG CLIN: HCPCS | Performed by: FAMILY MEDICINE

## 2023-12-14 PROCEDURE — G8417 CALC BMI ABV UP PARAM F/U: HCPCS | Performed by: FAMILY MEDICINE

## 2023-12-14 PROCEDURE — G8484 FLU IMMUNIZE NO ADMIN: HCPCS | Performed by: FAMILY MEDICINE

## 2023-12-14 PROCEDURE — 3017F COLORECTAL CA SCREEN DOC REV: CPT | Performed by: FAMILY MEDICINE

## 2023-12-14 NOTE — PATIENT INSTRUCTIONS
Specialty appointment with GI and testing for swallowing study next week. Once GI determines their course of action may consider doing prednisone treatment for weakness for possible PMR symptoms. Dermatomyositis ruled out by muscle biopsy. Follow-up labs ordered for today. Patient will get those completed.   Watching trend of abnormalities

## 2023-12-14 NOTE — PROGRESS NOTES
Diagnosis Orders   1. Elevated liver enzymes  ALT    AST    B. Burgdorferi Antibodies By Wb      2. Elevated C-reactive protein (CRP)  ALT    AST    B. Burgdorferi Antibodies By Wb      3. Muscle weakness  B. Burgdorferi Antibodies By Wb        Return for with specialist.  Patient Instructions   Specialty appointment with GI and testing for swallowing study next week. Once GI determines their course of action may consider doing prednisone treatment for weakness for possible PMR symptoms. Dermatomyositis ruled out by muscle biopsy. Follow-up labs ordered for today. Patient will get those completed. Watching trend of abnormalities    Subjective:      Patient ID: Ophelia Ayala is a 70 y.o. male who presents for:  Chief Complaint   Patient presents with    Madhavi Castillo     Follow up    Discuss Labs       Patient will be seeing GI hepatology specialist for abnormal liver functions. CRP was elevated and muscle biopsy was negative. Patient still noticing weakness but less now. Only using a cane intermittently. Was able to walk into the office on his own.         Current Outpatient Medications on File Prior to Visit   Medication Sig Dispense Refill    Cyanocobalamin (VITAMIN B-12) 1000 MCG extended release tablet Take 1 tablet by mouth daily Chewable      simvastatin (ZOCOR) 20 MG tablet Take 1 tablet by mouth daily 90 tablet 3    metFORMIN (GLUCOPHAGE) 500 MG tablet Take 1 tablet by mouth 3 times daily 270 tablet 3    irbesartan (AVAPRO) 150 MG tablet Take 1 tablet by mouth daily 90 tablet 3    allopurinol (ZYLOPRIM) 300 MG tablet Take 1 tablet by mouth daily 90 tablet 3    metoprolol succinate (TOPROL XL) 100 MG extended release tablet Take 1 tablet by mouth daily 90 tablet 3    fluticasone (FLOVENT HFA) 110 MCG/ACT inhaler Inhale 2 puffs into the lungs 2 times daily 1 each 5    folic acid (FOLVITE) 1 MG tablet Take 1 tablet by mouth daily Pt is taking      albuterol sulfate HFA (VENTOLIN HFA) 108 (90

## 2023-12-18 ENCOUNTER — OFFICE VISIT (OUTPATIENT)
Dept: SURGERY | Age: 68
End: 2023-12-18

## 2023-12-18 ENCOUNTER — PREP FOR PROCEDURE (OUTPATIENT)
Dept: GASTROENTEROLOGY | Age: 68
End: 2023-12-18

## 2023-12-18 VITALS
OXYGEN SATURATION: 97 % | HEIGHT: 72 IN | HEART RATE: 110 BPM | BODY MASS INDEX: 27.77 KG/M2 | TEMPERATURE: 97.6 F | SYSTOLIC BLOOD PRESSURE: 130 MMHG | DIASTOLIC BLOOD PRESSURE: 80 MMHG | WEIGHT: 205 LBS

## 2023-12-18 DIAGNOSIS — Z09 POSTOP CHECK: Primary | ICD-10-CM

## 2023-12-18 PROBLEM — R13.10 DYSPHAGIA: Status: ACTIVE | Noted: 2023-12-18

## 2023-12-18 LAB
B BURGDOR IGG SER QL IB: NEGATIVE
B BURGDOR IGM SER QL IB: NEGATIVE
LAB AP ASR DISCLAIMER: NORMAL
LABORATORY COMMENT REPORT: NORMAL
PATH REPORT.FINAL DX SPEC: NORMAL
PATH REPORT.GROSS SPEC: NORMAL
PATH REPORT.RELEVANT HX SPEC: NORMAL
PATH REPORT.TOTAL CANCER: NORMAL

## 2023-12-18 PROCEDURE — 99024 POSTOP FOLLOW-UP VISIT: CPT | Performed by: SURGERY

## 2023-12-19 ENCOUNTER — PREP FOR PROCEDURE (OUTPATIENT)
Dept: GASTROENTEROLOGY | Age: 68
End: 2023-12-19

## 2023-12-19 RX ORDER — SODIUM CHLORIDE 9 MG/ML
INJECTION, SOLUTION INTRAVENOUS CONTINUOUS
Status: CANCELLED | OUTPATIENT
Start: 2023-12-19

## 2023-12-19 RX ORDER — SODIUM CHLORIDE 9 MG/ML
INJECTION, SOLUTION INTRAVENOUS PRN
Status: CANCELLED | OUTPATIENT
Start: 2023-12-19

## 2023-12-19 RX ORDER — SODIUM CHLORIDE 0.9 % (FLUSH) 0.9 %
5-40 SYRINGE (ML) INJECTION EVERY 12 HOURS SCHEDULED
Status: CANCELLED | OUTPATIENT
Start: 2023-12-19

## 2023-12-22 ENCOUNTER — TELEPHONE (OUTPATIENT)
Dept: GASTROENTEROLOGY | Age: 68
End: 2023-12-22

## 2023-12-22 NOTE — TELEPHONE ENCOUNTER
Patient called stating the pantoprazole is making him feel dizzy and has a rapid heart beat. I advised patient to stop taking medication. Please advise.  Thanks

## 2023-12-28 ENCOUNTER — OFFICE VISIT (OUTPATIENT)
Dept: GASTROENTEROLOGY | Age: 68
End: 2023-12-28
Payer: MEDICARE

## 2023-12-28 VITALS
HEART RATE: 103 BPM | OXYGEN SATURATION: 98 % | HEIGHT: 72 IN | SYSTOLIC BLOOD PRESSURE: 148 MMHG | DIASTOLIC BLOOD PRESSURE: 94 MMHG | WEIGHT: 206 LBS | BODY MASS INDEX: 27.9 KG/M2

## 2023-12-28 DIAGNOSIS — R00.2 PALPITATIONS: Primary | ICD-10-CM

## 2023-12-28 DIAGNOSIS — R19.7 DIARRHEA, UNSPECIFIED TYPE: ICD-10-CM

## 2023-12-28 DIAGNOSIS — R53.1 WEAKNESS: ICD-10-CM

## 2023-12-28 DIAGNOSIS — R20.0 NUMBNESS: ICD-10-CM

## 2023-12-28 PROCEDURE — G8484 FLU IMMUNIZE NO ADMIN: HCPCS | Performed by: INTERNAL MEDICINE

## 2023-12-28 PROCEDURE — 3080F DIAST BP >= 90 MM HG: CPT | Performed by: INTERNAL MEDICINE

## 2023-12-28 PROCEDURE — 3077F SYST BP >= 140 MM HG: CPT | Performed by: INTERNAL MEDICINE

## 2023-12-28 PROCEDURE — 99215 OFFICE O/P EST HI 40 MIN: CPT | Performed by: INTERNAL MEDICINE

## 2023-12-28 PROCEDURE — 1123F ACP DISCUSS/DSCN MKR DOCD: CPT | Performed by: INTERNAL MEDICINE

## 2023-12-28 PROCEDURE — 3017F COLORECTAL CA SCREEN DOC REV: CPT | Performed by: INTERNAL MEDICINE

## 2023-12-28 PROCEDURE — 1036F TOBACCO NON-USER: CPT | Performed by: INTERNAL MEDICINE

## 2023-12-28 PROCEDURE — G8427 DOCREV CUR MEDS BY ELIG CLIN: HCPCS | Performed by: INTERNAL MEDICINE

## 2023-12-28 PROCEDURE — G8417 CALC BMI ABV UP PARAM F/U: HCPCS | Performed by: INTERNAL MEDICINE

## 2023-12-28 RX ORDER — ESOMEPRAZOLE MAGNESIUM 40 MG/1
40 CAPSULE, DELAYED RELEASE ORAL
Qty: 90 CAPSULE | Refills: 1 | Status: SHIPPED | OUTPATIENT
Start: 2023-12-28

## 2023-12-28 NOTE — PROGRESS NOTES
Subjective:      Patient ID: Denita Deleon is a 70 y.o. male who presents today for:  Chief Complaint   Patient presents with    Follow-up     1 week follow up. HPI  Since last visit, patient had EGD that showed a widely patent Schatzki's ring and LA grade B esophagitis. Esophagogram reported as normal.  Modified barium swallow was noted normal.  Patient continues to have difficulty swallowing and regurgitation. Continues to drink alcohol noted evidence of alcoholic liver disease. Does report significant weight loss, had CT abdomen and chest with no source identified of the weight loss. Patient was also recommended proceeding with colonoscopy. Otherwise reported numbness and weakness extremities. Endorses diarrhea as well. No melena or hematochezia. Patient was initiated on PPI Protonix however he mentioned that she was having palpitation and dizziness. He attributes the palpitation to the new medication PPI. Patient discontinued PPI. Patient came in today for further evaluation management  Prior note  This is a very pleasant 65-year-old who came in today for further evaluation management. Patient reported that over the last 4 months has been having difficulty swallowing mainly solid. Significant weight loss reported. More than 40 pound weight loss noted over the last few months. He mentioned that he is unable to eat solid food with subsequent regurgitation. He is currently on Jell-O and liquid diet. Patient with known history of diabetes mellitus and also significant alcohol use with last 4-6 beer alcoholic drink a day. Patient also found to have abnormal LFTs and subsequently referred to GI for further evaluation. No associated melena or hematochezia. Endorses loose stool. Patient is not on anticoagulation. So given the significant weight loss and dysphagia, patient sought initial attention with ENT had evaluation that was negative and was recommended barium swallow.   Patient was

## 2024-01-03 ENCOUNTER — OFFICE VISIT (OUTPATIENT)
Dept: OTOLARYNGOLOGY | Facility: CLINIC | Age: 69
End: 2024-01-03
Payer: MEDICARE

## 2024-01-03 DIAGNOSIS — R13.12 OROPHARYNGEAL DYSPHAGIA: Primary | ICD-10-CM

## 2024-01-03 PROCEDURE — 1036F TOBACCO NON-USER: CPT | Performed by: OTOLARYNGOLOGY

## 2024-01-03 PROCEDURE — 99212 OFFICE O/P EST SF 10 MIN: CPT | Performed by: OTOLARYNGOLOGY

## 2024-01-03 NOTE — PROGRESS NOTES
Patient returns.  He has completed at least part of his workup for dysphagia including a full barium swallow as well as EGD with [facility] docs.  Thankfully those studies were all unremarkable.  He did have evidence of a Schatzki's ring but not constricting enough to warrant dilation or intervention and not believed to be the main cause of dysphagia which he still believes occurs in the upper part of his throat.  Remaining head neck inquiry is clear.  There have been no significant changes in past medical or past surgical histories except as mentioned.    Physical exam  No acute distress.  The external ear structures appear normal. The ear canals patent and the tympanic membranes are intact without evidence of air-fluid levels, retraction, or congenital defects.  Anterior rhinoscopy notes essentially a midline nasal septum. Examination is noted for normal healthy mucosal membranes without any evidence of lesions, polyps, or exudate. The tongue is normally mobile. There are no lesions on the gingiva, buccal, or oral mucosa. There are no oral cavity masses.  The neck is negative for mass lymphadenopathy. The trachea and parotid are clear. The thyroid bed is grossly unremarkable. The salivary gland structures are grossly unremarkable.    Assessment and plan  History of dysphagia.  We need to elucidate truly whether or not he had a modified barium swallow or not and then review that appropriately.  He may very well have had some variant of the study but we cannot find in the [facility] records.  If indeed we cannot find it that we will almost assuredly order a new one to be done and we will review that with him after it is accomplished.  Detailed discussion with the patient his wife in this regard with all questions answered.

## 2024-01-04 ENCOUNTER — TELEPHONE (OUTPATIENT)
Dept: GASTROENTEROLOGY | Age: 69
End: 2024-01-04

## 2024-01-04 DIAGNOSIS — R19.7 DIARRHEA, UNSPECIFIED TYPE: ICD-10-CM

## 2024-01-04 NOTE — TELEPHONE ENCOUNTER
The patient is scheduled for a follow up with Dr. Price on 1/22/24, he wanted you to know he could not get in with Dr. Crawford until 5/15/24.

## 2024-01-06 LAB — CALPROTECTIN STL-MCNT: 184 UG/G

## 2024-01-09 LAB — ELASTASE PANC STL-MCNT: >800 UG/G

## 2024-01-10 ENCOUNTER — OFFICE VISIT (OUTPATIENT)
Dept: CARDIOLOGY CLINIC | Age: 69
End: 2024-01-10
Payer: MEDICARE

## 2024-01-10 VITALS
DIASTOLIC BLOOD PRESSURE: 66 MMHG | HEIGHT: 72 IN | OXYGEN SATURATION: 98 % | HEART RATE: 119 BPM | WEIGHT: 208 LBS | SYSTOLIC BLOOD PRESSURE: 122 MMHG | RESPIRATION RATE: 15 BRPM | BODY MASS INDEX: 28.17 KG/M2

## 2024-01-10 DIAGNOSIS — R06.02 SOB (SHORTNESS OF BREATH): ICD-10-CM

## 2024-01-10 DIAGNOSIS — R00.2 HEART PALPITATIONS: ICD-10-CM

## 2024-01-10 DIAGNOSIS — I10 ESSENTIAL HYPERTENSION: Primary | ICD-10-CM

## 2024-01-10 PROCEDURE — G8484 FLU IMMUNIZE NO ADMIN: HCPCS | Performed by: INTERNAL MEDICINE

## 2024-01-10 PROCEDURE — 3017F COLORECTAL CA SCREEN DOC REV: CPT | Performed by: INTERNAL MEDICINE

## 2024-01-10 PROCEDURE — 1036F TOBACCO NON-USER: CPT | Performed by: INTERNAL MEDICINE

## 2024-01-10 PROCEDURE — 3078F DIAST BP <80 MM HG: CPT | Performed by: INTERNAL MEDICINE

## 2024-01-10 PROCEDURE — 1123F ACP DISCUSS/DSCN MKR DOCD: CPT | Performed by: INTERNAL MEDICINE

## 2024-01-10 PROCEDURE — G8427 DOCREV CUR MEDS BY ELIG CLIN: HCPCS | Performed by: INTERNAL MEDICINE

## 2024-01-10 PROCEDURE — 3074F SYST BP LT 130 MM HG: CPT | Performed by: INTERNAL MEDICINE

## 2024-01-10 PROCEDURE — G8417 CALC BMI ABV UP PARAM F/U: HCPCS | Performed by: INTERNAL MEDICINE

## 2024-01-10 RX ORDER — METOPROLOL SUCCINATE 100 MG/1
150 TABLET, EXTENDED RELEASE ORAL DAILY
Qty: 90 TABLET | Refills: 3 | Status: SHIPPED | OUTPATIENT
Start: 2024-01-10

## 2024-01-10 ASSESSMENT — ENCOUNTER SYMPTOMS
SHORTNESS OF BREATH: 0
EYE REDNESS: 0
CONSTIPATION: 0
ABDOMINAL PAIN: 0
NAUSEA: 0
RHINORRHEA: 0
CHEST TIGHTNESS: 0
COUGH: 0
APNEA: 0
COLOR CHANGE: 0
WHEEZING: 0
DIARRHEA: 0
VOMITING: 0

## 2024-01-10 NOTE — PROGRESS NOTES
Chief Complaint   Patient presents with    New Patient     Dr Price ref for Palpitations.   - Heart Racing   - Per HX HR is in 100's   - Dizziness  - Near Syncopal Episodes during Episodes        Patient presents for initial medical evaluation. Patient is followed on a regular basis by Mukesh Lopez MD.     Hypertension  Hyperlipidemia  Diabetes  Former smoker of 20 years quit 1982  TTE 8/14/2020 EF 60%  ==========  1 /10/2024  First clinic visit referred to our office for management of palpitations  Patient reports episodes of palpitations every day  Yesterday patient had an episode of sudden palpitation and along with palpitations patient almost felt like he was going to faint  Palpitation lasted about 5 minutes and trying to relax made the condition go away  Palpitations is reported as fast heart rate  Patient not physically active secondary to knee problems, patient uses a cane  However patient reports dyspnea on exertion      Patient Active Problem List   Diagnosis    Asthma    Allergic rhinitis    Gout    Mixed hyperlipidemia    Essential hypertension    Prostate CA (HCC)    Actinic keratoses    Keratosis, inflamed seborrheic    Bilateral knee pain    Varicose veins of left lower extremity with pain    Venous insufficiency of left leg    Syncope and collapse    Alcohol consumption of one to four drinks per day on alcohol screening    Varicose veins of both lower extremities    Acute left ankle pain    Hypertrophy of prostate with urinary obstruction and other lower urinary tract symptoms (LUTS)    Hyperuricemia    Polyp of transverse colon f/u due 2021    Posterior calcaneal exostosis    Primary osteoarthritis of both knees    S/P prostatectomy    Diverticula of colon    Elevated LFTs    Lipoma of back    Uncontrolled type 2 diabetes mellitus with hyperglycemia (HCC)    Chronic cough    SOB (shortness of breath)    Mass on back    Post-op pain    Dysphagia       Past Surgical History:   Procedure

## 2024-01-12 ENCOUNTER — HOSPITAL ENCOUNTER (OUTPATIENT)
Facility: HOSPITAL | Age: 69
Setting detail: OUTPATIENT SURGERY
End: 2024-01-12
Attending: ORTHOPAEDIC SURGERY | Admitting: ORTHOPAEDIC SURGERY
Payer: MEDICARE

## 2024-01-12 ENCOUNTER — TRANSCRIBE ORDERS (OUTPATIENT)
Dept: ORTHOPEDIC SURGERY | Facility: CLINIC | Age: 69
End: 2024-01-12
Payer: MEDICARE

## 2024-01-12 DIAGNOSIS — M16.11 PRIMARY OSTEOARTHRITIS OF RIGHT HIP: ICD-10-CM

## 2024-01-12 PROBLEM — M17.11 UNILATERAL PRIMARY OSTEOARTHRITIS, RIGHT KNEE: Status: ACTIVE | Noted: 2024-01-12

## 2024-01-17 ENCOUNTER — OFFICE VISIT (OUTPATIENT)
Dept: FAMILY MEDICINE CLINIC | Age: 69
End: 2024-01-17

## 2024-01-17 VITALS
HEART RATE: 109 BPM | SYSTOLIC BLOOD PRESSURE: 132 MMHG | WEIGHT: 203 LBS | HEIGHT: 72 IN | BODY MASS INDEX: 27.5 KG/M2 | TEMPERATURE: 98.4 F | OXYGEN SATURATION: 98 % | DIASTOLIC BLOOD PRESSURE: 88 MMHG

## 2024-01-17 DIAGNOSIS — M17.11 LOCALIZED OSTEOARTHRITIS OF RIGHT KNEE: ICD-10-CM

## 2024-01-17 DIAGNOSIS — K70.9 ALCOHOLIC LIVER DISEASE (HCC): Primary | ICD-10-CM

## 2024-01-17 DIAGNOSIS — C61 PROSTATE CA (HCC): ICD-10-CM

## 2024-01-17 DIAGNOSIS — K70.9 ALCOHOLIC LIVER DISEASE (HCC): ICD-10-CM

## 2024-01-17 DIAGNOSIS — E11.65 UNCONTROLLED TYPE 2 DIABETES MELLITUS WITH HYPERGLYCEMIA (HCC): ICD-10-CM

## 2024-01-17 PROBLEM — M25.561 BILATERAL KNEE PAIN: Status: RESOLVED | Noted: 2017-12-14 | Resolved: 2024-01-17

## 2024-01-17 PROBLEM — M25.562 BILATERAL KNEE PAIN: Status: RESOLVED | Noted: 2017-12-14 | Resolved: 2024-01-17

## 2024-01-17 LAB
ALT SERPL-CCNC: 25 U/L (ref 0–41)
AST SERPL-CCNC: 44 U/L (ref 0–40)
C-REACTIVE PROTEIN, HIGH SENSITIVITY: 9.3 MG/L (ref 0–5)
ERYTHROCYTE [SEDIMENTATION RATE] IN BLOOD BY WESTERGREN METHOD: 20 MM (ref 0–20)

## 2024-01-17 RX ORDER — NALTREXONE HYDROCHLORIDE 50 MG/1
50 TABLET, FILM COATED ORAL DAILY
Qty: 30 TABLET | Refills: 2 | Status: SHIPPED | OUTPATIENT
Start: 2024-01-17

## 2024-01-17 ASSESSMENT — PATIENT HEALTH QUESTIONNAIRE - PHQ9
SUM OF ALL RESPONSES TO PHQ QUESTIONS 1-9: 0
SUM OF ALL RESPONSES TO PHQ QUESTIONS 1-9: 0
1. LITTLE INTEREST OR PLEASURE IN DOING THINGS: 0
SUM OF ALL RESPONSES TO PHQ QUESTIONS 1-9: 0
2. FEELING DOWN, DEPRESSED OR HOPELESS: 0
SUM OF ALL RESPONSES TO PHQ QUESTIONS 1-9: 0
SUM OF ALL RESPONSES TO PHQ9 QUESTIONS 1 & 2: 0

## 2024-01-17 ASSESSMENT — ENCOUNTER SYMPTOMS
ABDOMINAL DISTENTION: 0
SHORTNESS OF BREATH: 0
ABDOMINAL PAIN: 0
PHOTOPHOBIA: 0
CHEST TIGHTNESS: 0

## 2024-01-17 NOTE — PATIENT INSTRUCTIONS
Patient is going to do some research into naltrexone for alcohol abuse.    No approval for surgery until cardiovascular workup complete.  Patient is going to look into delaying surgery at least 1 week.    Repeat blood work ordered today.    Patient instructed to decrease drinking but that alcohol is dangerous substance to withdraw from so do not just discontinue alcohol usage.  Consider decreasing intake by 1-2 beverages every few days.    Patient is going to focus on hydration and protein intake.    Adding thiamine to daily intake as well due to alcohol history.  Naltrexone has been ordered in the event patient decides to take it.

## 2024-01-17 NOTE — PROGRESS NOTES
Collection Time: 01/04/24  3:28 PM   Result Value Ref Range    Pancreatic Elastase, Fecal >800 >=100 ug/g   Calprotectin Stool    Collection Time: 01/04/24  3:28 PM   Result Value Ref Range    Calprotectin, Fecal 184 (H) <=49 ug/g       [x] Pt was seen by provider for    45  Minutes  Counseling and coordination of care was done for all assessment diagnosis listed for today with patient and any family/friend present.   More than 50% of this visit was spent coordinating current care, obtaining information for prior records, and counseling for current plan of action.  Review of cardiology and GI notes as well as testing results         Assessment:       Diagnosis Orders   1. Alcoholic liver disease (HCC)  Gamma GT    ALT    AST    Sedimentation Rate    High Sensitivity Crp    thiamine 250 MG tablet      2. Uncontrolled type 2 diabetes mellitus with hyperglycemia (HCC)        3. Prostate CA (HCC)        4. Localized osteoarthritis of right knee  Handicap Placard MISC            Orders Placed This Encounter   Procedures    Gamma GT     Standing Status:   Future     Standing Expiration Date:   1/17/2025    ALT     Standing Status:   Future     Standing Expiration Date:   1/17/2025    AST     Standing Status:   Future     Standing Expiration Date:   1/17/2025    Sedimentation Rate     Standing Status:   Future     Standing Expiration Date:   1/16/2025    High Sensitivity Crp     Standing Status:   Future     Standing Expiration Date:   1/17/2025       Orders Placed This Encounter   Medications    thiamine 250 MG tablet     Sig: Take 1 tablet by mouth daily     Dispense:  30 tablet     Refill:  3    naltrexone (DEPADE) 50 MG tablet     Sig: Take 1 tablet by mouth daily     Dispense:  30 tablet     Refill:  2    Handicap Placard MISC     Sig: by Does not apply route Pt cannot ambulate > 100 ft without rest    Good for 5 years     Dispense:  1 each     Refill:  0          Medication List            Accurate as of January 17,

## 2024-01-18 LAB — GGT, 20027: 388 U/L (ref 8–61)

## 2024-01-18 NOTE — RESULT ENCOUNTER NOTE
Notify patient current lab values are improving.   No changes to be made in medical management at this time.

## 2024-01-22 ENCOUNTER — OFFICE VISIT (OUTPATIENT)
Dept: GASTROENTEROLOGY | Age: 69
End: 2024-01-22
Payer: MEDICARE

## 2024-01-22 ENCOUNTER — TELEPHONE (OUTPATIENT)
Dept: FAMILY MEDICINE CLINIC | Age: 69
End: 2024-01-22

## 2024-01-22 VITALS
HEART RATE: 86 BPM | DIASTOLIC BLOOD PRESSURE: 86 MMHG | WEIGHT: 203 LBS | BODY MASS INDEX: 27.53 KG/M2 | OXYGEN SATURATION: 98 % | SYSTOLIC BLOOD PRESSURE: 132 MMHG

## 2024-01-22 DIAGNOSIS — R93.3 ABNORMAL FINDING ON GI TRACT IMAGING: Primary | ICD-10-CM

## 2024-01-22 DIAGNOSIS — K70.9 ALCOHOLIC LIVER DISEASE (HCC): ICD-10-CM

## 2024-01-22 DIAGNOSIS — R79.89 ABNORMAL LFTS: ICD-10-CM

## 2024-01-22 PROCEDURE — 3079F DIAST BP 80-89 MM HG: CPT | Performed by: INTERNAL MEDICINE

## 2024-01-22 PROCEDURE — G8417 CALC BMI ABV UP PARAM F/U: HCPCS | Performed by: INTERNAL MEDICINE

## 2024-01-22 PROCEDURE — 3017F COLORECTAL CA SCREEN DOC REV: CPT | Performed by: INTERNAL MEDICINE

## 2024-01-22 PROCEDURE — 3075F SYST BP GE 130 - 139MM HG: CPT | Performed by: INTERNAL MEDICINE

## 2024-01-22 PROCEDURE — G8484 FLU IMMUNIZE NO ADMIN: HCPCS | Performed by: INTERNAL MEDICINE

## 2024-01-22 PROCEDURE — 99214 OFFICE O/P EST MOD 30 MIN: CPT | Performed by: INTERNAL MEDICINE

## 2024-01-22 PROCEDURE — 1036F TOBACCO NON-USER: CPT | Performed by: INTERNAL MEDICINE

## 2024-01-22 PROCEDURE — G8427 DOCREV CUR MEDS BY ELIG CLIN: HCPCS | Performed by: INTERNAL MEDICINE

## 2024-01-22 PROCEDURE — 1123F ACP DISCUSS/DSCN MKR DOCD: CPT | Performed by: INTERNAL MEDICINE

## 2024-01-22 RX ORDER — LANOLIN ALCOHOL/MO/W.PET/CERES
100 CREAM (GRAM) TOPICAL DAILY
Qty: 30 TABLET | Refills: 12 | Status: SHIPPED | OUTPATIENT
Start: 2024-01-22

## 2024-01-22 RX ORDER — SODIUM, POTASSIUM,MAG SULFATES 17.5-3.13G
SOLUTION, RECONSTITUTED, ORAL ORAL
Qty: 1 EACH | Refills: 0 | Status: SHIPPED | OUTPATIENT
Start: 2024-01-22

## 2024-01-23 ASSESSMENT — ENCOUNTER SYMPTOMS
EYE REDNESS: 0
WHEEZING: 0
PHOTOPHOBIA: 0
EYE PAIN: 0
CONSTIPATION: 0
DIARRHEA: 1
VOMITING: 0
RECTAL PAIN: 0
BLOOD IN STOOL: 0
NAUSEA: 0
VOICE CHANGE: 0
ABDOMINAL PAIN: 0
TROUBLE SWALLOWING: 0
CHEST TIGHTNESS: 0
ABDOMINAL DISTENTION: 0
COLOR CHANGE: 0
SHORTNESS OF BREATH: 0

## 2024-01-26 ENCOUNTER — APPOINTMENT (OUTPATIENT)
Dept: RADIOLOGY | Facility: HOSPITAL | Age: 69
End: 2024-01-26
Payer: MEDICARE

## 2024-01-28 SDOH — HEALTH STABILITY: PHYSICAL HEALTH: ON AVERAGE, HOW MANY DAYS PER WEEK DO YOU ENGAGE IN MODERATE TO STRENUOUS EXERCISE (LIKE A BRISK WALK)?: 0 DAYS

## 2024-01-28 SDOH — HEALTH STABILITY: PHYSICAL HEALTH: ON AVERAGE, HOW MANY MINUTES DO YOU ENGAGE IN EXERCISE AT THIS LEVEL?: 0 MIN

## 2024-01-28 ASSESSMENT — LIFESTYLE VARIABLES
HOW MANY STANDARD DRINKS CONTAINING ALCOHOL DO YOU HAVE ON A TYPICAL DAY: 2
HOW OFTEN DURING THE LAST YEAR HAVE YOU FAILED TO DO WHAT WAS NORMALLY EXPECTED FROM YOU BECAUSE OF DRINKING: NEVER
HOW OFTEN DO YOU HAVE A DRINK CONTAINING ALCOHOL: 5
HAS A RELATIVE, FRIEND, DOCTOR, OR ANOTHER HEALTH PROFESSIONAL EXPRESSED CONCERN ABOUT YOUR DRINKING OR SUGGESTED YOU CUT DOWN: YES, DURING THE PAST YEAR
HOW OFTEN DURING THE LAST YEAR HAVE YOU FAILED TO DO WHAT WAS NORMALLY EXPECTED FROM YOU BECAUSE OF DRINKING: 0
HOW OFTEN DURING THE LAST YEAR HAVE YOU BEEN UNABLE TO REMEMBER WHAT HAPPENED THE NIGHT BEFORE BECAUSE YOU HAD BEEN DRINKING: NEVER
HOW OFTEN DURING THE LAST YEAR HAVE YOU BEEN UNABLE TO REMEMBER WHAT HAPPENED THE NIGHT BEFORE BECAUSE YOU HAD BEEN DRINKING: 0
HAVE YOU OR SOMEONE ELSE BEEN INJURED AS A RESULT OF YOUR DRINKING: NO
HOW OFTEN DURING THE LAST YEAR HAVE YOU HAD A FEELING OF GUILT OR REMORSE AFTER DRINKING: NEVER
HOW OFTEN DURING THE LAST YEAR HAVE YOU HAD A FEELING OF GUILT OR REMORSE AFTER DRINKING: 0
HOW OFTEN DURING THE LAST YEAR HAVE YOU NEEDED AN ALCOHOLIC DRINK FIRST THING IN THE MORNING TO GET YOURSELF GOING AFTER A NIGHT OF HEAVY DRINKING: 0
HOW OFTEN DO YOU HAVE SIX OR MORE DRINKS ON ONE OCCASION: 1
HOW OFTEN DO YOU HAVE A DRINK CONTAINING ALCOHOL: 4 OR MORE TIMES A WEEK
HOW MANY STANDARD DRINKS CONTAINING ALCOHOL DO YOU HAVE ON A TYPICAL DAY: 3 OR 4
HAVE YOU OR SOMEONE ELSE BEEN INJURED AS A RESULT OF YOUR DRINKING: 0
HOW OFTEN DURING THE LAST YEAR HAVE YOU NEEDED AN ALCOHOLIC DRINK FIRST THING IN THE MORNING TO GET YOURSELF GOING AFTER A NIGHT OF HEAVY DRINKING: NEVER
HOW OFTEN DURING THE LAST YEAR HAVE YOU FOUND THAT YOU WERE NOT ABLE TO STOP DRINKING ONCE YOU HAD STARTED: LESS THAN MONTHLY
HOW OFTEN DURING THE LAST YEAR HAVE YOU FOUND THAT YOU WERE NOT ABLE TO STOP DRINKING ONCE YOU HAD STARTED: 1
HAS A RELATIVE, FRIEND, DOCTOR, OR ANOTHER HEALTH PROFESSIONAL EXPRESSED CONCERN ABOUT YOUR DRINKING OR SUGGESTED YOU CUT DOWN: 4

## 2024-01-28 ASSESSMENT — PATIENT HEALTH QUESTIONNAIRE - PHQ9
SUM OF ALL RESPONSES TO PHQ QUESTIONS 1-9: 0
SUM OF ALL RESPONSES TO PHQ9 QUESTIONS 1 & 2: 0
2. FEELING DOWN, DEPRESSED OR HOPELESS: 0
SUM OF ALL RESPONSES TO PHQ QUESTIONS 1-9: 0
1. LITTLE INTEREST OR PLEASURE IN DOING THINGS: 0

## 2024-01-29 ENCOUNTER — OFFICE VISIT (OUTPATIENT)
Dept: FAMILY MEDICINE CLINIC | Age: 69
End: 2024-01-29
Payer: MEDICARE

## 2024-01-29 VITALS
BODY MASS INDEX: 27.14 KG/M2 | TEMPERATURE: 98.4 F | DIASTOLIC BLOOD PRESSURE: 82 MMHG | HEART RATE: 68 BPM | WEIGHT: 200.4 LBS | HEIGHT: 72 IN | SYSTOLIC BLOOD PRESSURE: 138 MMHG | OXYGEN SATURATION: 98 %

## 2024-01-29 DIAGNOSIS — Z00.00 MEDICARE ANNUAL WELLNESS VISIT, SUBSEQUENT: Primary | ICD-10-CM

## 2024-01-29 DIAGNOSIS — L57.0 ACTINIC KERATOSES: ICD-10-CM

## 2024-01-29 PROCEDURE — 17000 DESTRUCT PREMALG LESION: CPT | Performed by: FAMILY MEDICINE

## 2024-01-29 PROCEDURE — 1123F ACP DISCUSS/DSCN MKR DOCD: CPT | Performed by: FAMILY MEDICINE

## 2024-01-29 PROCEDURE — 17003 DESTRUCT PREMALG LES 2-14: CPT | Performed by: FAMILY MEDICINE

## 2024-01-29 PROCEDURE — 3075F SYST BP GE 130 - 139MM HG: CPT | Performed by: FAMILY MEDICINE

## 2024-01-29 PROCEDURE — 3017F COLORECTAL CA SCREEN DOC REV: CPT | Performed by: FAMILY MEDICINE

## 2024-01-29 PROCEDURE — G0439 PPPS, SUBSEQ VISIT: HCPCS | Performed by: FAMILY MEDICINE

## 2024-01-29 PROCEDURE — G8484 FLU IMMUNIZE NO ADMIN: HCPCS | Performed by: FAMILY MEDICINE

## 2024-01-29 PROCEDURE — 3079F DIAST BP 80-89 MM HG: CPT | Performed by: FAMILY MEDICINE

## 2024-01-29 NOTE — PATIENT INSTRUCTIONS
and berries.     Foods high in fiber can reduce your cholesterol and provide important vitamins and minerals. High-fiber foods include whole-grain cereals and breads, oatmeal, beans, brown rice, citrus fruits, and apples.     Eat lean proteins. Heart-healthy proteins include seafood, lean meats and poultry, eggs, beans, peas, nuts, seeds, and soy products.     Limit drinks and foods with added sugar. These include candy, desserts, and soda pop.   Lifestyle changes    If your doctor recommends it, get more exercise. Walking is a good choice. Bit by bit, increase the amount you walk every day. Try for at least 30 minutes on most days of the week. You also may want to swim, bike, or do other activities.     Do not smoke. If you need help quitting, talk to your doctor about stop-smoking programs and medicines. These can increase your chances of quitting for good. Quitting smoking may be the most important step you can take to protect your heart. It is never too late to quit.     Limit alcohol to 2 drinks a day for men and 1 drink a day for women. Too much alcohol can cause health problems.     Manage other health problems such as diabetes, high blood pressure, and high cholesterol. If you think you may have a problem with alcohol or drug use, talk to your doctor.   Medicines    Take your medicines exactly as prescribed. Call your doctor if you think you are having a problem with your medicine.     If your doctor recommends aspirin, take the amount directed each day. Make sure you take aspirin and not another kind of pain reliever, such as acetaminophen (Tylenol).   When should you call for help?   Call 911 if you have symptoms of a heart attack. These may include:    Chest pain or pressure, or a strange feeling in the chest.     Sweating.     Shortness of breath.     Pain, pressure, or a strange feeling in the back, neck, jaw, or upper belly or in one or both shoulders or arms.     Lightheadedness or sudden weakness.

## 2024-01-29 NOTE — PROGRESS NOTES
Medicare Annual Wellness Visit    Narayan Eddy is here for Medicare AWV and Skin Exam ([facility] )    Assessment & Plan   Medicare annual wellness visit, subsequent  Actinic keratoses  -     OR DESTRUCTION PREMALIGNANT LESION 1ST  -     OR DESTRUCTION PREMALIGNANT LESION 2-14 EA  Recommendations for Preventive Services Due: see orders and patient instructions/AVS.  Recommended screening schedule for the next 5-10 years is provided to the patient in written form: see Patient Instructions/AVS.     Return in about 3 months (around 4/29/2024) for for routine major medical condition management.     Subjective   The following acute and/or chronic problems were also addressed today:  Patient has lesions on his scalp he is concerned are consistent with his history of prior skin abnormality and precancer lesions    Patient's complete Health Risk Assessment and screening values have been reviewed and are found in Flowsheets. The following problems were reviewed today and where indicated follow up appointments were made and/or referrals ordered.    Positive Risk Factor Screenings with Interventions:    Fall Risk:  Do you feel unsteady or are you worried about falling? : (!) yes  2 or more falls in past year?: no  Fall with injury in past year?: no     Interventions:    Reviewed medications, home hazards, visual acuity, and co-morbidities that can increase risk for falls      Alcohol Screening:  Alcohol Use: Heavy Drinker (1/28/2024)    AUDIT-C     Frequency of Alcohol Consumption: 4 or more times a week     Average Number of Drinks: 3 or 4     Frequency of Binge Drinking: Never      AUDIT-C Score: 5   AUDIT Total Score: 9    Interpretation of AUDIT-C score:   3-7 indicates potential alcohol risk.    8 or more is associated with harmful or hazardous drinking.   13 or more in women, and 15 or more in men, is likely to indicate alcohol dependence.  Interventions:  Patient comments: States he is down to 3 vodka drinks per

## 2024-01-30 ENCOUNTER — PATIENT MESSAGE (OUTPATIENT)
Dept: GASTROENTEROLOGY | Age: 69
End: 2024-01-30

## 2024-01-30 ENCOUNTER — TELEPHONE (OUTPATIENT)
Dept: GASTROENTEROLOGY | Age: 69
End: 2024-01-30

## 2024-01-30 NOTE — TELEPHONE ENCOUNTER
Patient left office 1/22 without scheduling colonoscopy that was ordered. Left message to call back so we can get him scheduled. Also, sent my chart message.

## 2024-01-31 ENCOUNTER — HOSPITAL ENCOUNTER (OUTPATIENT)
Dept: NUCLEAR MEDICINE | Age: 69
Discharge: HOME | End: 2024-02-02
Attending: INTERNAL MEDICINE
Payer: MEDICARE

## 2024-01-31 ENCOUNTER — HOSPITAL ENCOUNTER (OUTPATIENT)
Age: 69
Discharge: HOME | End: 2024-02-02
Attending: INTERNAL MEDICINE

## 2024-01-31 DIAGNOSIS — R06.02 SOB (SHORTNESS OF BREATH): ICD-10-CM

## 2024-01-31 LAB
NUC STRESS EJECTION FRACTION: 73 %
STRESS BASELINE DIAS BP: 92 MMHG
STRESS BASELINE HR: 84 BPM
STRESS BASELINE SYS BP: 159 MMHG
STRESS ESTIMATED WORKLOAD: 1 METS
STRESS PEAK DIAS BP: 92 MMHG
STRESS PEAK SYS BP: 159 MMHG
STRESS PERCENT HR ACHIEVED: 60 %
STRESS POST PEAK HR: 90 BPM
STRESS RATE PRESSURE PRODUCT: NORMAL BPM*MMHG
STRESS ST DEPRESSION: 0 MM
STRESS TARGET HR: 151 BPM
TID: 0.93

## 2024-01-31 PROCEDURE — 6360000002 HC RX W HCPCS: Performed by: INTERNAL MEDICINE

## 2024-01-31 PROCEDURE — 78452 HT MUSCLE IMAGE SPECT MULT: CPT

## 2024-01-31 PROCEDURE — A9502 TC99M TETROFOSMIN: HCPCS | Performed by: INTERNAL MEDICINE

## 2024-01-31 PROCEDURE — 93017 CV STRESS TEST TRACING ONLY: CPT

## 2024-01-31 PROCEDURE — 3430000000 HC RX DIAGNOSTIC RADIOPHARMACEUTICAL: Performed by: INTERNAL MEDICINE

## 2024-01-31 PROCEDURE — 2580000003 HC RX 258: Performed by: INTERNAL MEDICINE

## 2024-01-31 RX ORDER — REGADENOSON 0.08 MG/ML
0.4 INJECTION, SOLUTION INTRAVENOUS
Status: COMPLETED | OUTPATIENT
Start: 2024-01-31 | End: 2024-01-31

## 2024-01-31 RX ORDER — SODIUM CHLORIDE 0.9 % (FLUSH) 0.9 %
10 SYRINGE (ML) INJECTION PRN
Status: DISCONTINUED | OUTPATIENT
Start: 2024-01-31 | End: 2024-02-03 | Stop reason: HOSPADM

## 2024-01-31 RX ADMIN — Medication 10 ML: at 08:14

## 2024-01-31 RX ADMIN — TETROFOSMIN 11.7 MILLICURIE: 1.38 INJECTION, POWDER, LYOPHILIZED, FOR SOLUTION INTRAVENOUS at 08:14

## 2024-01-31 RX ADMIN — Medication 10 ML: at 09:11

## 2024-01-31 RX ADMIN — TETROFOSMIN 35.7 MILLICURIE: 1.38 INJECTION, POWDER, LYOPHILIZED, FOR SOLUTION INTRAVENOUS at 09:11

## 2024-01-31 RX ADMIN — Medication 10 ML: at 09:12

## 2024-01-31 RX ADMIN — REGADENOSON 0.4 MG: 0.08 INJECTION, SOLUTION INTRAVENOUS at 09:11

## 2024-02-02 ENCOUNTER — APPOINTMENT (OUTPATIENT)
Dept: PHYSICAL THERAPY | Facility: CLINIC | Age: 69
End: 2024-02-02
Payer: MEDICARE

## 2024-02-02 ENCOUNTER — APPOINTMENT (OUTPATIENT)
Dept: ORTHOPEDIC SURGERY | Facility: CLINIC | Age: 69
End: 2024-02-02
Payer: MEDICARE

## 2024-02-06 ENCOUNTER — ANCILLARY PROCEDURE (OUTPATIENT)
Dept: ENDOSCOPY | Age: 69
End: 2024-02-06
Payer: MEDICARE

## 2024-02-06 DIAGNOSIS — R79.89 ABNORMAL LFTS: ICD-10-CM

## 2024-02-06 PROCEDURE — 91200 LIVER ELASTOGRAPHY: CPT

## 2024-02-07 DIAGNOSIS — R00.2 HEART PALPITATIONS: ICD-10-CM

## 2024-02-12 ENCOUNTER — HOSPITAL ENCOUNTER (OUTPATIENT)
Age: 69
Discharge: HOME | End: 2024-02-14
Attending: INTERNAL MEDICINE
Payer: MEDICARE

## 2024-02-12 VITALS
WEIGHT: 200.4 LBS | BODY MASS INDEX: 27.14 KG/M2 | HEIGHT: 72 IN | SYSTOLIC BLOOD PRESSURE: 138 MMHG | DIASTOLIC BLOOD PRESSURE: 82 MMHG

## 2024-02-12 DIAGNOSIS — R06.02 SOB (SHORTNESS OF BREATH): ICD-10-CM

## 2024-02-12 LAB
ECHO AV AREA PEAK VELOCITY: 2.1 CM2
ECHO AV AREA VTI: 2 CM2
ECHO AV AREA/BSA PEAK VELOCITY: 1 CM2/M2
ECHO AV AREA/BSA VTI: 0.9 CM2/M2
ECHO AV CUSP MM: 2.3 CM
ECHO AV MEAN GRADIENT: 4 MMHG
ECHO AV MEAN VELOCITY: 0.9 M/S
ECHO AV PEAK GRADIENT: 6 MMHG
ECHO AV PEAK VELOCITY: 1.3 M/S
ECHO AV VELOCITY RATIO: 0.85
ECHO AV VTI: 26.3 CM
ECHO BSA: 2.16 M2
ECHO EST RA PRESSURE: 3 MMHG
ECHO LA DIAMETER INDEX: 1.92 CM/M2
ECHO LA DIAMETER: 4.1 CM
ECHO LA VOL A-L A2C: 30 ML (ref 18–58)
ECHO LA VOL A-L A4C: 62 ML (ref 18–58)
ECHO LA VOL MOD A2C: 28 ML (ref 18–58)
ECHO LA VOL MOD A4C: 57 ML (ref 18–58)
ECHO LA VOLUME AREA LENGTH: 45 ML
ECHO LA VOLUME INDEX A-L A2C: 14 ML/M2 (ref 16–34)
ECHO LA VOLUME INDEX A-L A4C: 29 ML/M2 (ref 16–34)
ECHO LA VOLUME INDEX AREA LENGTH: 21 ML/M2 (ref 16–34)
ECHO LA VOLUME INDEX MOD A2C: 13 ML/M2 (ref 16–34)
ECHO LA VOLUME INDEX MOD A4C: 27 ML/M2 (ref 16–34)
ECHO LV E' LATERAL VELOCITY: 10 CM/S
ECHO LV E' SEPTAL VELOCITY: 7 CM/S
ECHO LV EDV A2C: 63 ML
ECHO LV EDV A4C: 48 ML
ECHO LV EDV BP: 56 ML (ref 67–155)
ECHO LV EDV INDEX A4C: 22 ML/M2
ECHO LV EDV INDEX BP: 26 ML/M2
ECHO LV EDV NDEX A2C: 29 ML/M2
ECHO LV EJECTION FRACTION A2C: 69 %
ECHO LV EJECTION FRACTION A4C: 57 %
ECHO LV EJECTION FRACTION BIPLANE: 61 % (ref 55–100)
ECHO LV ESV A2C: 20 ML
ECHO LV ESV A4C: 21 ML
ECHO LV ESV BP: 22 ML (ref 22–58)
ECHO LV ESV INDEX A2C: 9 ML/M2
ECHO LV ESV INDEX A4C: 10 ML/M2
ECHO LV ESV INDEX BP: 10 ML/M2
ECHO LV FRACTIONAL SHORTENING: 37 % (ref 28–44)
ECHO LV INTERNAL DIMENSION DIASTOLE INDEX: 2.15 CM/M2
ECHO LV INTERNAL DIMENSION DIASTOLIC: 4.6 CM (ref 4.2–5.9)
ECHO LV INTERNAL DIMENSION SYSTOLIC INDEX: 1.36 CM/M2
ECHO LV INTERNAL DIMENSION SYSTOLIC: 2.9 CM
ECHO LV IVSD: 1.4 CM (ref 0.6–1)
ECHO LV IVSS: 1.3 CM
ECHO LV MASS 2D: 243.3 G (ref 88–224)
ECHO LV MASS INDEX 2D: 113.7 G/M2 (ref 49–115)
ECHO LV POSTERIOR WALL DIASTOLIC: 1.3 CM (ref 0.6–1)
ECHO LV POSTERIOR WALL SYSTOLIC: 1.5 CM
ECHO LV RELATIVE WALL THICKNESS RATIO: 0.57
ECHO LVOT AREA: 2.5 CM2
ECHO LVOT AV VTI INDEX: 0.79
ECHO LVOT DIAM: 1.8 CM
ECHO LVOT MEAN GRADIENT: 2 MMHG
ECHO LVOT PEAK GRADIENT: 4 MMHG
ECHO LVOT PEAK VELOCITY: 1.1 M/S
ECHO LVOT STROKE VOLUME INDEX: 24.7 ML/M2
ECHO LVOT SV: 52.9 ML
ECHO LVOT VTI: 20.8 CM
ECHO MV A VELOCITY: 0.58 M/S
ECHO MV E DECELERATION TIME (DT): 191.2 MS
ECHO MV E VELOCITY: 0.65 M/S
ECHO MV E/A RATIO: 1.12
ECHO MV E/E' LATERAL: 6.5
ECHO MV E/E' RATIO (AVERAGED): 7.89
ECHO PV MAX VELOCITY: 0.8 M/S
ECHO PV PEAK GRADIENT: 2 MMHG
ECHO RIGHT VENTRICULAR SYSTOLIC PRESSURE (RVSP): 13 MMHG
ECHO RV INTERNAL DIMENSION: 2.6 CM
ECHO RV TAPSE: 1.3 CM (ref 1.7–?)
ECHO RVOT PEAK GRADIENT: 2 MMHG
ECHO RVOT PEAK VELOCITY: 0.6 M/S
ECHO TV REGURGITANT MAX VELOCITY: 1.61 M/S
ECHO TV REGURGITANT PEAK GRADIENT: 10 MMHG

## 2024-02-12 PROCEDURE — 93306 TTE W/DOPPLER COMPLETE: CPT

## 2024-02-12 PROCEDURE — 93306 TTE W/DOPPLER COMPLETE: CPT | Performed by: INTERNAL MEDICINE

## 2024-02-20 ENCOUNTER — TELEPHONE (OUTPATIENT)
Dept: GASTROENTEROLOGY | Age: 69
End: 2024-02-20

## 2024-02-20 NOTE — TELEPHONE ENCOUNTER
Patient is calling for Fibroscan results. I told patient results are still pending. Patient is concerned because he had it done on 2-6-24

## 2024-02-21 ENCOUNTER — OFFICE VISIT (OUTPATIENT)
Dept: CARDIOLOGY CLINIC | Age: 69
End: 2024-02-21
Payer: MEDICARE

## 2024-02-21 VITALS
HEIGHT: 72 IN | RESPIRATION RATE: 15 BRPM | BODY MASS INDEX: 26.82 KG/M2 | SYSTOLIC BLOOD PRESSURE: 138 MMHG | WEIGHT: 198 LBS | OXYGEN SATURATION: 97 % | DIASTOLIC BLOOD PRESSURE: 66 MMHG | HEART RATE: 91 BPM

## 2024-02-21 DIAGNOSIS — I10 ESSENTIAL HYPERTENSION: Primary | ICD-10-CM

## 2024-02-21 PROCEDURE — 3017F COLORECTAL CA SCREEN DOC REV: CPT | Performed by: INTERNAL MEDICINE

## 2024-02-21 PROCEDURE — 3078F DIAST BP <80 MM HG: CPT | Performed by: INTERNAL MEDICINE

## 2024-02-21 PROCEDURE — G8484 FLU IMMUNIZE NO ADMIN: HCPCS | Performed by: INTERNAL MEDICINE

## 2024-02-21 PROCEDURE — 1036F TOBACCO NON-USER: CPT | Performed by: INTERNAL MEDICINE

## 2024-02-21 PROCEDURE — G8427 DOCREV CUR MEDS BY ELIG CLIN: HCPCS | Performed by: INTERNAL MEDICINE

## 2024-02-21 PROCEDURE — 99214 OFFICE O/P EST MOD 30 MIN: CPT | Performed by: INTERNAL MEDICINE

## 2024-02-21 PROCEDURE — G8417 CALC BMI ABV UP PARAM F/U: HCPCS | Performed by: INTERNAL MEDICINE

## 2024-02-21 PROCEDURE — 3075F SYST BP GE 130 - 139MM HG: CPT | Performed by: INTERNAL MEDICINE

## 2024-02-21 PROCEDURE — 1123F ACP DISCUSS/DSCN MKR DOCD: CPT | Performed by: INTERNAL MEDICINE

## 2024-02-21 ASSESSMENT — ENCOUNTER SYMPTOMS
DIARRHEA: 0
EYE REDNESS: 0
COLOR CHANGE: 0
VOMITING: 0
CHEST TIGHTNESS: 0
WHEEZING: 0
NAUSEA: 0
ABDOMINAL PAIN: 0
CONSTIPATION: 0
SHORTNESS OF BREATH: 0
APNEA: 0
RHINORRHEA: 0
COUGH: 0

## 2024-02-21 NOTE — PROGRESS NOTES
Chief Complaint   Patient presents with    Results     Of Stress, Echo, and Holter.        Patient presents for initial medical evaluation. Patient is followed on a regular basis by Mukesh Lopez MD.     Hypertension  Hyperlipidemia  Diabetes  Former smoker of 20 years quit 1982  TTE 8/14/2020 EF 60%  TTE 2/12/2024 EF 55 to 60%  6-day event recorder 2/7/2024 25 episodes of atrial tachycardia no no malignant arrhythmias  Suspect 1/31/2024 no ischemia EF 73%  ==========  2/21/2024  Follow-up on palpitations  Patient comes with partner for this appointment  Patient reports feeling well denies chest pain or shortness of breath  TTE 2/12/2024 EF 55 to 60%  6-day event recorder 2/7/2024 25 episodes of atrial tachycardia no no malignant arrhythmias  Suspect 1/31/2024 no ischemia EF 73%    1 /10/2024  First clinic visit referred to our office for management of palpitations  Patient reports episodes of palpitations every day  Yesterday patient had an episode of sudden palpitation and along with palpitations patient almost felt like he was going to faint  Palpitation lasted about 5 minutes and trying to relax made the condition go away  Palpitations is reported as fast heart rate  Patient not physically active secondary to knee problems, patient uses a cane  However patient reports dyspnea on exertion      Patient Active Problem List   Diagnosis    Asthma    Allergic rhinitis    Gout    Mixed hyperlipidemia    Essential hypertension    Prostate CA (HCC)    Actinic keratoses    Keratosis, inflamed seborrheic    Varicose veins of left lower extremity with pain    Venous insufficiency of left leg    Syncope and collapse    Alcohol consumption of one to four drinks per day on alcohol screening    Varicose veins of both lower extremities    Hypertrophy of prostate with urinary obstruction and other lower urinary tract symptoms (LUTS)    Hyperuricemia    Polyp of transverse colon f/u due 2021    Posterior calcaneal

## 2024-02-27 ENCOUNTER — APPOINTMENT (OUTPATIENT)
Dept: ORTHOPEDIC SURGERY | Facility: CLINIC | Age: 69
End: 2024-02-27
Payer: MEDICARE

## 2024-03-06 ENCOUNTER — TRANSCRIBE ORDERS (OUTPATIENT)
Dept: ORTHOPEDIC SURGERY | Facility: CLINIC | Age: 69
End: 2024-03-06
Payer: MEDICARE

## 2024-03-06 DIAGNOSIS — M17.11 UNILATERAL PRIMARY OSTEOARTHRITIS, RIGHT KNEE: Primary | ICD-10-CM

## 2024-03-08 ENCOUNTER — TELEPHONE (OUTPATIENT)
Dept: GASTROENTEROLOGY | Age: 69
End: 2024-03-08

## 2024-03-08 NOTE — TELEPHONE ENCOUNTER
Patient called to get results from fibroscan, states he has it in my chart bur doesn't understand completely

## 2024-03-13 ENCOUNTER — OFFICE VISIT (OUTPATIENT)
Dept: FAMILY MEDICINE CLINIC | Age: 69
End: 2024-03-13

## 2024-03-13 VITALS
BODY MASS INDEX: 26.82 KG/M2 | TEMPERATURE: 97.9 F | OXYGEN SATURATION: 97 % | DIASTOLIC BLOOD PRESSURE: 82 MMHG | HEIGHT: 72 IN | SYSTOLIC BLOOD PRESSURE: 134 MMHG | HEART RATE: 76 BPM | WEIGHT: 198 LBS

## 2024-03-13 DIAGNOSIS — K70.9 ALCOHOLIC LIVER DISEASE (HCC): ICD-10-CM

## 2024-03-13 DIAGNOSIS — M17.11 UNILATERAL PRIMARY OSTEOARTHRITIS, RIGHT KNEE: Primary | ICD-10-CM

## 2024-03-13 DIAGNOSIS — E11.9 TYPE 2 DIABETES MELLITUS WITHOUT COMPLICATION, WITHOUT LONG-TERM CURRENT USE OF INSULIN (HCC): ICD-10-CM

## 2024-03-13 PROBLEM — R55 SYNCOPE AND COLLAPSE: Status: RESOLVED | Noted: 2018-02-05 | Resolved: 2024-03-13

## 2024-03-13 PROBLEM — R13.12 OROPHARYNGEAL DYSPHAGIA: Status: ACTIVE | Noted: 2023-12-12

## 2024-03-13 PROBLEM — E11.65 UNCONTROLLED TYPE 2 DIABETES MELLITUS WITH HYPERGLYCEMIA (HCC): Status: RESOLVED | Noted: 2023-01-25 | Resolved: 2024-03-13

## 2024-03-13 PROBLEM — G89.18 POST-OP PAIN: Status: RESOLVED | Noted: 2023-12-07 | Resolved: 2024-03-13

## 2024-03-13 PROBLEM — R22.2 MASS ON BACK: Status: RESOLVED | Noted: 2023-11-17 | Resolved: 2024-03-13

## 2024-03-13 PROBLEM — R06.02 SOB (SHORTNESS OF BREATH): Status: RESOLVED | Noted: 2023-07-06 | Resolved: 2024-03-13

## 2024-03-13 ASSESSMENT — ENCOUNTER SYMPTOMS
CHEST TIGHTNESS: 0
ABDOMINAL DISTENTION: 0
PHOTOPHOBIA: 0
ABDOMINAL PAIN: 0
SHORTNESS OF BREATH: 0

## 2024-03-13 NOTE — PATIENT INSTRUCTIONS
Patient is likely to be cleared for surgery, waiting on preadmission testing.    Cardiology recently saw and diagnosed benign atrial tachycardia episodes with treatment with metoprolol.  Diabetes has been well-controlled.  And alcohol intake has been decreased.    Again educated patient on the importance of communicating with surgical team exactly how much alcohol he consumes in a day.

## 2024-03-14 ENCOUNTER — HOSPITAL ENCOUNTER (OUTPATIENT)
Dept: RADIOLOGY | Facility: HOSPITAL | Age: 69
Discharge: HOME | End: 2024-03-14
Payer: MEDICARE

## 2024-03-14 ENCOUNTER — APPOINTMENT (OUTPATIENT)
Dept: RADIOLOGY | Facility: CLINIC | Age: 69
End: 2024-03-14
Payer: MEDICARE

## 2024-03-14 DIAGNOSIS — M17.11 UNILATERAL PRIMARY OSTEOARTHRITIS, RIGHT KNEE: ICD-10-CM

## 2024-03-14 PROCEDURE — 73700 CT LOWER EXTREMITY W/O DYE: CPT | Mod: RT

## 2024-03-18 ENCOUNTER — PRE-ADMISSION TESTING (OUTPATIENT)
Dept: PREADMISSION TESTING | Facility: HOSPITAL | Age: 69
End: 2024-03-18
Payer: MEDICARE

## 2024-03-18 VITALS
WEIGHT: 196.87 LBS | SYSTOLIC BLOOD PRESSURE: 124 MMHG | RESPIRATION RATE: 18 BRPM | BODY MASS INDEX: 26.09 KG/M2 | OXYGEN SATURATION: 97 % | DIASTOLIC BLOOD PRESSURE: 74 MMHG | HEART RATE: 72 BPM | HEIGHT: 73 IN

## 2024-03-18 DIAGNOSIS — M17.11 UNILATERAL PRIMARY OSTEOARTHRITIS, RIGHT KNEE: ICD-10-CM

## 2024-03-18 LAB
ALBUMIN SERPL BCP-MCNC: 4 G/DL (ref 3.4–5)
ALP SERPL-CCNC: 109 U/L (ref 33–136)
ALT SERPL W P-5'-P-CCNC: 13 U/L (ref 10–52)
ANION GAP SERPL CALC-SCNC: 17 MMOL/L (ref 10–20)
AST SERPL W P-5'-P-CCNC: 20 U/L (ref 9–39)
BASOPHILS # BLD AUTO: 0.04 X10*3/UL (ref 0–0.1)
BASOPHILS NFR BLD AUTO: 0.5 %
BILIRUB SERPL-MCNC: 0.8 MG/DL (ref 0–1.2)
BUN SERPL-MCNC: 10 MG/DL (ref 6–23)
CALCIUM SERPL-MCNC: 9.4 MG/DL (ref 8.6–10.3)
CHLORIDE SERPL-SCNC: 98 MMOL/L (ref 98–107)
CO2 SERPL-SCNC: 25 MMOL/L (ref 21–32)
CREAT SERPL-MCNC: 0.94 MG/DL (ref 0.5–1.3)
EGFRCR SERPLBLD CKD-EPI 2021: 88 ML/MIN/1.73M*2
EOSINOPHIL # BLD AUTO: 0.06 X10*3/UL (ref 0–0.7)
EOSINOPHIL NFR BLD AUTO: 0.8 %
ERYTHROCYTE [DISTWIDTH] IN BLOOD BY AUTOMATED COUNT: 12.1 % (ref 11.5–14.5)
EST. AVERAGE GLUCOSE BLD GHB EST-MCNC: 108 MG/DL
GLUCOSE SERPL-MCNC: 133 MG/DL (ref 74–99)
HBA1C MFR BLD: 5.4 %
HCT VFR BLD AUTO: 41.2 % (ref 41–52)
HGB BLD-MCNC: 13.7 G/DL (ref 13.5–17.5)
IMM GRANULOCYTES # BLD AUTO: 0.02 X10*3/UL (ref 0–0.7)
IMM GRANULOCYTES NFR BLD AUTO: 0.3 % (ref 0–0.9)
INR PPP: 1.1 (ref 0.9–1.1)
LYMPHOCYTES # BLD AUTO: 1.22 X10*3/UL (ref 1.2–4.8)
LYMPHOCYTES NFR BLD AUTO: 15.6 %
MCH RBC QN AUTO: 33.3 PG (ref 26–34)
MCHC RBC AUTO-ENTMCNC: 33.3 G/DL (ref 32–36)
MCV RBC AUTO: 100 FL (ref 80–100)
MONOCYTES # BLD AUTO: 0.89 X10*3/UL (ref 0.1–1)
MONOCYTES NFR BLD AUTO: 11.4 %
NEUTROPHILS # BLD AUTO: 5.57 X10*3/UL (ref 1.2–7.7)
NEUTROPHILS NFR BLD AUTO: 71.4 %
NRBC BLD-RTO: 0 /100 WBCS (ref 0–0)
PLATELET # BLD AUTO: 189 X10*3/UL (ref 150–450)
POTASSIUM SERPL-SCNC: 3.6 MMOL/L (ref 3.5–5.3)
PROT SERPL-MCNC: 7.1 G/DL (ref 6.4–8.2)
PROTHROMBIN TIME: 12.7 SECONDS (ref 9.8–12.8)
RBC # BLD AUTO: 4.12 X10*6/UL (ref 4.5–5.9)
SODIUM SERPL-SCNC: 136 MMOL/L (ref 136–145)
WBC # BLD AUTO: 7.8 X10*3/UL (ref 4.4–11.3)

## 2024-03-18 PROCEDURE — 87081 CULTURE SCREEN ONLY: CPT | Mod: ELYLAB | Performed by: ORTHOPAEDIC SURGERY

## 2024-03-18 PROCEDURE — 80053 COMPREHEN METABOLIC PANEL: CPT | Performed by: ORTHOPAEDIC SURGERY

## 2024-03-18 PROCEDURE — 85610 PROTHROMBIN TIME: CPT | Performed by: ORTHOPAEDIC SURGERY

## 2024-03-18 PROCEDURE — 85025 COMPLETE CBC W/AUTO DIFF WBC: CPT | Performed by: ORTHOPAEDIC SURGERY

## 2024-03-18 PROCEDURE — 83036 HEMOGLOBIN GLYCOSYLATED A1C: CPT | Mod: ELYLAB | Performed by: ORTHOPAEDIC SURGERY

## 2024-03-18 PROCEDURE — 36415 COLL VENOUS BLD VENIPUNCTURE: CPT

## 2024-03-18 RX ORDER — ALBUTEROL SULFATE 90 UG/1
2 AEROSOL, METERED RESPIRATORY (INHALATION) EVERY 6 HOURS PRN
COMMUNITY
Start: 2023-01-13

## 2024-03-18 RX ORDER — ASCORBIC ACID 500 MG
500 TABLET ORAL DAILY
COMMUNITY

## 2024-03-18 RX ORDER — METFORMIN HYDROCHLORIDE 500 MG/1
1 TABLET ORAL 3 TIMES DAILY
COMMUNITY
Start: 2023-11-24

## 2024-03-18 RX ORDER — FOLIC ACID 1 MG/1
400 TABLET ORAL DAILY
COMMUNITY
End: 2024-04-02 | Stop reason: HOSPADM

## 2024-03-18 RX ORDER — FLUTICASONE PROPIONATE 50 MCG
2 SPRAY, SUSPENSION (ML) NASAL DAILY PRN
COMMUNITY
Start: 2022-02-01

## 2024-03-18 RX ORDER — FLUTICASONE PROPIONATE 110 UG/1
2 AEROSOL, METERED RESPIRATORY (INHALATION) 2 TIMES DAILY
COMMUNITY
Start: 2023-04-24 | End: 2024-04-23

## 2024-03-18 RX ORDER — IRBESARTAN 150 MG/1
150 TABLET ORAL DAILY
COMMUNITY

## 2024-03-18 RX ORDER — EPINEPHRINE 0.22MG
100 AEROSOL WITH ADAPTER (ML) INHALATION DAILY
COMMUNITY

## 2024-03-18 RX ORDER — VIT C/E/ZN/COPPR/LUTEIN/ZEAXAN 250MG-90MG
25 CAPSULE ORAL DAILY
COMMUNITY

## 2024-03-18 RX ORDER — IBUPROFEN 200 MG
200 TABLET ORAL EVERY 6 HOURS PRN
COMMUNITY
End: 2024-04-02 | Stop reason: HOSPADM

## 2024-03-18 RX ORDER — ESOMEPRAZOLE MAGNESIUM 40 MG/1
40 CAPSULE, DELAYED RELEASE ORAL
COMMUNITY
Start: 2023-12-28

## 2024-03-18 RX ORDER — SIMVASTATIN 20 MG/1
20 TABLET, FILM COATED ORAL NIGHTLY
COMMUNITY

## 2024-03-18 RX ORDER — ALLOPURINOL 300 MG/1
300 TABLET ORAL DAILY
COMMUNITY

## 2024-03-18 RX ORDER — LANOLIN ALCOHOL/MO/W.PET/CERES
1000 CREAM (GRAM) TOPICAL DAILY
COMMUNITY

## 2024-03-18 RX ORDER — METOPROLOL SUCCINATE 100 MG/1
1.5 TABLET, EXTENDED RELEASE ORAL DAILY
COMMUNITY
Start: 2024-01-10

## 2024-03-18 NOTE — PREPROCEDURE INSTRUCTIONS
Patient here for PAT visit; labs and MRSA swab obtained. G skin wipes and joint instruction book given to and reviewed with patient. Written pre-op instructions reviewed with patient. Aware to hold ibuprofen and to take metoprolol morning of procedure. NPO after midnight. Patient to obtain a walker.

## 2024-03-19 ENCOUNTER — OFFICE VISIT (OUTPATIENT)
Dept: FAMILY MEDICINE CLINIC | Age: 69
End: 2024-03-19
Payer: MEDICARE

## 2024-03-19 VITALS
HEIGHT: 72 IN | SYSTOLIC BLOOD PRESSURE: 126 MMHG | BODY MASS INDEX: 26.28 KG/M2 | OXYGEN SATURATION: 96 % | DIASTOLIC BLOOD PRESSURE: 74 MMHG | WEIGHT: 194 LBS | TEMPERATURE: 99.1 F | HEART RATE: 87 BPM

## 2024-03-19 DIAGNOSIS — J45.901 ASTHMA EXACERBATION, MILD: ICD-10-CM

## 2024-03-19 DIAGNOSIS — J40 BRONCHITIS: Primary | ICD-10-CM

## 2024-03-19 PROCEDURE — G8484 FLU IMMUNIZE NO ADMIN: HCPCS | Performed by: NURSE PRACTITIONER

## 2024-03-19 PROCEDURE — 3074F SYST BP LT 130 MM HG: CPT | Performed by: NURSE PRACTITIONER

## 2024-03-19 PROCEDURE — 1036F TOBACCO NON-USER: CPT | Performed by: NURSE PRACTITIONER

## 2024-03-19 PROCEDURE — G8417 CALC BMI ABV UP PARAM F/U: HCPCS | Performed by: NURSE PRACTITIONER

## 2024-03-19 PROCEDURE — G8427 DOCREV CUR MEDS BY ELIG CLIN: HCPCS | Performed by: NURSE PRACTITIONER

## 2024-03-19 PROCEDURE — 3017F COLORECTAL CA SCREEN DOC REV: CPT | Performed by: NURSE PRACTITIONER

## 2024-03-19 PROCEDURE — 3078F DIAST BP <80 MM HG: CPT | Performed by: NURSE PRACTITIONER

## 2024-03-19 PROCEDURE — 99213 OFFICE O/P EST LOW 20 MIN: CPT | Performed by: NURSE PRACTITIONER

## 2024-03-19 PROCEDURE — 1123F ACP DISCUSS/DSCN MKR DOCD: CPT | Performed by: NURSE PRACTITIONER

## 2024-03-19 RX ORDER — METHYLPREDNISOLONE 4 MG/1
TABLET ORAL
Qty: 1 KIT | Refills: 0 | Status: SHIPPED | OUTPATIENT
Start: 2024-03-19 | End: 2024-03-25

## 2024-03-19 RX ORDER — AMOXICILLIN AND CLAVULANATE POTASSIUM 875; 125 MG/1; MG/1
1 TABLET, FILM COATED ORAL 2 TIMES DAILY
Qty: 14 TABLET | Refills: 0 | Status: SHIPPED | OUTPATIENT
Start: 2024-03-19 | End: 2024-03-26

## 2024-03-20 LAB — STAPHYLOCOCCUS SPEC CULT: NORMAL

## 2024-03-21 ENCOUNTER — APPOINTMENT (OUTPATIENT)
Dept: RADIOLOGY | Facility: CLINIC | Age: 69
End: 2024-03-21
Payer: MEDICARE

## 2024-03-25 ENCOUNTER — TELEPHONE (OUTPATIENT)
Dept: FAMILY MEDICINE CLINIC | Age: 69
End: 2024-03-25

## 2024-03-25 NOTE — TELEPHONE ENCOUNTER
TATE MAZA able to see information in chart, hence updated information in chart  requested , and not form filled out .

## 2024-03-25 NOTE — TELEPHONE ENCOUNTER
[facility] called  let us know that Pt saw doctor on the 13th for preop appt.  He is having surgery next Monday, Can you please review labs and update notes to confirm he is cleared for surgery.

## 2024-03-25 NOTE — TELEPHONE ENCOUNTER
Please send to the appropriate location my March 13 note which is now addended to say cleared for surgery based on normal labs

## 2024-03-25 NOTE — PROGRESS NOTES
"  Physical Therapy  Physical Therapy Evaluation    Patient Name: David Roca  MRN: [id number]  Today's Date: 3/27/2024  Time Calculation  Start Time: 1000  Stop Time: 1033  Time Calculation (min): 33 min    Insurance:  Visit number: 1  Authorization info: No auth required   Insurance Type: Medicare    General:  Reason for visit: pre-op R TKA  Referred by: Gonzalo Haddad MD    Current Problem:  1. Unilateral primary osteoarthritis, right knee                Medical History Form: Reviewed (scanned into chart)    Subjective:     HPI: Patient reports to PT ambulating with SPC with c/o chronic R knee pain > 20 years prior to R TKA 04/01/24. Patient states that knee pain has gotten progressively worse over the past few years, was becoming increasingly more limiting, and he felt it was time for surgical treatment. Patient denies any falls in relation to knee pain. Reports B n/t in feet associated with diabetes.     Chief Complaint: Patient presents to clinic chronic R knee pain prior to R TKA 04/01/24  Onset Date: > 20 years  BASIL: Insidious    Current Condition:   Worse    Pertinent PMH includes: Diabetes, hx of prostate cancer, liver disease  PSHx: see chart     Pain:     Location: R knee  Description: achy  Worst: 7/10  Best: 4/10  Current: 4/10  Aggravating Factors:  walking, stairs, transfers  Relieving Factors:  rest      Relevant Information (PMH & Previous Tests/Imaging):     CT R knee 03/14/24: \"Preoperative CT. Tricompartment knee osteoarthritis with trace knee  joint effusion. No acute osseous or soft tissue abnormalities.\"    Previous Interventions/Treatments: Injections > 5 years ago     Prior Level of Function (PLOF)  Patient previously independent with all ADLs  Exercise/Physical Activity: n/a  Work/School: retired; accounting  Biggest limitation: stairs  Chief complaint: pain      Patients Living Environment: 3STE; Reviewed and no concern      Primary Language: English    Patient's Goal(s) for Therapy: " be able to walk without pain     Red Flags: Do you have any of the following? Yes  Fever/chills, unexplained weight changes, dizziness/fainting, unexplained change in bowel or bladder functions, unexplained malaise or muscle weakness, night pain/sweats, numbness or tingling  Reports losing about 50 pounds over last 5 months d/t lack of appetite. Reports PCP is aware; suspects it is related to liver. States he is beginning to have a little more of an appetite.    Reports chronic n/t in B feet, attributed to diabetes which patient reports is being medically managed  Objective:    Gait Assessment - antalgic with SPC    AROM  Right knee flexion: 115 degrees    Left knee flexion: WNL  Right knee extension:  -9 degrees   Left knee extension: WNL     MMT  Right quadriceps: 4/5   Left quadriceps: 4/5  Right iliopsoas: 3+/5   Left iliopsoas: 3+/5  Right hamstrin/5  Left hamstrin/5      Treatment Performed:  Initial evaluation completed. Discussed objective findings and goals of skilled care. Provided informaiton regarding upcoming surgical intervention including handouts outlining procedure, appropriate, post-operative exercises, signs and symptoms of infection, and post-operative management of pain. Instructed patient in proper gait mechanics on level surface and stairs using WW and SPC, WBAT. Answered all questions for patient.        Assessment: 70 y/o M presents with c/o chronic R knee pain pre R TKA 24. Upon examination patient demonstrates pain, diminished R knee ROM, and decreased R LE strength limiting overall functional mobility including walking. Activity limitations and participations restrictions include navigation of stairs and community environments. Pt presentation consistent with dx of R knee pain 2/2 OA. Pt to benefit from outpatient PT to address deficits, maximize functional mobility and improve QOL.  The clinical presentation of this patient is stable and their history and examination  findings are consistent with a low complexity evaluation with good rehab potential.           Plan:     David will be placed on hold for therapy until after completion of surgical procedure. Upon return to therapy per MD discretion, patient's status will be re-evaluated. POFU scheduled today. Unsure if he plans on attending PT here at [facility] or with clinic closer to home.         Rasheed Correa, PT

## 2024-03-27 ENCOUNTER — OFFICE VISIT (OUTPATIENT)
Dept: ORTHOPEDIC SURGERY | Facility: CLINIC | Age: 69
End: 2024-03-27
Payer: MEDICARE

## 2024-03-27 ENCOUNTER — EVALUATION (OUTPATIENT)
Dept: PHYSICAL THERAPY | Facility: CLINIC | Age: 69
End: 2024-03-27
Payer: MEDICARE

## 2024-03-27 ENCOUNTER — APPOINTMENT (OUTPATIENT)
Dept: ORTHOPEDIC SURGERY | Facility: CLINIC | Age: 69
End: 2024-03-27
Payer: MEDICARE

## 2024-03-27 DIAGNOSIS — M17.11 PRIMARY OSTEOARTHRITIS OF RIGHT KNEE: Primary | ICD-10-CM

## 2024-03-27 DIAGNOSIS — M17.11 UNILATERAL PRIMARY OSTEOARTHRITIS, RIGHT KNEE: Primary | ICD-10-CM

## 2024-03-27 PROCEDURE — 97535 SELF CARE MNGMENT TRAINING: CPT | Mod: GP

## 2024-03-27 PROCEDURE — 1159F MED LIST DOCD IN RCRD: CPT | Performed by: ORTHOPAEDIC SURGERY

## 2024-03-27 PROCEDURE — 1036F TOBACCO NON-USER: CPT | Performed by: ORTHOPAEDIC SURGERY

## 2024-03-27 PROCEDURE — 99024 POSTOP FOLLOW-UP VISIT: CPT | Performed by: ORTHOPAEDIC SURGERY

## 2024-03-27 PROCEDURE — 97161 PT EVAL LOW COMPLEX 20 MIN: CPT | Mod: GP

## 2024-03-27 ASSESSMENT — PATIENT HEALTH QUESTIONNAIRE - PHQ9
2. FEELING DOWN, DEPRESSED OR HOPELESS: NOT AT ALL
SUM OF ALL RESPONSES TO PHQ9 QUESTIONS 1 AND 2: 0
1. LITTLE INTEREST OR PLEASURE IN DOING THINGS: NOT AT ALL

## 2024-03-27 NOTE — PROGRESS NOTES
Subjective    Patient ID: David    Chief Complaint:   Chief Complaint   Patient presents with    Right Knee - Preop Visit     RT NIKKIE TKR 4/1/24 @  [facility]     This patient has pain in the knee that is worsening.  The pain increases with activity and with weightbearing, and interferes with daily activities.  There is pain with range of motion, limited range of motion, crepitus and swelling.  The x-ray demonstrates joint space narrowing, osteophyte formation, and subchondral sclerosis.  The symptoms have worsened in spite of extensive medical treatment, therapy, and external support over at least the past 3 months.     This is the preop visit to discuss the risks and benefits of the total knee replacement surgery.  These risks were fully explained to the patient.  With this, and any surgery, infection is a risk, this is usually 1 to 2%, even higher in diabetics, persons with rheumatoid arthritis, previous surgeries, on oral steroid medications, and obesity.  All of these issues were properly addressed.  We always assure all sterile techniques will be followed.  Patient will also need to be on antibiotics for the next 2 years for any minor surgery, even dental work.  For high risk patients, this will be for lifetime.  Severe infections may require removal of the prosthesis.     It was also explained to the patient that there will be some minor blood loss during the procedure and a blood transfusion may be recommended if medically necessary.  It is also noted that with knee surgery a tourniquet is applied to the lower extremity to limit blood loss during the procedure.     Pulmonary embolism and blood clots are also discussed with the patient.  We discussed that these can be fatal complications to the surgery.  It is explained to the patient that the use of thromboembolic stockings, foot pumps, incentive spirometer's, early mobility, and the use of blood thinners for period of time is the standard of care for  prevention of blood clots.     Patient's preoperative range of motion is 0-115 degrees.  It is explained to the patient that this type of surgery is to decrease arthritis pain and sometimes stiffness may occur.  It is important that the patient go to physical therapy postoperatively.  It is also stated that occasionally we will have to manipulate the knee if pain and stiffness persists.     Loosening and wear of the prosthesis are also discussed.  The prosthesis normally last between 12 and 15 years.  Revisions may be more complicated and with higher risk.     Fractures, though rare, may also occur intraoperatively.  These fractures may be to the tibia or to the femur.  There may be nerve or arterial injuries as well and these are discussed in detail.     Lastly, the benefits of spinal anesthesia were explained to the patient.     All of the patient's questions and concerns were answered.     This note was prepared using voice recognition software.  The details of this note are correct and have been reviewed, and corrected to the best of my ability.  Some grammatical areas may persist related to the Dragon software     Rasheed Villatoro PA-C     [phone]

## 2024-03-29 PROCEDURE — RXMED WILLOW AMBULATORY MEDICATION CHARGE

## 2024-03-31 ENCOUNTER — ANESTHESIA EVENT (OUTPATIENT)
Dept: OPERATING ROOM | Facility: HOSPITAL | Age: 69
End: 2024-03-31
Payer: MEDICARE

## 2024-04-01 ENCOUNTER — APPOINTMENT (OUTPATIENT)
Dept: RADIOLOGY | Facility: HOSPITAL | Age: 69
End: 2024-04-01
Payer: MEDICARE

## 2024-04-01 ENCOUNTER — ANESTHESIA (OUTPATIENT)
Dept: OPERATING ROOM | Facility: HOSPITAL | Age: 69
End: 2024-04-01
Payer: MEDICARE

## 2024-04-01 ENCOUNTER — HOSPITAL ENCOUNTER (OUTPATIENT)
Facility: HOSPITAL | Age: 69
Discharge: HOME | End: 2024-04-02
Attending: ORTHOPAEDIC SURGERY | Admitting: ORTHOPAEDIC SURGERY
Payer: MEDICARE

## 2024-04-01 DIAGNOSIS — M17.11 UNILATERAL PRIMARY OSTEOARTHRITIS, RIGHT KNEE: Primary | ICD-10-CM

## 2024-04-01 PROBLEM — C61 PROSTATE CA (MULTI): Status: ACTIVE | Noted: 2024-04-01

## 2024-04-01 PROBLEM — E11.9 DIABETES MELLITUS, TYPE 2 (MULTI): Status: ACTIVE | Noted: 2024-04-01

## 2024-04-01 PROBLEM — E78.5 HYPERLIPIDEMIA: Status: ACTIVE | Noted: 2024-04-01

## 2024-04-01 PROBLEM — I10 HTN (HYPERTENSION): Status: ACTIVE | Noted: 2024-04-01

## 2024-04-01 PROBLEM — J45.909 ASTHMA (HHS-HCC): Status: ACTIVE | Noted: 2024-04-01

## 2024-04-01 LAB
GLUCOSE BLD MANUAL STRIP-MCNC: 143 MG/DL (ref 74–99)
GLUCOSE BLD MANUAL STRIP-MCNC: 235 MG/DL (ref 74–99)

## 2024-04-01 PROCEDURE — 2500000005 HC RX 250 GENERAL PHARMACY W/O HCPCS: Performed by: ORTHOPAEDIC SURGERY

## 2024-04-01 PROCEDURE — 2500000004 HC RX 250 GENERAL PHARMACY W/ HCPCS (ALT 636 FOR OP/ED): Performed by: STUDENT IN AN ORGANIZED HEALTH CARE EDUCATION/TRAINING PROGRAM

## 2024-04-01 PROCEDURE — 2500000004 HC RX 250 GENERAL PHARMACY W/ HCPCS (ALT 636 FOR OP/ED): Mod: JZ | Performed by: ORTHOPAEDIC SURGERY

## 2024-04-01 PROCEDURE — 7100000011 HC EXTENDED STAY RECOVERY HOURLY - NURSING UNIT

## 2024-04-01 PROCEDURE — 99222 1ST HOSP IP/OBS MODERATE 55: CPT | Performed by: REGISTERED NURSE

## 2024-04-01 PROCEDURE — 2780000003 HC OR 278 NO HCPCS: Performed by: ORTHOPAEDIC SURGERY

## 2024-04-01 PROCEDURE — 3700000002 HC GENERAL ANESTHESIA TIME - EACH INCREMENTAL 1 MINUTE: Performed by: ORTHOPAEDIC SURGERY

## 2024-04-01 PROCEDURE — 2500000005 HC RX 250 GENERAL PHARMACY W/O HCPCS: Performed by: NURSE ANESTHETIST, CERTIFIED REGISTERED

## 2024-04-01 PROCEDURE — 73560 X-RAY EXAM OF KNEE 1 OR 2: CPT | Mod: RT

## 2024-04-01 PROCEDURE — 2500000001 HC RX 250 WO HCPCS SELF ADMINISTERED DRUGS (ALT 637 FOR MEDICARE OP): Performed by: ORTHOPAEDIC SURGERY

## 2024-04-01 PROCEDURE — 27447 TOTAL KNEE ARTHROPLASTY: CPT | Performed by: PHYSICIAN ASSISTANT

## 2024-04-01 PROCEDURE — 20985 CPTR-ASST DIR MS PX: CPT | Performed by: ORTHOPAEDIC SURGERY

## 2024-04-01 PROCEDURE — 82947 ASSAY GLUCOSE BLOOD QUANT: CPT

## 2024-04-01 PROCEDURE — A4217 STERILE WATER/SALINE, 500 ML: HCPCS | Performed by: ORTHOPAEDIC SURGERY

## 2024-04-01 PROCEDURE — 7100000001 HC RECOVERY ROOM TIME - INITIAL BASE CHARGE: Performed by: ORTHOPAEDIC SURGERY

## 2024-04-01 PROCEDURE — 2500000002 HC RX 250 W HCPCS SELF ADMINISTERED DRUGS (ALT 637 FOR MEDICARE OP, ALT 636 FOR OP/ED): Performed by: ORTHOPAEDIC SURGERY

## 2024-04-01 PROCEDURE — 2500000002 HC RX 250 W HCPCS SELF ADMINISTERED DRUGS (ALT 637 FOR MEDICARE OP, ALT 636 FOR OP/ED): Performed by: REGISTERED NURSE

## 2024-04-01 PROCEDURE — 2500000004 HC RX 250 GENERAL PHARMACY W/ HCPCS (ALT 636 FOR OP/ED)

## 2024-04-01 PROCEDURE — 2500000001 HC RX 250 WO HCPCS SELF ADMINISTERED DRUGS (ALT 637 FOR MEDICARE OP): Performed by: REGISTERED NURSE

## 2024-04-01 PROCEDURE — 73560 X-RAY EXAM OF KNEE 1 OR 2: CPT | Mod: RIGHT SIDE | Performed by: RADIOLOGY

## 2024-04-01 PROCEDURE — C1776 JOINT DEVICE (IMPLANTABLE): HCPCS | Performed by: ORTHOPAEDIC SURGERY

## 2024-04-01 PROCEDURE — 3600000018 HC OR TIME - INITIAL BASE CHARGE - PROCEDURE LEVEL SIX: Performed by: ORTHOPAEDIC SURGERY

## 2024-04-01 PROCEDURE — 2500000004 HC RX 250 GENERAL PHARMACY W/ HCPCS (ALT 636 FOR OP/ED): Performed by: NURSE ANESTHETIST, CERTIFIED REGISTERED

## 2024-04-01 PROCEDURE — G0378 HOSPITAL OBSERVATION PER HR: HCPCS

## 2024-04-01 PROCEDURE — 3700000001 HC GENERAL ANESTHESIA TIME - INITIAL BASE CHARGE: Performed by: ORTHOPAEDIC SURGERY

## 2024-04-01 PROCEDURE — C1713 ANCHOR/SCREW BN/BN,TIS/BN: HCPCS | Performed by: ORTHOPAEDIC SURGERY

## 2024-04-01 PROCEDURE — 3600000017 HC OR TIME - EACH INCREMENTAL 1 MINUTE - PROCEDURE LEVEL SIX: Performed by: ORTHOPAEDIC SURGERY

## 2024-04-01 PROCEDURE — 27447 TOTAL KNEE ARTHROPLASTY: CPT | Performed by: ORTHOPAEDIC SURGERY

## 2024-04-01 PROCEDURE — 7100000002 HC RECOVERY ROOM TIME - EACH INCREMENTAL 1 MINUTE: Performed by: ORTHOPAEDIC SURGERY

## 2024-04-01 PROCEDURE — 97161 PT EVAL LOW COMPLEX 20 MIN: CPT | Mod: GP | Performed by: PHYSICAL THERAPIST

## 2024-04-01 PROCEDURE — 2720000007 HC OR 272 NO HCPCS: Performed by: ORTHOPAEDIC SURGERY

## 2024-04-01 DEVICE — CRUCIATE RETAINING FEMORAL
Type: IMPLANTABLE DEVICE | Site: KNEE | Status: FUNCTIONAL
Brand: TRIATHLON

## 2024-04-01 DEVICE — PATELLA
Type: IMPLANTABLE DEVICE | Site: KNEE | Status: FUNCTIONAL
Brand: TRIATHLON

## 2024-04-01 DEVICE — TIBIAL BEARING INSERT - CS
Type: IMPLANTABLE DEVICE | Site: KNEE | Status: FUNCTIONAL
Brand: TRIATHLON

## 2024-04-01 DEVICE — PRIMARY TIBIAL BASEPLATE
Type: IMPLANTABLE DEVICE | Site: KNEE | Status: FUNCTIONAL
Brand: TRIATHLON

## 2024-04-01 DEVICE — SIMPLEX® HV IS A FAST-SETTING ACRYLIC RESIN FOR USE IN BONE SURGERY. MIXING THE TWO SEPARATE STERILE COMPONENTS PRODUCES A DUCTILE BONE CEMENT WHICH, AFTER HARDENING, FIXES THE IMPLANT AND TRANSFERS STRESSES PRODUCED DURING MOVEMENT EVENLY TO THE BONE. SIMPLEX® HV CEMENT POWDER ALSO CONTAINS INSOLUBLE ZIRCONIUM DIOXIDE AS AN X-RAY CONTRAST MEDIUM. SIMPLEX® HV DOES NOT EMIT A SIGNAL AND DOES NOT POSE A SAFETY RISK IN A MAGNETIC RESONANCE ENVIRONMENT.
Type: IMPLANTABLE DEVICE | Site: KNEE | Status: FUNCTIONAL
Brand: SIMPLEX HV

## 2024-04-01 RX ORDER — BUPIVACAINE HYDROCHLORIDE 7.5 MG/ML
INJECTION, SOLUTION INTRASPINAL AS NEEDED
Status: DISCONTINUED | OUTPATIENT
Start: 2024-04-01 | End: 2024-04-01

## 2024-04-01 RX ORDER — DEXTROSE 50 % IN WATER (D50W) INTRAVENOUS SYRINGE
12.5
Status: DISCONTINUED | OUTPATIENT
Start: 2024-04-01 | End: 2024-04-02 | Stop reason: HOSPADM

## 2024-04-01 RX ORDER — MULTIVIT-MIN/IRON FUM/FOLIC AC 7.5 MG-4
1 TABLET ORAL DAILY
COMMUNITY

## 2024-04-01 RX ORDER — MULTIVIT-MIN/IRON FUM/FOLIC AC 7.5 MG-4
1 TABLET ORAL DAILY
Status: DISCONTINUED | OUTPATIENT
Start: 2024-04-01 | End: 2024-04-02 | Stop reason: HOSPADM

## 2024-04-01 RX ORDER — GABAPENTIN 300 MG/1
600 CAPSULE ORAL ONCE
Status: COMPLETED | OUTPATIENT
Start: 2024-04-01 | End: 2024-04-01

## 2024-04-01 RX ORDER — LIDOCAINE HYDROCHLORIDE 20 MG/ML
INJECTION, SOLUTION EPIDURAL; INFILTRATION; INTRACAUDAL; PERINEURAL AS NEEDED
Status: DISCONTINUED | OUTPATIENT
Start: 2024-04-01 | End: 2024-04-01

## 2024-04-01 RX ORDER — BISACODYL 5 MG
10 TABLET, DELAYED RELEASE (ENTERIC COATED) ORAL DAILY PRN
Status: DISCONTINUED | OUTPATIENT
Start: 2024-04-01 | End: 2024-04-02 | Stop reason: HOSPADM

## 2024-04-01 RX ORDER — ONDANSETRON HYDROCHLORIDE 2 MG/ML
4 INJECTION, SOLUTION INTRAVENOUS ONCE AS NEEDED
Status: DISCONTINUED | OUTPATIENT
Start: 2024-04-01 | End: 2024-04-01 | Stop reason: HOSPADM

## 2024-04-01 RX ORDER — TRANEXAMIC ACID 650 MG/1
1950 TABLET ORAL ONCE
Status: COMPLETED | OUTPATIENT
Start: 2024-04-01 | End: 2024-04-01

## 2024-04-01 RX ORDER — OXYCODONE HYDROCHLORIDE 5 MG/1
10 TABLET ORAL EVERY 4 HOURS PRN
Status: DISCONTINUED | OUTPATIENT
Start: 2024-04-01 | End: 2024-04-02 | Stop reason: HOSPADM

## 2024-04-01 RX ORDER — DEXTROSE 50 % IN WATER (D50W) INTRAVENOUS SYRINGE
25
Status: DISCONTINUED | OUTPATIENT
Start: 2024-04-01 | End: 2024-04-02 | Stop reason: HOSPADM

## 2024-04-01 RX ORDER — SIMVASTATIN 20 MG/1
20 TABLET, FILM COATED ORAL NIGHTLY
Status: DISCONTINUED | OUTPATIENT
Start: 2024-04-02 | End: 2024-04-02 | Stop reason: HOSPADM

## 2024-04-01 RX ORDER — PROCHLORPERAZINE EDISYLATE 5 MG/ML
10 INJECTION INTRAMUSCULAR; INTRAVENOUS EVERY 6 HOURS PRN
Status: DISCONTINUED | OUTPATIENT
Start: 2024-04-01 | End: 2024-04-02 | Stop reason: HOSPADM

## 2024-04-01 RX ORDER — LORAZEPAM 1 MG/1
1 TABLET ORAL EVERY 2 HOUR PRN
Status: DISCONTINUED | OUTPATIENT
Start: 2024-04-01 | End: 2024-04-02 | Stop reason: HOSPADM

## 2024-04-01 RX ORDER — ONDANSETRON 4 MG/1
4 TABLET, ORALLY DISINTEGRATING ORAL EVERY 8 HOURS PRN
Status: DISCONTINUED | OUTPATIENT
Start: 2024-04-01 | End: 2024-04-02 | Stop reason: HOSPADM

## 2024-04-01 RX ORDER — FOLIC ACID 1 MG/1
1 TABLET ORAL DAILY
Status: DISCONTINUED | OUTPATIENT
Start: 2024-04-01 | End: 2024-04-02 | Stop reason: HOSPADM

## 2024-04-01 RX ORDER — WATER 1 ML/ML
IRRIGANT IRRIGATION AS NEEDED
Status: DISCONTINUED | OUTPATIENT
Start: 2024-04-01 | End: 2024-04-01 | Stop reason: HOSPADM

## 2024-04-01 RX ORDER — ONDANSETRON HYDROCHLORIDE 2 MG/ML
INJECTION, SOLUTION INTRAVENOUS AS NEEDED
Status: DISCONTINUED | OUTPATIENT
Start: 2024-04-01 | End: 2024-04-01

## 2024-04-01 RX ORDER — PROCHLORPERAZINE MALEATE 5 MG
10 TABLET ORAL EVERY 6 HOURS PRN
Status: DISCONTINUED | OUTPATIENT
Start: 2024-04-01 | End: 2024-04-02 | Stop reason: HOSPADM

## 2024-04-01 RX ORDER — TRANEXAMIC ACID 650 MG/1
1950 TABLET ORAL ONCE
Status: COMPLETED | OUTPATIENT
Start: 2024-04-02 | End: 2024-04-02

## 2024-04-01 RX ORDER — PROPOFOL 10 MG/ML
INJECTION, EMULSION INTRAVENOUS AS NEEDED
Status: DISCONTINUED | OUTPATIENT
Start: 2024-04-01 | End: 2024-04-01

## 2024-04-01 RX ORDER — OXYCODONE HYDROCHLORIDE 5 MG/1
5 TABLET ORAL EVERY 4 HOURS PRN
Status: CANCELLED | OUTPATIENT
Start: 2024-04-01

## 2024-04-01 RX ORDER — ROPIVACAINE/EPI/CLONIDINE/KET 2.46-0.005
SYRINGE (ML) INJECTION AS NEEDED
Status: DISCONTINUED | OUTPATIENT
Start: 2024-04-01 | End: 2024-04-01 | Stop reason: HOSPADM

## 2024-04-01 RX ORDER — SODIUM CHLORIDE, SODIUM LACTATE, POTASSIUM CHLORIDE, CALCIUM CHLORIDE 600; 310; 30; 20 MG/100ML; MG/100ML; MG/100ML; MG/100ML
50 INJECTION, SOLUTION INTRAVENOUS CONTINUOUS
Status: ACTIVE | OUTPATIENT
Start: 2024-04-01 | End: 2024-04-02

## 2024-04-01 RX ORDER — LANOLIN ALCOHOL/MO/W.PET/CERES
100 CREAM (GRAM) TOPICAL DAILY
COMMUNITY
Start: 2024-01-22

## 2024-04-01 RX ORDER — LABETALOL HYDROCHLORIDE 5 MG/ML
5 INJECTION, SOLUTION INTRAVENOUS ONCE AS NEEDED
Status: DISCONTINUED | OUTPATIENT
Start: 2024-04-01 | End: 2024-04-01 | Stop reason: HOSPADM

## 2024-04-01 RX ORDER — ASPIRIN 81 MG/1
81 TABLET ORAL 2 TIMES DAILY
Status: DISCONTINUED | OUTPATIENT
Start: 2024-04-01 | End: 2024-04-02 | Stop reason: HOSPADM

## 2024-04-01 RX ORDER — PROPOFOL 10 MG/ML
INJECTION, EMULSION INTRAVENOUS CONTINUOUS PRN
Status: DISCONTINUED | OUTPATIENT
Start: 2024-04-01 | End: 2024-04-01

## 2024-04-01 RX ORDER — INSULIN LISPRO 100 [IU]/ML
0-10 INJECTION, SOLUTION INTRAVENOUS; SUBCUTANEOUS
Status: DISCONTINUED | OUTPATIENT
Start: 2024-04-01 | End: 2024-04-02 | Stop reason: HOSPADM

## 2024-04-01 RX ORDER — MIDAZOLAM HYDROCHLORIDE 1 MG/ML
INJECTION, SOLUTION INTRAMUSCULAR; INTRAVENOUS AS NEEDED
Status: DISCONTINUED | OUTPATIENT
Start: 2024-04-01 | End: 2024-04-01

## 2024-04-01 RX ORDER — ACETAMINOPHEN 325 MG/1
975 TABLET ORAL ONCE
Status: COMPLETED | OUTPATIENT
Start: 2024-04-01 | End: 2024-04-01

## 2024-04-01 RX ORDER — CEFAZOLIN SODIUM 2 G/100ML
2 INJECTION, SOLUTION INTRAVENOUS EVERY 8 HOURS
Status: COMPLETED | OUTPATIENT
Start: 2024-04-01 | End: 2024-04-02

## 2024-04-01 RX ORDER — ALBUTEROL SULFATE 90 UG/1
2 AEROSOL, METERED RESPIRATORY (INHALATION) EVERY 6 HOURS PRN
Status: DISCONTINUED | OUTPATIENT
Start: 2024-04-01 | End: 2024-04-02 | Stop reason: HOSPADM

## 2024-04-01 RX ORDER — METOPROLOL SUCCINATE 50 MG/1
150 TABLET, EXTENDED RELEASE ORAL DAILY
Status: DISCONTINUED | OUTPATIENT
Start: 2024-04-02 | End: 2024-04-02 | Stop reason: HOSPADM

## 2024-04-01 RX ORDER — ROPIVACAINE HYDROCHLORIDE 5 MG/ML
20 INJECTION, SOLUTION EPIDURAL; INFILTRATION; PERINEURAL ONCE
Status: DISCONTINUED | OUTPATIENT
Start: 2024-04-01 | End: 2024-04-01

## 2024-04-01 RX ORDER — ONDANSETRON HYDROCHLORIDE 2 MG/ML
4 INJECTION, SOLUTION INTRAVENOUS EVERY 8 HOURS PRN
Status: DISCONTINUED | OUTPATIENT
Start: 2024-04-01 | End: 2024-04-02 | Stop reason: HOSPADM

## 2024-04-01 RX ORDER — PROCHLORPERAZINE 25 MG/1
25 SUPPOSITORY RECTAL EVERY 12 HOURS PRN
Status: DISCONTINUED | OUTPATIENT
Start: 2024-04-01 | End: 2024-04-02 | Stop reason: HOSPADM

## 2024-04-01 RX ORDER — CELECOXIB 200 MG/1
200 CAPSULE ORAL ONCE
Status: COMPLETED | OUTPATIENT
Start: 2024-04-01 | End: 2024-04-01

## 2024-04-01 RX ORDER — SODIUM CHLORIDE, SODIUM LACTATE, POTASSIUM CHLORIDE, CALCIUM CHLORIDE 600; 310; 30; 20 MG/100ML; MG/100ML; MG/100ML; MG/100ML
50 INJECTION, SOLUTION INTRAVENOUS CONTINUOUS
Status: DISCONTINUED | OUTPATIENT
Start: 2024-04-01 | End: 2024-04-02 | Stop reason: HOSPADM

## 2024-04-01 RX ORDER — LANOLIN ALCOHOL/MO/W.PET/CERES
100 CREAM (GRAM) TOPICAL DAILY
Status: DISCONTINUED | OUTPATIENT
Start: 2024-04-01 | End: 2024-04-02 | Stop reason: HOSPADM

## 2024-04-01 RX ORDER — KETOROLAC TROMETHAMINE 30 MG/ML
15 INJECTION, SOLUTION INTRAMUSCULAR; INTRAVENOUS EVERY 6 HOURS
Status: COMPLETED | OUTPATIENT
Start: 2024-04-01 | End: 2024-04-02

## 2024-04-01 RX ORDER — MORPHINE SULFATE 2 MG/ML
2 INJECTION, SOLUTION INTRAMUSCULAR; INTRAVENOUS EVERY 2 HOUR PRN
Status: DISCONTINUED | OUTPATIENT
Start: 2024-04-01 | End: 2024-04-02 | Stop reason: HOSPADM

## 2024-04-01 RX ORDER — DOCUSATE SODIUM 100 MG/1
100 CAPSULE, LIQUID FILLED ORAL 2 TIMES DAILY
Status: DISCONTINUED | OUTPATIENT
Start: 2024-04-01 | End: 2024-04-02 | Stop reason: HOSPADM

## 2024-04-01 RX ORDER — OXYCODONE HYDROCHLORIDE 5 MG/1
5 TABLET ORAL EVERY 4 HOURS PRN
Status: DISCONTINUED | OUTPATIENT
Start: 2024-04-01 | End: 2024-04-02 | Stop reason: HOSPADM

## 2024-04-01 RX ORDER — ROPIVACAINE HYDROCHLORIDE 5 MG/ML
20 INJECTION, SOLUTION EPIDURAL; INFILTRATION; PERINEURAL ONCE
Status: COMPLETED | OUTPATIENT
Start: 2024-04-01 | End: 2024-04-01

## 2024-04-01 RX ORDER — FENTANYL CITRATE 50 UG/ML
25 INJECTION, SOLUTION INTRAMUSCULAR; INTRAVENOUS EVERY 5 MIN PRN
Status: DISCONTINUED | OUTPATIENT
Start: 2024-04-01 | End: 2024-04-01 | Stop reason: HOSPADM

## 2024-04-01 RX ORDER — PANTOPRAZOLE SODIUM 40 MG/1
40 TABLET, DELAYED RELEASE ORAL DAILY
Status: DISCONTINUED | OUTPATIENT
Start: 2024-04-01 | End: 2024-04-02 | Stop reason: HOSPADM

## 2024-04-01 RX ORDER — ACETAMINOPHEN 325 MG/1
650 TABLET ORAL EVERY 6 HOURS SCHEDULED
Status: DISCONTINUED | OUTPATIENT
Start: 2024-04-01 | End: 2024-04-02 | Stop reason: HOSPADM

## 2024-04-01 RX ORDER — SODIUM CHLORIDE 0.9 G/100ML
IRRIGANT IRRIGATION AS NEEDED
Status: DISCONTINUED | OUTPATIENT
Start: 2024-04-01 | End: 2024-04-01 | Stop reason: HOSPADM

## 2024-04-01 RX ORDER — LORAZEPAM 1 MG/1
2 TABLET ORAL EVERY 2 HOUR PRN
Status: DISCONTINUED | OUTPATIENT
Start: 2024-04-01 | End: 2024-04-02 | Stop reason: HOSPADM

## 2024-04-01 RX ORDER — ALLOPURINOL 300 MG/1
300 TABLET ORAL DAILY
Status: DISCONTINUED | OUTPATIENT
Start: 2024-04-01 | End: 2024-04-02 | Stop reason: HOSPADM

## 2024-04-01 RX ORDER — DIPHENHYDRAMINE HCL 12.5MG/5ML
12.5 LIQUID (ML) ORAL EVERY 6 HOURS PRN
Status: DISCONTINUED | OUTPATIENT
Start: 2024-04-01 | End: 2024-04-02 | Stop reason: HOSPADM

## 2024-04-01 RX ORDER — CEFAZOLIN SODIUM 2 G/100ML
2 INJECTION, SOLUTION INTRAVENOUS ONCE
Status: COMPLETED | OUTPATIENT
Start: 2024-04-01 | End: 2024-04-01

## 2024-04-01 RX ORDER — FAMOTIDINE 10 MG/ML
INJECTION INTRAVENOUS AS NEEDED
Status: DISCONTINUED | OUTPATIENT
Start: 2024-04-01 | End: 2024-04-01

## 2024-04-01 RX ORDER — LORAZEPAM 0.5 MG/1
0.5 TABLET ORAL EVERY 2 HOUR PRN
Status: DISCONTINUED | OUTPATIENT
Start: 2024-04-01 | End: 2024-04-02 | Stop reason: HOSPADM

## 2024-04-01 RX ORDER — SODIUM CHLORIDE, SODIUM LACTATE, POTASSIUM CHLORIDE, CALCIUM CHLORIDE 600; 310; 30; 20 MG/100ML; MG/100ML; MG/100ML; MG/100ML
100 INJECTION, SOLUTION INTRAVENOUS CONTINUOUS
Status: DISCONTINUED | OUTPATIENT
Start: 2024-04-01 | End: 2024-04-01 | Stop reason: HOSPADM

## 2024-04-01 RX ORDER — CYCLOBENZAPRINE HCL 10 MG
10 TABLET ORAL 3 TIMES DAILY PRN
Status: DISCONTINUED | OUTPATIENT
Start: 2024-04-01 | End: 2024-04-02 | Stop reason: HOSPADM

## 2024-04-01 RX ADMIN — ACETAMINOPHEN 650 MG: 325 TABLET ORAL at 11:38

## 2024-04-01 RX ADMIN — PROPOFOL 30 MG: 10 INJECTION, EMULSION INTRAVENOUS at 08:28

## 2024-04-01 RX ADMIN — PROPOFOL 70 MG: 10 INJECTION, EMULSION INTRAVENOUS at 08:50

## 2024-04-01 RX ADMIN — ROPIVACAINE HYDROCHLORIDE 100 MG: 5 INJECTION EPIDURAL; INFILTRATION; PERINEURAL at 07:43

## 2024-04-01 RX ADMIN — BUPIVACAINE HYDROCHLORIDE IN DEXTROSE 2 ML: 7.5 INJECTION, SOLUTION SUBARACHNOID at 08:14

## 2024-04-01 RX ADMIN — CEFAZOLIN SODIUM 2 G: 2 INJECTION, SOLUTION INTRAVENOUS at 08:14

## 2024-04-01 RX ADMIN — SODIUM CHLORIDE, POTASSIUM CHLORIDE, SODIUM LACTATE AND CALCIUM CHLORIDE 50 ML/HR: 600; 310; 30; 20 INJECTION, SOLUTION INTRAVENOUS at 11:44

## 2024-04-01 RX ADMIN — PROPOFOL 20 MG: 10 INJECTION, EMULSION INTRAVENOUS at 08:27

## 2024-04-01 RX ADMIN — POVIDONE-IODINE 1 APPLICATION: 5 SOLUTION TOPICAL at 07:08

## 2024-04-01 RX ADMIN — Medication 100 MG: at 14:37

## 2024-04-01 RX ADMIN — TRANEXAMIC ACID 1950 MG: 650 TABLET ORAL at 07:07

## 2024-04-01 RX ADMIN — ROPIVACAINE HYDROCHLORIDE: 5 INJECTION EPIDURAL; INFILTRATION; PERINEURAL at 07:43

## 2024-04-01 RX ADMIN — ACETAMINOPHEN 975 MG: 325 TABLET ORAL at 07:07

## 2024-04-01 RX ADMIN — ONDANSETRON 4 MG: 2 INJECTION, SOLUTION INTRAMUSCULAR; INTRAVENOUS at 08:14

## 2024-04-01 RX ADMIN — SODIUM CHLORIDE, SODIUM LACTATE, POTASSIUM CHLORIDE, AND CALCIUM CHLORIDE 50 ML/HR: 600; 310; 30; 20 INJECTION, SOLUTION INTRAVENOUS at 07:07

## 2024-04-01 RX ADMIN — INSULIN LISPRO 4 UNITS: 100 INJECTION, SOLUTION INTRAVENOUS; SUBCUTANEOUS at 17:31

## 2024-04-01 RX ADMIN — PROPOFOL 80 MG: 10 INJECTION, EMULSION INTRAVENOUS at 08:14

## 2024-04-01 RX ADMIN — GABAPENTIN 600 MG: 300 CAPSULE ORAL at 07:07

## 2024-04-01 RX ADMIN — Medication 1 TABLET: at 14:37

## 2024-04-01 RX ADMIN — KETOROLAC TROMETHAMINE 15 MG: 30 INJECTION, SOLUTION INTRAMUSCULAR at 17:30

## 2024-04-01 RX ADMIN — KETOROLAC TROMETHAMINE 15 MG: 30 INJECTION, SOLUTION INTRAMUSCULAR at 11:39

## 2024-04-01 RX ADMIN — KETOROLAC TROMETHAMINE 15 MG: 30 INJECTION, SOLUTION INTRAMUSCULAR at 23:33

## 2024-04-01 RX ADMIN — TRANEXAMIC ACID 1950 MG: 650 TABLET ORAL at 11:39

## 2024-04-01 RX ADMIN — ASPIRIN 81 MG: 81 TABLET, COATED ORAL at 21:04

## 2024-04-01 RX ADMIN — CEFAZOLIN SODIUM 2 G: 2 INJECTION, SOLUTION INTRAVENOUS at 16:02

## 2024-04-01 RX ADMIN — CELECOXIB 200 MG: 200 CAPSULE ORAL at 07:08

## 2024-04-01 RX ADMIN — CEFAZOLIN SODIUM 2 G: 2 INJECTION, SOLUTION INTRAVENOUS at 23:33

## 2024-04-01 RX ADMIN — ASPIRIN 81 MG: 81 TABLET, COATED ORAL at 11:44

## 2024-04-01 RX ADMIN — PROPOFOL 100 MCG/KG/MIN: 10 INJECTION, EMULSION INTRAVENOUS at 08:14

## 2024-04-01 RX ADMIN — ALLOPURINOL 300 MG: 300 TABLET ORAL at 14:37

## 2024-04-01 RX ADMIN — FOLIC ACID 1 MG: 1 TABLET ORAL at 14:37

## 2024-04-01 RX ADMIN — SODIUM CHLORIDE, SODIUM LACTATE, POTASSIUM CHLORIDE, AND CALCIUM CHLORIDE: 600; 310; 30; 20 INJECTION, SOLUTION INTRAVENOUS at 08:46

## 2024-04-01 RX ADMIN — MIDAZOLAM 2 MG: 1 INJECTION INTRAMUSCULAR; INTRAVENOUS at 07:43

## 2024-04-01 RX ADMIN — ACETAMINOPHEN 650 MG: 325 TABLET ORAL at 23:33

## 2024-04-01 RX ADMIN — PANTOPRAZOLE SODIUM 40 MG: 40 TABLET, DELAYED RELEASE ORAL at 14:37

## 2024-04-01 RX ADMIN — FAMOTIDINE 20 MG: 10 INJECTION, SOLUTION INTRAVENOUS at 08:19

## 2024-04-01 RX ADMIN — ACETAMINOPHEN 650 MG: 325 TABLET ORAL at 17:31

## 2024-04-01 RX ADMIN — LIDOCAINE HYDROCHLORIDE 60 MG: 20 INJECTION, SOLUTION EPIDURAL; INFILTRATION; INTRACAUDAL; PERINEURAL at 08:14

## 2024-04-01 RX ADMIN — DOCUSATE SODIUM 100 MG: 100 CAPSULE, LIQUID FILLED ORAL at 21:04

## 2024-04-01 SDOH — SOCIAL STABILITY: SOCIAL INSECURITY: ARE THERE ANY APPARENT SIGNS OF INJURIES/BEHAVIORS THAT COULD BE RELATED TO ABUSE/NEGLECT?: NO

## 2024-04-01 SDOH — SOCIAL STABILITY: SOCIAL INSECURITY: ARE YOU OR HAVE YOU BEEN THREATENED OR ABUSED PHYSICALLY, EMOTIONALLY, OR SEXUALLY BY ANYONE?: NO

## 2024-04-01 SDOH — SOCIAL STABILITY: SOCIAL INSECURITY: WERE YOU ABLE TO COMPLETE ALL THE BEHAVIORAL HEALTH SCREENINGS?: YES

## 2024-04-01 SDOH — HEALTH STABILITY: MENTAL HEALTH: CURRENT SMOKER: 0

## 2024-04-01 SDOH — SOCIAL STABILITY: SOCIAL INSECURITY: DO YOU FEEL UNSAFE GOING BACK TO THE PLACE WHERE YOU ARE LIVING?: NO

## 2024-04-01 SDOH — SOCIAL STABILITY: SOCIAL INSECURITY: HAVE YOU HAD THOUGHTS OF HARMING ANYONE ELSE?: NO

## 2024-04-01 SDOH — SOCIAL STABILITY: SOCIAL INSECURITY: DOES ANYONE TRY TO KEEP YOU FROM HAVING/CONTACTING OTHER FRIENDS OR DOING THINGS OUTSIDE YOUR HOME?: NO

## 2024-04-01 SDOH — SOCIAL STABILITY: SOCIAL INSECURITY: DO YOU FEEL ANYONE HAS EXPLOITED OR TAKEN ADVANTAGE OF YOU FINANCIALLY OR OF YOUR PERSONAL PROPERTY?: NO

## 2024-04-01 SDOH — SOCIAL STABILITY: SOCIAL INSECURITY: ABUSE: ADULT

## 2024-04-01 SDOH — SOCIAL STABILITY: SOCIAL INSECURITY: HAS ANYONE EVER THREATENED TO HURT YOUR FAMILY OR YOUR PETS?: NO

## 2024-04-01 ASSESSMENT — LIFESTYLE VARIABLES
HAS A RELATIVE, FRIEND, DOCTOR, OR ANOTHER HEALTH PROFESSIONAL EXPRESSED CONCERN ABOUT YOUR DRINKING OR SUGGESTED YOU CUT DOWN: NO
AUDITORY DISTURBANCES: NOT PRESENT
ORIENTATION AND CLOUDING OF SENSORIUM: ORIENTED AND CAN DO SERIAL ADDITIONS
PAROXYSMAL SWEATS: NO SWEAT VISIBLE
NAUSEA AND VOMITING: NO NAUSEA AND NO VOMITING
HOW OFTEN DURING THE LAST YEAR HAVE YOU FOUND THAT YOU WERE NOT ABLE TO STOP DRINKING ONCE YOU HAD STARTED: NEVER
TOTAL SCORE: 0
AUDITORY DISTURBANCES: NOT PRESENT
HAVE YOU OR SOMEONE ELSE BEEN INJURED AS A RESULT OF YOUR DRINKING: NO
AUDIT TOTAL SCORE: 0
HEADACHE, FULLNESS IN HEAD: NOT PRESENT
AGITATION: NORMAL ACTIVITY
AUDIT-C TOTAL SCORE: 4
AUDITORY DISTURBANCES: NOT PRESENT
HEADACHE, FULLNESS IN HEAD: NOT PRESENT
PAROXYSMAL SWEATS: NO SWEAT VISIBLE
TREMOR: NO TREMOR
NAUSEA AND VOMITING: NO NAUSEA AND NO VOMITING
VISUAL DISTURBANCES: NOT PRESENT
PAROXYSMAL SWEATS: NO SWEAT VISIBLE
ANXIETY: NO ANXIETY, AT EASE
PAROXYSMAL SWEATS: NO SWEAT VISIBLE
VISUAL DISTURBANCES: NOT PRESENT
AGITATION: NORMAL ACTIVITY
SKIP TO QUESTIONS 9-10: 1
ANXIETY: NO ANXIETY, AT EASE
ORIENTATION AND CLOUDING OF SENSORIUM: ORIENTED AND CAN DO SERIAL ADDITIONS
HOW OFTEN DO YOU HAVE A DRINK CONTAINING ALCOHOL: 4 OR MORE TIMES A WEEK
TREMOR: NO TREMOR
ORIENTATION AND CLOUDING OF SENSORIUM: ORIENTED AND CAN DO SERIAL ADDITIONS
HOW OFTEN DO YOU HAVE 6 OR MORE DRINKS ON ONE OCCASION: NEVER
HOW OFTEN DURING THE LAST YEAR HAVE YOU BEEN UNABLE TO REMEMBER WHAT HAPPENED THE NIGHT BEFORE BECAUSE YOU HAD BEEN DRINKING: NEVER
NAUSEA AND VOMITING: NO NAUSEA AND NO VOMITING
AGITATION: NORMAL ACTIVITY
HOW MANY STANDARD DRINKS CONTAINING ALCOHOL DO YOU HAVE ON A TYPICAL DAY: 1 OR 2
AUDITORY DISTURBANCES: NOT PRESENT
TOTAL SCORE: 0
AUDIT TOTAL SCORE: 4
HEADACHE, FULLNESS IN HEAD: NOT PRESENT
ANXIETY: NO ANXIETY, AT EASE
ORIENTATION AND CLOUDING OF SENSORIUM: ORIENTED AND CAN DO SERIAL ADDITIONS
HOW OFTEN DURING THE LAST YEAR HAVE YOU NEEDED AN ALCOHOLIC DRINK FIRST THING IN THE MORNING TO GET YOURSELF GOING AFTER A NIGHT OF HEAVY DRINKING: NEVER
NAUSEA AND VOMITING: NO NAUSEA AND NO VOMITING
VISUAL DISTURBANCES: NOT PRESENT
HOW OFTEN DURING THE LAST YEAR HAVE YOU HAD A FEELING OF GUILT OR REMORSE AFTER DRINKING: NEVER
HOW OFTEN DURING THE LAST YEAR HAVE YOU FAILED TO DO WHAT WAS NORMALLY EXPECTED FROM YOU BECAUSE OF DRINKING: NEVER
TREMOR: NO TREMOR
TREMOR: NO TREMOR
HEADACHE, FULLNESS IN HEAD: NOT PRESENT
VISUAL DISTURBANCES: NOT PRESENT
AGITATION: NORMAL ACTIVITY
PRESCIPTION_ABUSE_PAST_12_MONTHS: NO
SUBSTANCE_ABUSE_PAST_12_MONTHS: NO
ANXIETY: NO ANXIETY, AT EASE
AUDIT-C TOTAL SCORE: 4
TOTAL SCORE: 0
TOTAL SCORE: 0

## 2024-04-01 ASSESSMENT — ACTIVITIES OF DAILY LIVING (ADL)
HEARING - RIGHT EAR: FUNCTIONAL
ADEQUATE_TO_COMPLETE_ADL: YES
LACK_OF_TRANSPORTATION: NO
BATHING: NEEDS ASSISTANCE
TOILETING: NEEDS ASSISTANCE
LACK_OF_TRANSPORTATION: NO
GROOMING: INDEPENDENT
PATIENT'S MEMORY ADEQUATE TO SAFELY COMPLETE DAILY ACTIVITIES?: YES
HEARING - LEFT EAR: FUNCTIONAL
DRESSING YOURSELF: NEEDS ASSISTANCE
WALKS IN HOME: NEEDS ASSISTANCE
FEEDING YOURSELF: INDEPENDENT
ASSISTIVE_DEVICE: WALKER
JUDGMENT_ADEQUATE_SAFELY_COMPLETE_DAILY_ACTIVITIES: YES

## 2024-04-01 ASSESSMENT — PAIN SCALES - GENERAL
PAINLEVEL_OUTOF10: 0 - NO PAIN
PAIN_LEVEL: 1
PAINLEVEL_OUTOF10: 0 - NO PAIN
PAINLEVEL_OUTOF10: 3

## 2024-04-01 ASSESSMENT — COGNITIVE AND FUNCTIONAL STATUS - GENERAL
WALKING IN HOSPITAL ROOM: A LITTLE
MOBILITY SCORE: 17
STANDING UP FROM CHAIR USING ARMS: A LITTLE
DAILY ACTIVITIY SCORE: 19
DAILY ACTIVITIY SCORE: 22
MOVING TO AND FROM BED TO CHAIR: A LITTLE
TURNING FROM BACK TO SIDE WHILE IN FLAT BAD: A LITTLE
WALKING IN HOSPITAL ROOM: A LOT
TOILETING: A LITTLE
DRESSING REGULAR LOWER BODY CLOTHING: A LITTLE
PERSONAL GROOMING: A LITTLE
HELP NEEDED FOR BATHING: A LITTLE
STANDING UP FROM CHAIR USING ARMS: A LOT
TURNING FROM BACK TO SIDE WHILE IN FLAT BAD: A LITTLE
DRESSING REGULAR LOWER BODY CLOTHING: A LOT
TURNING FROM BACK TO SIDE WHILE IN FLAT BAD: A LITTLE
MOVING TO AND FROM BED TO CHAIR: A LOT
CLIMB 3 TO 5 STEPS WITH RAILING: A LOT
CLIMB 3 TO 5 STEPS WITH RAILING: A LOT
MOVING FROM LYING ON BACK TO SITTING ON SIDE OF FLAT BED WITH BEDRAILS: A LITTLE
MOVING TO AND FROM BED TO CHAIR: A LITTLE
MOVING FROM LYING ON BACK TO SITTING ON SIDE OF FLAT BED WITH BEDRAILS: A LITTLE
TOILETING: A LITTLE
MOBILITY SCORE: 17
MOBILITY SCORE: 14
STANDING UP FROM CHAIR USING ARMS: A LITTLE
PATIENT BASELINE BEDBOUND: NO
WALKING IN HOSPITAL ROOM: A LOT
CLIMB 3 TO 5 STEPS WITH RAILING: A LOT

## 2024-04-01 ASSESSMENT — PAIN - FUNCTIONAL ASSESSMENT
PAIN_FUNCTIONAL_ASSESSMENT: 0-10

## 2024-04-01 ASSESSMENT — COLUMBIA-SUICIDE SEVERITY RATING SCALE - C-SSRS
6. HAVE YOU EVER DONE ANYTHING, STARTED TO DO ANYTHING, OR PREPARED TO DO ANYTHING TO END YOUR LIFE?: NO
2. HAVE YOU ACTUALLY HAD ANY THOUGHTS OF KILLING YOURSELF?: NO
1. IN THE PAST MONTH, HAVE YOU WISHED YOU WERE DEAD OR WISHED YOU COULD GO TO SLEEP AND NOT WAKE UP?: NO

## 2024-04-01 ASSESSMENT — PATIENT HEALTH QUESTIONNAIRE - PHQ9
SUM OF ALL RESPONSES TO PHQ9 QUESTIONS 1 & 2: 0
1. LITTLE INTEREST OR PLEASURE IN DOING THINGS: NOT AT ALL
2. FEELING DOWN, DEPRESSED OR HOPELESS: NOT AT ALL

## 2024-04-01 NOTE — ANESTHESIA PROCEDURE NOTES
Spinal Block    Start time: 4/1/2024 8:04 AM  End time: 4/1/2024 8:13 AM  Reason for block: primary anesthetic  Staffing  Performed: CRNA   Authorized by: Koby Chan MD    Performed by: KAILA Moy-CRNA    Preanesthetic Checklist  Completed: patient identified, IV checked, risks and benefits discussed, surgical consent, monitors and equipment checked, pre-op evaluation, timeout performed and sterile techniques followed  Block Timeout  RN/Licensed healthcare professional reads aloud to the Anesthesia provider and entire team: Patient identity, procedure with side and site, patient position, and as applicable the availability of implants/special equipment/special requirements.  Patient on coagulant treatment: no  Timeout performed at: 4/1/2024 8:04 AM  Spinal Block  Patient position: sitting  Prep: Betadine  Sterility prep: cap, gloves, drape, hand hygiene and mask  Sedation level: light sedation  Patient monitoring: blood pressure and continuous pulse oximetry  Approach: midline  Vertebral space: L3-4  Injection technique: single-shot  Needle  Needle type: Quincke   Needle gauge: 22 G  Needle length: 3.5 in  Free flowing CSF: yes    Assessment  Sensory level: T6 bilateral  Block outcome: patient comfortable  Procedure assessment: patient tolerated procedure well with no immediate complications  Additional Notes  Attempt at L2-3 with Pencan and anne moved to L3-4 with elen

## 2024-04-01 NOTE — ANESTHESIA PROCEDURE NOTES
Peripheral Block    Patient location during procedure: holding area  Start time: 4/1/2024 7:43 AM  End time: 4/1/2024 7:50 AM  Reason for block: at surgeon's request and post-op pain management  Staffing  Performed: attending   Authorized by: Koby Chan MD    Performed by: Koby Chan MD  Preanesthetic Checklist  Completed: patient identified, IV checked, site marked, risks and benefits discussed, surgical consent, monitors and equipment checked, pre-op evaluation and timeout performed   Timeout performed at: 4/1/2024 7:43 AM  Peripheral Block  Patient position: laying flat  Prep: ChloraPrep  Patient monitoring: heart rate, cardiac monitor and continuous pulse ox  Block type: other (IPACK)  Laterality: right  Injection technique: single-shot  Guidance: ultrasound guided  Infiltration strength: 2 %  Dose: 3 mL  Needle  Needle gauge: 21 G  Needle length: 10 cm  Needle localization: ultrasound guidance  Assessment  Injection assessment: negative aspiration for heme, no paresthesia on injection, incremental injection and local visualized surrounding nerve on ultrasound  Paresthesia pain: none  Heart rate change: no  Slow fractionated injection: yes  Additional Notes  Following timeout, sedation administered for patient comfort. Skin prep with chlorhexadine, local infiltration with 2% lidocaine. US for visualization of structures, real-time visualization of needle entry, visualization of local anesthetic spread. Aspiration negative for heme. 20cc 0.5% ropivacaine injected in divided doses. Patient awake and conversant throughout, tolerated well with no apparent complications.

## 2024-04-01 NOTE — OP NOTE
Arthroplasty Total Knee Robot-Assisted (R) Operative Note     Date: 2024  OR Location: [facility]    Name: David Roca, : 1955, Age: 69 y.o., MRN: [id number], Sex: male    Diagnosis  Pre-op Diagnosis     * Unilateral primary osteoarthritis, right knee [M17.11] Post-op Diagnosis     * Unilateral primary osteoarthritis, right knee [M17.11]     Procedures  Arthroplasty Total Knee Robot-Assisted  [id number] - MA ARTHRP KNE CONDYLE&PLATU MEDIAL&LAT COMPARTMENTS      Surgeons      * Gonzalo Haddad - Primary    Resident/Fellow/Other Assistant:  Surgeon(s) and Role:     * Thu Westfall PA-C - Assisting    Procedure Summary  Anesthesia: Spinal  ASA: III  Anesthesia Staff: Anesthesiologist: Koby Chan MD  CRNA: KAILA Moy-CRNA  Estimated Blood Loss: minmL  Intra-op Medications:   Administrations occurring from 0830 to 0930 on 24:   Medication Name Total Dose   lactated Ringer's infusion 36.67 mL              Anesthesia Record               Intraprocedure I/O Totals          Intake    Propofol Drip 0.00 mL    The total shown is the total volume documented since Anesthesia Start was filed.    lactated Ringer's infusion 1000.00 mL    ceFAZolin in dextrose (iso-os) (Ancef) IVPB 2 g 100.00 mL    Total Intake 1100 mL          Specimen: No specimens collected     Staff:   Circulator: Germain Iraheta RN  Scrub Person: Marilu Shah         Drains and/or Catheters: * None in log *    Tourniquet Times:   * Missing tourniquet times found for documented tourniquets in lo *     Implants:  Implants       Type Name Action Serial No.      Implant CEMENT, BONE, SIMPLEX HV FULL DOSE  40 GM - [id number] Implanted      Implant CEMENT, BONE, SIMPLEX HV FULL DOSE  40 GM - [id number] Implanted      Joint FEM COMP, TRIATH CR SZ7 RIGHT - [id number] Implanted      Joint PLATE, TIBIAL TRIATH CHETNA YANETH FXD BPLT P6 - [id number] Implanted      Joint INSERT, TIBIAL, X3 TRIATHLON CS, SZ-6 9MM - [id number]  Implanted      Joint PATELLA, ASYMM TRIATH 35MM x 10MM - [id number] Implanted               Findings: see procedure details    Indications: David Roca is an 69 y.o. male who is having surgery for Unilateral primary osteoarthritis, right knee [M17.11].     The patient was seen in the preoperative area. The risks, benefits, complications, treatment options, non-operative alternatives, expected recovery and outcomes were discussed with the patient. The possibilities of reaction to medication, pulmonary aspiration, injury to surrounding structures, bleeding, recurrent infection, the need for additional procedures, failure to diagnose a condition, and creating a complication requiring transfusion or operation were discussed with the patient. The patient concurred with the proposed plan, giving informed consent.  The site of surgery was properly noted/marked if necessary per policy. The patient has been actively warmed in preoperative area. Preoperative antibiotics have been ordered and given within 1 hours of incision. Venous thrombosis prophylaxis have been ordered.    Procedure Details:   Preop diagnosis is DJD right knee  Postoperative diagnosis is same  Procedure total knee replacement using the zintin computer assisted robotic-assisted knee replacement system    Anesthesia spinal with nerve block  EBL minimal    Implants Sarah  Tibial component size6  Femoral component size7 CR  Patella size35  Insert size9 CS    Primary surgeon Gonzalo Haddad  Assisting surgeon Alaina BELTRAN certified      Pre-operative alignment 8 degree flexion contracture 4 degrees varus  Post-operative alignment 1 degree flexion contracture 0.5 degrees varus      This patient has progressive knee pain which has been refractory to conservative treatment.  The patient has decided to undergo elective total knee replacement.  The risks, benefits, outcomes and postoperative course were fully explained to the patient.  We discussed wear,  loosening, blood clot infection, nerve damage, numbness and tingling, failure of the procedure, stiffness, loss of life, loss of limb, and the need for possible revision surgery.  The patient understands the procedure and consents to surgical intervention.    The patient has pain in the knee that is increased with activity and weightbearing, and walks with an antalgic gait.  The pain is interfering with activities of daily living.  The patient has limited range of motion and pain with passive range of motion.  X-rays demonstrate joint space narrowing, subchondral sclerosis and osteophyte formation.  Symptoms are not improving with medication, therapy or supportive device for a period of at least 3 months.    The physician assistant was present through the entire case.  Given the nature of the disease process and the procedure to be performed skilled surgical first assistant was necessary during the case.  The assistant was necessary to hold retractors and manipulate the extremity during the procedure.  A certified scrub tech was at the back table managing the instruments and supplies for the surgical case.    Patient was taken to the operating room placed on the table in the supine position where upon adequate anesthesia was obtained  A tourniquet was applied to the lower extremity  The patient was then prepped and draped in the usual sterile manner the leg was then exsanguinated using an Esmarch bandage and the tourniquet was inflated to 250 mmHg  A surgical timeout was performed  A linear midline incision was made through the skin and subcutaneous tissues using sharp and blunt dissection.  A medial parapatellar arthrotomy was then performed and the patella was everted.  The patellar fat pad was then excised.  The femoral array and femoral checkpoint were then inserted in standard technique.  the tibial array was placed in the tibial checkpoints was applied.  Hip center was then identified by rotating the lower  extremity.  Medial and lateral malleolar checkpoints performed.  At this point femoral mapping was performed using the blue probe and accuracy was confirmed and verified.  Tibial mapping was then performed with the patella was well and accuracy was confirmed.  Pose capture was then performed both extension and flexion.  Flexion and extension gaps were assessed and adjusted appropriately.  Once we confirmed flexion and extension gaps the cut sequence was verified and confirmed.  The robot was then introduced into the field the sawblade was verified self-retaining retractors were placed and the femoral cuts were then performed.  Tibial cut was completed as well and all bony surfaces were removed without difficulty meniscal remnants were excised  Tibial trial was then placed on the tibial bone surface to assess for appropriate sizing.  Tibial component rotation was confirmed using the green probe.   The femoral trial was applied as was the polyethylene insert.  The knee was placed in flexion and extension,  stability was assessed throughout using the robotic system as well as manual tension.  Patellar reaming was then performed and peg holes were drilled for the patella and the patella trial was applied.  Patellofemoral tracking was assessed and once we confirmed stability and patellofemoral tracking the tibial component was pinned in position.  The remaining trials were removed and the tibial bone surface was completed using the cruciate punch all bony surfaces were then irrigated with a copious amount of pulsatile irrigation.  Cement was mixed on the back table using vacuum technique  Components were then inserted and completely seated without difficulty  A Hamburg elevator was used to remove any remaining cement and the knee was placed in full extension while the cement hardened  The knee joint was then irrigated with a copious pulsatile irrigation.  Joint capsule was repaired using interrupted #1 Vicryl suture.  3-0  Monocryl suture was used for the underlying subcutaneous tissue and 4-0 Monocryl for the skin.  A dry sterile bulky dressing was applied checkpoints and array were removed prior to closure  Patient was taken to the postanesthesia care unit in good condition postoperative x-rays were reviewed and findings the procedure were discussed with the patient's family    This note was prepared using voice recognition software.  The details of this note are correct and have been reviewed, and corrected to the best of my ability.  Some grammatical areas may persist related to the Dragon software    Gonzalo Haddad MD    [phone]      Complications:  None; patient tolerated the procedure well.    Disposition: PACU - hemodynamically stable.  Condition: stable         Gonzalo Haddad  Phone Number: [phone]

## 2024-04-01 NOTE — INTERVAL H&P NOTE
History and physical is reviewed, patient re evaluated, no changes.  Procedure, risks, benefits, and postoperative course were discussed with the patient and consent is reviewed.    Gonzalo Haddad MD

## 2024-04-01 NOTE — ANESTHESIA POSTPROCEDURE EVALUATION
Patient: David Roca    Procedure Summary       Date: 04/01/24 Room / Location: [facility] 12 / [facility]    Anesthesia Start: 0759 Anesthesia Stop: 0930    Procedure: Arthroplasty Total Knee Robot-Assisted (Right: Knee) Diagnosis:       Unilateral primary osteoarthritis, right knee      (Unilateral primary osteoarthritis, right knee [M17.11])    Surgeons: Gonzalo Haddad MD Responsible Provider: Koby Chan MD    Anesthesia Type: regional, MAC, spinal ASA Status: 3            Anesthesia Type: regional, MAC, spinal    Vitals Value Taken Time   /81 04/01/24 0930   Temp 36 04/01/24 0930   Pulse 71 04/01/24 0930   Resp 18 04/01/24 0930   SpO2 97 04/01/24 0930       Anesthesia Post Evaluation    Patient location during evaluation: bedside  Patient participation: complete - patient participated  Level of consciousness: awake  Pain score: 1  Pain management: adequate  Airway patency: patent  Cardiovascular status: acceptable  Respiratory status: acceptable  Hydration status: acceptable  Postoperative Nausea and Vomiting: none        There were no known notable events for this encounter.

## 2024-04-01 NOTE — ANESTHESIA PROCEDURE NOTES
Peripheral Block    Patient location during procedure: holding area  Start time: 4/1/2024 7:43 AM  End time: 4/1/2024 7:50 AM  Reason for block: at surgeon's request and post-op pain management  Staffing  Performed: attending   Authorized by: Koby Chan MD    Performed by: Koby Chan MD  Preanesthetic Checklist  Completed: patient identified, IV checked, site marked, risks and benefits discussed, surgical consent, monitors and equipment checked, pre-op evaluation and timeout performed   Timeout performed at: 4/1/2024 7:43 AM  Peripheral Block  Patient position: laying flat  Prep: ChloraPrep  Patient monitoring: heart rate, cardiac monitor and continuous pulse ox  Block type: adductor canal  Laterality: right  Injection technique: single-shot  Guidance: ultrasound guided  Infiltration strength: 2 %  Dose: 3 mL  Needle  Needle gauge: 21 G  Needle length: 10 cm  Needle localization: ultrasound guidance     image stored in chart  Assessment  Injection assessment: negative aspiration for heme, no paresthesia on injection, incremental injection and local visualized surrounding nerve on ultrasound  Paresthesia pain: none  Heart rate change: no  Slow fractionated injection: yes  Additional Notes  Following timeout, sedation administered for patient comfort. Skin prep with chlorhexadine, local infiltration with 2% lidocaine. US for visualization of structures, real-time visualization of needle entry, visualization of local anesthetic spread. Aspiration negative for heme. 15cc 0.5% ropivacaine with 5mg dexamethasone injected in divided doses. Patient awake and conversant throughout, tolerated well with no apparent complications.

## 2024-04-01 NOTE — PROGRESS NOTES
Physical Therapy    Physical Therapy Evaluation +Treatment     Patient Name: David Roca  MRN: [id number]  Today's Date: 4/1/2024   Time Calculation  Start Time: 1437  Stop Time: 1530  Time Calculation (min): 53 min    Assessment/Plan   PT Assessment  PT Assessment Results: Decreased strength, Decreased endurance, Impaired balance, Decreased mobility, Decreased safety awareness  Rehab Prognosis: Good  Evaluation/Treatment Tolerance: Patient tolerated treatment well  Assessment Comment: Patient will benefit from additional PT to increase ROM, strength, mobility and actiivty tolerance. Progress ambuulation. Instruct in HEP  End of Session Patient Position: Up in chair, Alarm on (LEs elevated)  IP OR SWING BED PT PLAN  Inpatient or Swing Bed: Inpatient  PT Plan  Treatment/Interventions: Bed mobility, Transfer training, Gait training, Stair training, Strengthening, Range of motion, Therapeutic activity, Therapeutic exercise, Home exercise program  PT Plan: Skilled PT  PT Frequency: BID  PT Discharge Recommendations:  (HH PT v OPPT)  PT Recommended Transfer Status: Assist x1  PT - OK to Discharge: (with continued PT once deemed medically appropriate)      Subjective   General Visit Information:  General  Reason for Referral: s/p Right TKA 4/1/24  Referred By: PT/OT 4/1/24 Boo BAIG  Past Medical History Relevant to Rehab: Asthma, HLD, HTn, Arthritis, DM, GERD, Prostate Cancer, Gout, Dysphagia, OA Right knee Ulnar nerve repair  Patient Position Received: Bed, 4 rail up, Alarm off, not on at start of session  General Comment: Elective surgery  Home Living:  Home Living  Home Living Comments: Per patient report resides with life partner in 1 story home 3 steps with handrail. Tub shower with seat no grab bar. Owns ww and cane.  Prior Level of Function:  Prior Function Per Pt/Caregiver Report  Prior Function Comments: Per patient report independent in mobility, ADLs and IADLs wthiout assistive device. Denies falls.  Drives. Wears glasses.   Precautions:  Precautions  LE Weight Bearing Status:  (Right FWB)  Medical Precautions: Fall precautions  Braces Applied: Knee immoobilizer until + SLR  Treatment;   Instructed patient in AP, QS, GS, hold 5 x 20, SLR, SAQ and heel slides x 5 AA. Patient performed 10 reps on LLE. Provided written HEP for patient. Ice applied following session.        Objective   Pain:  Pain Assessment  Pain Score: 3  Cognition:  Cognition  Overall Cognitive Status: Within Functional Limits Patient emotional at completion of session. Tearful when explaining that he will need surgery on his left knee also.     General Assessments:  General Observation  General Observation: Patient lying in bed iwth  knee immobilizer in place. Agreeable to PT. PIV. Dressing RLE in place.      Activity Tolerance  Endurance:  (Fair)    Static Sitting Balance  Static Sitting-Comment/Number of Minutes: Good  Dynamic Sitting Balance  Dynamic Sitting-Comments: Good    Static Standing Balance  Static Standing-Comment/Number of Minutes: Fair +  Dynamic Standing Balance  Dynamic Standing-Comments: Fair  Functional Assessments:  Bed Mobility  Bed Mobility:  (Supine > sit with HOB 45 degrees + 1minimal assist. Incontinent of urine)    Transfers  Transfer:  (Sit <> stand at bed level + 1 moderate assist with bed elevated). Provided disposable underwear and pad for incontinence     Ambulation/Gait Training  Ambulation/Gait Training Performed:  (Patient ambulated with knee immobilizer FWB RLE using ww 3' + 1 minimal assist. Limited gait secondary to urinary incontinence.)  Extremity/Trunk Assessments:  RUE   RUE :  (AROM shoulder/elbow/wirst/hand WFL MMT 4/5 grossly)  LUE   LUE:  (AROM shoulder/elbow/wirst/hand WFL MMT 4/5 grossly)  RLE   RLE :  (AAROM Hip/ankle WFL knee 18-55 degrees, Strength + SLR , Anterioir tibialis 4/5)  LLE   LLE :  (AROM Hip/knee/ankle WFL MMT 4/5 grossly)  Outcome Measures:  [facility] Basic Mobility  Turning from your  back to your side while in a flat bed without using bedrails: A little  Moving from lying on your back to sitting on the side of a flat bed without using bedrails: A little  Moving to and from bed to chair (including a wheelchair): A little  Standing up from a chair using your arms (e.g. wheelchair or bedside chair): A little  To walk in hospital room: A little  Climbing 3-5 steps with railing: A lot  Basic Mobility - Total Score: 17    Encounter Problems       Encounter Problems (Active)       PT Problem       Bed mobility supine <> sit independent  (Progressing)       Start:  04/01/24    Expected End:  04/29/24            Transfers sit <> stand with ww modified independent  (Progressing)       Start:  04/01/24    Expected End:  04/29/24            Patient to ambulate  100' with ww modified independent  (Progressing)       Start:  04/01/24    Expected End:  04/29/24            AAROM right knee 5-90 degrees (Progressing)       Start:  04/01/24    Expected End:  04/29/24            + SLR x 5 RLE independent  (Progressing)       Start:  04/01/24    Expected End:  04/29/24            Patient independent in HEP  (Progressing)       Start:  04/01/24    Expected End:  04/29/24               PT Problem       Patient to negotiate 3 steps with handrial and cane  CGA (Progressing)       Start:  04/01/24    Expected End:  04/29/24               Pain - Adult              Education Documentation  Handouts, taught by Sonam Albarran, PT at 4/1/2024  3:59 PM.  Learner: Patient  Readiness: Acceptance  Method: Explanation, Demonstration  Response: Needs Reinforcement    Mobility Training, taught by Sonam Albarran PT at 4/1/2024  3:59 PM.  Learner: Patient  Readiness: Acceptance  Method: Explanation, Demonstration  Response: Needs Reinforcement

## 2024-04-01 NOTE — PROGRESS NOTES
04/01/24 1531   Discharge Planning   Living Arrangements Spouse/significant other  (significant other works during day)   Support Systems Spouse/significant other;Friends/neighbors   Assistance Needed PTA pt states ind in ADLS and IADLS with occasional cane as needed, drives, retired, denies falls. Pt owns FWW, cane, shower chair and raised toilet with arms   Type of Residence Private residence  (1 level home)   Number of Stairs to Enter Residence 3   Number of Stairs Within Residence 0   Do you have animals or pets at home? No   Home or Post Acute Services In home services   Type of Home Care Services Home OT;Home PT   Patient expects to be discharged to: Home with [facility]   Does the patient need discharge transport arranged? No   Financial Resource Strain   How hard is it for you to pay for the very basics like food, housing, medical care, and heating? Not hard   Housing Stability   In the last 12 months, was there a time when you were not able to pay the mortgage or rent on time? N   In the last 12 months, how many places have you lived? 1   In the last 12 months, was there a time when you did not have a steady place to sleep or slept in a shelter (including now)? N   Transportation Needs   In the past 12 months, has lack of transportation kept you from medical appointments or from getting medications? no   In the past 12 months, has lack of transportation kept you from meetings, work, or from getting things needed for daily living? No   Patient Choice   Provider Choice list and CMS website (https://medicare.gov/care-compare#search) for post-acute Quality and Resource Measure Data were provided and reviewed with: Patient   Patient / Family choosing to utilize agency / facility established prior to hospitalization No     Pt is s/p Elective right total knee arthroplasty 4/1/24 with Dr. Gonzalo Haddad, pt is weight bearing as tolerated. PT/OT eval AMPAC scores are currently pending. Pt DC preference is home  with [facility] and agency of choice is [facility]. CNP notified of pt [facility] agency of choice.

## 2024-04-01 NOTE — CONSULTS
Consults    Reason For Consult  diabetes    History Of Present Illness  David Roca is a 69 y.o. male presents to the orthopedics team with increased pain and decreased ability to perform ADLS due to right knee OA. After failed conservative treatment, patient underwent surgery with no immediate post op complications, reports minimal pain to site described as dull in nature, mild in severity, responding well to pain meds. No other complaints. Hospitalist services were consulted for management of the patient's medical conditions post/op.  Patient examined and seen, resting comfortably. Denies chest pain, shortness of breath, abdominal pain, dizziness, fever or chills. Currently patient vital signs are stable. Plan of care was discussed with patient, verbalized understanding through teach back method. Hospitalist team will continue to follow until discharge.    Review of systems: 10 system were reviewed and were negative except what was mentioned in history of present illness         Past Medical History  He has a past medical history of Acute alcoholic liver disease, Arthritis, Bronchitis, Diabetes (CMS/HCC), GERD (gastroesophageal reflux disease), Gout, High cholesterol, Hypertension, Prostate CA (CMS/HCC), Pulmonary air trapping, Snores, Unsteady gait, and Wears contact lenses.    Surgical History  He has a past surgical history that includes Prostatectomy; Ulnar nerve repair (Bilateral); Vein ligation and stripping (Right); Colonoscopy; and Esophagogastroduodenoscopy.     Social History  He reports that he quit smoking about 42 years ago. His smoking use included cigarettes. He has a 6.00 pack-year smoking history. He has never used smokeless tobacco. He reports current alcohol use. He reports that he does not use drugs.  Drinks 12 ounces of vodka daily (3 cocktails)   Family History  Diabetes, heart disease     Allergies  Patient has no known allergies.       Physical Exam  Constitutional: Well developed,  awake/alert/oriented x3, cooperative  Eyes: PERRL,  clear sclera  ENMT: mucous membranes moist, no apparent injury, no lesions seen  Head/Neck: Neck supple, no apparent injury,  Respiratory/Thorax: Patent airways,  normal breath sounds with good chest expansion, thorax symmetric  Cardiovascular: Regular, rate and rhythm, no murmurs, 2+ equal pulses of the extremities, normal S 1and S 2  Gastrointestinal: Nondistended, soft, non-tender,   Musculoskeletal: mild decrease range of motion to right knee s/p sx intervention, good capillary refills bilaterally, +2 pulses, good sensation   Extremities: normal extremities,  incision covered with splint and immobilizer   Skin: warm, dry, intact  Neurological: alert/oriented x 3, speech clear  Psychiatric: appropriate mood and behavior         Last Recorded Vitals  /75   Pulse 70   Temp 36.1 °C (97 °F)   Resp 18   Wt 85.4 kg (188 lb 4.4 oz)   SpO2 97%     Relevant Results  Scheduled medications  acetaminophen, 650 mg, oral, q6h LUIS ARMANDO  allopurinol, 300 mg, oral, Daily  aspirin, 81 mg, oral, BID  ceFAZolin, 2 g, intravenous, q8h  docusate sodium, 100 mg, oral, BID  insulin lispro, 0-10 Units, subcutaneous, TID with meals  ketorolac, 15 mg, intravenous, q6h  [START ON 4/2/2024] metoprolol succinate XL, 150 mg, oral, Daily  pantoprazole, 40 mg, oral, Daily  [START ON 4/2/2024] simvastatin, 20 mg, oral, Nightly  thiamine, 100 mg, oral, Daily  [START ON 4/2/2024] tranexamic acid, 1,950 mg, oral, Once      Continuous medications  lactated Ringer's, 50 mL/hr, Last Rate: 50 mL/hr (04/01/24 0846)  lactated Ringer's, 50 mL/hr, Last Rate: 50 mL/hr (04/01/24 1144)  oxygen, 2 L/min, Last Rate: Stopped (04/01/24 1100)      PRN medications  PRN medications: albuterol, benzocaine-menthol, bisacodyl, cyclobenzaprine, dextrose, dextrose, diphenhydrAMINE, glucagon, glucagon, HYDROmorphone, morphine, ondansetron ODT **OR** ondansetron, oxyCODONE, oxyCODONE, prochlorperazine **OR**  prochlorperazine **OR** prochlorperazine    Results for orders placed or performed during the hospital encounter of 04/01/24 (from the past 24 hour(s))   POCT GLUCOSE   Result Value Ref Range    POCT Glucose 143 (H) 74 - 99 mg/dL          Assessment/Plan     # Right Knee Arthoplasty  Right Knee pain  Admit to Orthopedics Team   Hospitalist team Consulted   DVTp and Pain management per Ortho   PT/OT evaluation  and treatment   CBC/BMP as appropriate, review results  Pulmonary Toileting   Follow up as needed outpatient with Orthopedics     # Diabetes Mellitus Type 2   Continue home medications  Diet Cardiac/Diabetic  Continue Sliding Scale SSI AC/HS   A1C  5.4  Encourage healthy lifestyle changes  Hold metformin     # HTN/ HLD  Hold ARB  Continue statin  No significant heart history  No indication for telemetry at this time   Hemodynamically stable    # Hx of Alcoholic Liver Disease / Alcohol Use  Current  CIWA as needed  Avoid narcotics and alcohol use post op   Ativan Q2 for CIWA  No hx of alcoholic seizure withdrawals per patient   Discussed importance of not drinking with narcotics - patient verbalizes understanding  Telemetry monitoring     #Gout  Continue home medications       Thank you for consult  Medicine to continue to follow  Call for acute needs    Time spent  56 minutes obtaining labs, imaging, recommendations, interview, assessment, examination, medication review/ordering, and EMR review.    Plan of care was discussed extensively with patient. Patient verbalized understanding through teach back method. All questions and concerns addressed upon examination.     Of note, this documentation is completed using the Dragon Dictation system (voice recognition software). There may be spelling and/or grammatical errors that were not corrected prior to final submission.        Meera Voss, APRN-CNP

## 2024-04-01 NOTE — ANESTHESIA PREPROCEDURE EVALUATION
David Roca is a 69 y.o. male here for:    Arthroplasty Total Knee Robot-Assisted, DIABETIC, ANN NIKKIE, NERVE BLOCK  With Gonzalo Haddad MD  Pre-Op Diagnosis Codes:     * Unilateral primary osteoarthritis, right knee [M17.11]    Relevant Problems   Cardiac   (+) HTN (hypertension)   (+) Hyperlipidemia      Pulmonary   (+) Asthma      GI   (+) Dysphagia      /Renal   (+) Prostate CA (CMS/HCC) (S/p prostatectomy 2014)      Endocrine   (+) Diabetes mellitus, type 2 (CMS/HCC)      Musculoskeletal   (+) Primary osteoarthritis of right knee   (+) Unilateral primary osteoarthritis, right knee       Lab Results   Component Value Date    HGB 13.7 03/18/2024    HCT 41.2 03/18/2024    WBC 7.8 03/18/2024     03/18/2024     03/18/2024    K 3.6 03/18/2024    CL 98 03/18/2024    CREATININE 0.94 03/18/2024    BUN 10 03/18/2024     TTE 2/2024:    Left Ventricle: Normal left ventricular systolic function with a   visually estimated EF of 55 - 60%. Left ventricle size is normal. Normal   wall thickness. Normal wall motion. Normal diastolic function.     Tricuspid Valve: Normal RVSP. The estimated RVSP is 13 mmHg.     Image quality is good.     NM Stress 1/2024:    Stress Combined Conclusion: Normal pharmacological myocardial perfusion   study. Findings suggest a low risk of cardiac events.     Stress Function: Left ventricular function post-stress is normal.   Post-stress ejection fraction is 73%.     Perfusion Conclusion: TID ratio is 0.93.     Image quality is good.     ECG: Resting ECG demonstrates atrial fibrillation.     ECG: The ECG was negative for ischemia.     Stress Test: A pharmacological stress test was performed using   lexiscan. Blood pressure demonstrated a normal response and heart rate   demonstrated a normal response to stress.     Social History     Tobacco Use   Smoking Status Former    Packs/day: 0.50    Years: 12.00    Additional pack years: 0.00    Total pack years: 6.00    Types:  Cigarettes    Quit date:     Years since quittin.2   Smokeless Tobacco Never       No Known Allergies    Current Outpatient Medications   Medication Instructions    albuterol 90 mcg/actuation inhaler 2 puffs, inhalation, Every 6 hours PRN    allopurinol (ZYLOPRIM) 300 mg, oral, Daily    ascorbic acid (VITAMIN C) 500 mg, oral, Daily    cholecalciferol (VITAMIN D3) 25 mcg, oral, Daily    coenzyme Q-10 (CO Q-10) 100 mg, oral, Daily    cyanocobalamin (VITAMIN B-12) 1,000 mcg, oral, Daily    esomeprazole (NEXIUM) 40 mg, oral, Daily before breakfast    fluticasone (Flonase) 50 mcg/actuation nasal spray 2 sprays, nasal, Daily PRN    fluticasone (Flovent) 110 mcg/actuation inhaler 2 puffs, inhalation, 2 times daily    folic acid (FOLVITE) 1 mg, oral, Daily    ibuprofen 200 mg, oral, Every 6 hours PRN    irbesartan (AVAPRO) 150 mg, oral, Daily    LYCOPENE ORAL 1 tablet, oral, Daily RT    metFORMIN (Glucophage) 500 mg tablet 1 tablet, oral, 3 times daily    metoprolol succinate XL (Toprol-XL) 100 mg 24 hr tablet 1.5 tablets, oral, Daily    naloxone (Narcan) 4 mg/0.1 mL nasal spray Instill one spray intranasally for opioid overdoes; repeat in 5 minutes if no response    simvastatin (ZOCOR) 20 mg, oral, Nightly       Past Surgical History:   Procedure Laterality Date    COLONOSCOPY      ESOPHAGOGASTRODUODENOSCOPY      PROSTATECTOMY      ULNAR NERVE REPAIR Bilateral     VEIN LIGATION AND STRIPPING Right        No family history on file.    NPO Details:  NPO/Void Status  Carbohydrate Drink Given Prior to Surgery? : N  Date of Last Liquid: 24  Time of Last Liquid:   Date of Last Solid: 24  Time of Last Solid: 1800  Last Intake Type: Clear fluids; Food  Time of Last Void: 0500        Physical Exam    Airway  Mallampati: II  TM distance: >3 FB     Cardiovascular    Dental    Pulmonary    Abdominal            Anesthesia Plan    History of general anesthesia?: yes  History of complications of general  anesthesia?: no    ASA 3     regional, MAC and spinal     The patient is not a current smoker.    intravenous induction   Anesthetic plan and risks discussed with patient.    Plan discussed with CRNA.

## 2024-04-02 ENCOUNTER — HOME HEALTH ADMISSION (OUTPATIENT)
Dept: HOME HEALTH SERVICES | Facility: HOME HEALTH | Age: 69
End: 2024-04-02
Payer: MEDICARE

## 2024-04-02 ENCOUNTER — PHARMACY VISIT (OUTPATIENT)
Dept: PHARMACY | Facility: CLINIC | Age: 69
End: 2024-04-02
Payer: COMMERCIAL

## 2024-04-02 ENCOUNTER — DOCUMENTATION (OUTPATIENT)
Dept: HOME HEALTH SERVICES | Facility: HOME HEALTH | Age: 69
End: 2024-04-02
Payer: MEDICARE

## 2024-04-02 VITALS
BODY MASS INDEX: 25.5 KG/M2 | SYSTOLIC BLOOD PRESSURE: 128 MMHG | DIASTOLIC BLOOD PRESSURE: 79 MMHG | HEART RATE: 55 BPM | TEMPERATURE: 97.5 F | RESPIRATION RATE: 16 BRPM | WEIGHT: 188.27 LBS | OXYGEN SATURATION: 96 % | HEIGHT: 72 IN

## 2024-04-02 PROBLEM — M17.11 PRIMARY OSTEOARTHRITIS OF RIGHT KNEE: Status: RESOLVED | Noted: 2024-04-01 | Resolved: 2024-04-02

## 2024-04-02 PROBLEM — M17.11 UNILATERAL PRIMARY OSTEOARTHRITIS, RIGHT KNEE: Status: RESOLVED | Noted: 2024-01-12 | Resolved: 2024-04-02

## 2024-04-02 LAB
ANION GAP SERPL CALC-SCNC: 11 MMOL/L (ref 10–20)
BUN SERPL-MCNC: 12 MG/DL (ref 6–23)
CALCIUM SERPL-MCNC: 8.3 MG/DL (ref 8.6–10.3)
CHLORIDE SERPL-SCNC: 100 MMOL/L (ref 98–107)
CO2 SERPL-SCNC: 26 MMOL/L (ref 21–32)
CREAT SERPL-MCNC: 1.08 MG/DL (ref 0.5–1.3)
EGFRCR SERPLBLD CKD-EPI 2021: 74 ML/MIN/1.73M*2
ERYTHROCYTE [DISTWIDTH] IN BLOOD BY AUTOMATED COUNT: 11.9 % (ref 11.5–14.5)
GLUCOSE BLD MANUAL STRIP-MCNC: 136 MG/DL (ref 74–99)
GLUCOSE BLD MANUAL STRIP-MCNC: 175 MG/DL (ref 74–99)
GLUCOSE BLD MANUAL STRIP-MCNC: 252 MG/DL (ref 74–99)
GLUCOSE SERPL-MCNC: 152 MG/DL (ref 74–99)
HCT VFR BLD AUTO: 31.6 % (ref 41–52)
HGB BLD-MCNC: 10.7 G/DL (ref 13.5–17.5)
MCH RBC QN AUTO: 33 PG (ref 26–34)
MCHC RBC AUTO-ENTMCNC: 33.9 G/DL (ref 32–36)
MCV RBC AUTO: 98 FL (ref 80–100)
NRBC BLD-RTO: 0 /100 WBCS (ref 0–0)
PLATELET # BLD AUTO: 151 X10*3/UL (ref 150–450)
POTASSIUM SERPL-SCNC: 3.9 MMOL/L (ref 3.5–5.3)
RBC # BLD AUTO: 3.24 X10*6/UL (ref 4.5–5.9)
SODIUM SERPL-SCNC: 133 MMOL/L (ref 136–145)
WBC # BLD AUTO: 13.3 X10*3/UL (ref 4.4–11.3)

## 2024-04-02 PROCEDURE — RXMED WILLOW AMBULATORY MEDICATION CHARGE

## 2024-04-02 PROCEDURE — 2500000002 HC RX 250 W HCPCS SELF ADMINISTERED DRUGS (ALT 637 FOR MEDICARE OP, ALT 636 FOR OP/ED): Performed by: ORTHOPAEDIC SURGERY

## 2024-04-02 PROCEDURE — 97110 THERAPEUTIC EXERCISES: CPT | Mod: GP,CQ

## 2024-04-02 PROCEDURE — 2500000001 HC RX 250 WO HCPCS SELF ADMINISTERED DRUGS (ALT 637 FOR MEDICARE OP): Performed by: REGISTERED NURSE

## 2024-04-02 PROCEDURE — 7100000011 HC EXTENDED STAY RECOVERY HOURLY - NURSING UNIT

## 2024-04-02 PROCEDURE — 85027 COMPLETE CBC AUTOMATED: CPT | Performed by: ORTHOPAEDIC SURGERY

## 2024-04-02 PROCEDURE — 82565 ASSAY OF CREATININE: CPT | Performed by: ORTHOPAEDIC SURGERY

## 2024-04-02 PROCEDURE — 97535 SELF CARE MNGMENT TRAINING: CPT | Mod: GO

## 2024-04-02 PROCEDURE — 82947 ASSAY GLUCOSE BLOOD QUANT: CPT | Mod: 59

## 2024-04-02 PROCEDURE — 99231 SBSQ HOSP IP/OBS SF/LOW 25: CPT

## 2024-04-02 PROCEDURE — 2500000004 HC RX 250 GENERAL PHARMACY W/ HCPCS (ALT 636 FOR OP/ED): Performed by: ORTHOPAEDIC SURGERY

## 2024-04-02 PROCEDURE — 36415 COLL VENOUS BLD VENIPUNCTURE: CPT | Performed by: ORTHOPAEDIC SURGERY

## 2024-04-02 PROCEDURE — 2500000001 HC RX 250 WO HCPCS SELF ADMINISTERED DRUGS (ALT 637 FOR MEDICARE OP): Performed by: ORTHOPAEDIC SURGERY

## 2024-04-02 PROCEDURE — 97165 OT EVAL LOW COMPLEX 30 MIN: CPT | Mod: GO

## 2024-04-02 PROCEDURE — 97530 THERAPEUTIC ACTIVITIES: CPT | Mod: GP,CQ

## 2024-04-02 PROCEDURE — 97116 GAIT TRAINING THERAPY: CPT | Mod: GP,CQ

## 2024-04-02 RX ORDER — ASPIRIN 81 MG/1
81 TABLET ORAL 2 TIMES DAILY
Qty: 60 TABLET | Refills: 0 | Status: SHIPPED | OUTPATIENT
Start: 2024-04-02 | End: 2024-05-02

## 2024-04-02 RX ORDER — CYCLOBENZAPRINE HCL 5 MG
5 TABLET ORAL 3 TIMES DAILY PRN
Qty: 21 TABLET | Refills: 0 | Status: SHIPPED | OUTPATIENT
Start: 2024-04-02 | End: 2024-04-09

## 2024-04-02 RX ORDER — DOCUSATE SODIUM 100 MG/1
100 CAPSULE, LIQUID FILLED ORAL 2 TIMES DAILY
Qty: 20 CAPSULE | Refills: 0 | Status: SHIPPED | OUTPATIENT
Start: 2024-04-02 | End: 2024-04-12

## 2024-04-02 RX ORDER — ACETAMINOPHEN 500 MG
1000 TABLET ORAL EVERY 8 HOURS
Qty: 42 TABLET | Refills: 0 | Status: SHIPPED | OUTPATIENT
Start: 2024-04-02 | End: 2024-04-09

## 2024-04-02 RX ORDER — OXYCODONE HYDROCHLORIDE 5 MG/1
5 TABLET ORAL EVERY 6 HOURS PRN
Qty: 28 TABLET | Refills: 0 | Status: SHIPPED | OUTPATIENT
Start: 2024-04-02 | End: 2024-04-09

## 2024-04-02 RX ADMIN — OXYCODONE HYDROCHLORIDE 10 MG: 5 TABLET ORAL at 08:18

## 2024-04-02 RX ADMIN — METOPROLOL SUCCINATE 150 MG: 50 TABLET, EXTENDED RELEASE ORAL at 08:16

## 2024-04-02 RX ADMIN — OXYCODONE HYDROCHLORIDE 10 MG: 5 TABLET ORAL at 12:31

## 2024-04-02 RX ADMIN — DOCUSATE SODIUM 100 MG: 100 CAPSULE, LIQUID FILLED ORAL at 08:16

## 2024-04-02 RX ADMIN — SODIUM CHLORIDE, POTASSIUM CHLORIDE, SODIUM LACTATE AND CALCIUM CHLORIDE 50 ML/HR: 600; 310; 30; 20 INJECTION, SOLUTION INTRAVENOUS at 05:09

## 2024-04-02 RX ADMIN — FOLIC ACID 1 MG: 1 TABLET ORAL at 08:16

## 2024-04-02 RX ADMIN — Medication 100 MG: at 08:16

## 2024-04-02 RX ADMIN — TRANEXAMIC ACID 1950 MG: 650 TABLET ORAL at 05:10

## 2024-04-02 RX ADMIN — Medication 1 TABLET: at 08:16

## 2024-04-02 RX ADMIN — KETOROLAC TROMETHAMINE 15 MG: 30 INJECTION, SOLUTION INTRAMUSCULAR at 05:10

## 2024-04-02 RX ADMIN — ACETAMINOPHEN 650 MG: 325 TABLET ORAL at 12:31

## 2024-04-02 RX ADMIN — ALLOPURINOL 300 MG: 300 TABLET ORAL at 08:16

## 2024-04-02 RX ADMIN — ASPIRIN 81 MG: 81 TABLET, COATED ORAL at 08:16

## 2024-04-02 RX ADMIN — ACETAMINOPHEN 650 MG: 325 TABLET ORAL at 05:10

## 2024-04-02 ASSESSMENT — PAIN SCALES - GENERAL
PAINLEVEL_OUTOF10: 8
PAINLEVEL_OUTOF10: 5 - MODERATE PAIN

## 2024-04-02 ASSESSMENT — COGNITIVE AND FUNCTIONAL STATUS - GENERAL
DAILY ACTIVITIY SCORE: 21
STANDING UP FROM CHAIR USING ARMS: A LITTLE
DRESSING REGULAR LOWER BODY CLOTHING: A LITTLE
MOBILITY SCORE: 19
TOILETING: A LITTLE
MOBILITY SCORE: 19
CLIMB 3 TO 5 STEPS WITH RAILING: A LITTLE
TURNING FROM BACK TO SIDE WHILE IN FLAT BAD: A LITTLE
WALKING IN HOSPITAL ROOM: A LITTLE
HELP NEEDED FOR BATHING: A LITTLE
MOVING TO AND FROM BED TO CHAIR: A LITTLE
TURNING FROM BACK TO SIDE WHILE IN FLAT BAD: A LITTLE
DRESSING REGULAR LOWER BODY CLOTHING: A LITTLE
TOILETING: A LITTLE
WALKING IN HOSPITAL ROOM: A LITTLE
DAILY ACTIVITIY SCORE: 22
CLIMB 3 TO 5 STEPS WITH RAILING: A LITTLE
MOVING TO AND FROM BED TO CHAIR: A LITTLE
STANDING UP FROM CHAIR USING ARMS: A LITTLE

## 2024-04-02 ASSESSMENT — LIFESTYLE VARIABLES
PULSE: 52
PAROXYSMAL SWEATS: NO SWEAT VISIBLE
TOTAL SCORE: 0
TREMOR: NO TREMOR
PAROXYSMAL SWEATS: NO SWEAT VISIBLE
HEADACHE, FULLNESS IN HEAD: NOT PRESENT
AUDITORY DISTURBANCES: NOT PRESENT
AGITATION: NORMAL ACTIVITY
AGITATION: NORMAL ACTIVITY
PULSE: 55
TOTAL SCORE: 1
AUDITORY DISTURBANCES: NOT PRESENT
ORIENTATION AND CLOUDING OF SENSORIUM: ORIENTED AND CAN DO SERIAL ADDITIONS
ANXIETY: MILDLY ANXIOUS
TREMOR: NO TREMOR
TOTAL SCORE: 0
AUDITORY DISTURBANCES: NOT PRESENT
PAROXYSMAL SWEATS: NO SWEAT VISIBLE
VISUAL DISTURBANCES: NOT PRESENT
NAUSEA AND VOMITING: NO NAUSEA AND NO VOMITING
ORIENTATION AND CLOUDING OF SENSORIUM: ORIENTED AND CAN DO SERIAL ADDITIONS
NAUSEA AND VOMITING: NO NAUSEA AND NO VOMITING
HEADACHE, FULLNESS IN HEAD: NOT PRESENT
AGITATION: NORMAL ACTIVITY
ANXIETY: NO ANXIETY, AT EASE
TREMOR: NO TREMOR
NAUSEA AND VOMITING: NO NAUSEA AND NO VOMITING
HEADACHE, FULLNESS IN HEAD: NOT PRESENT
VISUAL DISTURBANCES: NOT PRESENT
ORIENTATION AND CLOUDING OF SENSORIUM: ORIENTED AND CAN DO SERIAL ADDITIONS
ANXIETY: NO ANXIETY, AT EASE
VISUAL DISTURBANCES: NOT PRESENT

## 2024-04-02 ASSESSMENT — ACTIVITIES OF DAILY LIVING (ADL)
HOME_MANAGEMENT_TIME_ENTRY: 11
BATHING_ASSISTANCE: STAND BY

## 2024-04-02 ASSESSMENT — PAIN DESCRIPTION - ORIENTATION: ORIENTATION: RIGHT

## 2024-04-02 ASSESSMENT — PAIN DESCRIPTION - LOCATION
LOCATION: KNEE
LOCATION: KNEE

## 2024-04-02 ASSESSMENT — PAIN - FUNCTIONAL ASSESSMENT
PAIN_FUNCTIONAL_ASSESSMENT: 0-10
PAIN_FUNCTIONAL_ASSESSMENT: 0-10

## 2024-04-02 ASSESSMENT — PAIN DESCRIPTION - DESCRIPTORS: DESCRIPTORS: ACHING

## 2024-04-02 NOTE — PROGRESS NOTES
Orthopedic Surgery Progress Note   TKA    Subjective:     Post-Operative Day # 1   Status Post right Total Knee Arthroplasty    Systemic or Specific Complaints: No Complaints    Objective:     Visit Vitals  /79   Pulse 54   Temp 36.4 °C (97.5 °F)   Resp 16       General: alert and oriented, in no acute distress and cooperative   Wound: no erythema, no edema, no drainage, and dressing clean, dry, and intact   Motion: Painful range of Motion   DVT Exam: No evidence of DVT seen on physical exam.  Negative Suzanne's sign.  No significant calf/ankle edema.       Knee swollen but thigh soft to palpation. Moving foot and ankle. Good distal pulses.      Data Review  Recent Results (from the past 24 hour(s))   POCT GLUCOSE    Collection Time: 04/01/24  4:10 PM   Result Value Ref Range    POCT Glucose 235 (H) 74 - 99 mg/dL   POCT GLUCOSE    Collection Time: 04/02/24 12:06 AM   Result Value Ref Range    POCT Glucose 252 (H) 74 - 99 mg/dL   CBC    Collection Time: 04/02/24  5:52 AM   Result Value Ref Range    WBC 13.3 (H) 4.4 - 11.3 x10*3/uL    nRBC 0.0 0.0 - 0.0 /100 WBCs    RBC 3.24 (L) 4.50 - 5.90 x10*6/uL    Hemoglobin 10.7 (L) 13.5 - 17.5 g/dL    Hematocrit 31.6 (L) 41.0 - 52.0 %    MCV 98 80 - 100 fL    MCH 33.0 26.0 - 34.0 pg    MCHC 33.9 32.0 - 36.0 g/dL    RDW 11.9 11.5 - 14.5 %    Platelets 151 150 - 450 x10*3/uL   Basic metabolic panel    Collection Time: 04/02/24  5:52 AM   Result Value Ref Range    Glucose 152 (H) 74 - 99 mg/dL    Sodium 133 (L) 136 - 145 mmol/L    Potassium 3.9 3.5 - 5.3 mmol/L    Chloride 100 98 - 107 mmol/L    Bicarbonate 26 21 - 32 mmol/L    Anion Gap 11 10 - 20 mmol/L    Urea Nitrogen 12 6 - 23 mg/dL    Creatinine 1.08 0.50 - 1.30 mg/dL    eGFR 74 >60 mL/min/1.73m*2    Calcium 8.3 (L) 8.6 - 10.3 mg/dL   POCT GLUCOSE    Collection Time: 04/02/24  7:23 AM   Result Value Ref Range    POCT Glucose 136 (H) 74 - 99 mg/dL         Assessment:     Status Post right Total Knee Arthroplasty.  Doing well postoperatively. No acute events overnight.     Acute postoperative pain: Reports mild to moderate pain to operative extremity. Exacerbated by movement, relieved by rest ice and pain medication.  Continue current pain management.     Acute postoperative blood loss anemia: Secondary to expected surgical blood loss. Hemoglobin this A.M. 10.7.  Patient asymptomatic, and remains hemodynamically stable with no signs of active bleeding.     Leukocytosis: WBC count this morning 13.3.  No acute signs of infection.  Patient afebrile, remains hemodynamically stable denies any cough, abdominal pain, or urinary symptoms. Transient elevation in white blood count is most likely secondary to stress and inflammatory process from surgery.    Plan:      1.  Discharge today, Return to Clinic: in 2 weeks    2.  Continue Deep venous thrombosis prophylaxis: Aspirin 81 mg BID for 30 days  3.  Continue physical therapy: Weight-bearing as tolerated with use of walker for assistance with ambulation   4.  Continue Pain Control: scripts sent  5.  Discharge planning: patient plans to return to home with  home care at discharge, orders placed. [facility] and [facility] following for discharge planning.       TRACEE Ingram   4/2/2024 9:08 AM

## 2024-04-02 NOTE — PROGRESS NOTES
Physical Therapy    Physical Therapy Treatment    Patient Name: David Roca  MRN: [id number]  Today's Date: 4/2/2024  Time Calculation  Start Time: 0846  Stop Time: 0956  Time Calculation (min): 70 min       Assessment/Plan   PT Assessment  PT Assessment Results: Decreased endurance, Decreased mobility, Decreased coordination, Pain, Decreased strength, Decreased range of motion  Rehab Prognosis: Good  Evaluation/Treatment Tolerance: Patient tolerated treatment well  Medical Staff Made Aware: Yes  End of Session Communication: Bedside nurse  Assessment Comment: pt would benefit from continue therapy to improve functional mobility  End of Session Patient Position: Up in chair, Alarm on     Treatment/Interventions: Transfer training, Gait training, Stair training, Strengthening, Range of motion, Therapeutic exercise, Therapeutic activity, Home exercise program  PT Plan: Skilled PT  PT Frequency: BID  PT Discharge Recommendations: Low intensity level of continued care  Equipment Recommended upon Discharge:  (Pt owns FWW and shower seat. No other equipment recommended.)   PT Recommended Transfer Status: Assist x1, Assistive device    General Visit Information:   PT  Visit  PT Received On: 04/02/24  General  Reason for Referral: s/p Right TKA 4/1/24  Referred By: PT/OT 4/1/24 Boo BAIG  Past Medical History Relevant to Rehab: Asthma, HLD, HTn, Arthritis, DM, GERD, Prostate Cancer, Gout, Dysphagia, OA Right knee Ulnar nerve repair  Family/Caregiver Present: No  Prior to Session Communication: Bedside nurse  Patient Position Received: Alarm on, Up in chair  General Comment: pt is agreeable to participate , states that he does not havbe any home going concerns and will have help at home      Subjective     Precautions:  Precautions  LE Weight Bearing Status: Weight Bearing as Tolerated  Medical Precautions: Fall precautions  Braces Applied: Knee immoobilizer until + SLR  Precautions Comment: knee immobilizer until SLR  and/or no buckling (No KI secondary to good quad control)    Vital Signs:     Objective     Pain:  Pain Assessment  Pain Assessment: 0-10  Pain Score: 8 (states it doesnt hurt much when not moving)  Pain Type: Surgical pain, Acute pain  Pain Location: Knee  Pain Orientation: Right  Pain Descriptors: Aching  Pain Interventions: Cold applied    Cognition:  Cognition  Overall Cognitive Status: Within Functional Limits  Orientation Level: Oriented X4      Extremity/Trunk Assessments:     AROM RLE (degrees)  R Knee Flexion 0-130: 88 (measured in sitting)  R Knee Extension 0-130: 12 (measured in longsit)       Activity Tolerance:  Activity Tolerance  Endurance: Endurance does not limit participation in activity    Treatments:  Therapeutic Exercise  Therapeutic Exercise Performed: Yes  Therapeutic Exercise Activity 1: Pt performed supine ther ex including ankle pumps, quad sets, glut sets B/L LEs and heel slides and SAQ B LE 10 reps x1, SLR 5 reps x2. Cues for technique and breathing.  Therapeutic Exercise Activity 2: Pt performed seated ther ex including heel/toe raises and hip ABD, LAQ, heel slides, and marches 10 to 15  reps x1. Reviewed home exercise program. Educated on goal for 2-3 sets of 20 reps to tolerance. Pt with good understanding.     Bed Mobility  Bed Mobility: No    Ambulation/Gait Training  Ambulation/Gait Training Performed: Yes  Ambulation/Gait Training 1  Surface 1: Level tile  Device 1: Rolling walker  Comments/Distance (ft) 1: ambulating 75 ft x 2 with WW and CGA using step to gait pattern, vc for upright posture and to slide vc lift WW Able to ambulate with step thrugh gait for short stretched , up to 10 f, then reverts to step to patern , Vc to avoid stepping too far into WW (pt is receptive to instruction , with vc for follow through)    Transfers  Transfer: Yes  Transfer 1  Technique 1: Sit to stand, Stand to sit  Trials/Comments 1: transfers sit <--->with WW, with good safety awareness , vc for  positioning of LE's    Stairs  Stairs: Yes (up and down 3 stairs with use of B railings and CGA with step to gait pattern and good sequencing, pt states he has 2 railing and can reach both at the same time)       Outcome Measures:  [facility] Basic Mobility  Turning from your back to your side while in a flat bed without using bedrails: None  Moving from lying on your back to sitting on the side of a flat bed without using bedrails: A little  Moving to and from bed to chair (including a wheelchair): A little  Standing up from a chair using your arms (e.g. wheelchair or bedside chair): A little  To walk in hospital room: A little  Climbing 3-5 steps with railing: A little  Basic Mobility - Total Score: 19      EDUCATION:  Individual(s) Educated: Patient  Education Provided: Fall Risk, Body Mechanics, Home Exercise Program, Home Safety  Patient Response to Education: Patient/Caregiver Verbalized Understanding of Information    Encounter Problems       Encounter Problems (Active)       PT Problem       Bed mobility supine <> sit independent  (Progressing)       Start:  04/01/24    Expected End:  04/29/24            Transfers sit <> stand with ww modified independent  (Progressing)       Start:  04/01/24    Expected End:  04/29/24            Patient to ambulate  100' with ww modified independent  (Progressing)       Start:  04/01/24    Expected End:  04/29/24            AAROM right knee 5-90 degrees (Progressing)       Start:  04/01/24    Expected End:  04/29/24            + SLR x 5 RLE independent  (Met)       Start:  04/01/24    Expected End:  04/29/24    Resolved:  04/02/24         Patient independent in HEP  (Progressing)       Start:  04/01/24    Expected End:  04/29/24               PT Problem       Patient to negotiate 3 steps with handrial and cane  CGA (Progressing)       Start:  04/01/24    Expected End:  04/29/24

## 2024-04-02 NOTE — PROGRESS NOTES
Occupational Therapy    Evaluation    Patient Name: David Roca  MRN: [id number]  Today's Date: 4/2/2024  Time Calculation  Start Time: 0825  Stop Time: 0846  Time Calculation (min): 21 min        Assessment:  Prognosis: Good  Barriers to Discharge: None  Medical Staff Made Aware: Yes  End of Session Communication: Bedside nurse  End of Session Patient Position: Up in chair, Alarm on  Prognosis: Good  Barriers to Discharge: None  Medical Staff Made Aware: Yes  Plan:  No Skilled OT: No acute OT goals identified  OT Frequency: OT eval only  OT Discharge Recommendations: No further acute OT  Equipment Recommended upon Discharge:  (Pt owns FWW and shower seat. No other equipment recommended.)  OT - OK to Discharge: Yes (ok to d/c once medically cleared)       Subjective   Current Problem:  1. Unilateral primary osteoarthritis, right knee  acetaminophen (Tylenol) 500 mg tablet    aspirin 81 mg EC tablet    cyclobenzaprine (Flexeril) 5 mg tablet    docusate sodium (Colace) 100 mg capsule    oxyCODONE (Roxicodone) 5 mg immediate release tablet    Referral to Home Health        General:  General  Reason for Referral: s/p Right TKA 4/1/24  Referred By: PT/OT 4/1/24 Boo BAIG  Past Medical History Relevant to Rehab: Asthma, HLD, HTn, Arthritis, DM, GERD, Prostate Cancer, Gout, Dysphagia, OA Right knee Ulnar nerve repair  Family/Caregiver Present: No  Prior to Session Communication: Bedside nurse  Patient Position Received: Alarm on, Up in chair  General Comment: Pt is a 68 y/o M s/p R by Dr. Haddad on 4/1.  Precautions:  LE Weight Bearing Status:  (R FWB)  Precautions Comment: R knee immobilizer until +SLR, pt demo +SLR and KI discontinued  Pain:  Pain Assessment  Pain Assessment: 0-10  Pain Score: 5 - Moderate pain  Pain Location: Knee    Objective   Cognition:  Overall Cognitive Status: Within Functional Limits           Home Living:  Home Living Comments: Pt lives with partner in 1 story home with 3 TJ with HR.  Tub shower with seat. Owns WW and cane.  Prior Function:  Prior Function Comments: Pt reports completing all ADLs/iADLs IND. Pt reports ambulating IND without a device. Denies falls in past 3 months. Drives.    ADL:  Eating Assistance: Independent  Grooming Assistance: Independent  Bathing Assistance: Stand by  UE Dressing Assistance: Modified independent (Device) (Pt donned shirt while standing at [facility] with MOD I.)  LE Dressing Assistance: Stand by (Pt threaded underwear/pants while seated and pulled over hips in supported stance at [facility] with MOD I. Cues to dress surgical leg first.)  Toileting Assistance with Device: Stand by (Pt completed clothing management and dexter care in stance at [facility] with SBA.)  Activity Tolerance:  Endurance: Endurance does not limit participation in activity  Bed Mobility/Transfers: Transfers  Transfer: Yes (Pt completed ambulatory transfer from recliner <> toilet using [facility] with SBA.)      Functional Mobility:  Functional Mobility  Functional Mobility Performed: Yes (Pt ambulated throughout room and bathroom using [facility] with SBA.)  Sitting Balance:  Static Sitting Balance  Static Sitting-Comment/Number of Minutes: good-  Dynamic Sitting Balance  Dynamic Sitting-Comments: good-  Standing Balance:  Static Standing Balance  Static Standing-Comment/Number of Minutes: good-  Dynamic Standing Balance  Dynamic Standing-Comments: good-   Strength:  Strength Comments: BUE strength grossly 5/5        Hand Function:  Gross Grasp: Functional  Extremities: RUE   RUE : Within Functional Limits and LUE   LUE: Within Functional Limits    Outcome Measures:[facility] Daily Activity  Putting on and taking off regular lower body clothing: A little  Bathing (including washing, rinsing, drying): A little  Putting on and taking off regular upper body clothing: None  Toileting, which includes using toilet, bedpan or urinal: A little  Taking care of personal grooming such as brushing teeth: None  Eating Meals: None  Daily  Activity - Total Score: 21        Education Documentation  Body Mechanics, taught by Qiana Gonzales OT at 4/2/2024  9:20 AM.  Learner: Patient  Readiness: Acceptance  Method: Explanation, Demonstration  Response: Verbalizes Understanding, Demonstrated Understanding    ADL Training, taught by Qiana Gonzales OT at 4/2/2024  9:20 AM.  Learner: Patient  Readiness: Acceptance  Method: Explanation, Demonstration  Response: Verbalizes Understanding, Demonstrated Understanding      IP EDUCATION:  Education  Individual(s) Educated: Patient  Education Comment: Pt educated on compensatory dressing techniques, verbalized and demonstrated good understanding.

## 2024-04-02 NOTE — HH CARE COORDINATION
Home Care received a Referral for Physical Therapy and Occupational Therapy. We have processed the referral for a Start of Care on 04/04/2024.     If you have any questions or concerns, please feel free to contact us at [phone]. Follow the prompts, enter your five digit zip code, and you will be directed to your care team on [facility] 1.

## 2024-04-02 NOTE — DISCHARGE SUMMARY
Discharge summary  This patient David Roca was admitted to the hospital on 4/1/2024  after undergoing Procedure(s) (LRB):  Arthroplasty Total Knee Robot-Assisted (Right) without complications that morning.    During the postoperative period,while in hospital, patient was medically managed by the hospitalist. Please see medial notes and H&P for patients additional diagnoses.  Ortho agrees with all medical diagnoses and treatments while patient in hospital.  No significant or unexpected findings or abnormal ortho imaging were noted during the hospital stay    Hospital course      Patient tolerated surgical procedure well and there was no complications. Patient progressed adequately through their recovery during hospital stay including PT and rehabilitation.    Patient was then D/C on  to home  in stable condition.  Patient was instructed on the use of pain medications, the signs and symptoms of infection, and was given our number to call should they have any questions or concerns following discharge.    Based on my clinical judgment, the patient was provided with a 7-day prescription for opioid medication at 30 MED, indicated for treatment of acute pain in the setting of recent surgery. OARRS report was run and has demonstrated an appropriate time course.  The patient has been provided with counseling pertaining to safe use of opioid medication.      Patient may WBAT to operative extremity with use of walker for assistance with ambulation   Mepilex dressing to be removed POD#7 and incision left open to air  Aspirin 81 mg BID for DVT prophylaxis started on 4/1/24 and to be taken for 30 days  Follow up with surgeon in 2 weeks

## 2024-04-02 NOTE — NURSING NOTE
Follow up appointment noted.  Reviewed medication administration and possible side effects.  Instructed incision care and monitoring for signs of infection. Will use IS and wear compression hose as instructed.

## 2024-04-02 NOTE — PROGRESS NOTES
04/01/24 1531   Discharge Planning   Living Arrangements Spouse/significant other  (significant other works during day)   Support Systems Spouse/significant other;Friends/neighbors   Assistance Needed PTA pt states ind in ADLS and IADLS with occasional cane as needed, drives, retired, denies falls. Pt owns FWW, cane, shower chair and raised toilet with arms   Type of Residence Private residence  (1 level home)   Number of Stairs to Enter Residence 3   Number of Stairs Within Residence 0   Do you have animals or pets at home? No   Home or Post Acute Services In home services   Type of Home Care Services Home OT;Home PT   Patient expects to be discharged to: Home with [facility]   Does the patient need discharge transport arranged? No   Financial Resource Strain   How hard is it for you to pay for the very basics like food, housing, medical care, and heating? Not hard   Housing Stability   In the last 12 months, was there a time when you were not able to pay the mortgage or rent on time? N   In the last 12 months, how many places have you lived? 1   In the last 12 months, was there a time when you did not have a steady place to sleep or slept in a shelter (including now)? N   Transportation Needs   In the past 12 months, has lack of transportation kept you from medical appointments or from getting medications? no   In the past 12 months, has lack of transportation kept you from meetings, work, or from getting things needed for daily living? No   Patient Choice   Provider Choice list and CMS website (https://medicare.gov/care-compare#search) for post-acute Quality and Resource Measure Data were provided and reviewed with: Patient   Patient / Family choosing to utilize agency / facility established prior to hospitalization No     Pt is s/p POD #1 Elective right total knee arthroplasty 4/1/24 with Dr. Gonzalo Haddad, pt is weight bearing as tolerated. AMPAC score are PT (17) OT (21) and recommend continued therapy at  low level/intensity.  Pt DC preference remains home with [facility]. Per rounding report with Orthopedics team, pt is medically ready for DC today, has written DC and HCO in [facility]. [facility]  notified of HHC referral/HCO in [facility] and confirms received and processed for SOC on 4/4/24, states Team does not have availability on 4/3. Pt notified of this information and agreeable to SOC on 4/4.

## 2024-04-02 NOTE — DISCHARGE INSTRUCTIONS
Total Knee Replacement  Discharge Instructions    To prevent Clot formation, you have been placed on the following medication:  ASA 81 mg twice a day for 30 days started on 4/1/24.  Surgical Site Care:  Change dressing once a day and PRN (as needed). Apply 4 x 4 sponge and light tape. If glue present, leave open to air.  You may leave wound open to air after initial dressing removal, if wound is clean, dry and intact  If Aquacel Ag dressing is present, do not remove dressing for 7 days, unless heavily saturated. If heavily saturated, remove dressing and start using instructions above  Staples will be removed on post-operative day 14 and steri-strips applied  Showering is permitted starting POD1 if waterproof Aquacel dressing is present or when the incision is covered with 4 x 4 and Tegaderm waterproof dressing  Until all areas of incision are healed.    Physical Therapy:  Weight Bearing Status:  WBAT  Precautions, Per Physical Therapy Handout  Pain Medications  You were given  oxycodone  Wean off pain medications as you deem appropriate as long as pain is under control  Cold packs/Ice packs/Machine  May be used 3 times daily for 15-30 minutes as necessary  Be sure to have a barrier (cloth, clothing, towel) between the site and the ice pack to prevent frostbite  Contact [facility] office if  Increased redness, swelling, drainage of any kind, and/or pain to surgery site.  As well as new onset fevers and or chills.  These could signify an infection.  Calf or thigh tenderness to touch as well as increased swelling or redness.  This could signify a clot formation.  Numbness or tingling to an area around the incision site or below the incision site (toes).  Any rash appears, increased  or new onset nausea/vomiting occur.  This may indicate a reaction to a medication.  Phone # [phone].  Follow up with Surgeon in 2 weeks  I acknowledge that I have received tila hose and understand the instructions on how  and when to wear them (on during the day, off at night)   Discharging RN who has gone over instructions and acknowledges ivory hose have been received     Ice 5 times a day for 20 minutes each session to operative extremity for two weeks.   IVORY hose to be worn for three weeks. Can be removed for skin care and hygiene.   Incentive spirometer 10 times every hour while awake for two weeks.

## 2024-04-02 NOTE — CARE PLAN
Problem: Pain - Adult  Goal: Verbalizes/displays adequate comfort level or baseline comfort level  Outcome: Progressing     Problem: Safety - Adult  Goal: Free from fall injury  Outcome: Progressing     Problem: Chronic Conditions and Co-morbidities  Goal: Patient's chronic conditions and co-morbidity symptoms are monitored and maintained or improved  Outcome: Progressing

## 2024-04-04 ENCOUNTER — HOME CARE VISIT (OUTPATIENT)
Dept: HOME HEALTH SERVICES | Facility: HOME HEALTH | Age: 69
End: 2024-04-04
Payer: MEDICARE

## 2024-04-04 VITALS — TEMPERATURE: 96.8 F

## 2024-04-04 PROCEDURE — 1090000002 HH PPS REVENUE DEBIT

## 2024-04-04 PROCEDURE — 0023 HH SOC

## 2024-04-04 PROCEDURE — G0152 HHCP-SERV OF OT,EA 15 MIN: HCPCS | Mod: HHH

## 2024-04-04 PROCEDURE — 169592 NO-PAY CLAIM PROCEDURE

## 2024-04-04 PROCEDURE — 1090000001 HH PPS REVENUE CREDIT

## 2024-04-04 ASSESSMENT — ENCOUNTER SYMPTOMS
DEPRESSION: 0
PAIN LOCATION - RELIEVING FACTORS: MEDS, ICE, REST
PAIN LOCATION - PAIN SEVERITY: 2/10
PERSON REPORTING PAIN: PATIENT
PAIN: 1
HIGHEST PAIN SEVERITY IN PAST 24 HOURS: 9/10
LOSS OF SENSATION IN FEET: 1
PAIN SEVERITY GOAL: 0/10
PAIN LOCATION - PAIN FREQUENCY: WITH ACTIVITY
PAIN LOCATION - EXACERBATING FACTORS: WEIGHT BEARING, WALKING
SUBJECTIVE PAIN PROGRESSION: GRADUALLY IMPROVING
LOWEST PAIN SEVERITY IN PAST 24 HOURS: 1/10
PAIN LOCATION: RIGHT LEG
OCCASIONAL FEELINGS OF UNSTEADINESS: 1

## 2024-04-04 ASSESSMENT — ACTIVITIES OF DAILY LIVING (ADL)
TOILETING: 1
OASIS_M1830: 03
ENTERING_EXITING_HOME: NEEDS ASSISTANCE
AMBULATION ASSISTANCE: 1
PHYSICAL TRANSFERS ASSESSED: 1

## 2024-04-05 ENCOUNTER — HOME CARE VISIT (OUTPATIENT)
Dept: HOME HEALTH SERVICES | Facility: HOME HEALTH | Age: 69
End: 2024-04-05
Payer: MEDICARE

## 2024-04-05 PROCEDURE — 1090000002 HH PPS REVENUE DEBIT

## 2024-04-05 PROCEDURE — G0151 HHCP-SERV OF PT,EA 15 MIN: HCPCS | Mod: HHH

## 2024-04-05 PROCEDURE — 1090000001 HH PPS REVENUE CREDIT

## 2024-04-05 SDOH — HEALTH STABILITY: PHYSICAL HEALTH
EXERCISE COMMENTS: INSTRUCTED IN AND GIVEN WRITTEN PROGRAM. SUPINE ANKLE P DORSI, QUAD ISOMET, SLR, TKE. SEATED HEEL SLIDES, KNEE EXT STRETCH

## 2024-04-05 ASSESSMENT — ENCOUNTER SYMPTOMS
PERSON REPORTING PAIN: PATIENT
LOWEST PAIN SEVERITY IN PAST 24 HOURS: 3/10
PAIN: 1
SUBJECTIVE PAIN PROGRESSION: UNCHANGED
PAIN SEVERITY GOAL: 1/10
HIGHEST PAIN SEVERITY IN PAST 24 HOURS: 8/10

## 2024-04-05 ASSESSMENT — ACTIVITIES OF DAILY LIVING (ADL): AMBULATION ASSISTANCE ON FLAT SURFACES: 1

## 2024-04-06 PROCEDURE — 1090000001 HH PPS REVENUE CREDIT

## 2024-04-06 PROCEDURE — 1090000002 HH PPS REVENUE DEBIT

## 2024-04-06 NOTE — HOME HEALTH
pt lives with girlfriend, who works during the day, in 1 story home, 3 stairs to enter back entrance. pt id self. ambul with cane occasionally pre r tka [id number]. to see dr mcgraw, potentially start out pt. p.t. [phone]. goal for p.t. is to walk without pain

## 2024-04-07 PROCEDURE — 1090000001 HH PPS REVENUE CREDIT

## 2024-04-07 PROCEDURE — 1090000002 HH PPS REVENUE DEBIT

## 2024-04-08 ENCOUNTER — HOME CARE VISIT (OUTPATIENT)
Dept: HOME HEALTH SERVICES | Facility: HOME HEALTH | Age: 69
End: 2024-04-08
Payer: MEDICARE

## 2024-04-08 PROCEDURE — G0152 HHCP-SERV OF OT,EA 15 MIN: HCPCS | Mod: HHH

## 2024-04-08 PROCEDURE — 1090000002 HH PPS REVENUE DEBIT

## 2024-04-08 PROCEDURE — 1090000001 HH PPS REVENUE CREDIT

## 2024-04-08 ASSESSMENT — ENCOUNTER SYMPTOMS
PAIN LOCATION - EXACERBATING FACTORS: EXERCISES, WALKING
PAIN: 1
SUBJECTIVE PAIN PROGRESSION: GRADUALLY IMPROVING
HIGHEST PAIN SEVERITY IN PAST 24 HOURS: 8/10
PAIN SEVERITY GOAL: 0/10
PAIN LOCATION - PAIN QUALITY: ACHE
PAIN LOCATION - PAIN FREQUENCY: WITH ACTIVITY
LOWEST PAIN SEVERITY IN PAST 24 HOURS: 3/10
PAIN LOCATION: RIGHT KNEE
PAIN LOCATION - PAIN SEVERITY: 3/10
PERSON REPORTING PAIN: PATIENT

## 2024-04-08 ASSESSMENT — ACTIVITIES OF DAILY LIVING (ADL)
TOILETING: INDEPENDENT
AMBULATION ASSISTANCE: 1
PHYSICAL TRANSFERS ASSESSED: 1
AMBULATION ASSISTANCE: INDEPENDENT
CURRENT_FUNCTION: INDEPENDENT
TOILETING: 1

## 2024-04-09 PROCEDURE — 1090000002 HH PPS REVENUE DEBIT

## 2024-04-09 PROCEDURE — 1090000001 HH PPS REVENUE CREDIT

## 2024-04-10 ENCOUNTER — HOME CARE VISIT (OUTPATIENT)
Dept: HOME HEALTH SERVICES | Facility: HOME HEALTH | Age: 69
End: 2024-04-10
Payer: MEDICARE

## 2024-04-10 VITALS
TEMPERATURE: 97.7 F | SYSTOLIC BLOOD PRESSURE: 138 MMHG | DIASTOLIC BLOOD PRESSURE: 83 MMHG | OXYGEN SATURATION: 94 % | HEART RATE: 70 BPM

## 2024-04-10 PROCEDURE — 1090000001 HH PPS REVENUE CREDIT

## 2024-04-10 PROCEDURE — G0157 HHC PT ASSISTANT EA 15: HCPCS | Mod: CQ,HHH

## 2024-04-10 PROCEDURE — 1090000002 HH PPS REVENUE DEBIT

## 2024-04-10 ASSESSMENT — ENCOUNTER SYMPTOMS
PAIN LOCATION: RIGHT KNEE
PAIN: 1
PERSON REPORTING PAIN: PATIENT
HIGHEST PAIN SEVERITY IN PAST 24 HOURS: 7/10
PAIN LOCATION - PAIN SEVERITY: 2/10

## 2024-04-11 PROCEDURE — 1090000002 HH PPS REVENUE DEBIT

## 2024-04-11 PROCEDURE — 1090000001 HH PPS REVENUE CREDIT

## 2024-04-12 ENCOUNTER — TELEPHONE (OUTPATIENT)
Dept: ORTHOPEDIC SURGERY | Facility: CLINIC | Age: 69
End: 2024-04-12
Payer: MEDICARE

## 2024-04-12 ENCOUNTER — HOME CARE VISIT (OUTPATIENT)
Dept: HOME HEALTH SERVICES | Facility: HOME HEALTH | Age: 69
End: 2024-04-12
Payer: MEDICARE

## 2024-04-12 VITALS
DIASTOLIC BLOOD PRESSURE: 80 MMHG | OXYGEN SATURATION: 98 % | HEART RATE: 76 BPM | TEMPERATURE: 97.7 F | SYSTOLIC BLOOD PRESSURE: 118 MMHG

## 2024-04-12 DIAGNOSIS — M17.11 PRIMARY OSTEOARTHRITIS OF RIGHT KNEE: ICD-10-CM

## 2024-04-12 PROCEDURE — 1090000002 HH PPS REVENUE DEBIT

## 2024-04-12 PROCEDURE — 1090000001 HH PPS REVENUE CREDIT

## 2024-04-12 PROCEDURE — G0157 HHC PT ASSISTANT EA 15: HCPCS | Mod: CQ,HHH

## 2024-04-12 ASSESSMENT — ENCOUNTER SYMPTOMS
PAIN: 1
PAIN LOCATION: RIGHT KNEE
HIGHEST PAIN SEVERITY IN PAST 24 HOURS: 7/10
PAIN LOCATION - PAIN SEVERITY: 3/10
PERSON REPORTING PAIN: PATIENT

## 2024-04-13 PROCEDURE — 1090000002 HH PPS REVENUE DEBIT

## 2024-04-13 PROCEDURE — 1090000001 HH PPS REVENUE CREDIT

## 2024-04-14 PROCEDURE — 1090000001 HH PPS REVENUE CREDIT

## 2024-04-14 PROCEDURE — 1090000002 HH PPS REVENUE DEBIT

## 2024-04-15 ENCOUNTER — HOME CARE VISIT (OUTPATIENT)
Dept: HOME HEALTH SERVICES | Facility: HOME HEALTH | Age: 69
End: 2024-04-15
Payer: MEDICARE

## 2024-04-15 VITALS — TEMPERATURE: 76.8 F | SYSTOLIC BLOOD PRESSURE: 138 MMHG | DIASTOLIC BLOOD PRESSURE: 86 MMHG | HEART RATE: 78 BPM

## 2024-04-15 PROCEDURE — 1090000001 HH PPS REVENUE CREDIT

## 2024-04-15 PROCEDURE — G0157 HHC PT ASSISTANT EA 15: HCPCS | Mod: CQ,HHH

## 2024-04-15 PROCEDURE — 1090000002 HH PPS REVENUE DEBIT

## 2024-04-15 RX ORDER — OXYCODONE AND ACETAMINOPHEN 5; 325 MG/1; MG/1
1 TABLET ORAL EVERY 6 HOURS PRN
Qty: 28 TABLET | Refills: 0 | Status: SHIPPED | OUTPATIENT
Start: 2024-04-15 | End: 2024-04-16 | Stop reason: SDUPTHER

## 2024-04-15 ASSESSMENT — ENCOUNTER SYMPTOMS
PERSON REPORTING PAIN: PATIENT
PAIN LOCATION - PAIN SEVERITY: 3/10
PAIN LOCATION: RIGHT KNEE
HIGHEST PAIN SEVERITY IN PAST 24 HOURS: 7/10
PAIN: 1

## 2024-04-16 ENCOUNTER — HOSPITAL ENCOUNTER (OUTPATIENT)
Dept: RADIOLOGY | Facility: CLINIC | Age: 69
Discharge: HOME | End: 2024-04-16
Payer: MEDICARE

## 2024-04-16 ENCOUNTER — APPOINTMENT (OUTPATIENT)
Dept: PHYSICAL THERAPY | Facility: CLINIC | Age: 69
End: 2024-04-16
Payer: MEDICARE

## 2024-04-16 ENCOUNTER — OFFICE VISIT (OUTPATIENT)
Dept: ORTHOPEDIC SURGERY | Facility: CLINIC | Age: 69
End: 2024-04-16
Payer: MEDICARE

## 2024-04-16 DIAGNOSIS — Z96.651 AFTERCARE FOLLOWING RIGHT KNEE JOINT REPLACEMENT SURGERY: Primary | ICD-10-CM

## 2024-04-16 DIAGNOSIS — Z47.1 AFTERCARE FOLLOWING RIGHT KNEE JOINT REPLACEMENT SURGERY: Primary | ICD-10-CM

## 2024-04-16 DIAGNOSIS — M17.11 PRIMARY OSTEOARTHRITIS OF RIGHT KNEE: ICD-10-CM

## 2024-04-16 DIAGNOSIS — Z47.1 AFTERCARE FOLLOWING RIGHT KNEE JOINT REPLACEMENT SURGERY: ICD-10-CM

## 2024-04-16 DIAGNOSIS — Z96.651 AFTERCARE FOLLOWING RIGHT KNEE JOINT REPLACEMENT SURGERY: ICD-10-CM

## 2024-04-16 PROCEDURE — 1090000002 HH PPS REVENUE DEBIT

## 2024-04-16 PROCEDURE — 73562 X-RAY EXAM OF KNEE 3: CPT | Mod: RT

## 2024-04-16 PROCEDURE — 73562 X-RAY EXAM OF KNEE 3: CPT | Mod: RIGHT SIDE | Performed by: ORTHOPAEDIC SURGERY

## 2024-04-16 PROCEDURE — 3044F HG A1C LEVEL LT 7.0%: CPT | Performed by: ORTHOPAEDIC SURGERY

## 2024-04-16 PROCEDURE — 1090000001 HH PPS REVENUE CREDIT

## 2024-04-16 PROCEDURE — 4010F ACE/ARB THERAPY RXD/TAKEN: CPT | Performed by: ORTHOPAEDIC SURGERY

## 2024-04-16 PROCEDURE — 1159F MED LIST DOCD IN RCRD: CPT | Performed by: ORTHOPAEDIC SURGERY

## 2024-04-16 PROCEDURE — 99024 POSTOP FOLLOW-UP VISIT: CPT | Performed by: ORTHOPAEDIC SURGERY

## 2024-04-16 RX ORDER — OXYCODONE AND ACETAMINOPHEN 5; 325 MG/1; MG/1
1 TABLET ORAL EVERY 6 HOURS PRN
Qty: 28 TABLET | Refills: 0 | Status: SHIPPED | OUTPATIENT
Start: 2024-04-16 | End: 2024-04-23

## 2024-04-16 SDOH — HEALTH STABILITY: PHYSICAL HEALTH
EXERCISE COMMENTS: SUPINE AND STANDING EXERCISES WITH REINFORCEMENT FOR ALIGNMENT AND TECHNIQUE, FOCUS ON TERMINAL EXTENSION R KNEE DURING TOE RAISE, HEEL RAISE, MINI-SQUATS AND DURING STANCE PHASE WITH ALTERNATING KNEE FLEXION AND HIP ABDUCTION 15X EACH.

## 2024-04-17 PROCEDURE — 1090000002 HH PPS REVENUE DEBIT

## 2024-04-17 PROCEDURE — 1090000001 HH PPS REVENUE CREDIT

## 2024-04-17 NOTE — PROGRESS NOTES
History of present illness    69-year-old gentleman here for his first postop visit status post right total knee replacement surgery was April 1 he feels like he is coming along very well denies any fevers or chills denies any numbness or tingling denies any locking giving way he is using a cane for assistive device.  No chest pain or shortness of breath no redness swelling or drainage      Past medical , Surgical, Family and social history reviewed.      Physical exam  General: No acute distress and breathing comfortably.  Patient is pleasant and cooperative with the examination.    Extremity  Knee incisions well-approximated range of motion 5-100 no instability brisk cap refill compartments soft calf is nontender Homans' sign negative    Diagnostics      XR knee right 3 views    Result Date: 4/16/2024  Interpreted By:  Santi Haddad, STUDY: XR KNEE RIGHT 3 VIEWS; 4/16/2024 1:29 pm   INDICATION: Signs/Symptoms:s/p TKR.   ACCESSION NUMBER(S): [id number]   ORDERING CLINICIAN: SANTI HADDAD   FINDINGS: Three views of the knee show status post joint replacement in good alignment.  There are no signs of fracture or dislocation.  No other bony abnormalities       Signed by: Santi Haddad 4/16/2024 1:31 PM Dictation workstation:   [id number]    XR knee right 1-2 views    Result Date: 4/1/2024  Interpreted By:  Carlos Pierce, STUDY: XR KNEE RIGHT 1-2 VIEWS; ;  4/1/2024 10:17 am   INDICATION: Signs/Symptoms:Post op knee.   COMPARISON: None.   ACCESSION NUMBER(S): [id number]   ORDERING CLINICIAN: SANTI HADDAD   FINDINGS: Right knee, two views   Right total knee arthroplasty in place. No periprosthetic fracture or lucency seen. Postsurgical changes in the soft tissues. Normal alignment. Moderate-sized effusion       No hardware failure about the right total knee arthroplasty     MACRO: None   Signed by: Carlos Pierce 4/1/2024 11:14 AM Dictation workstation:   [id number]        Procedure  [ none]    Assessment  Status post right total knee replacement    Treatment plan  1.  Continue with his physical therapy continue to weight-bear as tolerated she is requesting me refill on his pain medication which is sent to pharmacy.  2.  Prescription for outpatient physical therapy provided today as well.  He will follow-up with me in 3 weeks.  3. [   ]  4.  All of the patient's questions were answered.    Orders Placed This Encounter    XR knee right 3 views    Referral to Physical Therapy    oxyCODONE-acetaminophen (Percocet) 5-325 mg tablet       This note was prepared using voice recognition software.  The details of this note are correct and have been reviewed, and corrected to the best of my ability.  Some grammatical areas may persist related to the Dragon software    Gonzalo Haddad MD  Senior Attending Physician  [facility]  Orthopedic [place]    [phone]

## 2024-04-18 ENCOUNTER — HOME CARE VISIT (OUTPATIENT)
Dept: HOME HEALTH SERVICES | Facility: HOME HEALTH | Age: 69
End: 2024-04-18
Payer: MEDICARE

## 2024-04-18 VITALS
TEMPERATURE: 97.8 F | OXYGEN SATURATION: 90 % | HEART RATE: 76 BPM | DIASTOLIC BLOOD PRESSURE: 78 MMHG | SYSTOLIC BLOOD PRESSURE: 134 MMHG

## 2024-04-18 PROCEDURE — G0157 HHC PT ASSISTANT EA 15: HCPCS | Mod: CQ,HHH

## 2024-04-18 PROCEDURE — 1090000002 HH PPS REVENUE DEBIT

## 2024-04-18 PROCEDURE — 1090000001 HH PPS REVENUE CREDIT

## 2024-04-18 ASSESSMENT — ENCOUNTER SYMPTOMS
HIGHEST PAIN SEVERITY IN PAST 24 HOURS: 7/10
PAIN: 1
PAIN LOCATION: RIGHT KNEE
PAIN LOCATION - PAIN SEVERITY: 3/10
PERSON REPORTING PAIN: PATIENT

## 2024-04-19 PROCEDURE — 1090000001 HH PPS REVENUE CREDIT

## 2024-04-19 PROCEDURE — 1090000002 HH PPS REVENUE DEBIT

## 2024-04-20 PROCEDURE — 1090000001 HH PPS REVENUE CREDIT

## 2024-04-20 PROCEDURE — 1090000002 HH PPS REVENUE DEBIT

## 2024-04-21 PROCEDURE — 1090000001 HH PPS REVENUE CREDIT

## 2024-04-21 PROCEDURE — 1090000002 HH PPS REVENUE DEBIT

## 2024-04-22 ENCOUNTER — HOME CARE VISIT (OUTPATIENT)
Dept: HOME HEALTH SERVICES | Facility: HOME HEALTH | Age: 69
End: 2024-04-22
Payer: MEDICARE

## 2024-04-22 VITALS — TEMPERATURE: 98 F | DIASTOLIC BLOOD PRESSURE: 98 MMHG | OXYGEN SATURATION: 96 % | SYSTOLIC BLOOD PRESSURE: 140 MMHG

## 2024-04-22 PROCEDURE — 1090000002 HH PPS REVENUE DEBIT

## 2024-04-22 PROCEDURE — G0157 HHC PT ASSISTANT EA 15: HCPCS | Mod: CQ,HHH

## 2024-04-22 PROCEDURE — 1090000001 HH PPS REVENUE CREDIT

## 2024-04-22 SDOH — HEALTH STABILITY: PHYSICAL HEALTH
EXERCISE COMMENTS: INSTRUCTIONS FOR REDUCTION OF WEIGHT BEARING EXERCISE, HOLDING STANDING EXERCISES IN FAVOR OF SUPINE AND SEATED EXERCISES WHILE MAINTAINING ELEVATION AND ICE IN BED 3X DAILY.    SUPINE AP, QS, SAQ, SLR, LEG PRESSES AND HEEL SLIDES WITH REINFORCEMENT

## 2024-04-22 SDOH — HEALTH STABILITY: PHYSICAL HEALTH: EXERCISE COMMENTS: FOR ALIGNMENT AND TECHNIQUE, BREATHING OUT WITH EXERTION 5X3 SETS.

## 2024-04-22 ASSESSMENT — ENCOUNTER SYMPTOMS
PAIN: 1
HIGHEST PAIN SEVERITY IN PAST 24 HOURS: 10/10
PAIN LOCATION: RIGHT KNEE
PERSON REPORTING PAIN: PATIENT
PAIN LOCATION - PAIN SEVERITY: 4/10

## 2024-04-23 PROCEDURE — 1090000002 HH PPS REVENUE DEBIT

## 2024-04-23 PROCEDURE — 1090000001 HH PPS REVENUE CREDIT

## 2024-04-24 DIAGNOSIS — Z96.651 AFTERCARE FOLLOWING RIGHT KNEE JOINT REPLACEMENT SURGERY: ICD-10-CM

## 2024-04-24 DIAGNOSIS — Z47.1 AFTERCARE FOLLOWING RIGHT KNEE JOINT REPLACEMENT SURGERY: ICD-10-CM

## 2024-04-24 PROCEDURE — 1090000002 HH PPS REVENUE DEBIT

## 2024-04-24 PROCEDURE — 1090000001 HH PPS REVENUE CREDIT

## 2024-04-24 RX ORDER — OXYCODONE AND ACETAMINOPHEN 5; 325 MG/1; MG/1
1 TABLET ORAL EVERY 6 HOURS PRN
Qty: 28 TABLET | Refills: 0 | Status: SHIPPED | OUTPATIENT
Start: 2024-04-24 | End: 2024-05-03 | Stop reason: SDUPTHER

## 2024-04-25 PROCEDURE — 1090000002 HH PPS REVENUE DEBIT

## 2024-04-25 PROCEDURE — 1090000001 HH PPS REVENUE CREDIT

## 2024-04-26 ENCOUNTER — HOME CARE VISIT (OUTPATIENT)
Dept: HOME HEALTH SERVICES | Facility: HOME HEALTH | Age: 69
End: 2024-04-26
Payer: MEDICARE

## 2024-04-26 PROCEDURE — 1090000001 HH PPS REVENUE CREDIT

## 2024-04-26 PROCEDURE — 1090000002 HH PPS REVENUE DEBIT

## 2024-04-26 PROCEDURE — G0151 HHCP-SERV OF PT,EA 15 MIN: HCPCS | Mod: HHH

## 2024-04-26 SDOH — ECONOMIC STABILITY: INCOME INSECURITY: HOW HARD IS IT FOR YOU TO PAY FOR THE VERY BASICS LIKE FOOD, HOUSING, MEDICAL CARE, AND HEATING?: NOT HARD AT ALL

## 2024-04-26 SDOH — ECONOMIC STABILITY: FOOD INSECURITY: WITHIN THE PAST 12 MONTHS, YOU WORRIED THAT YOUR FOOD WOULD RUN OUT BEFORE YOU GOT MONEY TO BUY MORE.: NEVER TRUE

## 2024-04-26 SDOH — ECONOMIC STABILITY: FOOD INSECURITY: WITHIN THE PAST 12 MONTHS, THE FOOD YOU BOUGHT JUST DIDN'T LAST AND YOU DIDN'T HAVE MONEY TO GET MORE.: NEVER TRUE

## 2024-04-26 SDOH — HEALTH STABILITY: PHYSICAL HEALTH
EXERCISE COMMENTS: IS INDEP WITH HEP. SUPINE ANKLE P DORSI, QUAD ISOMET, SLR, TKE. SEATED HEEL SLIDES, KNEE EXT STRETCH. STAND AT SINK HEEL RAISES, HIP ABD, MARCH, KNEE FLEX

## 2024-04-26 ASSESSMENT — ACTIVITIES OF DAILY LIVING (ADL)
OASIS_M1830: 00
AMBULATION ASSISTANCE ON FLAT SURFACES: 1
HOME_HEALTH_OASIS: 00

## 2024-04-26 ASSESSMENT — ENCOUNTER SYMPTOMS
PAIN LOCATION: RIGHT KNEE
SUBJECTIVE PAIN PROGRESSION: GRADUALLY IMPROVING
PAIN SEVERITY GOAL: 1/10
LOWEST PAIN SEVERITY IN PAST 24 HOURS: 1/10
PAIN: 1
PERSON REPORTING PAIN: PATIENT
HIGHEST PAIN SEVERITY IN PAST 24 HOURS: 6/10

## 2024-04-26 NOTE — HOME HEALTH
reports saw dr mcgraw [phone], got good report. to rtn [id number], to start out pt. p.t. [phone]. reporyts compliance with hep.

## 2024-04-27 PROCEDURE — 1090000002 HH PPS REVENUE DEBIT

## 2024-04-27 PROCEDURE — 1090000001 HH PPS REVENUE CREDIT

## 2024-04-28 PROCEDURE — 1090000002 HH PPS REVENUE DEBIT

## 2024-04-28 PROCEDURE — 1090000001 HH PPS REVENUE CREDIT

## 2024-04-29 ENCOUNTER — OFFICE VISIT (OUTPATIENT)
Dept: FAMILY MEDICINE CLINIC | Age: 69
End: 2024-04-29

## 2024-04-29 VITALS
WEIGHT: 192.2 LBS | SYSTOLIC BLOOD PRESSURE: 138 MMHG | HEART RATE: 79 BPM | DIASTOLIC BLOOD PRESSURE: 84 MMHG | HEIGHT: 72 IN | BODY MASS INDEX: 26.03 KG/M2 | OXYGEN SATURATION: 96 % | TEMPERATURE: 98.2 F

## 2024-04-29 DIAGNOSIS — Z12.5 PROSTATE CANCER SCREENING: ICD-10-CM

## 2024-04-29 DIAGNOSIS — K70.9 ALCOHOLIC LIVER DISEASE (HCC): ICD-10-CM

## 2024-04-29 DIAGNOSIS — Z85.46 H/O PROSTATE CANCER: ICD-10-CM

## 2024-04-29 DIAGNOSIS — E11.9 TYPE 2 DIABETES MELLITUS WITHOUT COMPLICATION, WITHOUT LONG-TERM CURRENT USE OF INSULIN (HCC): ICD-10-CM

## 2024-04-29 DIAGNOSIS — D75.89 MACROCYTOSIS: ICD-10-CM

## 2024-04-29 DIAGNOSIS — R79.82 ELEVATED C-REACTIVE PROTEIN (CRP): ICD-10-CM

## 2024-04-29 DIAGNOSIS — I10 ESSENTIAL HYPERTENSION: Primary | ICD-10-CM

## 2024-04-29 ASSESSMENT — ENCOUNTER SYMPTOMS
SHORTNESS OF BREATH: 0
ABDOMINAL PAIN: 0
ABDOMINAL DISTENTION: 0
PHOTOPHOBIA: 0
CHEST TIGHTNESS: 0

## 2024-04-29 ASSESSMENT — PATIENT HEALTH QUESTIONNAIRE - PHQ9
SUM OF ALL RESPONSES TO PHQ QUESTIONS 1-9: 0
2. FEELING DOWN, DEPRESSED OR HOPELESS: NOT AT ALL
SUM OF ALL RESPONSES TO PHQ QUESTIONS 1-9: 0
SUM OF ALL RESPONSES TO PHQ9 QUESTIONS 1 & 2: 0
1. LITTLE INTEREST OR PLEASURE IN DOING THINGS: NOT AT ALL

## 2024-04-29 NOTE — PATIENT INSTRUCTIONS
Patient is going to recontact general surgery to consider lipoma removal approximately 3 months postop from his knee surgery.    Knee rehab going well.    Hypertension under good control.    Alcohol intake improved.  Labs will be repeated in the future.    Patient is interested in discontinuing allopurinol.  We will discuss this when patient is 3 months postop

## 2024-04-29 NOTE — PROGRESS NOTES
Diagnosis Orders   1. Essential hypertension  Comprehensive Metabolic Panel      2. Alcoholic liver disease (HCC)  Comprehensive Metabolic Panel    Gamma GT      3. Type 2 diabetes mellitus without complication, without long-term current use of insulin (HCC)  Comprehensive Metabolic Panel      4. Elevated C-reactive protein (CRP)  C-Reactive Protein    Sedimentation Rate      5. Macrocytosis  CBC with Auto Differential      6. H/O prostate cancer        7. Prostate cancer screening  PSA Screening        Return in about 3 months (around 7/29/2024) for for routine major medical condition management plus skin check h/o AK.  Patient Instructions   Patient is going to recontact general surgery to consider lipoma removal approximately 3 months postop from his knee surgery.    Knee rehab going well.    Hypertension under good control.    Alcohol intake improved.  Labs will be repeated in the future.    Patient is interested in discontinuing allopurinol.  We will discuss this when patient is 3 months postop    Subjective:      Patient ID: Narayan Eddy is a 69 y.o. male who presents for:  Chief Complaint   Patient presents with    Hypertension     X 3 month hcc visit   Due for labs        Patient states since knee surgery has been drinking a lot of less alcohol and his sister verifies that.    Denies any difficulty with his hypertensive meds.    States the knee is overall rehabbing well but still having some stiffness and pain.    Denies any new symptomology see review of systems.    Would like to know when he can stop allopurinol.  We talked about the mechanism of action and potential setbacks for stopping medication related to recent surgery.    Patient is also interested in having the lipoma of his back removed now that his other more urgent issues are resolved.        Current Outpatient Medications on File Prior to Visit   Medication Sig Dispense Refill    thiamine 100 MG tablet Take 1 tablet by mouth daily 30 tablet

## 2024-05-01 ENCOUNTER — CARE COORDINATION (OUTPATIENT)
Dept: CARE COORDINATION | Age: 69
End: 2024-05-01

## 2024-05-01 NOTE — CARE COORDINATION
SECURE email notification sent to identified IDT members at [facility]

## 2024-05-02 NOTE — ADDENDUM NOTE
Addendum  created 05/02/24 0926 by Koby Chan MD    Clinical Note Signed, Intraprocedure Blocks edited, SmartForm saved

## 2024-05-03 DIAGNOSIS — Z96.651 AFTERCARE FOLLOWING RIGHT KNEE JOINT REPLACEMENT SURGERY: ICD-10-CM

## 2024-05-03 DIAGNOSIS — Z47.1 AFTERCARE FOLLOWING RIGHT KNEE JOINT REPLACEMENT SURGERY: ICD-10-CM

## 2024-05-03 RX ORDER — OXYCODONE AND ACETAMINOPHEN 5; 325 MG/1; MG/1
1 TABLET ORAL EVERY 6 HOURS PRN
Qty: 28 TABLET | Refills: 0 | Status: SHIPPED | OUTPATIENT
Start: 2024-05-03 | End: 2024-05-13 | Stop reason: SDUPTHER

## 2024-05-07 ENCOUNTER — OFFICE VISIT (OUTPATIENT)
Dept: ORTHOPEDIC SURGERY | Facility: CLINIC | Age: 69
End: 2024-05-07
Payer: MEDICARE

## 2024-05-07 VITALS — BODY MASS INDEX: 25.77 KG/M2 | WEIGHT: 190 LBS

## 2024-05-07 DIAGNOSIS — Z47.1 AFTERCARE FOLLOWING RIGHT KNEE JOINT REPLACEMENT SURGERY: Primary | ICD-10-CM

## 2024-05-07 DIAGNOSIS — Z96.651 AFTERCARE FOLLOWING RIGHT KNEE JOINT REPLACEMENT SURGERY: Primary | ICD-10-CM

## 2024-05-07 PROCEDURE — 4010F ACE/ARB THERAPY RXD/TAKEN: CPT | Performed by: ORTHOPAEDIC SURGERY

## 2024-05-07 PROCEDURE — 99024 POSTOP FOLLOW-UP VISIT: CPT | Performed by: ORTHOPAEDIC SURGERY

## 2024-05-07 PROCEDURE — 1159F MED LIST DOCD IN RCRD: CPT | Performed by: ORTHOPAEDIC SURGERY

## 2024-05-07 PROCEDURE — 3044F HG A1C LEVEL LT 7.0%: CPT | Performed by: ORTHOPAEDIC SURGERY

## 2024-05-08 NOTE — PROGRESS NOTES
History of present illness    69-year-old gentleman here for follow-up right total knee replacement surgery April 1 he is doing physical therapy at [facility] feels like things are progressing very well denies any chest pain or shortness of breath denies any fevers or chills denies any redness or drainage.      Past medical , Surgical, Family and social history reviewed.      Physical exam  General: No acute distress and breathing comfortably.  Patient is pleasant and cooperative with the examination.    Extremity  Incision is well-approximated range of motion 3-95 no instability brisk cap refill compartments soft calf is nontender    Diagnostics      XR knee right 3 views    Result Date: 4/16/2024  Interpreted By:  Santi Haddad, STUDY: XR KNEE RIGHT 3 VIEWS; 4/16/2024 1:29 pm   INDICATION: Signs/Symptoms:s/p TKR.   ACCESSION NUMBER(S): [id number]   ORDERING CLINICIAN: SANTI HADDAD   FINDINGS: Three views of the knee show status post joint replacement in good alignment.  There are no signs of fracture or dislocation.  No other bony abnormalities       Signed by: Santi Haddad 4/16/2024 1:31 PM Dictation workstation:   [id number]       Procedure  [ none]    Assessment  Status post total knee replacement right    Treatment plan  1.  Continue working on range of motion strengthening continue to weight-bear as tolerated follow-up with me in 3 to 4 weeks repeat evaluation with x-ray at that time.  2. [   ]  3. [   ]  4.  All of the patient's questions were answered.    No orders of the defined types were placed in this encounter.      This note was prepared using voice recognition software.  The details of this note are correct and have been reviewed, and corrected to the best of my ability.  Some grammatical areas may persist related to the Dragon software    Santi Haddad MD  Senior Attending Physician  [facility]  Orthopedic [place]    [phone]

## 2024-05-09 ENCOUNTER — CARE COORDINATION (OUTPATIENT)
Dept: CARE COORDINATION | Age: 69
End: 2024-05-09

## 2024-05-13 ENCOUNTER — TELEPHONE (OUTPATIENT)
Dept: ORTHOPEDIC SURGERY | Facility: CLINIC | Age: 69
End: 2024-05-13
Payer: MEDICARE

## 2024-05-13 DIAGNOSIS — Z96.651 AFTERCARE FOLLOWING RIGHT KNEE JOINT REPLACEMENT SURGERY: ICD-10-CM

## 2024-05-13 DIAGNOSIS — Z47.1 AFTERCARE FOLLOWING RIGHT KNEE JOINT REPLACEMENT SURGERY: ICD-10-CM

## 2024-05-13 RX ORDER — OXYCODONE AND ACETAMINOPHEN 5; 325 MG/1; MG/1
1 TABLET ORAL EVERY 8 HOURS PRN
Qty: 21 TABLET | Refills: 0 | Status: SHIPPED | OUTPATIENT
Start: 2024-05-13 | End: 2024-05-22 | Stop reason: SDUPTHER

## 2024-05-13 NOTE — TELEPHONE ENCOUNTER
----- Message from Loli Wade sent at 5/13/2024 10:29 AM EDT -----  Regarding: percocet refill  Patient would like a refill on percocet called to [facility]

## 2024-05-15 ENCOUNTER — OFFICE VISIT (OUTPATIENT)
Dept: NEUROLOGY | Age: 69
End: 2024-05-15
Payer: MEDICARE

## 2024-05-15 VITALS
HEART RATE: 64 BPM | BODY MASS INDEX: 25.63 KG/M2 | DIASTOLIC BLOOD PRESSURE: 60 MMHG | WEIGHT: 189 LBS | SYSTOLIC BLOOD PRESSURE: 122 MMHG

## 2024-05-15 DIAGNOSIS — I83.812 VARICOSE VEINS OF LEFT LOWER EXTREMITY WITH PAIN: ICD-10-CM

## 2024-05-15 DIAGNOSIS — R27.0 ATAXIA: ICD-10-CM

## 2024-05-15 DIAGNOSIS — R20.2 PINS AND NEEDLES SENSATION: ICD-10-CM

## 2024-05-15 DIAGNOSIS — R20.2 PARESTHESIA: ICD-10-CM

## 2024-05-15 DIAGNOSIS — I73.9 PVD (PERIPHERAL VASCULAR DISEASE) (HCC): ICD-10-CM

## 2024-05-15 DIAGNOSIS — G63 POLYNEUROPATHY ASSOCIATED WITH UNDERLYING DISEASE (HCC): Primary | ICD-10-CM

## 2024-05-15 PROCEDURE — 3017F COLORECTAL CA SCREEN DOC REV: CPT | Performed by: PSYCHIATRY & NEUROLOGY

## 2024-05-15 PROCEDURE — 3074F SYST BP LT 130 MM HG: CPT | Performed by: PSYCHIATRY & NEUROLOGY

## 2024-05-15 PROCEDURE — 3078F DIAST BP <80 MM HG: CPT | Performed by: PSYCHIATRY & NEUROLOGY

## 2024-05-15 PROCEDURE — G8427 DOCREV CUR MEDS BY ELIG CLIN: HCPCS | Performed by: PSYCHIATRY & NEUROLOGY

## 2024-05-15 PROCEDURE — 1123F ACP DISCUSS/DSCN MKR DOCD: CPT | Performed by: PSYCHIATRY & NEUROLOGY

## 2024-05-15 PROCEDURE — G8417 CALC BMI ABV UP PARAM F/U: HCPCS | Performed by: PSYCHIATRY & NEUROLOGY

## 2024-05-15 PROCEDURE — 99204 OFFICE O/P NEW MOD 45 MIN: CPT | Performed by: PSYCHIATRY & NEUROLOGY

## 2024-05-15 PROCEDURE — 1036F TOBACCO NON-USER: CPT | Performed by: PSYCHIATRY & NEUROLOGY

## 2024-05-15 RX ORDER — GABAPENTIN 300 MG/1
CAPSULE ORAL
Qty: 180 CAPSULE | Refills: 1 | Status: SHIPPED | OUTPATIENT
Start: 2024-05-15 | End: 2024-06-15

## 2024-05-15 NOTE — PROGRESS NOTES
Subjective:      Patient ID: Narayan Eddy is a 69 y.o. male who presents today for:  Chief Complaint   Patient presents with    New Patient     Pt is having numbness and tingling in his feet. Pt states it been going on for a long time.        HPI 69-year-old right-handed female with a history of weakness.  Patient is here more so with numbness and tingling in the feet.  Is going on for a long time patient has diabetes and also is on time.  Patient had EMG of the upper extremity in 2018 showing mild to moderate carpal tunnel syndrome.  Patient has some investigations done in her high CRP was 9.3 with a sed rate is normal.  Patient and Ig GGT with abnormal liver function.      Patient has cold feet and in the mornings when he wakes up he is quite cold.  There appears to be color changes.  He had venous stripping in the past    Patient has significant history of alcohol use past and current.  He is to drink quite a bit in the past and still continues with 8 ounces of hard liquor on a daily basis    Patient has some degree of back pain but no neck pain.      Past Medical History:   Diagnosis Date    Actinic keratoses     has had LN2 and used Efudex    Alcohol consumption of one to four drinks per day on alcohol screening     Allergic rhinitis     cats, weeds, grasses, ragweed    Asthma     Bilateral knee pain     Diabetes mellitus (HCC)     Gout     History of colon polyps 02/2016    needs f/u 5 years [facility]    History of type 2 diabetes mellitus     about 15 years    Hyperlipidemia     Hypertension     Keratosis, inflamed seborrheic     Osteoarthritis, localized, shoulder, right     Polyp of transverse colon f/u due 2021 08/09/2014    Prediabetes     Prostate cancer (HCC) 2014    s/p prostatectomy    Syncope and collapse     Varicose vein of leg      Past Surgical History:   Procedure Laterality Date    COLONOSCOPY  02/04/2016    Dr. Cantu; polyps f/u in 5 years    COLONOSCOPY N/A 10/02/2020    COLONOSCOPY

## 2024-05-16 DIAGNOSIS — G63 POLYNEUROPATHY ASSOCIATED WITH UNDERLYING DISEASE (HCC): ICD-10-CM

## 2024-05-17 LAB
FOLATE: >20 NG/ML (ref 4.8–24.2)
HEPATITIS C ANTIBODY: NONREACTIVE
VITAMIN B-12: 1102 PG/ML (ref 232–1245)

## 2024-05-18 LAB
CARDIOLIPIN IGG SER IA-ACNC: <10 GPL
CARDIOLIPIN IGM SER IA-ACNC: 22 MPL
DSDNA AB TITR SER CLIF: 3 IU (ref 0–24)

## 2024-05-20 ENCOUNTER — TELEPHONE (OUTPATIENT)
Dept: ORTHOPEDIC SURGERY | Facility: CLINIC | Age: 69
End: 2024-05-20
Payer: MEDICARE

## 2024-05-20 ENCOUNTER — TELEPHONE (OUTPATIENT)
Dept: NEUROLOGY | Age: 69
End: 2024-05-20

## 2024-05-20 DIAGNOSIS — Z96.651 AFTERCARE FOLLOWING RIGHT KNEE JOINT REPLACEMENT SURGERY: ICD-10-CM

## 2024-05-20 DIAGNOSIS — Z47.1 AFTERCARE FOLLOWING RIGHT KNEE JOINT REPLACEMENT SURGERY: ICD-10-CM

## 2024-05-20 RX ORDER — OXYCODONE HYDROCHLORIDE AND ACETAMINOPHEN 5; 325 MG/1; MG/1
1 TABLET ORAL EVERY 8 HOURS PRN
COMMUNITY
Start: 2024-05-13

## 2024-05-20 NOTE — TELEPHONE ENCOUNTER
Daron message from pt:    During my last appointment I declined medication for restless legs at night, but I shouldn't have. I've had trouble getting to sleep because of it, perhaps intensified by my recent knee replacement. My pharmacy is [facility]. Thanks.

## 2024-05-21 LAB
ALBUMIN SERPL-MCNC: 3.88 G/DL (ref 3.75–5.01)
ALPHA1 GLOB SERPL ELPH-MCNC: 0.24 G/DL (ref 0.19–0.46)
ALPHA2 GLOB SERPL ELPH-MCNC: 1.15 G/DL (ref 0.48–1.05)
B-GLOBULIN SERPL ELPH-MCNC: 0.82 G/DL (ref 0.48–1.1)
EER IMMUNOFIX ELECTROPHORESIS GEL: NORMAL
GAMMA GLOB SERPL ELPH-MCNC: 0.92 G/DL (ref 0.62–1.51)
INTERPRETATION SERPL IFE-IMP: ABNORMAL
INTERPRETATION SERPL IFE-IMP: NORMAL
PROT SERPL-MCNC: 7 G/DL (ref 6.3–8.2)
PROTEIN ELECTROPHORESIS, SERUM: ABNORMAL

## 2024-05-21 RX ORDER — ROPINIROLE 0.25 MG/1
TABLET, FILM COATED ORAL
Qty: 53 TABLET | Refills: 0 | Status: SHIPPED | OUTPATIENT
Start: 2024-05-21 | End: 2024-06-20

## 2024-05-21 NOTE — TELEPHONE ENCOUNTER
Called and informed pt of the new medication and asked for him to call about 4-5 days before being out of medication so if we need to message the doctor about possible changes we can and him to get his refill on time.

## 2024-05-22 RX ORDER — OXYCODONE AND ACETAMINOPHEN 5; 325 MG/1; MG/1
1 TABLET ORAL EVERY 8 HOURS PRN
Qty: 21 TABLET | Refills: 0 | Status: SHIPPED | OUTPATIENT
Start: 2024-05-22

## 2024-05-23 LAB — MAG IGM SER IA-ACNC: <1000 TU (ref 0–999)

## 2024-06-02 DIAGNOSIS — M1A.0720 CHRONIC GOUT OF LEFT FOOT, UNSPECIFIED CAUSE: ICD-10-CM

## 2024-06-03 RX ORDER — ALLOPURINOL 300 MG/1
300 TABLET ORAL DAILY
Qty: 90 TABLET | Refills: 3 | Status: SHIPPED | OUTPATIENT
Start: 2024-06-03

## 2024-06-03 NOTE — TELEPHONE ENCOUNTER
Future Appointments    Encounter Information   Provider Department Appt Notes   6/17/2024 Светлана Lucio MD [facility] General Surgery EP Lump on Rt Shoulder (Back), Lump on Left Bicep   6/19/2024 Tim Crawford MD EMG epi pt   7/29/2024 Mukesh Prieto MD [facility] Primary Care Return in about 3 months (around 7/29/2024) for for routine major medical condition management plus skin check h/o AK.   8/19/2024 Tim Crawford MD [facility] Neurology 3 month follow up     Past Visits    Date Provider Specialty Visit Type Primary Dx   05/15/2024 Tim Crawford MD Neurology Office Visit Polyneuropathy associated with underlying disease (HCC)   04/29/2024 Mukesh Prieto MD Family Medicine Office Visit Essential hypertension   03/19/2024 Anthony Carrillo, CALIXTO - CNP Family Medicine Office Visit Bronchitis   03/13/2024 Mukesh Prieto MD Family Medicine Office Visit Unilateral primary osteoarthritis, right knee   02/21/2024 Gino Santana MD Cardiology Office Visit Essential hypertension

## 2024-06-04 ENCOUNTER — OFFICE VISIT (OUTPATIENT)
Dept: ORTHOPEDIC SURGERY | Facility: CLINIC | Age: 69
End: 2024-06-04
Payer: MEDICARE

## 2024-06-04 ENCOUNTER — HOSPITAL ENCOUNTER (OUTPATIENT)
Dept: RADIOLOGY | Facility: CLINIC | Age: 69
Discharge: HOME | End: 2024-06-04
Payer: MEDICARE

## 2024-06-04 DIAGNOSIS — M25.561 RIGHT KNEE PAIN, UNSPECIFIED CHRONICITY: ICD-10-CM

## 2024-06-04 DIAGNOSIS — Z96.651 AFTERCARE FOLLOWING RIGHT KNEE JOINT REPLACEMENT SURGERY: Primary | ICD-10-CM

## 2024-06-04 DIAGNOSIS — Z47.1 AFTERCARE FOLLOWING RIGHT KNEE JOINT REPLACEMENT SURGERY: Primary | ICD-10-CM

## 2024-06-04 PROCEDURE — 1036F TOBACCO NON-USER: CPT | Performed by: ORTHOPAEDIC SURGERY

## 2024-06-04 PROCEDURE — 99024 POSTOP FOLLOW-UP VISIT: CPT | Performed by: ORTHOPAEDIC SURGERY

## 2024-06-04 PROCEDURE — 4010F ACE/ARB THERAPY RXD/TAKEN: CPT | Performed by: ORTHOPAEDIC SURGERY

## 2024-06-04 PROCEDURE — 3044F HG A1C LEVEL LT 7.0%: CPT | Performed by: ORTHOPAEDIC SURGERY

## 2024-06-04 PROCEDURE — 73562 X-RAY EXAM OF KNEE 3: CPT | Mod: RIGHT SIDE | Performed by: ORTHOPAEDIC SURGERY

## 2024-06-04 PROCEDURE — 73562 X-RAY EXAM OF KNEE 3: CPT | Mod: RT

## 2024-06-04 PROCEDURE — 1159F MED LIST DOCD IN RCRD: CPT | Performed by: ORTHOPAEDIC SURGERY

## 2024-06-04 PROCEDURE — 1160F RVW MEDS BY RX/DR IN RCRD: CPT | Performed by: ORTHOPAEDIC SURGERY

## 2024-06-05 NOTE — PROGRESS NOTES
History of present illness    69-year-old gentleman here for follow-up right total knee replacement surgery 4/1/2024.  He is 2 months postop he states doing well he has a cane which he brings with him he states he uses it most of the time but does not really feel like he needs it he has been doing outpatient physical therapy feels like his symptoms are getting better and better his range of motion is improving as well.  Denies any numbness or tingling denies any fevers or chills denies any locking or giving way.      Past medical , Surgical, Family and social history reviewed.      Physical exam  General: No acute distress and breathing comfortably.  Patient is pleasant and cooperative with the examination.    Extremity  Incision well-approximated range of motion 3-95 no instability brisk cap refill compartments soft calf is nontender    Diagnostics      XR knee right 3 views    Result Date: 6/4/2024  Interpreted By:  Santi Haddad, STUDY: XR KNEE RIGHT 3 VIEWS; 6/4/2024 3:27 pm   INDICATION: Signs/Symptoms:pain.   ACCESSION NUMBER(S): [id number]   ORDERING CLINICIAN: SANTI HADDAD   FINDINGS: Three views of the knee show status post joint replacement in good alignment.  There are no signs of fracture or dislocation.  No other bony abnormalities       Signed by: Santi Haddad 6/4/2024 4:28 PM Dictation workstation:   [id number]       Procedure  [ none]    Assessment  Status post total knee replacement right    Treatment plan  1.  Continue work on range of motion strengthening continue to weight-bear as tolerated follow-up 1 month for his 3-month follow-up sooner if he has increased pain or discomfort.  2. [   ]  3. [   ]  4.  All of the patient's questions were answered.    Orders Placed This Encounter    XR knee right 3 views       This note was prepared using voice recognition software.  The details of this note are correct and have been reviewed, and corrected to the best of  my ability.  Some grammatical areas may persist related to the Dragon software    Gonzalo Haddad MD  Senior Attending Physician  [facility]  Orthopedic [place]    [phone]

## 2024-06-14 DIAGNOSIS — E11.9 TYPE 2 DIABETES MELLITUS WITHOUT COMPLICATION, WITHOUT LONG-TERM CURRENT USE OF INSULIN (HCC): ICD-10-CM

## 2024-06-14 DIAGNOSIS — K70.9 ALCOHOLIC LIVER DISEASE (HCC): ICD-10-CM

## 2024-06-14 DIAGNOSIS — Z12.5 PROSTATE CANCER SCREENING: ICD-10-CM

## 2024-06-14 DIAGNOSIS — D75.89 MACROCYTOSIS: ICD-10-CM

## 2024-06-14 DIAGNOSIS — R79.82 ELEVATED C-REACTIVE PROTEIN (CRP): ICD-10-CM

## 2024-06-14 DIAGNOSIS — I10 ESSENTIAL HYPERTENSION: ICD-10-CM

## 2024-06-14 LAB
ALBUMIN SERPL-MCNC: 4 G/DL (ref 3.5–4.6)
ALP SERPL-CCNC: 77 U/L (ref 35–104)
ALT SERPL-CCNC: 12 U/L (ref 0–41)
ANION GAP SERPL CALCULATED.3IONS-SCNC: 15 MEQ/L (ref 9–15)
AST SERPL-CCNC: 19 U/L (ref 0–40)
BASOPHILS # BLD: 0.1 K/UL (ref 0–0.2)
BASOPHILS NFR BLD: 1.1 %
BILIRUB SERPL-MCNC: 0.6 MG/DL (ref 0.2–0.7)
BUN SERPL-MCNC: 11 MG/DL (ref 8–23)
CALCIUM SERPL-MCNC: 9.4 MG/DL (ref 8.5–9.9)
CHLORIDE SERPL-SCNC: 100 MEQ/L (ref 95–107)
CO2 SERPL-SCNC: 23 MEQ/L (ref 20–31)
CREAT SERPL-MCNC: 0.8 MG/DL (ref 0.7–1.2)
CRP SERPL HS-MCNC: 3.1 MG/L (ref 0–5)
EOSINOPHIL # BLD: 0.2 K/UL (ref 0–0.7)
EOSINOPHIL NFR BLD: 3.5 %
ERYTHROCYTE [DISTWIDTH] IN BLOOD BY AUTOMATED COUNT: 13.6 % (ref 11.5–14.5)
ERYTHROCYTE [SEDIMENTATION RATE] IN BLOOD BY WESTERGREN METHOD: 6 MM (ref 0–20)
GLOBULIN SER CALC-MCNC: 2.5 G/DL (ref 2.3–3.5)
GLUCOSE SERPL-MCNC: 134 MG/DL (ref 70–99)
HCT VFR BLD AUTO: 38.5 % (ref 42–52)
HGB BLD-MCNC: 13 G/DL (ref 14–18)
LYMPHOCYTES # BLD: 1.4 K/UL (ref 1–4.8)
LYMPHOCYTES NFR BLD: 25.4 %
MCH RBC QN AUTO: 32 PG (ref 27–31.3)
MCHC RBC AUTO-ENTMCNC: 33.8 % (ref 33–37)
MCV RBC AUTO: 94.8 FL (ref 79–92.2)
MONOCYTES # BLD: 0.5 K/UL (ref 0.2–0.8)
MONOCYTES NFR BLD: 9.4 %
NEUTROPHILS # BLD: 3.4 K/UL (ref 1.4–6.5)
NEUTS SEG NFR BLD: 60.2 %
PLATELET # BLD AUTO: 195 K/UL (ref 130–400)
POTASSIUM SERPL-SCNC: 3.7 MEQ/L (ref 3.4–4.9)
PROT SERPL-MCNC: 6.5 G/DL (ref 6.3–8)
PSA SERPL-MCNC: <0.01 NG/ML (ref 0–4)
RBC # BLD AUTO: 4.06 M/UL (ref 4.7–6.1)
SODIUM SERPL-SCNC: 138 MEQ/L (ref 135–144)
WBC # BLD AUTO: 5.6 K/UL (ref 4.8–10.8)

## 2024-06-15 LAB — GGT, 20027: 117 U/L (ref 8–61)

## 2024-06-16 NOTE — PROGRESS NOTES
GENERAL SURGERY   ESTABLISHED PATIENT CLINIC NOTE    Pt Name: Narayan Eddy  MRN: [id number]    Date: 6/17/2024    Primary Care Physician: Mukesh Prieto MD    Reason for follow up: Back mass      SUBJECTIVE:     History of Chief Complaint:    Narayan is a 69 y.o. male with a PMH of snycope, HTN, HLD, asthma, DM, knee pain/ osteoarthritis, prostate cancer, gout, and varicose veins who presents for follow up of a large back mass. He was last seen for this in clinic about 6 months ago- during that time he started to have progressive muscle weakness and his back mass excision was postponed.  Upon presentation mass seems to be getting a little bigger now.  Still not causing him any pain.  Does report sometimes \"feeling a click\" in his back.  No drainage.  Denies fevers, chills, and night sweats.    He also reports a recent new finding of a bump on his left bicep.  Noticed it about 2 weeks ago and reports it is not causing him any pain.  Stable in size.  Denies any trauma to the area.    OBJECTIVE:   CURRENT VITALS: /82   Pulse 75   Temp 98.4 °F (36.9 °C)   Ht 1.854 m (6' 1\")   Wt 86.6 kg (191 lb)   SpO2 98%   BMI 25.20 kg/m²      GEN: Alert and oriented x3, no acute distress, cooperative   SKIN: Skin color, texture, turgor normal. No rashes or lesions  HEENT: Head is normocephalic, atraumatic. EOMI  NECK: Supple, symmetrical, trachea midline, skin normal  PULM: Chest symmetric, no increased work of breathing or accessory muscle use  CV: Heart regular rate   BACK: enlarging 74w71ha soft, nontender mass in the upper back (see photo below, taken with verbal permission from the patient)   EXTREMITIES: Warm, dry. Left anterior soft nontender mass measuring 2.5 x 4cm with no overlying erythema (see photo below, taken with verbal permission from the patient)         ASSESSMENT AND PLAN:   Narayan Eddy is a 69 y.o. male with a PMH of snycope, HTN, HLD, asthma, DM, knee pain/ osteoarthritis, prostate cancer,

## 2024-06-17 ENCOUNTER — OFFICE VISIT (OUTPATIENT)
Dept: SURGERY | Age: 69
End: 2024-06-17
Payer: MEDICARE

## 2024-06-17 VITALS
TEMPERATURE: 98.4 F | DIASTOLIC BLOOD PRESSURE: 82 MMHG | HEART RATE: 75 BPM | BODY MASS INDEX: 25.31 KG/M2 | OXYGEN SATURATION: 98 % | SYSTOLIC BLOOD PRESSURE: 136 MMHG | HEIGHT: 73 IN | WEIGHT: 191 LBS

## 2024-06-17 DIAGNOSIS — R22.2 MASS ON BACK: Primary | ICD-10-CM

## 2024-06-17 DIAGNOSIS — R22.32 MASS OF LEFT UPPER EXTREMITY: ICD-10-CM

## 2024-06-17 PROCEDURE — 3017F COLORECTAL CA SCREEN DOC REV: CPT | Performed by: SURGERY

## 2024-06-17 PROCEDURE — 1036F TOBACCO NON-USER: CPT | Performed by: SURGERY

## 2024-06-17 PROCEDURE — 99214 OFFICE O/P EST MOD 30 MIN: CPT | Performed by: SURGERY

## 2024-06-17 PROCEDURE — 1123F ACP DISCUSS/DSCN MKR DOCD: CPT | Performed by: SURGERY

## 2024-06-17 PROCEDURE — G8417 CALC BMI ABV UP PARAM F/U: HCPCS | Performed by: SURGERY

## 2024-06-17 PROCEDURE — G8427 DOCREV CUR MEDS BY ELIG CLIN: HCPCS | Performed by: SURGERY

## 2024-06-17 PROCEDURE — 3079F DIAST BP 80-89 MM HG: CPT | Performed by: SURGERY

## 2024-06-17 PROCEDURE — 3075F SYST BP GE 130 - 139MM HG: CPT | Performed by: SURGERY

## 2024-06-19 ENCOUNTER — HOSPITAL ENCOUNTER (OUTPATIENT)
Dept: NEUROLOGY | Age: 69
Discharge: HOME | End: 2024-06-19
Payer: MEDICARE

## 2024-06-19 PROCEDURE — 95912 NRV CNDJ TEST 11-12 STUDIES: CPT

## 2024-06-19 PROCEDURE — 95886 MUSC TEST DONE W/N TEST COMP: CPT

## 2024-06-19 NOTE — PROCEDURES
beneficial.    Multiple sensory nerve conduction studies are obtained to avoid any artifact or temperature differences.  This test is personally performed and interpreted by me.          EULALIO CESPEDES MD      D:  06/19/2024 11:23:45     T:  06/19/2024 12:03:48     NAI/RONDA  Job #:  [id number]     Doc#:  [id number]

## 2024-06-20 ENCOUNTER — HOSPITAL ENCOUNTER (OUTPATIENT)
Dept: ULTRASOUND IMAGING | Age: 69
Discharge: HOME | End: 2024-06-22
Attending: SURGERY
Payer: MEDICARE

## 2024-06-20 DIAGNOSIS — R22.32 MASS OF LEFT UPPER EXTREMITY: ICD-10-CM

## 2024-06-20 PROCEDURE — 76999 ECHO EXAMINATION PROCEDURE: CPT

## 2024-06-26 ENCOUNTER — TELEPHONE (OUTPATIENT)
Dept: NEUROLOGY | Age: 69
End: 2024-06-26

## 2024-06-26 DIAGNOSIS — I73.9 PVD (PERIPHERAL VASCULAR DISEASE) (HCC): ICD-10-CM

## 2024-06-26 DIAGNOSIS — M54.16 LUMBAR RADICULOPATHY: Primary | ICD-10-CM

## 2024-06-26 NOTE — TELEPHONE ENCOUNTER
Patient had EMG done on 6/19/2024-wants to know what the next steps are for treatment. Please advise.      IF PATIENT DOESN'T ANSWER SEND HIM MYCHART MESSAGE TO CALL OFFICE.

## 2024-06-27 ENCOUNTER — OFFICE VISIT (OUTPATIENT)
Dept: SURGERY | Age: 69
End: 2024-06-27
Payer: MEDICARE

## 2024-06-27 VITALS
TEMPERATURE: 97.3 F | HEART RATE: 88 BPM | DIASTOLIC BLOOD PRESSURE: 80 MMHG | SYSTOLIC BLOOD PRESSURE: 130 MMHG | BODY MASS INDEX: 24.78 KG/M2 | HEIGHT: 73 IN | OXYGEN SATURATION: 99 % | WEIGHT: 187 LBS

## 2024-06-27 DIAGNOSIS — R22.32 ARM MASS, LEFT: ICD-10-CM

## 2024-06-27 DIAGNOSIS — R22.2 MASS ON BACK: ICD-10-CM

## 2024-06-27 PROCEDURE — G8427 DOCREV CUR MEDS BY ELIG CLIN: HCPCS | Performed by: SURGERY

## 2024-06-27 PROCEDURE — 99214 OFFICE O/P EST MOD 30 MIN: CPT | Performed by: SURGERY

## 2024-06-27 PROCEDURE — 3079F DIAST BP 80-89 MM HG: CPT | Performed by: SURGERY

## 2024-06-27 PROCEDURE — G8420 CALC BMI NORM PARAMETERS: HCPCS | Performed by: SURGERY

## 2024-06-27 PROCEDURE — 3017F COLORECTAL CA SCREEN DOC REV: CPT | Performed by: SURGERY

## 2024-06-27 PROCEDURE — 1036F TOBACCO NON-USER: CPT | Performed by: SURGERY

## 2024-06-27 PROCEDURE — 1123F ACP DISCUSS/DSCN MKR DOCD: CPT | Performed by: SURGERY

## 2024-06-27 PROCEDURE — 3075F SYST BP GE 130 - 139MM HG: CPT | Performed by: SURGERY

## 2024-06-27 RX ORDER — SODIUM CHLORIDE 9 MG/ML
INJECTION, SOLUTION INTRAVENOUS PRN
OUTPATIENT
Start: 2024-06-27

## 2024-06-27 RX ORDER — SODIUM CHLORIDE 0.9 % (FLUSH) 0.9 %
5-40 SYRINGE (ML) INJECTION EVERY 12 HOURS SCHEDULED
OUTPATIENT
Start: 2024-06-27

## 2024-06-27 RX ORDER — SODIUM CHLORIDE 0.9 % (FLUSH) 0.9 %
5-40 SYRINGE (ML) INJECTION PRN
OUTPATIENT
Start: 2024-06-27

## 2024-06-27 NOTE — PROGRESS NOTES
GENERAL SURGERY   ESTABLISHED PATIENT CLINIC NOTE    Pt Name: Narayan Eddy  MRN: [id number]    Date: 6/27/2024    Primary Care Physician: Mukesh Prieto MD    Reason for follow up: Back and left arm masses      SUBJECTIVE:     History of Chief Complaint:    Narayan is a 69 y.o. male with a PMH of snycope, HTN, HLD, asthma, DM, knee pain/ osteoarthritis, prostate cancer, gout, and varicose veins who presents for follow up of a large back mass and left upper arm mass.  He has been following in clinic for a large back mass- plans for excision were postponed due to progressive muscle weakness and need for biopsy. I saw him a couple weeks ago for reconsideration of his back mass excision. During that time, he also noted to have a new, non painful left upper arm mass as well. He subsequently underwent an ultrasound that revealed a 3.9 cm likely lipoma. Upon presentation today, he reports no new symptoms.     OBJECTIVE:   CURRENT VITALS: /80   Pulse 88   Temp 97.3 °F (36.3 °C)   Ht 1.854 m (6' 1\")   Wt 84.8 kg (187 lb)   SpO2 99%   BMI 24.67 kg/m²      GEN: Alert and oriented x3, no acute distress, cooperative   SKIN: Skin color, texture, turgor normal. No rashes or lesions  HEENT: Head is normocephalic, atraumatic. EOMI  NECK: Supple, symmetrical, trachea midline, skin normal  PULM: Chest symmetric, no increased work of breathing or accessory muscle use  CV: Heart regular rate   BACK: enlarging [id number] soft, nontender mass in the upper back (see photo below, taken with verbal permission from the patient during last visit)   EXTREMITIES: Warm, dry. Left anterior soft nontender mass measuring 2.5 x 4cm with no overlying erythema (see photo below, taken with verbal permission from the patient during last visit)         RADIOLOGY:     Soft tissue ultrasound 6/24/24  FINDINGS:  The patient was scanned in the left upper arm.  A 3.9 cm x 2.9 cm x 0.8 cm oval nonvascular hypoechoic well-circumscribed solid

## 2024-06-27 NOTE — TELEPHONE ENCOUNTER
Patient called back and was advised but who did you want him to see for vascular work up because I dont see a referral in there

## 2024-07-01 RX ORDER — ESOMEPRAZOLE MAGNESIUM 40 MG/1
CAPSULE, DELAYED RELEASE ORAL
Qty: 90 CAPSULE | Refills: 0 | Status: SHIPPED | OUTPATIENT
Start: 2024-07-01

## 2024-07-02 ENCOUNTER — HOSPITAL ENCOUNTER (OUTPATIENT)
Dept: PREADMISSION TESTING | Age: 69
Discharge: HOME | End: 2024-07-06
Payer: MEDICARE

## 2024-07-02 VITALS
HEIGHT: 73 IN | HEART RATE: 68 BPM | OXYGEN SATURATION: 95 % | DIASTOLIC BLOOD PRESSURE: 90 MMHG | WEIGHT: 188.8 LBS | SYSTOLIC BLOOD PRESSURE: 148 MMHG | RESPIRATION RATE: 12 BRPM | BODY MASS INDEX: 25.02 KG/M2 | TEMPERATURE: 98.5 F

## 2024-07-02 DIAGNOSIS — T48.5X5A RHINITIS MEDICAMENTOSA: ICD-10-CM

## 2024-07-02 DIAGNOSIS — J31.0 RHINITIS MEDICAMENTOSA: ICD-10-CM

## 2024-07-02 DIAGNOSIS — J45.41 MODERATE PERSISTENT ASTHMA WITH ACUTE EXACERBATION: ICD-10-CM

## 2024-07-02 DIAGNOSIS — J30.81 CAT ALLERGY DUE TO BOTH AIRBORNE AND SKIN CONTACT: ICD-10-CM

## 2024-07-02 DIAGNOSIS — L23.81 CAT ALLERGY DUE TO BOTH AIRBORNE AND SKIN CONTACT: ICD-10-CM

## 2024-07-02 PROBLEM — C61 MALIGNANT NEOPLASM OF PROSTATE (HCC): Status: ACTIVE | Noted: 2024-04-01

## 2024-07-02 PROBLEM — R27.0 ATAXIA: Status: RESOLVED | Noted: 2024-05-15 | Resolved: 2024-07-02

## 2024-07-02 LAB
ALBUMIN SERPL-MCNC: 4.2 G/DL (ref 3.5–4.6)
ALP SERPL-CCNC: 87 U/L (ref 35–104)
ALT SERPL-CCNC: 21 U/L (ref 0–41)
APTT PPP: 29.6 SEC (ref 24.4–36.8)
AST SERPL-CCNC: 31 U/L (ref 0–40)
BILIRUB DIRECT SERPL-MCNC: <0.2 MG/DL (ref 0–0.4)
BILIRUB INDIRECT SERPL-MCNC: NORMAL MG/DL (ref 0–0.6)
BILIRUB SERPL-MCNC: 0.4 MG/DL (ref 0.2–0.7)
INR PPP: 1
PROT SERPL-MCNC: 6.7 G/DL (ref 6.3–8)
PROTHROMBIN TIME: 13.8 SEC (ref 12.3–14.9)

## 2024-07-02 PROCEDURE — 83036 HEMOGLOBIN GLYCOSYLATED A1C: CPT

## 2024-07-02 PROCEDURE — 80076 HEPATIC FUNCTION PANEL: CPT

## 2024-07-02 PROCEDURE — 85730 THROMBOPLASTIN TIME PARTIAL: CPT

## 2024-07-02 PROCEDURE — 85610 PROTHROMBIN TIME: CPT

## 2024-07-02 RX ORDER — FLUTICASONE PROPIONATE 50 MCG
2 SPRAY, SUSPENSION (ML) NASAL DAILY
Qty: 48 G | Refills: 1 | Status: SHIPPED | OUTPATIENT
Start: 2024-07-02 | End: 2024-07-02 | Stop reason: SDUPTHER

## 2024-07-02 RX ORDER — FLUTICASONE PROPIONATE 110 UG/1
2 AEROSOL, METERED RESPIRATORY (INHALATION) 2 TIMES DAILY
Qty: 1 EACH | Refills: 5 | Status: SHIPPED | OUTPATIENT
Start: 2024-07-02 | End: 2025-07-02

## 2024-07-02 RX ORDER — NALOXONE HYDROCHLORIDE 4 MG/.1ML
SPRAY NASAL
COMMUNITY
Start: 2024-03-29

## 2024-07-02 RX ORDER — FLUTICASONE PROPIONATE 50 MCG
2 SPRAY, SUSPENSION (ML) NASAL DAILY
Qty: 48 G | Refills: 1 | Status: SHIPPED | OUTPATIENT
Start: 2024-07-02

## 2024-07-02 ASSESSMENT — ENCOUNTER SYMPTOMS
CHOKING: 0
EYE PAIN: 0
EYE REDNESS: 0
ABDOMINAL PAIN: 0
FACIAL SWELLING: 0
DIARRHEA: 1
NAUSEA: 0
ABDOMINAL DISTENTION: 0
WHEEZING: 0
EYE ITCHING: 0
TROUBLE SWALLOWING: 0
SINUS PAIN: 0
PHOTOPHOBIA: 0
SHORTNESS OF BREATH: 0
CONSTIPATION: 0
CHEST TIGHTNESS: 0
EYE DISCHARGE: 0
APNEA: 0
VOMITING: 0
RHINORRHEA: 0
SINUS PRESSURE: 0
BACK PAIN: 0
SORE THROAT: 0
COUGH: 0

## 2024-07-02 NOTE — TELEPHONE ENCOUNTER
Comments:     Last Office Visit (last PCP visit):   2024    Next Visit Date:  Future Appointments   Date Time Provider Department Center   2024  2:00 PM [facility] RM [facility] NP [facility]   2024 10:00 AM Isela Burris, APRN - CNP [facility]   2024 12:30 PM Mukesh Prieto MD [facility]   2024  2:45 PM Tim Crawford MD [place] [facility] -       **If hasn't been seen in over a year OR hasn't followed up according to last diabetes/ADHD visit, make appointment for patient before sending refill to provider.    Rx requested:  Requested Prescriptions     Pending Prescriptions Disp Refills    fluticasone (FLONASE) 50 MCG/ACT nasal spray 32 g 0     Si sprays by Nasal route daily

## 2024-07-02 NOTE — H&P
lower extremities    Hyperuricemia    Polyp of transverse colon f/u due 2021    Posterior calcaneal exostosis    S/P prostatectomy    Diverticula of colon    Elevated LFTs    Lipoma of back    Chronic cough    Oropharyngeal dysphagia    Alcoholic liver disease (HCC)    Unilateral primary osteoarthritis, right knee    Polyneuropathy associated with underlying disease (HCC)    PVD (peripheral vascular disease) (HCC)    Pins and needles sensation    Mass on back    Arm mass, left    Malignant neoplasm of prostate (HCC)         Plan:  Preoperative workup as follows: PAT, PT/INR, APTT, HGB A1C, HEP PANEL (CMP/CBC 6/14/24; STRESS/ECHO/HOLTER MONITOR in Epic)  2.   Scheduled for: Back and left upper arm mass excisions on 7/9/24.    SIGNATURE: CALIXTO Loya - CNP  DATE: July 2, 2024

## 2024-07-03 ENCOUNTER — APPOINTMENT (OUTPATIENT)
Dept: ORTHOPEDIC SURGERY | Facility: CLINIC | Age: 69
End: 2024-07-03
Payer: MEDICARE

## 2024-07-03 DIAGNOSIS — M17.11 PRIMARY OSTEOARTHRITIS OF RIGHT KNEE: ICD-10-CM

## 2024-07-03 DIAGNOSIS — Z96.651 AFTERCARE FOLLOWING RIGHT KNEE JOINT REPLACEMENT SURGERY: Primary | ICD-10-CM

## 2024-07-03 DIAGNOSIS — Z47.1 AFTERCARE FOLLOWING RIGHT KNEE JOINT REPLACEMENT SURGERY: Primary | ICD-10-CM

## 2024-07-03 LAB
ESTIMATED AVERAGE GLUCOSE: 94 MG/DL
HBA1C MFR BLD: 4.9 % (ref 4–6)

## 2024-07-03 PROCEDURE — 3044F HG A1C LEVEL LT 7.0%: CPT | Performed by: PHYSICIAN ASSISTANT

## 2024-07-03 PROCEDURE — 99213 OFFICE O/P EST LOW 20 MIN: CPT | Performed by: PHYSICIAN ASSISTANT

## 2024-07-03 PROCEDURE — 4010F ACE/ARB THERAPY RXD/TAKEN: CPT | Performed by: PHYSICIAN ASSISTANT

## 2024-07-05 ENCOUNTER — TELEPHONE (OUTPATIENT)
Dept: FAMILY MEDICINE CLINIC | Age: 69
End: 2024-07-05

## 2024-07-05 DIAGNOSIS — J45.41 MODERATE PERSISTENT ASTHMA WITH ACUTE EXACERBATION: ICD-10-CM

## 2024-07-05 NOTE — TELEPHONE ENCOUNTER
Your [facility] calling asking if we could fax over the prescription for flovent  inhaler 125 mg. They said that they have sent a fax requesting this   Fax [phone]  Phone [phone]

## 2024-07-08 ENCOUNTER — ANESTHESIA EVENT (OUTPATIENT)
Dept: OPERATING ROOM | Age: 69
End: 2024-07-08
Payer: MEDICARE

## 2024-07-08 NOTE — PROGRESS NOTES
Subjective    Patient ID: David    Chief Complaint:   Chief Complaint   Patient presents with    Right Knee - Follow-up     RT RAFAEL TKR 4/1/24, (3 months out)     History of present illness    69-year-old gentleman presenting clinic today for his 3-month postop visit status post right total knee Rafael.  Overall doing extremely well.  Still has some mild tightness at times and tension over the right knee however he has been able to do all of his activities without any issues.  He reports no hindrance at this time.  No numbness tingling no fevers chills present.  Very happy with the outcome thus far.      Past medical , Surgical, Family and social history reviewed.      Physical exam  General: No acute distress and breathing comfortably.  Patient is pleasant and cooperative with the examination.    Extremity  Right knee is neurovascular intact.  Range of motion 0 to 115 degrees.  No sign of infection.  Incision is well-healed at this time.  No calf swelling or tenderness.  Improved strength is evident.  Compartments are soft.  Mild tightness over the posterior lateral corner right knee.    Diagnostics  [ none]  US SOFT TISSUE LIMITED AREA    Result Date: 6/24/2024  EXAMINATION: SOFT TISSUE ULTRASOUND 6/20/2024 11:02 am COMPARISON: None. HISTORY: ORDERING SYSTEM PROVIDED HISTORY: Mass of left upper extremity TECHNOLOGIST PROVIDED HISTORY: This procedure can be scheduled via [employer]. Reason for exam:->Left bicep mass What reading provider will be dictating this exam?->CRC FINDINGS: The patient was scanned in the left upper arm.  A 3.9 cm x 2.9 cm x 0.8 cm oval nonvascular hypoechoic well-circumscribed solid lesion is present. Findings are nonspecific but has a benign nonaggressive appearance and most likely represents a lipoma.    Probable 3.9 cm lipoma left upper arm. RECOMMENDATION: Clinical follow-up.       Procedure  [ none]    Assessment  Status post right total knee Rafael    Treatment plan  1.  At this time wound  instructions were discussed.  2.  He was encouraged to continue with weightbearing activity as tolerated.  3.  He will continue with stretching exercises at home.  Follow-up with us in 3 months with new x-rays right knee at that time.  4.  All of the patient's questions were answered.    No orders of the defined types were placed in this encounter.      This note was prepared using voice recognition software.  The details of this note are correct and have been reviewed, and corrected to the best of my ability.  Some grammatical areas may persist related to the Dragon software    Rasheed Villatoro PA-C, [facility]  [facility]  [facility] [place]    [phone]

## 2024-07-09 ENCOUNTER — HOSPITAL ENCOUNTER (OUTPATIENT)
Age: 69
Setting detail: OUTPATIENT SURGERY
Discharge: HOME | End: 2024-07-09
Attending: SURGERY | Admitting: SURGERY
Payer: MEDICARE

## 2024-07-09 ENCOUNTER — ANESTHESIA (OUTPATIENT)
Dept: OPERATING ROOM | Age: 69
End: 2024-07-09
Payer: MEDICARE

## 2024-07-09 ENCOUNTER — LAB REQUISITION (OUTPATIENT)
Dept: LAB | Facility: HOSPITAL | Age: 69
End: 2024-07-09
Payer: MEDICARE

## 2024-07-09 VITALS
HEART RATE: 69 BPM | TEMPERATURE: 97.1 F | OXYGEN SATURATION: 96 % | DIASTOLIC BLOOD PRESSURE: 83 MMHG | RESPIRATION RATE: 16 BRPM | SYSTOLIC BLOOD PRESSURE: 147 MMHG

## 2024-07-09 DIAGNOSIS — R22.2 MASS ON BACK: ICD-10-CM

## 2024-07-09 DIAGNOSIS — R22.32 LOCALIZED SWELLING, MASS AND LUMP, LEFT UPPER LIMB: ICD-10-CM

## 2024-07-09 DIAGNOSIS — R22.32 ARM MASS, LEFT: ICD-10-CM

## 2024-07-09 DIAGNOSIS — R22.2 LOCALIZED SWELLING, MASS AND LUMP, TRUNK: ICD-10-CM

## 2024-07-09 DIAGNOSIS — G89.18 POST-OP PAIN: Primary | ICD-10-CM

## 2024-07-09 LAB
GLUCOSE BLD-MCNC: 119 MG/DL (ref 70–99)
PERFORMED ON: ABNORMAL

## 2024-07-09 PROCEDURE — 3700000000 HC ANESTHESIA ATTENDED CARE: Performed by: SURGERY

## 2024-07-09 PROCEDURE — 3600000003 HC SURGERY LEVEL 3 BASE: Performed by: SURGERY

## 2024-07-09 PROCEDURE — 7100000011 HC PHASE II RECOVERY - ADDTL 15 MIN: Performed by: SURGERY

## 2024-07-09 PROCEDURE — 3700000001 HC ADD 15 MINUTES (ANESTHESIA): Performed by: SURGERY

## 2024-07-09 PROCEDURE — 12032 INTMD RPR S/A/T/EXT 2.6-7.5: CPT | Performed by: SURGERY

## 2024-07-09 PROCEDURE — 2580000003 HC RX 258: Performed by: ANESTHESIOLOGY

## 2024-07-09 PROCEDURE — 88313 SPECIAL STAINS GROUP 2: CPT

## 2024-07-09 PROCEDURE — 3600000013 HC SURGERY LEVEL 3 ADDTL 15MIN: Performed by: SURGERY

## 2024-07-09 PROCEDURE — 2709999900 HC NON-CHARGEABLE SUPPLY: Performed by: SURGERY

## 2024-07-09 PROCEDURE — 6360000002 HC RX W HCPCS: Performed by: SURGERY

## 2024-07-09 PROCEDURE — A4217 STERILE WATER/SALINE, 500 ML: HCPCS | Performed by: SURGERY

## 2024-07-09 PROCEDURE — 88305 TISSUE EXAM BY PATHOLOGIST: CPT

## 2024-07-09 PROCEDURE — 11404 EXC TR-EXT B9+MARG 3.1-4 CM: CPT | Performed by: SURGERY

## 2024-07-09 PROCEDURE — 7100000001 HC PACU RECOVERY - ADDTL 15 MIN: Performed by: SURGERY

## 2024-07-09 PROCEDURE — 2500000003 HC RX 250 WO HCPCS: Performed by: SURGERY

## 2024-07-09 PROCEDURE — 2580000003 HC RX 258: Performed by: SURGERY

## 2024-07-09 PROCEDURE — 21933 EXC BACK TUM DEEP 5 CM/>: CPT | Performed by: SURGERY

## 2024-07-09 PROCEDURE — 6360000002 HC RX W HCPCS

## 2024-07-09 PROCEDURE — 7100000010 HC PHASE II RECOVERY - FIRST 15 MIN: Performed by: SURGERY

## 2024-07-09 PROCEDURE — 2500000003 HC RX 250 WO HCPCS

## 2024-07-09 PROCEDURE — 7100000000 HC PACU RECOVERY - FIRST 15 MIN: Performed by: SURGERY

## 2024-07-09 RX ORDER — SODIUM CHLORIDE 0.9 % (FLUSH) 0.9 %
5-40 SYRINGE (ML) INJECTION PRN
Status: DISCONTINUED | OUTPATIENT
Start: 2024-07-09 | End: 2024-07-09 | Stop reason: HOSPADM

## 2024-07-09 RX ORDER — DOCUSATE SODIUM 100 MG/1
100 CAPSULE, LIQUID FILLED ORAL DAILY
Qty: 14 CAPSULE | Refills: 0 | Status: SHIPPED | OUTPATIENT
Start: 2024-07-09 | End: 2024-07-23

## 2024-07-09 RX ORDER — LIDOCAINE HYDROCHLORIDE 10 MG/ML
1 INJECTION, SOLUTION EPIDURAL; INFILTRATION; INTRACAUDAL; PERINEURAL
Status: DISCONTINUED | OUTPATIENT
Start: 2024-07-09 | End: 2024-07-09 | Stop reason: HOSPADM

## 2024-07-09 RX ORDER — ROCURONIUM BROMIDE 10 MG/ML
INJECTION, SOLUTION INTRAVENOUS PRN
Status: DISCONTINUED | OUTPATIENT
Start: 2024-07-09 | End: 2024-07-09 | Stop reason: SDUPTHER

## 2024-07-09 RX ORDER — NALOXONE HYDROCHLORIDE 0.4 MG/ML
INJECTION, SOLUTION INTRAMUSCULAR; INTRAVENOUS; SUBCUTANEOUS PRN
Status: DISCONTINUED | OUTPATIENT
Start: 2024-07-09 | End: 2024-07-09 | Stop reason: HOSPADM

## 2024-07-09 RX ORDER — FENTANYL CITRATE 0.05 MG/ML
50 INJECTION, SOLUTION INTRAMUSCULAR; INTRAVENOUS EVERY 10 MIN PRN
Status: DISCONTINUED | OUTPATIENT
Start: 2024-07-09 | End: 2024-07-09 | Stop reason: HOSPADM

## 2024-07-09 RX ORDER — SODIUM CHLORIDE 9 MG/ML
INJECTION, SOLUTION INTRAVENOUS PRN
Status: DISCONTINUED | OUTPATIENT
Start: 2024-07-09 | End: 2024-07-09 | Stop reason: HOSPADM

## 2024-07-09 RX ORDER — DIPHENHYDRAMINE HYDROCHLORIDE 50 MG/ML
12.5 INJECTION INTRAMUSCULAR; INTRAVENOUS
Status: DISCONTINUED | OUTPATIENT
Start: 2024-07-09 | End: 2024-07-09 | Stop reason: HOSPADM

## 2024-07-09 RX ORDER — PROPOFOL 10 MG/ML
INJECTION, EMULSION INTRAVENOUS PRN
Status: DISCONTINUED | OUTPATIENT
Start: 2024-07-09 | End: 2024-07-09 | Stop reason: SDUPTHER

## 2024-07-09 RX ORDER — METOCLOPRAMIDE HYDROCHLORIDE 5 MG/ML
10 INJECTION INTRAMUSCULAR; INTRAVENOUS
Status: DISCONTINUED | OUTPATIENT
Start: 2024-07-09 | End: 2024-07-09 | Stop reason: HOSPADM

## 2024-07-09 RX ORDER — MEPERIDINE HYDROCHLORIDE 25 MG/ML
12.5 INJECTION INTRAMUSCULAR; INTRAVENOUS; SUBCUTANEOUS
Status: DISCONTINUED | OUTPATIENT
Start: 2024-07-09 | End: 2024-07-09 | Stop reason: HOSPADM

## 2024-07-09 RX ORDER — LIDOCAINE HYDROCHLORIDE 10 MG/ML
INJECTION, SOLUTION EPIDURAL; INFILTRATION; INTRACAUDAL; PERINEURAL PRN
Status: DISCONTINUED | OUTPATIENT
Start: 2024-07-09 | End: 2024-07-09 | Stop reason: HOSPADM

## 2024-07-09 RX ORDER — SODIUM CHLORIDE, SODIUM LACTATE, POTASSIUM CHLORIDE, CALCIUM CHLORIDE 600; 310; 30; 20 MG/100ML; MG/100ML; MG/100ML; MG/100ML
INJECTION, SOLUTION INTRAVENOUS CONTINUOUS
Status: DISCONTINUED | OUTPATIENT
Start: 2024-07-09 | End: 2024-07-09 | Stop reason: HOSPADM

## 2024-07-09 RX ORDER — DEXAMETHASONE SODIUM PHOSPHATE 10 MG/ML
INJECTION INTRAMUSCULAR; INTRAVENOUS PRN
Status: DISCONTINUED | OUTPATIENT
Start: 2024-07-09 | End: 2024-07-09 | Stop reason: SDUPTHER

## 2024-07-09 RX ORDER — OXYCODONE HYDROCHLORIDE 5 MG/1
5 TABLET ORAL
Status: DISCONTINUED | OUTPATIENT
Start: 2024-07-09 | End: 2024-07-09 | Stop reason: HOSPADM

## 2024-07-09 RX ORDER — TRAMADOL HYDROCHLORIDE 50 MG/1
50 TABLET ORAL EVERY 6 HOURS PRN
Qty: 6 TABLET | Refills: 0 | Status: SHIPPED | OUTPATIENT
Start: 2024-07-09 | End: 2024-07-12

## 2024-07-09 RX ORDER — MAGNESIUM HYDROXIDE 1200 MG/15ML
LIQUID ORAL CONTINUOUS PRN
Status: DISCONTINUED | OUTPATIENT
Start: 2024-07-09 | End: 2024-07-09 | Stop reason: HOSPADM

## 2024-07-09 RX ORDER — FENTANYL CITRATE 50 UG/ML
INJECTION, SOLUTION INTRAMUSCULAR; INTRAVENOUS PRN
Status: DISCONTINUED | OUTPATIENT
Start: 2024-07-09 | End: 2024-07-09 | Stop reason: SDUPTHER

## 2024-07-09 RX ORDER — ONDANSETRON 2 MG/ML
4 INJECTION INTRAMUSCULAR; INTRAVENOUS
Status: DISCONTINUED | OUTPATIENT
Start: 2024-07-09 | End: 2024-07-09 | Stop reason: HOSPADM

## 2024-07-09 RX ORDER — SODIUM CHLORIDE 0.9 % (FLUSH) 0.9 %
5-40 SYRINGE (ML) INJECTION EVERY 12 HOURS SCHEDULED
Status: DISCONTINUED | OUTPATIENT
Start: 2024-07-09 | End: 2024-07-09 | Stop reason: HOSPADM

## 2024-07-09 RX ORDER — EPHEDRINE SULFATE/0.9% NACL/PF 25 MG/5 ML
SYRINGE (ML) INTRAVENOUS PRN
Status: DISCONTINUED | OUTPATIENT
Start: 2024-07-09 | End: 2024-07-09 | Stop reason: SDUPTHER

## 2024-07-09 RX ORDER — LIDOCAINE HYDROCHLORIDE 10 MG/ML
INJECTION, SOLUTION EPIDURAL; INFILTRATION; INTRACAUDAL; PERINEURAL PRN
Status: DISCONTINUED | OUTPATIENT
Start: 2024-07-09 | End: 2024-07-09 | Stop reason: SDUPTHER

## 2024-07-09 RX ORDER — ONDANSETRON 2 MG/ML
INJECTION INTRAMUSCULAR; INTRAVENOUS PRN
Status: DISCONTINUED | OUTPATIENT
Start: 2024-07-09 | End: 2024-07-09 | Stop reason: SDUPTHER

## 2024-07-09 RX ADMIN — FENTANYL CITRATE 25 MCG: 50 INJECTION, SOLUTION INTRAMUSCULAR; INTRAVENOUS at 09:44

## 2024-07-09 RX ADMIN — ROCURONIUM BROMIDE 50 MG: 10 INJECTION, SOLUTION INTRAVENOUS at 07:41

## 2024-07-09 RX ADMIN — SODIUM CHLORIDE, POTASSIUM CHLORIDE, SODIUM LACTATE AND CALCIUM CHLORIDE: 600; 310; 30; 20 INJECTION, SOLUTION INTRAVENOUS at 09:09

## 2024-07-09 RX ADMIN — PHENYLEPHRINE HYDROCHLORIDE 100 MCG: 10 INJECTION INTRAVENOUS at 08:41

## 2024-07-09 RX ADMIN — DEXAMETHASONE SODIUM PHOSPHATE 10 MG: 10 INJECTION INTRAMUSCULAR; INTRAVENOUS at 07:55

## 2024-07-09 RX ADMIN — PHENYLEPHRINE HYDROCHLORIDE 100 MCG: 10 INJECTION INTRAVENOUS at 09:12

## 2024-07-09 RX ADMIN — EPHEDRINE SULFATE 5 MG: 5 INJECTION INTRAVENOUS at 11:40

## 2024-07-09 RX ADMIN — EPHEDRINE SULFATE 5 MG: 5 INJECTION INTRAVENOUS at 11:26

## 2024-07-09 RX ADMIN — PHENYLEPHRINE HYDROCHLORIDE 100 MCG: 10 INJECTION INTRAVENOUS at 09:33

## 2024-07-09 RX ADMIN — SODIUM CHLORIDE, POTASSIUM CHLORIDE, SODIUM LACTATE AND CALCIUM CHLORIDE: 600; 310; 30; 20 INJECTION, SOLUTION INTRAVENOUS at 06:20

## 2024-07-09 RX ADMIN — FENTANYL CITRATE 25 MCG: 50 INJECTION, SOLUTION INTRAMUSCULAR; INTRAVENOUS at 07:37

## 2024-07-09 RX ADMIN — PHENYLEPHRINE HYDROCHLORIDE 100 MCG: 10 INJECTION INTRAVENOUS at 08:57

## 2024-07-09 RX ADMIN — ONDANSETRON 4 MG: 2 INJECTION INTRAMUSCULAR; INTRAVENOUS at 09:56

## 2024-07-09 RX ADMIN — PHENYLEPHRINE HYDROCHLORIDE 100 MCG: 10 INJECTION INTRAVENOUS at 10:24

## 2024-07-09 RX ADMIN — ROCURONIUM BROMIDE 10 MG: 10 INJECTION, SOLUTION INTRAVENOUS at 08:18

## 2024-07-09 RX ADMIN — FENTANYL CITRATE 25 MCG: 50 INJECTION, SOLUTION INTRAMUSCULAR; INTRAVENOUS at 12:06

## 2024-07-09 RX ADMIN — EPHEDRINE SULFATE 5 MG: 5 INJECTION INTRAVENOUS at 11:48

## 2024-07-09 RX ADMIN — PHENYLEPHRINE HYDROCHLORIDE 100 MCG: 10 INJECTION INTRAVENOUS at 11:26

## 2024-07-09 RX ADMIN — PHENYLEPHRINE HYDROCHLORIDE 100 MCG: 10 INJECTION INTRAVENOUS at 09:01

## 2024-07-09 RX ADMIN — PHENYLEPHRINE HYDROCHLORIDE 100 MCG: 10 INJECTION INTRAVENOUS at 09:16

## 2024-07-09 RX ADMIN — PHENYLEPHRINE HYDROCHLORIDE 100 MCG: 10 INJECTION INTRAVENOUS at 08:06

## 2024-07-09 RX ADMIN — PROPOFOL 150 MG: 10 INJECTION, EMULSION INTRAVENOUS at 07:40

## 2024-07-09 RX ADMIN — LIDOCAINE HYDROCHLORIDE 40 MG: 10 INJECTION, SOLUTION EPIDURAL; INFILTRATION; INTRACAUDAL; PERINEURAL at 07:40

## 2024-07-09 RX ADMIN — PHENYLEPHRINE HYDROCHLORIDE 100 MCG: 10 INJECTION INTRAVENOUS at 10:39

## 2024-07-09 RX ADMIN — PHENYLEPHRINE HYDROCHLORIDE 100 MCG: 10 INJECTION INTRAVENOUS at 10:06

## 2024-07-09 RX ADMIN — PHENYLEPHRINE HYDROCHLORIDE 100 MCG: 10 INJECTION INTRAVENOUS at 11:09

## 2024-07-09 RX ADMIN — FENTANYL CITRATE 25 MCG: 50 INJECTION, SOLUTION INTRAMUSCULAR; INTRAVENOUS at 08:17

## 2024-07-09 RX ADMIN — CEFAZOLIN 2000 MG: 2 INJECTION, POWDER, FOR SOLUTION INTRAMUSCULAR; INTRAVENOUS at 07:51

## 2024-07-09 RX ADMIN — EPHEDRINE SULFATE 10 MG: 5 INJECTION INTRAVENOUS at 11:56

## 2024-07-09 RX ADMIN — SUGAMMADEX 100 MG: 100 INJECTION, SOLUTION INTRAVENOUS at 12:02

## 2024-07-09 RX ADMIN — CEFAZOLIN 2000 MG: 2 INJECTION, POWDER, FOR SOLUTION INTRAMUSCULAR; INTRAVENOUS at 11:55

## 2024-07-09 RX ADMIN — ROCURONIUM BROMIDE 10 MG: 10 INJECTION, SOLUTION INTRAVENOUS at 09:02

## 2024-07-09 ASSESSMENT — PAIN SCALES - GENERAL
PAINLEVEL_OUTOF10: 3
PAINLEVEL_OUTOF10: 4
PAINLEVEL_OUTOF10: 3
PAINLEVEL_OUTOF10: 4
PAINLEVEL_OUTOF10: 4
PAINLEVEL_OUTOF10: 2
PAINLEVEL_OUTOF10: 3
PAINLEVEL_OUTOF10: 3
PAINLEVEL_OUTOF10: 4
PAINLEVEL_OUTOF10: 3
PAINLEVEL_OUTOF10: 0
PAINLEVEL_OUTOF10: 0

## 2024-07-09 ASSESSMENT — PAIN DESCRIPTION - ORIENTATION
ORIENTATION: LEFT;UPPER;INNER
ORIENTATION: LEFT
ORIENTATION: LEFT
ORIENTATION: LEFT;UPPER;INNER

## 2024-07-09 ASSESSMENT — PAIN DESCRIPTION - DESCRIPTORS
DESCRIPTORS: ACHING
DESCRIPTORS: DISCOMFORT
DESCRIPTORS: DISCOMFORT

## 2024-07-09 ASSESSMENT — PAIN DESCRIPTION - LOCATION
LOCATION: ARM

## 2024-07-09 ASSESSMENT — ENCOUNTER SYMPTOMS: SHORTNESS OF BREATH: 1

## 2024-07-09 NOTE — OP NOTE
Operative Note        Patient: Narayan Eddy  YOB: 1955  MRN: [id number]     Date of Procedure: 7/9/2024     Pre-Op Diagnosis: Back and left upper arm masses  Post-Op Diagnosis: Back lipoma, left biceps myosteatosis       Procedure: Back and left upper arm mass excision, left arm mass excision/ muscle biopsy     Surgeon: Светлана Lucio MD   First Assistant: Yumiko Hudson     Anesthesia: General     Estimated Blood Loss (mL): 10     Specimens: Back lipoma, left upper arm mass and fibromuscular tissue    Description of Procedure:   With the patient in supine position, adequate general anesthesia was obtained. He was given preoperative ancef and SCDs plugged in. He was then flipped into a prone position with padding provided on pressure points. His back was prepped with ChloraPrep and draped in sterile fashion. A time out was performed to verify patient, laterality, and procedure. A 6.5cm transverse incision was made over the mass. The subcutaneous tissue was dissected and the underlying fascia was opened. Under the fascia, there was a large lipoma that was dissected free completely using blunt and electrocautery for excision. The mass measured 8.5 x 5.5cm in size. The wound was irrigated with normal saline. Hemostasis was ensured and the wound was closed in layers. Local anesthetic using 1% lidocaine plain was injected along the incision. Using interrupted 2-0 vicryl, the fascia was closed. Deep dermal stitches using 3-0 vicryl followed by staples for skin closure. An aquacel dressing was applied.     Then, we turned our attention to his left upper arm mass. The drapes were removed and he was flipped into a supine position (padding removed from pressure points). His arm was likewise prepped with ChloraPrep and draped in sterile fashion. A 6cm longitudinal incision was made over the mass. The subcutaneous tissue was dissected. There was a superficial vein branch that was sutured ligated to

## 2024-07-09 NOTE — ANESTHESIA PRE PROCEDURE
Department of Anesthesiology  Preprocedure Note       Name:  Narayan Eddy   Age:  69 y.o.  :  1955                                          MRN:  [id number]         Date:  2024      Surgeon: Surgeon(s):  Светлана Lucio MD    Procedure: Procedure(s):  Back and left arm mass excisions- Prone position  ARM LESION BIOPSY EXCISION    Medications prior to admission:   Prior to Admission medications    Medication Sig Start Date End Date Taking? Authorizing Provider   fluticasone (FLOVENT HFA) 110 MCG/ACT inhaler Inhale 2 puffs into the lungs 2 times daily 24  Mukesh Prieto MD   fluticasone (FLONASE) 50 MCG/ACT nasal spray 2 sprays by Nasal route daily 24   Mukesh Prieto MD   LYCOPENE PO Take by mouth    Joaquín Joe MD   naloxone 4 MG/0.1ML LIQD nasal spray by Nasal route 3/29/24   Joaquín Joe MD   esomeprazole (NEXIUM) 40 MG delayed release capsule TAKE ONE CAPSULE BY MOUTH EVERY DAY IN THE MORNING BEFORE BREAKFAST 24   Ashley Price MD   allopurinol (ZYLOPRIM) 300 MG tablet TAKE ONE TABLET BY MOUTH EVERY DAY 6/3/24   Mukesh Prieto MD   rOPINIRole (REQUIP) 0.25 MG tablet Take 1 tablet by mouth at bedtime for 7 days, THEN 2 tablets at bedtime for 23 days.  Patient not taking: Reported on 2024  Tim Crawford MD   oxyCODONE-acetaminophen (PERCOCET) 5-325 MG per tablet Take 1 tablet by mouth every 8 hours as needed for Pain.  Patient not taking: Reported on 2024   Joaquín Joe MD   gabapentin (NEURONTIN) 300 MG capsule 1 nightly 5/15/24 7/9/24  Tim Crawford MD   thiamine 100 MG tablet Take 1 tablet by mouth daily 24   Mukesh Prieto MD   sodium-potassium-mag sulfate (SUPREP) 17.5-3.13-1.6 GM/177ML SOLN solution As directed  Patient not taking: Reported on 2024   Ashley Price MD   Handicap Placard MISC by Does not apply route Pt cannot ambulate > 100 ft without rest    Good for 5

## 2024-07-09 NOTE — DISCHARGE INSTRUCTIONS
The following is a brief overview of your hospitalization. Some of the information contained on this summary may be confidential.  This information should be kept in your records and should be shared with your regular doctor.    Admission Date: 7/9/2024  Discharge Date: 7/9/2024    Disposition:  Home    PRINCIPAL DIAGNOSIS (reason after study for this admission): Back and left upper arm masses    Physicians:               Attending: Светлана Lucio MD    Refer to After Visit Summary for Home Medications List  -After Visit Summary and medications reviewed with patient and family  -Please discard previous medication list and update your records with the new medication list, medication providers, or pharmacies    Procedures performed while hospitalized:   IV access  Venapuncture  Anesthesia/ Intubation (for surgery)    Operations performed while hospitalized:   Back and left upper arm masses excised    Treatment/wound care: Keep incision clean and dry- they are covered with surgical glue (do not peel off the glue, it will fall off on its own when ready, usually in 1-2 weeks).  Ok to shower upon discharge.  Do not scrub wound vigorously. Pat incision(s) dry after showering.  Do not submerge your incision: No baths, jacuzzi/ hot tubes, or swimming for 3 weeks. You can take off the tan dressing from both incisions in 5 days.    Pending results:  Pathology results (to be discussed at follow-up visit)    Pain Control:  Take tylenol or ibuprofen for pain relief- can take each every 6 hours. (Best to alternate the tylenol and ibuprofen every 3 hours: for example, if you take tylenol at 12pm, take ibuprofen at 3pm, then your next tylenol dose at 6pm). You may take prescription pain medication (tramadol) for severe pain.  You should not exceed more than 4000mg of tylenol/acetaminophen from all sources during a 24 hours period.  Do not drive while taking prescription pain medications. Be careful with the tramadol as it can

## 2024-07-09 NOTE — ANESTHESIA POSTPROCEDURE EVALUATION
Department of Anesthesiology  Postprocedure Note    Patient: Narayan Eddy  MRN: [id number]  YOB: 1955  Date of evaluation: 7/9/2024    Procedure Summary       Date: 07/09/24 Room / Location: [address]    Anesthesia Start: 0730 Anesthesia Stop: 1213    Procedures:       Back and left arm mass excisions (Back)      ARM LESION BIOPSY EXCISION (Left: Arm Upper) Diagnosis:       Mass on back      Arm mass, left      (Mass on back [R22.2])      (Arm mass, left [R22.32])    Surgeons: Светлана Lucio MD Responsible Provider: Genevieve West MD    Anesthesia Type: general ASA Status: 3            Anesthesia Type: No value filed.    Isabella Phase I: Isabella Score: 10    Isabella Phase II:      Anesthesia Post Evaluation    Patient location during evaluation: PACU  Patient participation: complete - patient participated  Level of consciousness: awake  Airway patency: patent  Nausea & Vomiting: no nausea and no vomiting  Cardiovascular status: blood pressure returned to baseline and hemodynamically stable  Respiratory status: acceptable  Hydration status: euvolemic  Pain management: adequate        No notable events documented.

## 2024-07-09 NOTE — PROGRESS NOTES
Pt discharged via wheelchair to girlfriend.  All personal belongings and discharge instructions taken with patient.

## 2024-07-09 NOTE — PROGRESS NOTES
CLINICAL PHARMACY NOTE: MEDS TO BEDS    Total # of Prescriptions Filled: 2   The following medications were delivered to the patient:  Tramadol 50 mg tab  Docusate 100 mg cap    Additional Documentation:

## 2024-07-09 NOTE — BRIEF OP NOTE
Brief Postoperative Note      Patient: Narayan Eddy  YOB: 1955  MRN: [id number]    Date of Procedure: 7/9/2024    Pre-Op Diagnosis: Back and left upper arm masses  Post-Op Diagnosis: Back lipoma, left biceps myosteatosis       Procedure: Back and left upper arm mass excision, left arm muscle biopsy    Surgeon: Светлана Lucio MD   First Assistant: Yumiko Hudson    Anesthesia: General    Estimated Blood Loss (mL): 10    Complications: None    Specimens:   ID Type Source Tests Collected by Time Destination   A : back mass Tissue Back SURGICAL PATHOLOGY Светлана Lucio MD 7/9/2024 0832    B : left arm mass Tissue Arm SURGICAL PATHOLOGY Светлана Lucio MD 7/9/2024 0833    C : LEFT ARM MASS Tissue Arm SURGICAL PATHOLOGY Светлана Lucio MD 7/9/2024 1056    D : LEFT FIBROMUSCULAR TISSUE Tissue Arm SURGICAL PATHOLOGY Светлана Lucio MD 7/9/2024 1120    E : LEFT ARM FIBROMUSCULAR TISSUE Tissue Arm SURGICAL PATHOLOGY Светлана Lucio MD 7/9/2024 1131      Implants: None      Drains: None    Findings: Large back lipoma measuring 8.5 x 5.5cm. Left upper arm intramuscular lipoma/ fibromuscular tissue.  Infection Present At Time Of Surgery (PATOS) (choose all levels that have infection present): None    Electronically signed by Светлана Lucio MD on 7/9/2024 at 12:02 PM

## 2024-07-09 NOTE — H&P
UPDATED HISTORY AND PHYSICAL EXAMINATION    The History and Physical (completed in the past 30 days) has been reviewed and the patient has been examined. The contents accurately reflect the patient's condition with the following additions or revisions since the H&P was completed.    Examination indicates no changes.  This H&P can be found in the Electronic Medical Record by Gemma Caceres NP, on 7/2/24.    SIGNATURE: Светлана Luico MD   DATE: 7/9/2024  TIME: 7:33 AM

## 2024-07-10 RX ORDER — FLUTICASONE PROPIONATE 110 UG/1
2 AEROSOL, METERED RESPIRATORY (INHALATION) 2 TIMES DAILY
Qty: 3 EACH | Refills: 1 | Status: SHIPPED | OUTPATIENT
Start: 2024-07-10 | End: 2025-07-10

## 2024-07-11 LAB
LAB AP ASR DISCLAIMER: NORMAL
LABORATORY COMMENT REPORT: NORMAL
PATH REPORT.COMMENTS IMP SPEC: NORMAL
PATH REPORT.FINAL DX SPEC: NORMAL
PATH REPORT.GROSS SPEC: NORMAL
PATH REPORT.RELEVANT HX SPEC: NORMAL
PATH REPORT.TOTAL CANCER: NORMAL

## 2024-07-12 DIAGNOSIS — E11.65 UNCONTROLLED TYPE 2 DIABETES MELLITUS WITH HYPERGLYCEMIA (HCC): ICD-10-CM

## 2024-07-12 DIAGNOSIS — E11.9 TYPE 2 DIABETES MELLITUS WITHOUT COMPLICATION, WITHOUT LONG-TERM CURRENT USE OF INSULIN (HCC): ICD-10-CM

## 2024-07-12 NOTE — TELEPHONE ENCOUNTER
Comments:     Last Office Visit (last PCP visit):   4/29/2024    Next Visit Date:  Future Appointments   Date Time Provider Department Center   7/18/2024 10:30 AM [facility] MRI ROOM 2 [facility] RAD   7/19/2024 10:00 AM Светлана Lucio MD [facility]   7/24/2024 10:00 AM Isela Burris, APRN - CNP [facility]   7/29/2024 12:30 PM Mukesh Prieto MD [facility]   8/19/2024  2:45 PM Tim Crawford MD [place] [facility] Neu[facility] -       **If hasn't been seen in over a year OR hasn't followed up according to last diabetes/ADHD visit, make appointment for patient before sending refill to provider.    Rx requested:  Requested Prescriptions      No prescriptions requested or ordered in this encounter

## 2024-07-18 ENCOUNTER — HOSPITAL ENCOUNTER (OUTPATIENT)
Dept: MRI IMAGING | Age: 69
Discharge: HOME | End: 2024-07-20
Attending: PSYCHIATRY & NEUROLOGY
Payer: MEDICARE

## 2024-07-18 DIAGNOSIS — M54.16 LUMBAR RADICULOPATHY: ICD-10-CM

## 2024-07-18 PROCEDURE — 72148 MRI LUMBAR SPINE W/O DYE: CPT

## 2024-07-18 NOTE — PROGRESS NOTES
GENERAL SURGERY POST OPERATIVE VISIT    Pt Name: Narayan Eddy  MRN: [id number]  Date: 7/19/2024       SUBJECTIVE:   Narayan Eddy is a 69 y.o. male who is now 1.5 weeks s/p back mass and left upper arm intramuscular lipoma excision. Upon presentation, pt reports doing well.  He had some bruising on his left arm postoperatively, but that is improving now.  Denies any pain or drainage from the area.  Not using any pain medications.  His back is not causing any significant pain. He has not noticed any drainage from his back wound.  Denies fevers, chills, and night sweats.    OBJECTIVE:   CURRENT VITALS: /82   Pulse 68   Temp 97.6 °F (36.4 °C)   Ht 1.854 m (6' 1\")   Wt 84.4 kg (186 lb)   SpO2 97%   BMI 24.54 kg/m²      GEN: Alert and oriented x3, no acute distress, cooperative   SKIN: Skin color, texture, turgor normal. No rashes or lesions  HEENT: Head is normocephalic, atraumatic. EOMI  NECK: Supple, symmetrical, trachea midline, skin normal  PULM: Chest symmetric, no increased work of breathing or accessory muscle use  CV: Heart regular rate   BACK: incision c/d/I with staple, no surrounding erythema, flat, NT (see photo below, taken with verbal permission from the patient during last visit)   EXTREMITIES: Warm, dry. Left anterior incision c/d/I without surrounding erythema, mild ecchymoses at medial proximal aspect of wound, NT, flat (see photo below, taken with verbal permission from the patient during last visit)          PATHOLOGY:   Left arm mass, biopsy: intramuscular lipoma from   Back mass: Pending    ASSESSMENT AND PLAN:   Narayan Eddy is a 69 y.o. male now 1.5 weeks s/p back mass and left upper arm intramuscular lipoma excision. Recovering well as expected.    Back incision staples removed, steri strips applied  Pathology results reviewed with patient. Back mass pathology still pending. Will call patient when it results  Return to clinic on an as needed basis      I have spent 20  Statement Selected

## 2024-07-19 ENCOUNTER — OFFICE VISIT (OUTPATIENT)
Dept: SURGERY | Age: 69
End: 2024-07-19

## 2024-07-19 VITALS
OXYGEN SATURATION: 97 % | TEMPERATURE: 97.6 F | WEIGHT: 186 LBS | BODY MASS INDEX: 24.65 KG/M2 | HEIGHT: 73 IN | SYSTOLIC BLOOD PRESSURE: 134 MMHG | DIASTOLIC BLOOD PRESSURE: 82 MMHG | HEART RATE: 68 BPM

## 2024-07-19 DIAGNOSIS — Z09 POSTOP CHECK: Primary | ICD-10-CM

## 2024-07-19 PROCEDURE — 99024 POSTOP FOLLOW-UP VISIT: CPT | Performed by: SURGERY

## 2024-07-20 ENCOUNTER — LAB REQUISITION (OUTPATIENT)
Dept: LAB | Facility: HOSPITAL | Age: 69
End: 2024-07-20
Payer: MEDICARE

## 2024-07-20 PROCEDURE — 88321 CONSLTJ&REPRT SLD PREP ELSWR: CPT | Performed by: PATHOLOGY

## 2024-07-22 PROCEDURE — 88342 IMHCHEM/IMCYTCHM 1ST ANTB: CPT | Performed by: PATHOLOGY

## 2024-07-22 PROCEDURE — 0760T DGTZ GLS MCRSCP SL IMM 1ST: CPT

## 2024-07-22 PROCEDURE — 88342 IMHCHEM/IMCYTCHM 1ST ANTB: CPT

## 2024-07-23 ENCOUNTER — TELEPHONE (OUTPATIENT)
Dept: ORTHOPEDIC SURGERY | Facility: CLINIC | Age: 69
End: 2024-07-23
Payer: MEDICARE

## 2024-07-23 DIAGNOSIS — M17.11 PRIMARY OSTEOARTHRITIS OF RIGHT KNEE: ICD-10-CM

## 2024-07-23 RX ORDER — CYCLOBENZAPRINE HCL 5 MG
5 TABLET ORAL NIGHTLY
Qty: 30 TABLET | Refills: 0 | Status: SHIPPED | OUTPATIENT
Start: 2024-07-23

## 2024-07-23 NOTE — TELEPHONE ENCOUNTER
----- Message from Loli LÓPEZ sent at 7/22/2024  2:54 PM EDT -----  Regarding: refill  Patient would like a refill on cyclobenzaprine called to [facility]

## 2024-07-24 ENCOUNTER — OFFICE VISIT (OUTPATIENT)
Dept: INTERVENTIONAL RADIOLOGY/VASCULAR | Age: 69
End: 2024-07-24
Payer: MEDICARE

## 2024-07-24 VITALS — SYSTOLIC BLOOD PRESSURE: 138 MMHG | DIASTOLIC BLOOD PRESSURE: 88 MMHG | OXYGEN SATURATION: 99 % | HEART RATE: 75 BPM

## 2024-07-24 DIAGNOSIS — R20.2 NUMBNESS AND TINGLING OF BOTH LOWER EXTREMITIES: ICD-10-CM

## 2024-07-24 DIAGNOSIS — R09.89 VEIN SYMPTOM: ICD-10-CM

## 2024-07-24 DIAGNOSIS — I87.2 CHRONIC VENOUS INSUFFICIENCY: Primary | ICD-10-CM

## 2024-07-24 DIAGNOSIS — I83.893 VARICOSE VEINS OF BOTH LEGS WITH EDEMA: ICD-10-CM

## 2024-07-24 DIAGNOSIS — R20.0 NUMBNESS AND TINGLING OF BOTH LOWER EXTREMITIES: ICD-10-CM

## 2024-07-24 DIAGNOSIS — I83.93 VARICOSE VEINS OF BOTH LOWER EXTREMITIES, UNSPECIFIED WHETHER COMPLICATED: ICD-10-CM

## 2024-07-24 PROCEDURE — 99204 OFFICE O/P NEW MOD 45 MIN: CPT | Performed by: NURSE PRACTITIONER

## 2024-07-24 PROCEDURE — G8427 DOCREV CUR MEDS BY ELIG CLIN: HCPCS | Performed by: NURSE PRACTITIONER

## 2024-07-24 PROCEDURE — 3075F SYST BP GE 130 - 139MM HG: CPT | Performed by: NURSE PRACTITIONER

## 2024-07-24 PROCEDURE — 1036F TOBACCO NON-USER: CPT | Performed by: NURSE PRACTITIONER

## 2024-07-24 PROCEDURE — 3017F COLORECTAL CA SCREEN DOC REV: CPT | Performed by: NURSE PRACTITIONER

## 2024-07-24 PROCEDURE — 1123F ACP DISCUSS/DSCN MKR DOCD: CPT | Performed by: NURSE PRACTITIONER

## 2024-07-24 PROCEDURE — G8420 CALC BMI NORM PARAMETERS: HCPCS | Performed by: NURSE PRACTITIONER

## 2024-07-24 PROCEDURE — 3079F DIAST BP 80-89 MM HG: CPT | Performed by: NURSE PRACTITIONER

## 2024-07-24 RX ORDER — CYCLOBENZAPRINE HCL 5 MG
5 TABLET ORAL NIGHTLY
COMMUNITY
Start: 2024-07-23

## 2024-07-24 ASSESSMENT — ENCOUNTER SYMPTOMS
GASTROINTESTINAL NEGATIVE: 1
NAUSEA: 0
VOMITING: 0
ALLERGIC/IMMUNOLOGIC NEGATIVE: 1
EYES NEGATIVE: 1
SHORTNESS OF BREATH: 0
RESPIRATORY NEGATIVE: 1

## 2024-07-24 NOTE — PROGRESS NOTES
Vascular Medicine and Interventional Radiology:    Narayan Eddy, a male of 69 y.o. came to the office 7/24/2024.     Chief Complaint   Patient presents with    New Patient     PVD- Dr Crawford     HPI: Narayan Eddy referred by Dr. Crawford for evaluation of PVD.  Patient with history of bilateral lower extremity numbness, pins/needles feeling.  States he notices it most when he first gets out of bed in the morning.  States that seems to feel better with walking.  States he has not is aware of the feeling during the day, however does notice it at night, especially in the morning as mentioned above.  States sometimes if something touches his feet unexpectedly and will make him jump as it startles him.  He reports it is not painful, however does affect his everyday life.  He reports this morning for example, he hurt his toenail, stubbed his toe as he did not know it was against furniture.  States he does take gabapentin for this.  Also has prescription for Requip, however has not been taking.  He reports history of chronic venous insufficiency, history of vein stripping in his right leg (greater saphenous and small saphenous veins) in the 1980s in [place].  He does report bilateral lower extremity varicose veins that are palpable/ropey, however nontender.  He does report mild left lower extremity swelling on intermittent occasions.  He reports wearing knee-high compression stockings, 15-20 mmHg for at least 5 years, feels they help his varicose veins.  Leg elevation:  Daily with out relief.   Stocking use and dates:  Wears daily class two compression stockings(10-15mmHg) for 6 weeks or greater without relief.   Denies chest pain.   Denies dyspnea.   PMH includes HTN, varicose veins (history of RLE vein stripping), alcoholic liver disease, allergic rhinitis, asthma, prostate cancer, acinic keratoses, gout, lipoma of back, HLD, right knee osteoarthritis (s/p surgery), diabetes    Family History   Problem Relation Age

## 2024-07-29 ENCOUNTER — OFFICE VISIT (OUTPATIENT)
Dept: FAMILY MEDICINE CLINIC | Age: 69
End: 2024-07-29
Payer: MEDICARE

## 2024-07-29 VITALS
BODY MASS INDEX: 25.02 KG/M2 | OXYGEN SATURATION: 99 % | WEIGHT: 188.8 LBS | HEART RATE: 78 BPM | HEIGHT: 73 IN | SYSTOLIC BLOOD PRESSURE: 135 MMHG | DIASTOLIC BLOOD PRESSURE: 80 MMHG

## 2024-07-29 DIAGNOSIS — E11.9 TYPE 2 DIABETES MELLITUS WITHOUT COMPLICATION, WITHOUT LONG-TERM CURRENT USE OF INSULIN (HCC): ICD-10-CM

## 2024-07-29 DIAGNOSIS — I10 ESSENTIAL HYPERTENSION: Primary | ICD-10-CM

## 2024-07-29 DIAGNOSIS — D49.2 ABNORMAL SKIN GROWTH: ICD-10-CM

## 2024-07-29 DIAGNOSIS — I73.9 PVD (PERIPHERAL VASCULAR DISEASE) (HCC): ICD-10-CM

## 2024-07-29 DIAGNOSIS — M1A.0720 CHRONIC GOUT OF LEFT FOOT, UNSPECIFIED CAUSE: ICD-10-CM

## 2024-07-29 DIAGNOSIS — L57.0 ACTINIC KERATOSES: ICD-10-CM

## 2024-07-29 DIAGNOSIS — E78.2 MIXED HYPERLIPIDEMIA: ICD-10-CM

## 2024-07-29 DIAGNOSIS — M48.061 SPINAL STENOSIS OF LUMBAR REGION, UNSPECIFIED WHETHER NEUROGENIC CLAUDICATION PRESENT: ICD-10-CM

## 2024-07-29 PROCEDURE — G8427 DOCREV CUR MEDS BY ELIG CLIN: HCPCS | Performed by: FAMILY MEDICINE

## 2024-07-29 PROCEDURE — 3044F HG A1C LEVEL LT 7.0%: CPT | Performed by: FAMILY MEDICINE

## 2024-07-29 PROCEDURE — 1123F ACP DISCUSS/DSCN MKR DOCD: CPT | Performed by: FAMILY MEDICINE

## 2024-07-29 PROCEDURE — 3079F DIAST BP 80-89 MM HG: CPT | Performed by: FAMILY MEDICINE

## 2024-07-29 PROCEDURE — 17003 DESTRUCT PREMALG LES 2-14: CPT | Performed by: FAMILY MEDICINE

## 2024-07-29 PROCEDURE — 3017F COLORECTAL CA SCREEN DOC REV: CPT | Performed by: FAMILY MEDICINE

## 2024-07-29 PROCEDURE — 1036F TOBACCO NON-USER: CPT | Performed by: FAMILY MEDICINE

## 2024-07-29 PROCEDURE — 99215 OFFICE O/P EST HI 40 MIN: CPT | Performed by: FAMILY MEDICINE

## 2024-07-29 PROCEDURE — G8420 CALC BMI NORM PARAMETERS: HCPCS | Performed by: FAMILY MEDICINE

## 2024-07-29 PROCEDURE — 2022F DILAT RTA XM EVC RTNOPTHY: CPT | Performed by: FAMILY MEDICINE

## 2024-07-29 PROCEDURE — 3075F SYST BP GE 130 - 139MM HG: CPT | Performed by: FAMILY MEDICINE

## 2024-07-29 PROCEDURE — 17000 DESTRUCT PREMALG LESION: CPT | Performed by: FAMILY MEDICINE

## 2024-07-29 RX ORDER — IRBESARTAN 150 MG/1
150 TABLET ORAL DAILY
Qty: 90 TABLET | Refills: 3 | Status: SHIPPED | OUTPATIENT
Start: 2024-07-29

## 2024-07-29 RX ORDER — METOPROLOL SUCCINATE 100 MG/1
150 TABLET, EXTENDED RELEASE ORAL 2 TIMES DAILY
Qty: 1 TABLET | Refills: 0
Start: 2024-07-29

## 2024-07-29 ASSESSMENT — ENCOUNTER SYMPTOMS
SHORTNESS OF BREATH: 0
ABDOMINAL DISTENTION: 0
PHOTOPHOBIA: 0
CHEST TIGHTNESS: 0
ABDOMINAL PAIN: 0

## 2024-07-29 NOTE — PATIENT INSTRUCTIONS
Patient's diabetes has been under good control we will change metformin to twice daily instead of 3 times a day.    Abnormal lesions noted on patient's back will be scheduled for removal as they are pigmented and irregular.    Actinic keratosis was elected to be removed by liquid nitrogen therapy today.    Patient is following peripheral vascular disease with specialty.    Refill of blood pressure medication completed today.    Patient is interested in discontinuing allopurinol.  No gout symptoms.  We will check uric acid level.  If uric acid level is in normal range we will look to change allopurinol from 300-100 and monitor for the following month.  If that goes well we will look to discontinue the medication.    Encourage patient to keep neurology appointment to discuss spinal stenosis findings.

## 2024-07-29 NOTE — PROGRESS NOTES
Diagnosis Orders   1. Essential hypertension  irbesartan (AVAPRO) 150 MG tablet    metoprolol succinate (TOPROL XL) 100 MG extended release tablet      2. PVD (peripheral vascular disease) (HCC)        3. Spinal stenosis of lumbar region, unspecified whether neurogenic claudication present        4. Chronic gout of left foot, unspecified cause  Uric Acid      5. Type 2 diabetes mellitus without complication, without long-term current use of insulin (HCC)  metFORMIN (GLUCOPHAGE) 500 MG tablet      6. Abnormal skin growth        7. Actinic keratoses  MO DESTRUCTION PREMALIGNANT LESION 1ST    MO DESTRUCTION PREMALIGNANT LESION 2-14 EA      8. Mixed hyperlipidemia  Lipid Panel          Return in about 6 months (around 1/29/2025) for next few weeks 15 shave biopsy appt lesions L lumbar back , MAW exam due Jan.    Orders Placed This Encounter   Procedures    Lipid Panel     Standing Status:   Future     Standing Expiration Date:   7/29/2025    Uric Acid     Standing Status:   Future     Standing Expiration Date:   7/29/2025    MO DESTRUCTION PREMALIGNANT LESION 1ST    MO DESTRUCTION PREMALIGNANT LESION 2-14 EA       Narayan was seen today for 3 month follow-up.    Diagnoses and all orders for this visit:    Essential hypertension  -     irbesartan (AVAPRO) 150 MG tablet; Take 1 tablet by mouth daily  -     metoprolol succinate (TOPROL XL) 100 MG extended release tablet; Take 1.5 tablets by mouth in the morning and at bedtime    PVD (peripheral vascular disease) (HCC)    Spinal stenosis of lumbar region, unspecified whether neurogenic claudication present    Chronic gout of left foot, unspecified cause  -     Uric Acid; Future    Type 2 diabetes mellitus without complication, without long-term current use of insulin (HCC)  -     metFORMIN (GLUCOPHAGE) 500 MG tablet; Take 1 tablet by mouth 2 times daily (with meals)    Abnormal skin growth    Actinic keratoses  -     MO DESTRUCTION PREMALIGNANT LESION 1ST  -     MO

## 2024-07-30 DIAGNOSIS — M1A.0720 CHRONIC GOUT OF LEFT FOOT, UNSPECIFIED CAUSE: ICD-10-CM

## 2024-07-30 DIAGNOSIS — E78.2 MIXED HYPERLIPIDEMIA: ICD-10-CM

## 2024-07-30 LAB
CHOLEST SERPL-MCNC: 147 MG/DL (ref 0–199)
HDLC SERPL-MCNC: 74 MG/DL (ref 40–59)
LDLC SERPL CALC-MCNC: 60 MG/DL (ref 0–129)
TRIGL SERPL-MCNC: 66 MG/DL (ref 0–150)
URATE SERPL-MCNC: 4.3 MG/DL (ref 3.4–7)

## 2024-07-30 NOTE — RESULT ENCOUNTER NOTE
Notify Narayan labs are normal and uric acid is low.  Would he like to try changing allopurinol 300-100?

## 2024-07-31 ENCOUNTER — PROCEDURE VISIT (OUTPATIENT)
Dept: FAMILY MEDICINE CLINIC | Age: 69
End: 2024-07-31

## 2024-07-31 VITALS — BODY MASS INDEX: 24.92 KG/M2 | TEMPERATURE: 98.4 F | WEIGHT: 188 LBS | HEIGHT: 73 IN

## 2024-07-31 DIAGNOSIS — D49.2 ABNORMAL SKIN GROWTH: Primary | ICD-10-CM

## 2024-07-31 DIAGNOSIS — L98.9 CHANGING SKIN LESION: ICD-10-CM

## 2024-07-31 NOTE — PROGRESS NOTES
Diagnosis Orders   1. Abnormal skin growth  Specimen to Pathology Outpatient    HI SHAVING SKIN LESION 1 TRUNK/ARM/LEG DIAM 0.5CM/<    Specimen to Pathology Outpatient    HI SHAVING SKIN LESION 1 TRUNK/ARM/LEG DIAM 0.5CM/<      2. Changing skin lesion  Specimen to Pathology Outpatient    HI SHAVING SKIN LESION 1 TRUNK/ARM/LEG DIAM 0.5CM/<    Specimen to Pathology Outpatient    HI SHAVING SKIN LESION 1 TRUNK/ARM/LEG DIAM 0.5CM/<            Orders Placed This Encounter   Procedures    Specimen to Pathology Outpatient     Margins requested on all specimens  R/O melanoma  Location left lateral thoracic back     Standing Status:   Future     Standing Expiration Date:   7/31/2025     Order Specific Question:   PREVIOUS BIOPSY     Answer:   No     Order Specific Question:   PREOP DIAGNOSIS     Answer:   Abnormal skin growth changing color     Order Specific Question:   FROZEN SECTION - NO OR YES/SPECIMEN     Answer:   No    Specimen to Pathology Outpatient     Margins requested on all specimens  R/O rule out melanoma  Location left lumbar back     Standing Status:   Future     Standing Expiration Date:   7/31/2025     Order Specific Question:   PREVIOUS BIOPSY     Answer:   No     Order Specific Question:   PREOP DIAGNOSIS     Answer:   Atypical skin lesion changing pigment     Order Specific Question:   FROZEN SECTION - NO OR YES/SPECIMEN     Answer:   No    HI SHAVING SKIN LESION 1 TRUNK/ARM/LEG DIAM 0.5CM/<     Left lateral thoracic back    HI SHAVING SKIN LESION 1 TRUNK/ARM/LEG DIAM 0.5CM/<     Left lumbar       Narayan was seen today for lesion(s).    Diagnoses and all orders for this visit:    Abnormal skin growth  -     Specimen to Pathology Outpatient; Future  -     HI SHAVING SKIN LESION 1 TRUNK/ARM/LEG DIAM 0.5CM/<  -     Specimen to Pathology Outpatient; Future  -     HI SHAVING SKIN LESION 1 TRUNK/ARM/LEG DIAM 0.5CM/<    Changing skin lesion  -     Specimen to Pathology Outpatient; Future  -     HI SHAVING

## 2024-08-01 DIAGNOSIS — L98.9 CHANGING SKIN LESION: ICD-10-CM

## 2024-08-01 DIAGNOSIS — D49.2 ABNORMAL SKIN GROWTH: ICD-10-CM

## 2024-08-05 DIAGNOSIS — M1A.0720 CHRONIC GOUT OF LEFT FOOT, UNSPECIFIED CAUSE: ICD-10-CM

## 2024-08-05 RX ORDER — ALLOPURINOL 100 MG/1
100 TABLET ORAL DAILY
Qty: 30 TABLET | Refills: 5 | Status: SHIPPED | OUTPATIENT
Start: 2024-08-05

## 2024-08-08 PROBLEM — R22.32 ARM MASS, LEFT: Status: RESOLVED | Noted: 2024-06-27 | Resolved: 2024-08-08

## 2024-08-08 PROBLEM — R22.2 MASS ON BACK: Status: RESOLVED | Noted: 2024-06-27 | Resolved: 2024-08-08

## 2024-08-08 LAB
AP SUMMARY REPORT: NORMAL
LAB AP ASR DISCLAIMER: NORMAL
LABORATORY COMMENT REPORT: NORMAL
PATH REPORT.FINAL DX SPEC: NORMAL
PATH REPORT.GROSS SPEC: NORMAL
PATH REPORT.TOTAL CANCER: NORMAL
SCAN RESULT: NORMAL

## 2024-08-09 DIAGNOSIS — C49.9 ATYPICAL LIPOMATOUS TUMOR (HCC): Primary | ICD-10-CM

## 2024-08-11 DIAGNOSIS — I10 ESSENTIAL HYPERTENSION: ICD-10-CM

## 2024-08-12 RX ORDER — METOPROLOL SUCCINATE 100 MG/1
150 TABLET, EXTENDED RELEASE ORAL 2 TIMES DAILY
Qty: 1 TABLET | Refills: 0 | OUTPATIENT
Start: 2024-08-12

## 2024-08-13 DIAGNOSIS — I10 ESSENTIAL HYPERTENSION: ICD-10-CM

## 2024-08-13 DIAGNOSIS — E11.9 TYPE 2 DIABETES MELLITUS WITHOUT COMPLICATION, WITHOUT LONG-TERM CURRENT USE OF INSULIN (HCC): ICD-10-CM

## 2024-08-13 NOTE — TELEPHONE ENCOUNTER
Comments: Pt asking for a 90 day rx. Pt takes 1.5 tabs daily. Please update rx for pt states Dr. Madera changed rx. Pt is out of RX    Last Office Visit (last PCP visit):   7/31/2024    Next Visit Date:  Future Appointments   Date Time Provider Department Center   8/19/2024  2:45 PM Tim Crawford MD [place] [facility] Neurology -   8/21/2024  9:00 AM Isela Burris, APRN - CNP [facility]   1/29/2025 11:15 AM Mukesh Prieto MD [facility] DEP       **If hasn't been seen in over a year OR hasn't followed up according to last diabetes/ADHD visit, make appointment for patient before sending refill to provider.    Rx requested:  Requested Prescriptions     Pending Prescriptions Disp Refills    metoprolol succinate (TOPROL XL) 100 MG extended release tablet 1 tablet 0     Sig: Take 1.5 tablets by mouth in the morning and at bedtime

## 2024-08-14 RX ORDER — METOPROLOL SUCCINATE 100 MG/1
150 TABLET, EXTENDED RELEASE ORAL 2 TIMES DAILY
Qty: 1 TABLET | Refills: 0 | Status: SHIPPED | OUTPATIENT
Start: 2024-08-14 | End: 2024-08-15 | Stop reason: SDUPTHER

## 2024-08-14 NOTE — TELEPHONE ENCOUNTER
Pt called back and said he's out of this medication and needs it asap. Would like a call to know that the script was sent to the Pharmacy.     Ph [phone]

## 2024-08-15 RX ORDER — METOPROLOL SUCCINATE 100 MG/1
150 TABLET, EXTENDED RELEASE ORAL 2 TIMES DAILY
Qty: 90 TABLET | Refills: 5 | Status: SHIPPED | OUTPATIENT
Start: 2024-08-15

## 2024-08-15 NOTE — TELEPHONE ENCOUNTER
Pt calling back to see if this has been corrected yet to the 1.5 instead of the 1.  Pt is out of this medication so if it could be corrected.

## 2024-08-15 NOTE — TELEPHONE ENCOUNTER
Pt calling and said [facility] needs someone to call them in regard to this script, they have the script wrong. He's supposed to take 1.5 tablets and [facility] has it wrong with 1 tablet.  Pt is out of medication and needs it asap.

## 2024-08-19 ENCOUNTER — OFFICE VISIT (OUTPATIENT)
Dept: NEUROLOGY | Age: 69
End: 2024-08-19
Payer: MEDICARE

## 2024-08-19 VITALS
WEIGHT: 194 LBS | BODY MASS INDEX: 25.6 KG/M2 | SYSTOLIC BLOOD PRESSURE: 130 MMHG | HEART RATE: 82 BPM | DIASTOLIC BLOOD PRESSURE: 82 MMHG

## 2024-08-19 DIAGNOSIS — I83.812 VARICOSE VEINS OF LEFT LOWER EXTREMITY WITH PAIN: ICD-10-CM

## 2024-08-19 DIAGNOSIS — M48.062 SPINAL STENOSIS OF LUMBAR REGION WITH NEUROGENIC CLAUDICATION: ICD-10-CM

## 2024-08-19 DIAGNOSIS — G63 POLYNEUROPATHY ASSOCIATED WITH UNDERLYING DISEASE (HCC): Primary | ICD-10-CM

## 2024-08-19 PROCEDURE — 1036F TOBACCO NON-USER: CPT | Performed by: PSYCHIATRY & NEUROLOGY

## 2024-08-19 PROCEDURE — G8417 CALC BMI ABV UP PARAM F/U: HCPCS | Performed by: PSYCHIATRY & NEUROLOGY

## 2024-08-19 PROCEDURE — 99214 OFFICE O/P EST MOD 30 MIN: CPT | Performed by: PSYCHIATRY & NEUROLOGY

## 2024-08-19 PROCEDURE — G8427 DOCREV CUR MEDS BY ELIG CLIN: HCPCS | Performed by: PSYCHIATRY & NEUROLOGY

## 2024-08-19 PROCEDURE — 3079F DIAST BP 80-89 MM HG: CPT | Performed by: PSYCHIATRY & NEUROLOGY

## 2024-08-19 PROCEDURE — 3075F SYST BP GE 130 - 139MM HG: CPT | Performed by: PSYCHIATRY & NEUROLOGY

## 2024-08-19 PROCEDURE — 3017F COLORECTAL CA SCREEN DOC REV: CPT | Performed by: PSYCHIATRY & NEUROLOGY

## 2024-08-19 PROCEDURE — 1123F ACP DISCUSS/DSCN MKR DOCD: CPT | Performed by: PSYCHIATRY & NEUROLOGY

## 2024-08-19 RX ORDER — GABAPENTIN 300 MG/1
CAPSULE ORAL
Qty: 60 CAPSULE | Refills: 3 | Status: SHIPPED | OUTPATIENT
Start: 2024-08-19 | End: 2024-10-28

## 2024-08-19 NOTE — PROGRESS NOTES
radiculopathy What is the sedation requirement?->None What reading provider will be dictating this exam?->CRC FINDINGS: BONES/ALIGNMENT: Vertebral body heights are maintained.  There is age-appropriate bone marrow signal.  There is multilevel degenerative disc disease with loss of disc signal.  There is disc space narrowing at L5-S1. There is no spondylolisthesis. SPINAL CORD: The conus terminates normally. SOFT TISSUES: No paraspinal mass identified. L1-L2: There is a circumferential disc bulge with facet hypertrophy.  There is no canal stenosis or foraminal narrowing. L2-L3: There is a circumferential disc bulge with facet hypertrophy.  There is no canal stenosis or left foraminal narrowing.  There is mild right foraminal narrowing. L3-L4: There is a circumferential disc bulge with facet and ligamentous hypertrophy.  There is canal stenosis measuring 7 mm in AP dimension.  There is no foraminal narrowing. L4-L5: There is a circumferential disc bulge with facet and ligamentous hypertrophy.  There is canal stenosis measuring 6 mm in AP dimension.  There is narrowing of the lateral recesses.  There is mild bilateral foraminal narrowing. L5-S1: There is a circumferential disc bulge with facet hypertrophy.  There is no canal stenosis.  There is mild bilateral foraminal narrowing.     Multilevel degenerative change with canal stenosis at L3-4 and L4-5. Foraminal narrowing and lateral recess narrowing as described above.       Lab Results   Component Value Date/Time    WBC 5.6 06/14/2024 09:49 AM    RBC 4.06 06/14/2024 09:49 AM    HGB 13.0 06/14/2024 09:49 AM    HCT 38.5 06/14/2024 09:49 AM    MCV 94.8 06/14/2024 09:49 AM    MCH 32.0 06/14/2024 09:49 AM    MCHC 33.8 06/14/2024 09:49 AM    RDW 13.6 06/14/2024 09:49 AM     06/14/2024 09:49 AM     Lab Results   Component Value Date/Time     06/14/2024 09:49 AM    K 3.7 06/14/2024 09:49 AM     06/14/2024 09:49 AM    CO2 23 06/14/2024 09:49 AM    BUN 11  for now till of workup is complete.  We did discuss the diagnosis with this with him as well    Tim Crawford MD, STEFAN  Diplomate, American Board of Psychiatry & Neurology  Board Certified in Vascular Neurology  Board Certified in Neuromuscular Medicine  Certified in Neurorehabilitation         Plan:      Orders Placed This Encounter   Procedures    Dominic Marquez MD, Neurosurgery, [place]     Referral Priority:   Routine     Referral Type:   Eval and Treat     Referral Reason:   Specialty Services Required     Referred to Provider:   Dominic Moreau MD     Requested Specialty:   Neurosurgery     Number of Visits Requested:   1     Orders Placed This Encounter   Medications    gabapentin (NEURONTIN) 300 MG capsule     Sig: One bid     Dispense:  60 capsule     Refill:  3       No follow-ups on file.      Tim Crawford MD

## 2024-08-21 ENCOUNTER — INITIAL CONSULT (OUTPATIENT)
Age: 69
End: 2024-08-21

## 2024-08-21 ENCOUNTER — TELEMEDICINE (OUTPATIENT)
Dept: INTERVENTIONAL RADIOLOGY/VASCULAR | Age: 69
End: 2024-08-21
Payer: MEDICARE

## 2024-08-21 VITALS
TEMPERATURE: 97.6 F | SYSTOLIC BLOOD PRESSURE: 130 MMHG | WEIGHT: 194 LBS | HEIGHT: 72 IN | BODY MASS INDEX: 26.28 KG/M2 | DIASTOLIC BLOOD PRESSURE: 82 MMHG

## 2024-08-21 DIAGNOSIS — R20.2 NUMBNESS AND TINGLING OF BOTH LOWER EXTREMITIES: Primary | ICD-10-CM

## 2024-08-21 DIAGNOSIS — I83.93 VARICOSE VEINS OF BOTH LOWER EXTREMITIES, UNSPECIFIED WHETHER COMPLICATED: ICD-10-CM

## 2024-08-21 DIAGNOSIS — M48.062 SPINAL STENOSIS OF LUMBAR REGION WITH NEUROGENIC CLAUDICATION: Primary | ICD-10-CM

## 2024-08-21 DIAGNOSIS — I87.2 CHRONIC VENOUS INSUFFICIENCY: ICD-10-CM

## 2024-08-21 DIAGNOSIS — R20.0 NUMBNESS AND TINGLING OF BOTH LOWER EXTREMITIES: Primary | ICD-10-CM

## 2024-08-21 PROBLEM — C49.9 LIPOSARCOMA, WELL DIFFERENTIATED TYPE (HCC): Status: ACTIVE | Noted: 2021-02-22

## 2024-08-21 PROCEDURE — 99421 OL DIG E/M SVC 5-10 MIN: CPT | Performed by: NURSE PRACTITIONER

## 2024-08-21 ASSESSMENT — ENCOUNTER SYMPTOMS
ABDOMINAL DISTENTION: 0
BACK PAIN: 0
COUGH: 0
TROUBLE SWALLOWING: 0
ABDOMINAL PAIN: 0
EYE PAIN: 0
SHORTNESS OF BREATH: 0

## 2024-08-21 NOTE — PROGRESS NOTES
Patient Name: Narayan Eddy : 1955        Date: 2024      Type of Appt: Consult    Reason for appt: C: Spinal stenosis of lumbar region with neurogenic claudication     Referred by: EULALIO CESPEDES     Studies; 24 MRI OF LUMBAR SPINE AT [facility]  24 EMG BY DR EULALIO CESPEDES     Physical therapy:NO    Smoking: No    REVIEW OF SYSTEMS:    Rash   Difficulty Urinating Nausea     Fever   Headaches  Bruising/Bleeding Easily     Hearing loss  Constipation  Sleep Disturbance    Shortness of breath Diarrhea  Neck Pain  Back Pain   
swallowing.    Eyes:  Negative for pain and visual disturbance.   Respiratory:  Negative for cough and shortness of breath.    Cardiovascular:  Negative for chest pain and leg swelling.   Gastrointestinal:  Negative for abdominal distention and abdominal pain.   Genitourinary:  Negative for difficulty urinating and urgency.   Musculoskeletal:  Negative for back pain and neck pain.   Neurological:  Positive for weakness and numbness.   Hematological:  Negative for adenopathy. Does not bruise/bleed easily.   Psychiatric/Behavioral:  Negative for behavioral problems and confusion.          Physical Examination:    Vitals:    08/21/24 1301   BP: 130/82   Temp: 97.6 °F (36.4 °C)       Physical Exam  Constitutional:       Appearance: Normal appearance. He is well-developed.   HENT:      Head: Normocephalic and atraumatic.   Eyes:      Conjunctiva/sclera: Conjunctivae normal.      Comments: Pupils equal     Cardiovascular:      Comments: No peripheral edema  Pulmonary:      Effort: Pulmonary effort is normal. No respiratory distress.   Abdominal:      Palpations: Abdomen is soft.      Tenderness: There is no abdominal tenderness.   Musculoskeletal:      Cervical back: Normal range of motion and neck supple.   Skin:     General: Skin is warm and dry.   Neurological:      Mental Status: He is alert.      Coordination: Coordination normal.      Deep Tendon Reflexes:      Reflex Scores:       Bicep reflexes are 2+ on the right side and 2+ on the left side.       Patellar reflexes are 2+ on the right side and 2+ on the left side.       Achilles reflexes are 2+ on the right side and 2+ on the left side.     Comments: Motor 5/5 UE and LE  Sensation intact LT except decreased BLE, more in feet.  DTR's 2+ biceps, achilles and patella   Psychiatric:         Mood and Affect: Mood normal.         Speech: Speech normal.         Thought Content: Thought content normal.              Gait  normal            Radiology Personal

## 2024-08-21 NOTE — PROGRESS NOTES
Vascular Medicine and Interventional Radiology Progress Note:    Chief Complaint   Patient presents with    Follow-up     US results     Documentation:  I communicated with the patient and/or health care decision maker about:    Interval change: 69-year-old male evaluated via telephone for BLE arterial and venous ultrasound results.  He continues to endorse bilateral lower extremity numbness, pins/needles feeling.  He reports history of chronic venous insufficiency, although denies leg heaviness, swelling or any symptoms associated with venous disease at this time.  He continues to wear compression stockings daily as he has for many years.  States he followed up with neurology yesterday regarding his neuropathy, neurology noting no clear etiology from vascular perspective and he was referred to neurosurgery for additional opinion/recommendations.  Denies chest pain.   Denies dyspnea.     HPI from last clinic visit on 7/24/2024  summarized below:  Narayan Eddy referred by Dr. Crawford for evaluation of PVD.  Patient with history of bilateral lower extremity numbness, pins/needles feeling.  States he notices it most when he first gets out of bed in the morning.  States that seems to feel better with walking.  States he has not is aware of the feeling during the day, however does notice it at night, especially in the morning as mentioned above.  States sometimes if something touches his feet unexpectedly and will make him jump as it startles him.  He reports it is not painful, however does affect his everyday life.  He reports this morning for example, he hurt his toenail, stubbed his toe as he did not know it was against furniture.  States he does take gabapentin for this.  Also has prescription for Requip, however has not been taking.  He reports history of chronic venous insufficiency, history of vein stripping in his right leg (greater saphenous and small saphenous veins) in the 1980s in [place].  He does report

## 2024-08-23 ENCOUNTER — PATIENT MESSAGE (OUTPATIENT)
Dept: FAMILY MEDICINE CLINIC | Age: 69
End: 2024-08-23

## 2024-08-23 DIAGNOSIS — I10 ESSENTIAL HYPERTENSION: ICD-10-CM

## 2024-08-23 RX ORDER — METOPROLOL SUCCINATE 100 MG/1
150 TABLET, EXTENDED RELEASE ORAL DAILY
Qty: 180 TABLET | Refills: 3 | Status: SHIPPED | OUTPATIENT
Start: 2024-08-23

## 2024-08-23 NOTE — TELEPHONE ENCOUNTER
Comments: last rx did not have the correct quantity or directions. Please send in again.    Last Office Visit (last PCP visit):   7/31/2024    Next Visit Date:  Future Appointments   Date Time Provider Department Center   12/19/2024  3:15 PM Tim Crawford MD [place] [facility] Neurology -   1/29/2025 11:15 AM Mukesh Prieto MD [facility] ECC DEP       **If hasn't been seen in over a year OR hasn't followed up according to last diabetes/ADHD visit, make appointment for patient before sending refill to provider.    Rx requested:  Requested Prescriptions     Pending Prescriptions Disp Refills    metoprolol succinate (TOPROL XL) 100 MG extended release tablet 180 tablet 3     Sig: Take 1.5 tablets by mouth daily

## 2024-08-31 DIAGNOSIS — M17.11 PRIMARY OSTEOARTHRITIS OF RIGHT KNEE: ICD-10-CM

## 2024-09-03 RX ORDER — CYCLOBENZAPRINE HCL 5 MG
5 TABLET ORAL NIGHTLY
Qty: 30 TABLET | Refills: 0 | OUTPATIENT
Start: 2024-09-03

## 2024-09-05 ENCOUNTER — TELEPHONE (OUTPATIENT)
Dept: ORTHOPEDIC SURGERY | Facility: CLINIC | Age: 69
End: 2024-09-05
Payer: MEDICARE

## 2024-09-05 DIAGNOSIS — Z96.651 STATUS POST TOTAL RIGHT KNEE REPLACEMENT: ICD-10-CM

## 2024-09-06 RX ORDER — AMOXICILLIN 500 MG/1
CAPSULE ORAL
Qty: 4 CAPSULE | Refills: 5 | Status: SHIPPED | OUTPATIENT
Start: 2024-09-06

## 2024-09-30 RX ORDER — ESOMEPRAZOLE MAGNESIUM 40 MG/1
40 CAPSULE, DELAYED RELEASE ORAL
Qty: 90 CAPSULE | Refills: 0 | Status: SHIPPED | OUTPATIENT
Start: 2024-09-30

## 2024-10-16 ENCOUNTER — HOSPITAL ENCOUNTER (OUTPATIENT)
Dept: RADIOLOGY | Facility: HOSPITAL | Age: 69
Discharge: HOME | End: 2024-10-16
Payer: MEDICARE

## 2024-10-16 ENCOUNTER — APPOINTMENT (OUTPATIENT)
Dept: ORTHOPEDIC SURGERY | Facility: CLINIC | Age: 69
End: 2024-10-16
Payer: MEDICARE

## 2024-10-16 DIAGNOSIS — Z47.1 AFTERCARE FOLLOWING RIGHT KNEE JOINT REPLACEMENT SURGERY: ICD-10-CM

## 2024-10-16 DIAGNOSIS — Z96.651 STATUS POST TOTAL RIGHT KNEE REPLACEMENT: ICD-10-CM

## 2024-10-16 DIAGNOSIS — Z96.651 AFTERCARE FOLLOWING RIGHT KNEE JOINT REPLACEMENT SURGERY: ICD-10-CM

## 2024-10-16 DIAGNOSIS — M17.11 PRIMARY OSTEOARTHRITIS OF RIGHT KNEE: Primary | ICD-10-CM

## 2024-10-16 PROCEDURE — 1036F TOBACCO NON-USER: CPT | Performed by: ORTHOPAEDIC SURGERY

## 2024-10-16 PROCEDURE — 73562 X-RAY EXAM OF KNEE 3: CPT | Mod: RT

## 2024-10-16 PROCEDURE — 3044F HG A1C LEVEL LT 7.0%: CPT | Performed by: ORTHOPAEDIC SURGERY

## 2024-10-16 PROCEDURE — 1159F MED LIST DOCD IN RCRD: CPT | Performed by: ORTHOPAEDIC SURGERY

## 2024-10-16 PROCEDURE — 99213 OFFICE O/P EST LOW 20 MIN: CPT | Performed by: ORTHOPAEDIC SURGERY

## 2024-10-16 PROCEDURE — 73562 X-RAY EXAM OF KNEE 3: CPT | Mod: RIGHT SIDE | Performed by: RADIOLOGY

## 2024-10-16 PROCEDURE — 4010F ACE/ARB THERAPY RXD/TAKEN: CPT | Performed by: ORTHOPAEDIC SURGERY

## 2024-10-16 NOTE — PROGRESS NOTES
History of present illness    69-year-old here for follow-up right total knee replacement from April 1 she is 6 months postop states is doing well with no complaints at this time little bit stiff at times no numbness or tingling no fevers or chills he is ambulating without assistive device.      Past medical , Surgical, Family and social history reviewed.      Physical exam  General: No acute distress and breathing comfortably.  Patient is pleasant and cooperative with the examination.    Extremity  Incisions well-approximated without infection range of motion 0-1 15 no instability brisk cap refill compartments soft calf is nontender    Diagnostics  See dictated x-ray report    No results found.     Procedure  [ none]    Assessment  Status post right total knee replacement    Treatment plan  1.  Continue work on range of motion strengthening continue to weight-bear as tolerated follow-up with me in 6 months with x-rays sooner if has increased pain or discomfort.  2. [   ]  3. [   ]  4.  All of the patient's questions were answered.    Orders Placed This Encounter    XR knee right 3 views       This note was prepared using voice recognition software.  The details of this note are correct and have been reviewed, and corrected to the best of my ability.  Some grammatical areas may persist related to the Dragon software    Gonzalo Haddad MD  Senior Attending Physician  [facility]  [facility] [place]    [phone]

## 2024-12-17 DIAGNOSIS — E78.2 MIXED HYPERLIPIDEMIA: ICD-10-CM

## 2024-12-18 RX ORDER — SIMVASTATIN 20 MG
20 TABLET ORAL DAILY
Qty: 90 TABLET | Refills: 2 | Status: SHIPPED | OUTPATIENT
Start: 2024-12-18

## 2024-12-18 NOTE — TELEPHONE ENCOUNTER
Comments:     Last Office Visit (last PCP visit):   7/31/2024    Next Visit Date:  Future Appointments   Date Time Provider Department Center   12/19/2024  3:15 PM Tim Crawford MD [place] [facility] Neurology -   1/29/2025 11:15 AM Mukesh Prieto MD [facility] ECC DEP       **If hasn't been seen in over a year OR hasn't followed up according to last diabetes/ADHD visit, make appointment for patient before sending refill to provider.    Rx requested:  Requested Prescriptions     Pending Prescriptions Disp Refills    simvastatin (ZOCOR) 20 MG tablet [Pharmacy Med Name: Simvastatin Oral Tablet 20 MG] 90 tablet 0     Sig: TAKE ONE TABLET BY MOUTH EVERY DAY

## 2024-12-19 ENCOUNTER — OFFICE VISIT (OUTPATIENT)
Dept: NEUROLOGY | Age: 69
End: 2024-12-19
Payer: MEDICARE

## 2024-12-19 VITALS
SYSTOLIC BLOOD PRESSURE: 120 MMHG | WEIGHT: 200 LBS | DIASTOLIC BLOOD PRESSURE: 80 MMHG | HEART RATE: 79 BPM | BODY MASS INDEX: 27.12 KG/M2

## 2024-12-19 DIAGNOSIS — M48.062 SPINAL STENOSIS OF LUMBAR REGION WITH NEUROGENIC CLAUDICATION: ICD-10-CM

## 2024-12-19 DIAGNOSIS — G63 POLYNEUROPATHY ASSOCIATED WITH UNDERLYING DISEASE (HCC): Primary | ICD-10-CM

## 2024-12-19 DIAGNOSIS — R27.0 ATAXIA: ICD-10-CM

## 2024-12-19 PROCEDURE — G8417 CALC BMI ABV UP PARAM F/U: HCPCS | Performed by: PSYCHIATRY & NEUROLOGY

## 2024-12-19 PROCEDURE — G8427 DOCREV CUR MEDS BY ELIG CLIN: HCPCS | Performed by: PSYCHIATRY & NEUROLOGY

## 2024-12-19 PROCEDURE — 1123F ACP DISCUSS/DSCN MKR DOCD: CPT | Performed by: PSYCHIATRY & NEUROLOGY

## 2024-12-19 PROCEDURE — 1159F MED LIST DOCD IN RCRD: CPT | Performed by: PSYCHIATRY & NEUROLOGY

## 2024-12-19 PROCEDURE — 3074F SYST BP LT 130 MM HG: CPT | Performed by: PSYCHIATRY & NEUROLOGY

## 2024-12-19 PROCEDURE — 3079F DIAST BP 80-89 MM HG: CPT | Performed by: PSYCHIATRY & NEUROLOGY

## 2024-12-19 PROCEDURE — 99214 OFFICE O/P EST MOD 30 MIN: CPT | Performed by: PSYCHIATRY & NEUROLOGY

## 2024-12-19 PROCEDURE — G8484 FLU IMMUNIZE NO ADMIN: HCPCS | Performed by: PSYCHIATRY & NEUROLOGY

## 2024-12-19 PROCEDURE — 3017F COLORECTAL CA SCREEN DOC REV: CPT | Performed by: PSYCHIATRY & NEUROLOGY

## 2024-12-19 PROCEDURE — 1036F TOBACCO NON-USER: CPT | Performed by: PSYCHIATRY & NEUROLOGY

## 2024-12-19 RX ORDER — GABAPENTIN 300 MG/1
CAPSULE ORAL
Qty: 90 CAPSULE | Refills: 3 | Status: SHIPPED | OUTPATIENT
Start: 2024-12-19 | End: 2025-02-27

## 2024-12-19 ASSESSMENT — ENCOUNTER SYMPTOMS
TROUBLE SWALLOWING: 0
CHOKING: 0
BACK PAIN: 0
COLOR CHANGE: 0
NAUSEA: 0
PHOTOPHOBIA: 0
VOMITING: 0
SHORTNESS OF BREATH: 0

## 2024-12-19 NOTE — PROGRESS NOTES
Subjective:      Patient ID: Narayan Eddy is a 69 y.o. male who presents today for:  Chief Complaint   Patient presents with    Follow-up     Pt states he still has a lot of numbness. Pt states his balance is not quite there. No question        HPI 69-year-old right-handed gentleman with history of polyneuropathy with spinal stenosis of lumbar spine.  Patient has varicose veins as well.  Patient has definite sensorimotor polyneuropathy.  He is on gabapentin 3 mg at night which helps.  Patient had MRI of the lumbar spine which shows focal stenosis at L4-L5.  Small fiber neuropathy as noted on his EMG study.  No clear etiology has been established.  This is not uncommon.  There is only symptomatic treatment.  We have changed his gabapentin to twice a day dosing.  Patient still has a lot of numbness and has issues with balance    Patient still has considerable weakness in his legs and patient has significant stenosis of the lumbar spine.  Patient was seen by Dr. Moreau as well and there was some discussion about surgery which she did not want to.  Will keep an eye on this and continue to follow-up otherwise becoming more weaker.    Past Medical History:   Diagnosis Date    Actinic keratoses     has had LN2 and used Efudex    Alcohol consumption of one to four drinks per day on alcohol screening     Allergic rhinitis     cats, weeds, grasses, ragweed    Asthma     Bilateral knee pain     Diabetes mellitus (HCC)     Gout     History of colon polyps 02/2016    needs f/u 5 years [place]    History of type 2 diabetes mellitus     about 15 years    Hyperlipidemia     Hypertension     Keratosis, inflamed seborrheic     Osteoarthritis, localized, shoulder, right     Polyp of transverse colon f/u due 2021 08/09/2014    Prediabetes     Prostate cancer (HCC) 2014    s/p prostatectomy    Syncope and collapse     Varicose vein of leg      Past Surgical History:   Procedure Laterality Date    ARM SURGERY Left 7/9/2024    ARM

## 2025-01-07 RX ORDER — ESOMEPRAZOLE MAGNESIUM 40 MG/1
40 CAPSULE, DELAYED RELEASE ORAL
Qty: 90 CAPSULE | Refills: 0 | Status: SHIPPED | OUTPATIENT
Start: 2025-01-07

## 2025-02-27 ENCOUNTER — TELEPHONE (OUTPATIENT)
Dept: FAMILY MEDICINE CLINIC | Age: 70
End: 2025-02-27

## 2025-03-04 DIAGNOSIS — J45.41 MODERATE PERSISTENT ASTHMA WITH ACUTE EXACERBATION: ICD-10-CM

## 2025-03-04 RX ORDER — FLUTICASONE PROPIONATE 110 UG/1
2 AEROSOL, METERED RESPIRATORY (INHALATION) 2 TIMES DAILY
Qty: 3 EACH | Refills: 1 | Status: SHIPPED | OUTPATIENT
Start: 2025-03-04 | End: 2026-03-04

## 2025-03-04 NOTE — TELEPHONE ENCOUNTER
Comments: Pharmacy calling to request refill on pt behalf    Last Office Visit (last PCP visit):   7/31/2024    Next Visit Date:  Future Appointments   Date Time Provider Department Center   6/19/2025  2:45 PM Tim Crawford MD [place] [facility] Neurology -       **If hasn't been seen in over a year OR hasn't followed up according to last diabetes/ADHD visit, make appointment for patient before sending refill to provider.    Rx requested:  Requested Prescriptions     Pending Prescriptions Disp Refills    fluticasone (FLOVENT HFA) 110 MCG/ACT inhaler 3 each 1     Sig: Inhale 2 puffs into the lungs 2 times daily

## 2025-03-22 DIAGNOSIS — M1A.0720 CHRONIC GOUT OF LEFT FOOT, UNSPECIFIED CAUSE: ICD-10-CM

## 2025-03-24 DIAGNOSIS — Z12.5 PROSTATE CANCER SCREENING: ICD-10-CM

## 2025-03-24 DIAGNOSIS — E78.2 MIXED HYPERLIPIDEMIA: ICD-10-CM

## 2025-03-24 DIAGNOSIS — M1A.0720 CHRONIC GOUT OF LEFT FOOT, UNSPECIFIED CAUSE: ICD-10-CM

## 2025-03-24 DIAGNOSIS — E79.0 HYPERURICEMIA: ICD-10-CM

## 2025-03-24 DIAGNOSIS — E55.9 VITAMIN D DEFICIENCY: ICD-10-CM

## 2025-03-24 DIAGNOSIS — I10 ESSENTIAL HYPERTENSION: Primary | ICD-10-CM

## 2025-03-24 DIAGNOSIS — R79.89 ELEVATED LFTS: ICD-10-CM

## 2025-03-24 DIAGNOSIS — E11.9 TYPE 2 DIABETES MELLITUS WITHOUT COMPLICATION, WITHOUT LONG-TERM CURRENT USE OF INSULIN: ICD-10-CM

## 2025-03-24 RX ORDER — ALLOPURINOL 100 MG/1
100 TABLET ORAL DAILY
Qty: 30 TABLET | Refills: 3 | Status: SHIPPED | OUTPATIENT
Start: 2025-03-24

## 2025-03-24 NOTE — TELEPHONE ENCOUNTER
Comments: Call pt to schedule f/u?    Last Office Visit (last PCP visit):   7/31/2024    Next Visit Date:  Future Appointments   Date Time Provider Department Center   6/19/2025  2:45 PM Tim Crawford MD [place] [facility] Neurology -       **If hasn't been seen in over a year OR hasn't followed up according to last diabetes/ADHD visit, make appointment for patient before sending refill to provider.    Rx requested:  Requested Prescriptions     Pending Prescriptions Disp Refills    allopurinol (ZYLOPRIM) 100 MG tablet [Pharmacy Med Name: Allopurinol Oral Tablet 100 MG] 30 tablet 0     Sig: TAKE ONE TABLET BY MOUTH DAILY

## 2025-04-16 ENCOUNTER — APPOINTMENT (OUTPATIENT)
Dept: ORTHOPEDIC SURGERY | Facility: CLINIC | Age: 70
End: 2025-04-16
Payer: COMMERCIAL

## 2025-05-07 ENCOUNTER — APPOINTMENT (OUTPATIENT)
Dept: ORTHOPEDIC SURGERY | Facility: CLINIC | Age: 70
End: 2025-05-07
Payer: MEDICARE

## 2025-05-19 RX ORDER — GABAPENTIN 300 MG/1
300 CAPSULE ORAL 3 TIMES DAILY
Qty: 90 CAPSULE | Refills: 3 | Status: ON HOLD | OUTPATIENT
Start: 2025-05-19 | End: 2025-09-16

## 2025-05-19 NOTE — TELEPHONE ENCOUNTER
Pharmacy is  requesting medication refill. Please approve or deny this request.    Rx requested:  Requested Prescriptions     Pending Prescriptions Disp Refills    gabapentin (NEURONTIN) 300 MG capsule [Pharmacy Med Name: Gabapentin Oral Capsule 300 MG] 90 capsule 3     Sig: Take 1 capsule by mouth in the morning, at noon, and at bedtime for 120 days.         Last Office Visit:   12/19/2024      Next Visit Date:  Future Appointments   Date Time Provider Department Center   6/19/2025  2:45 PM Tim Crawford MD [place] NE[facility]O Neurology -

## 2025-05-20 RX ORDER — ESOMEPRAZOLE MAGNESIUM 40 MG/1
40 CAPSULE, DELAYED RELEASE ORAL
Qty: 90 CAPSULE | Refills: 0 | Status: SHIPPED | OUTPATIENT
Start: 2025-05-20

## 2025-06-17 ENCOUNTER — TELEPHONE (OUTPATIENT)
Dept: FAMILY MEDICINE CLINIC | Age: 70
End: 2025-06-17

## 2025-06-17 NOTE — TELEPHONE ENCOUNTER
Kamilla Spann pt's significant other stopped in. Pt keeps falling she has to call 911 to get him up. She said pt is weak  and not able to walk. She can't get him out of the house. Pt using a walker at home, can't do the steps to leave the house. She  isn't  sure what to do for him. Pt won't go to the ER       Kamilla [phone]

## 2025-07-11 DIAGNOSIS — I10 ESSENTIAL HYPERTENSION: ICD-10-CM

## 2025-07-14 ENCOUNTER — APPOINTMENT (OUTPATIENT)
Dept: CT IMAGING | Age: 70
DRG: 641 | End: 2025-07-14
Payer: MEDICARE

## 2025-07-14 ENCOUNTER — HOSPITAL ENCOUNTER (INPATIENT)
Age: 70
LOS: 7 days | Discharge: NURSING, REHAB OR LONG-TERM CARE | DRG: 641 | End: 2025-07-21
Admitting: INTERNAL MEDICINE
Payer: MEDICARE

## 2025-07-14 DIAGNOSIS — R60.0 LOCALIZED EDEMA: ICD-10-CM

## 2025-07-14 DIAGNOSIS — R60.1 GENERALIZED EDEMA: ICD-10-CM

## 2025-07-14 DIAGNOSIS — S09.90XA CLOSED HEAD INJURY, INITIAL ENCOUNTER: ICD-10-CM

## 2025-07-14 DIAGNOSIS — D64.9 ANEMIA, UNSPECIFIED TYPE: ICD-10-CM

## 2025-07-14 DIAGNOSIS — E83.42 HYPOMAGNESEMIA: ICD-10-CM

## 2025-07-14 DIAGNOSIS — K70.9 ALCOHOLIC LIVER DISEASE: ICD-10-CM

## 2025-07-14 DIAGNOSIS — F10.20 ALCOHOLISM (HCC): ICD-10-CM

## 2025-07-14 DIAGNOSIS — E87.1 HYPONATREMIA: ICD-10-CM
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
## 2025-07-14 LAB
ALBUMIN SERPL-MCNC: 3.2 G/DL (ref 3.5–4.6)
ALP SERPL-CCNC: 202 U/L (ref 35–104)
ALT SERPL-CCNC: 27 U/L (ref 0–41)
AMMONIA PLAS-SCNC: 39 UMOL/L (ref 16–60)
ANION GAP SERPL CALCULATED.3IONS-SCNC: 14 MEQ/L (ref 9–15)
APTT PPP: 35.7 SEC (ref 24.4–36.8)
AST SERPL-CCNC: 91 U/L (ref 0–40)
BASOPHILS # BLD: 0 K/UL (ref 0–0.2)
BASOPHILS NFR BLD: 0.5 %
BILIRUB SERPL-MCNC: 3.2 MG/DL (ref 0.2–0.7)
BNP BLD-MCNC: 2200 PG/ML
BUN SERPL-MCNC: 7 MG/DL (ref 8–23)
CALCIUM SERPL-MCNC: 7.5 MG/DL (ref 8.5–9.9)
CHLORIDE SERPL-SCNC: 86 MEQ/L (ref 95–107)
CO2 SERPL-SCNC: 21 MEQ/L (ref 20–31)
CREAT SERPL-MCNC: 0.9 MG/DL (ref 0.7–1.2)
EOSINOPHIL # BLD: 0.1 K/UL (ref 0–0.7)
EOSINOPHIL NFR BLD: 1.2 %
ERYTHROCYTE [DISTWIDTH] IN BLOOD BY AUTOMATED COUNT: 13.1 % (ref 11.5–14.5)
ETHANOL PERCENT: 0.17 G/DL
ETHANOLAMINE SERPL-MCNC: 191 MG/DL (ref 0–0.08)
GLOBULIN SER CALC-MCNC: 2.6 G/DL (ref 2.3–3.5)
GLUCOSE SERPL-MCNC: 118 MG/DL (ref 70–99)
HCT VFR BLD AUTO: 27.7 % (ref 42–52)
HGB BLD-MCNC: 10.1 G/DL (ref 14–18)
INR PPP: 1.4
LYMPHOCYTES # BLD: 0.9 K/UL (ref 1–4.8)
LYMPHOCYTES NFR BLD: 14.3 %
MAGNESIUM SERPL-MCNC: 1.1 MG/DL (ref 1.7–2.4)
MCH RBC QN AUTO: 36.2 PG (ref 27–31.3)
MCHC RBC AUTO-ENTMCNC: 36.5 % (ref 33–37)
MCV RBC AUTO: 99.3 FL (ref 79–92.2)
MONOCYTES # BLD: 0.8 K/UL (ref 0.2–0.8)
MONOCYTES NFR BLD: 12.1 %
NEUTROPHILS # BLD: 4.7 K/UL (ref 1.4–6.5)
NEUTS SEG NFR BLD: 71.4 %
PLATELET # BLD AUTO: 169 K/UL (ref 130–400)
POTASSIUM SERPL-SCNC: 4.4 MEQ/L (ref 3.4–4.9)
PROT SERPL-MCNC: 5.8 G/DL (ref 6.3–8)
PROTHROMBIN TIME: 18.2 SEC (ref 12.3–14.9)
RBC # BLD AUTO: 2.79 M/UL (ref 4.7–6.1)
SODIUM SERPL-SCNC: 121 MEQ/L (ref 135–144)
TSH REFLEX: 2.04 UIU/ML (ref 0.44–3.86)
WBC # BLD AUTO: 6.5 K/UL (ref 4.8–10.8)

## 2025-07-14 PROCEDURE — 83735 ASSAY OF MAGNESIUM: CPT

## 2025-07-14 PROCEDURE — 70450 CT HEAD/BRAIN W/O DYE: CPT

## 2025-07-14 PROCEDURE — 36415 COLL VENOUS BLD VENIPUNCTURE: CPT

## 2025-07-14 PROCEDURE — 82077 ASSAY SPEC XCP UR&BREATH IA: CPT

## 2025-07-14 PROCEDURE — 83880 ASSAY OF NATRIURETIC PEPTIDE: CPT

## 2025-07-14 PROCEDURE — 99285 EMERGENCY DEPT VISIT HI MDM: CPT

## 2025-07-14 PROCEDURE — 85025 COMPLETE CBC W/AUTO DIFF WBC: CPT

## 2025-07-14 PROCEDURE — 82140 ASSAY OF AMMONIA: CPT

## 2025-07-14 PROCEDURE — 1210000000 HC MED SURG R&B

## 2025-07-14 PROCEDURE — 84443 ASSAY THYROID STIM HORMONE: CPT

## 2025-07-14 PROCEDURE — 80053 COMPREHEN METABOLIC PANEL: CPT

## 2025-07-14 PROCEDURE — 85610 PROTHROMBIN TIME: CPT

## 2025-07-14 PROCEDURE — 85730 THROMBOPLASTIN TIME PARTIAL: CPT

## 2025-07-14 RX ORDER — MAGNESIUM SULFATE IN WATER 40 MG/ML
4000 INJECTION, SOLUTION INTRAVENOUS ONCE
Status: DISCONTINUED | OUTPATIENT
Start: 2025-07-14 | End: 2025-07-15

## 2025-07-14 RX ORDER — IRBESARTAN 150 MG/1
150 TABLET ORAL DAILY
Qty: 30 TABLET | Refills: 12 | Status: ON HOLD | OUTPATIENT
Start: 2025-07-14 | End: 2025-07-18 | Stop reason: HOSPADM

## 2025-07-14 RX ORDER — 0.9 % SODIUM CHLORIDE 0.9 %
1000 INTRAVENOUS SOLUTION INTRAVENOUS ONCE
Status: DISCONTINUED | OUTPATIENT
Start: 2025-07-14 | End: 2025-07-15

## 2025-07-14 ASSESSMENT — PAIN - FUNCTIONAL ASSESSMENT: PAIN_FUNCTIONAL_ASSESSMENT: NONE - DENIES PAIN

## 2025-07-14 ASSESSMENT — LIFESTYLE VARIABLES
HOW OFTEN DO YOU HAVE A DRINK CONTAINING ALCOHOL: 4 OR MORE TIMES A WEEK
HOW MANY STANDARD DRINKS CONTAINING ALCOHOL DO YOU HAVE ON A TYPICAL DAY: 3 OR 4

## 2025-07-14 NOTE — TELEPHONE ENCOUNTER
Comments: joe sent    Last Office Visit (last PCP visit):   7/31/2024    Next Visit Date:  No future appointments.    **If hasn't been seen in over a year OR hasn't followed up according to last diabetes/ADHD visit, make appointment for patient before sending refill to provider.    Rx requested:  Requested Prescriptions     Pending Prescriptions Disp Refills    irbesartan (AVAPRO) 150 MG tablet [Pharmacy Med Name: Irbesartan Oral Tablet 150 MG] 90 tablet 0     Sig: TAKE ONE TABLET BY MOUTH DAILY

## 2025-07-15 ENCOUNTER — APPOINTMENT (OUTPATIENT)
Age: 70
DRG: 641 | End: 2025-07-15
Attending: INTERNAL MEDICINE
Payer: MEDICARE

## 2025-07-15 ENCOUNTER — APPOINTMENT (OUTPATIENT)
Dept: ULTRASOUND IMAGING | Age: 70
DRG: 641 | End: 2025-07-15
Payer: MEDICARE
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
## 2025-07-15 LAB
ALBUMIN SERPL-MCNC: 3.1 G/DL (ref 3.5–4.6)
ALP SERPL-CCNC: 212 U/L (ref 35–104)
ALT SERPL-CCNC: 27 U/L (ref 0–41)
AMPHET UR QL SCN: NORMAL
ANION GAP SERPL CALCULATED.3IONS-SCNC: 13 MEQ/L (ref 9–15)
ANION GAP SERPL CALCULATED.3IONS-SCNC: 14 MEQ/L (ref 9–15)
AST SERPL-CCNC: 91 U/L (ref 0–40)
BARBITURATES UR QL SCN: NORMAL
BASOPHILS # BLD: 0 K/UL (ref 0–0.2)
BASOPHILS NFR BLD: 0.6 %
BENZODIAZ UR QL SCN: NORMAL
BILIRUB DIRECT SERPL-MCNC: 2.3 MG/DL (ref 0–0.4)
BILIRUB INDIRECT SERPL-MCNC: 0.9 MG/DL (ref 0–0.6)
BILIRUB SERPL-MCNC: 3.2 MG/DL (ref 0.2–0.7)
BILIRUB UR QL STRIP: NEGATIVE
BUN SERPL-MCNC: 6 MG/DL (ref 8–23)
BUN SERPL-MCNC: 7 MG/DL (ref 8–23)
CALCIUM SERPL-MCNC: 7.5 MG/DL (ref 8.5–9.9)
CALCIUM SERPL-MCNC: 7.7 MG/DL (ref 8.5–9.9)
CALCIUM SERPL-MCNC: 7.8 MG/DL (ref 8.5–9.9)
CALCIUM SERPL-MCNC: 7.8 MG/DL (ref 8.5–9.9)
CANNABINOIDS UR QL SCN: NORMAL
CHLORIDE SERPL-SCNC: 85 MEQ/L (ref 95–107)
CHLORIDE SERPL-SCNC: 86 MEQ/L (ref 95–107)
CLARITY UR: CLEAR
CO2 SERPL-SCNC: 20 MEQ/L (ref 20–31)
CO2 SERPL-SCNC: 21 MEQ/L (ref 20–31)
CO2 SERPL-SCNC: 22 MEQ/L (ref 20–31)
CO2 SERPL-SCNC: 22 MEQ/L (ref 20–31)
COCAINE UR QL SCN: NORMAL
COLOR UR: YELLOW
CREAT SERPL-MCNC: 0.63 MG/DL (ref 0.7–1.2)
CREAT SERPL-MCNC: 0.65 MG/DL (ref 0.7–1.2)
CREAT SERPL-MCNC: 0.66 MG/DL (ref 0.7–1.2)
CREAT SERPL-MCNC: 0.79 MG/DL (ref 0.7–1.2)
CREAT UR-MCNC: 47.5 MG/DL
DRUG SCREEN COMMENT UR-IMP: NORMAL
ECHO AO ROOT DIAM: 3.8 CM
ECHO AO ROOT INDEX: 1.68 CM/M2
ECHO AV AREA PEAK VELOCITY: 2.6 CM2
ECHO AV AREA VTI: 2.5 CM2
ECHO AV AREA/BSA PEAK VELOCITY: 1.2 CM2/M2
ECHO AV AREA/BSA VTI: 1.1 CM2/M2
ECHO AV MEAN GRADIENT: 4 MMHG
ECHO AV MEAN VELOCITY: 0.9 M/S
ECHO AV PEAK GRADIENT: 6 MMHG
ECHO AV PEAK VELOCITY: 1.3 M/S
ECHO AV VELOCITY RATIO: 0.92
ECHO AV VTI: 25 CM
ECHO BSA: 2.3 M2
ECHO EST RA PRESSURE: 3 MMHG
ECHO LA VOL A-L A2C: 52 ML (ref 18–58)
ECHO LA VOL A-L A4C: 64 ML (ref 18–58)
ECHO LA VOL MOD A2C: 51 ML (ref 18–58)
ECHO LA VOL MOD A4C: 61 ML (ref 18–58)
ECHO LA VOLUME AREA LENGTH: 58 ML
ECHO LA VOLUME INDEX A-L A2C: 23 ML/M2 (ref 16–34)
ECHO LA VOLUME INDEX A-L A4C: 28 ML/M2 (ref 16–34)
ECHO LA VOLUME INDEX AREA LENGTH: 26 ML/M2 (ref 16–34)
ECHO LA VOLUME INDEX MOD A2C: 23 ML/M2 (ref 16–34)
ECHO LA VOLUME INDEX MOD A4C: 27 ML/M2 (ref 16–34)
ECHO LV E' LATERAL VELOCITY: 9.78 CM/S
ECHO LV E' SEPTAL VELOCITY: 8.82 CM/S
ECHO LV EDV A4C: 38 ML
ECHO LV EDV INDEX A4C: 17 ML/M2
ECHO LV EF PHYSICIAN: 60 %
ECHO LV EJECTION FRACTION A4C: 69 %
ECHO LV ESV A4C: 12 ML
ECHO LV ESV INDEX A4C: 5 ML/M2
ECHO LVOT AREA: 2.8 CM2
ECHO LVOT AV VTI INDEX: 0.87
ECHO LVOT DIAM: 1.9 CM
ECHO LVOT MEAN GRADIENT: 3 MMHG
ECHO LVOT PEAK GRADIENT: 5 MMHG
ECHO LVOT PEAK VELOCITY: 1.2 M/S
ECHO LVOT STROKE VOLUME INDEX: 27.3 ML/M2
ECHO LVOT SV: 61.8 ML
ECHO LVOT VTI: 21.8 CM
ECHO MV A VELOCITY: 0.66 M/S
ECHO MV E DECELERATION TIME (DT): 225.2 MS
ECHO MV E VELOCITY: 0.8 M/S
ECHO MV E/A RATIO: 1.21
ECHO MV E/E' LATERAL: 8.18
ECHO MV E/E' RATIO (AVERAGED): 8.63
ECHO MV E/E' SEPTAL: 9.07
ECHO PV MAX VELOCITY: 0.7 M/S
ECHO PV PEAK GRADIENT: 2 MMHG
ECHO RV TAPSE: 2.4 CM (ref 1.7–?)
EOSINOPHIL # BLD: 0.1 K/UL (ref 0–0.7)
EOSINOPHIL NFR BLD: 1.2 %
ERYTHROCYTE [DISTWIDTH] IN BLOOD BY AUTOMATED COUNT: 12.9 % (ref 11.5–14.5)
FENTANYL SCREEN, URINE: NORMAL
GLOBULIN SER CALC-MCNC: 2.9 G/DL (ref 2.3–3.5)
GLUCOSE BLD-MCNC: 108 MG/DL (ref 70–99)
GLUCOSE BLD-MCNC: 113 MG/DL (ref 70–99)
GLUCOSE BLD-MCNC: 134 MG/DL (ref 70–99)
GLUCOSE BLD-MCNC: 90 MG/DL (ref 70–99)
GLUCOSE SERPL-MCNC: 101 MG/DL (ref 70–99)
GLUCOSE SERPL-MCNC: 112 MG/DL (ref 70–99)
GLUCOSE SERPL-MCNC: 133 MG/DL (ref 70–99)
GLUCOSE SERPL-MCNC: 90 MG/DL (ref 70–99)
GLUCOSE UR STRIP-MCNC: NEGATIVE MG/DL
HBV SURFACE AG SERPL QL IA: NORMAL
HCT VFR BLD AUTO: 27.8 % (ref 42–52)
HGB BLD-MCNC: 10.3 G/DL (ref 14–18)
HGB UR QL STRIP: NEGATIVE
KETONES UR STRIP-MCNC: NEGATIVE MG/DL
LEUKOCYTE ESTERASE UR QL STRIP: NEGATIVE
LYMPHOCYTES # BLD: 1 K/UL (ref 1–4.8)
LYMPHOCYTES NFR BLD: 15.6 %
MAGNESIUM SERPL-MCNC: 1.1 MG/DL (ref 1.7–2.4)
MAGNESIUM SERPL-MCNC: 1.8 MG/DL (ref 1.7–2.4)
MCH RBC QN AUTO: 36.3 PG (ref 27–31.3)
MCHC RBC AUTO-ENTMCNC: 37.1 % (ref 33–37)
MCV RBC AUTO: 97.9 FL (ref 79–92.2)
METHADONE UR QL SCN: NORMAL
MONOCYTES # BLD: 0.7 K/UL (ref 0.2–0.8)
MONOCYTES NFR BLD: 11.4 %
NEUTROPHILS # BLD: 4.6 K/UL (ref 1.4–6.5)
NEUTS SEG NFR BLD: 70.7 %
NITRITE UR QL STRIP: NEGATIVE
OPIATES UR QL SCN: NORMAL
OSMOLALITY URINE: 140 MOSM/KG (ref 80–1300)
OXYCODONE UR QL SCN: NORMAL
PCP UR QL SCN: NORMAL
PERFORMED ON: ABNORMAL
PERFORMED ON: NORMAL
PH UR STRIP: 6.5 [PH] (ref 5–9)
PLATELET # BLD AUTO: 171 K/UL (ref 130–400)
POTASSIUM SERPL-SCNC: 4.6 MEQ/L (ref 3.4–4.9)
POTASSIUM SERPL-SCNC: 4.7 MEQ/L (ref 3.4–4.9)
POTASSIUM SERPL-SCNC: 4.7 MEQ/L (ref 3.4–4.9)
POTASSIUM SERPL-SCNC: 4.8 MEQ/L (ref 3.4–4.9)
PROPOXYPH UR QL SCN: NORMAL
PROT SERPL-MCNC: 6 G/DL (ref 6.3–8)
PROT UR STRIP-MCNC: NEGATIVE MG/DL
RBC # BLD AUTO: 2.84 M/UL (ref 4.7–6.1)
SERUM OSMOLALITY: 286 MOSM/KG (ref 275–295)
SODIUM SERPL-SCNC: 119 MEQ/L (ref 135–144)
SODIUM SERPL-SCNC: 120 MEQ/L (ref 135–144)
SODIUM SERPL-SCNC: 121 MEQ/L (ref 135–144)
SODIUM SERPL-SCNC: 121 MEQ/L (ref 135–144)
SODIUM UR-SCNC: <20 MEQ/L
SP GR UR STRIP: 1 (ref 1–1.03)
URINE REFLEX TO CULTURE: NORMAL
UROBILINOGEN UR STRIP-ACNC: 1 E.U./DL
WBC # BLD AUTO: 6.5 K/UL (ref 4.8–10.8)

## 2025-07-15 PROCEDURE — 80307 DRUG TEST PRSMV CHEM ANLYZR: CPT

## 2025-07-15 PROCEDURE — 6360000002 HC RX W HCPCS: Performed by: NURSE PRACTITIONER

## 2025-07-15 PROCEDURE — 83930 ASSAY OF BLOOD OSMOLALITY: CPT

## 2025-07-15 PROCEDURE — 93306 TTE W/DOPPLER COMPLETE: CPT | Performed by: INTERNAL MEDICINE

## 2025-07-15 PROCEDURE — 6370000000 HC RX 637 (ALT 250 FOR IP): Performed by: INTERNAL MEDICINE

## 2025-07-15 PROCEDURE — 83735 ASSAY OF MAGNESIUM: CPT

## 2025-07-15 PROCEDURE — 81003 URINALYSIS AUTO W/O SCOPE: CPT

## 2025-07-15 PROCEDURE — 1210000000 HC MED SURG R&B

## 2025-07-15 PROCEDURE — C8929 TTE W OR WO FOL WCON,DOPPLER: HCPCS

## 2025-07-15 PROCEDURE — 82105 ALPHA-FETOPROTEIN SERUM: CPT

## 2025-07-15 PROCEDURE — 86803 HEPATITIS C AB TEST: CPT

## 2025-07-15 PROCEDURE — 82570 ASSAY OF URINE CREATININE: CPT

## 2025-07-15 PROCEDURE — 85025 COMPLETE CBC W/AUTO DIFF WBC: CPT

## 2025-07-15 PROCEDURE — 2580000003 HC RX 258: Performed by: STUDENT IN AN ORGANIZED HEALTH CARE EDUCATION/TRAINING PROGRAM

## 2025-07-15 PROCEDURE — 83935 ASSAY OF URINE OSMOLALITY: CPT

## 2025-07-15 PROCEDURE — 84300 ASSAY OF URINE SODIUM: CPT

## 2025-07-15 PROCEDURE — 99222 1ST HOSP IP/OBS MODERATE 55: CPT | Performed by: SPECIALIST

## 2025-07-15 PROCEDURE — 82248 BILIRUBIN DIRECT: CPT

## 2025-07-15 PROCEDURE — 87340 HEPATITIS B SURFACE AG IA: CPT

## 2025-07-15 PROCEDURE — 2580000003 HC RX 258: Performed by: NURSE PRACTITIONER

## 2025-07-15 PROCEDURE — 76705 ECHO EXAM OF ABDOMEN: CPT

## 2025-07-15 PROCEDURE — 36415 COLL VENOUS BLD VENIPUNCTURE: CPT

## 2025-07-15 PROCEDURE — 80053 COMPREHEN METABOLIC PANEL: CPT

## 2025-07-15 PROCEDURE — 2500000003 HC RX 250 WO HCPCS: Performed by: NURSE PRACTITIONER

## 2025-07-15 PROCEDURE — 82247 BILIRUBIN TOTAL: CPT

## 2025-07-15 PROCEDURE — 94640 AIRWAY INHALATION TREATMENT: CPT

## 2025-07-15 RX ORDER — SODIUM CHLORIDE 9 MG/ML
INJECTION, SOLUTION INTRAVENOUS PRN
Status: DISCONTINUED | OUTPATIENT
Start: 2025-07-15 | End: 2025-07-15 | Stop reason: SDUPTHER

## 2025-07-15 RX ORDER — GABAPENTIN 300 MG/1
300 CAPSULE ORAL 3 TIMES DAILY
Status: DISCONTINUED | OUTPATIENT
Start: 2025-07-15 | End: 2025-07-21 | Stop reason: HOSPADM

## 2025-07-15 RX ORDER — SODIUM CHLORIDE 9 MG/ML
INJECTION, SOLUTION INTRAVENOUS CONTINUOUS
Status: DISCONTINUED | OUTPATIENT
Start: 2025-07-15 | End: 2025-07-17

## 2025-07-15 RX ORDER — LORAZEPAM 1 MG/1
1 TABLET ORAL
Status: DISCONTINUED | OUTPATIENT
Start: 2025-07-15 | End: 2025-07-21 | Stop reason: HOSPADM

## 2025-07-15 RX ORDER — MULTIVITAMIN WITH IRON
1 TABLET ORAL DAILY
Status: DISCONTINUED | OUTPATIENT
Start: 2025-07-15 | End: 2025-07-21 | Stop reason: HOSPADM

## 2025-07-15 RX ORDER — LOSARTAN POTASSIUM 50 MG/1
50 TABLET ORAL DAILY
Status: DISCONTINUED | OUTPATIENT
Start: 2025-07-15 | End: 2025-07-16

## 2025-07-15 RX ORDER — FLUTICASONE PROPIONATE 110 UG/1
2 AEROSOL, METERED RESPIRATORY (INHALATION) 2 TIMES DAILY
Status: DISCONTINUED | OUTPATIENT
Start: 2025-07-15 | End: 2025-07-21 | Stop reason: HOSPADM

## 2025-07-15 RX ORDER — SPIRONOLACTONE 25 MG/1
50 TABLET ORAL DAILY
Status: DISCONTINUED | OUTPATIENT
Start: 2025-07-16 | End: 2025-07-21 | Stop reason: HOSPADM

## 2025-07-15 RX ORDER — POLYETHYLENE GLYCOL 3350 17 G/17G
17 POWDER, FOR SOLUTION ORAL DAILY PRN
Status: DISCONTINUED | OUTPATIENT
Start: 2025-07-15 | End: 2025-07-21 | Stop reason: HOSPADM

## 2025-07-15 RX ORDER — ALLOPURINOL 100 MG/1
100 TABLET ORAL DAILY
Status: DISCONTINUED | OUTPATIENT
Start: 2025-07-15 | End: 2025-07-21 | Stop reason: HOSPADM

## 2025-07-15 RX ORDER — LORAZEPAM 2 MG/ML
3 INJECTION INTRAMUSCULAR
Status: DISCONTINUED | OUTPATIENT
Start: 2025-07-15 | End: 2025-07-21 | Stop reason: HOSPADM

## 2025-07-15 RX ORDER — MAGNESIUM SULFATE IN WATER 40 MG/ML
4000 INJECTION, SOLUTION INTRAVENOUS ONCE
Status: COMPLETED | OUTPATIENT
Start: 2025-07-15 | End: 2025-07-15

## 2025-07-15 RX ORDER — SODIUM CHLORIDE 0.9 % (FLUSH) 0.9 %
5-40 SYRINGE (ML) INJECTION PRN
Status: DISCONTINUED | OUTPATIENT
Start: 2025-07-15 | End: 2025-07-21 | Stop reason: HOSPADM

## 2025-07-15 RX ORDER — ACETAMINOPHEN 650 MG/1
650 SUPPOSITORY RECTAL EVERY 6 HOURS PRN
Status: DISCONTINUED | OUTPATIENT
Start: 2025-07-15 | End: 2025-07-21 | Stop reason: HOSPADM

## 2025-07-15 RX ORDER — LORAZEPAM 1 MG/1
4 TABLET ORAL
Status: DISCONTINUED | OUTPATIENT
Start: 2025-07-15 | End: 2025-07-21 | Stop reason: HOSPADM

## 2025-07-15 RX ORDER — SODIUM CHLORIDE 0.9 % (FLUSH) 0.9 %
5-40 SYRINGE (ML) INJECTION PRN
Status: DISCONTINUED | OUTPATIENT
Start: 2025-07-15 | End: 2025-07-15 | Stop reason: SDUPTHER

## 2025-07-15 RX ORDER — ONDANSETRON 2 MG/ML
4 INJECTION INTRAMUSCULAR; INTRAVENOUS EVERY 6 HOURS PRN
Status: DISCONTINUED | OUTPATIENT
Start: 2025-07-15 | End: 2025-07-21 | Stop reason: HOSPADM

## 2025-07-15 RX ORDER — SODIUM CHLORIDE 0.9 % (FLUSH) 0.9 %
5-40 SYRINGE (ML) INJECTION EVERY 12 HOURS SCHEDULED
Status: DISCONTINUED | OUTPATIENT
Start: 2025-07-15 | End: 2025-07-15 | Stop reason: SDUPTHER

## 2025-07-15 RX ORDER — INSULIN LISPRO 100 [IU]/ML
0-8 INJECTION, SOLUTION INTRAVENOUS; SUBCUTANEOUS
Status: DISCONTINUED | OUTPATIENT
Start: 2025-07-15 | End: 2025-07-21 | Stop reason: HOSPADM

## 2025-07-15 RX ORDER — POTASSIUM CHLORIDE 7.45 MG/ML
10 INJECTION INTRAVENOUS PRN
Status: DISCONTINUED | OUTPATIENT
Start: 2025-07-15 | End: 2025-07-21 | Stop reason: HOSPADM

## 2025-07-15 RX ORDER — LANOLIN ALCOHOL/MO/W.PET/CERES
100 CREAM (GRAM) TOPICAL DAILY
Status: DISCONTINUED | OUTPATIENT
Start: 2025-07-15 | End: 2025-07-21 | Stop reason: HOSPADM

## 2025-07-15 RX ORDER — LORAZEPAM 2 MG/ML
2 INJECTION INTRAMUSCULAR
Status: DISCONTINUED | OUTPATIENT
Start: 2025-07-15 | End: 2025-07-21 | Stop reason: HOSPADM

## 2025-07-15 RX ORDER — MAGNESIUM SULFATE IN WATER 40 MG/ML
2000 INJECTION, SOLUTION INTRAVENOUS PRN
Status: DISCONTINUED | OUTPATIENT
Start: 2025-07-15 | End: 2025-07-21 | Stop reason: HOSPADM

## 2025-07-15 RX ORDER — ATORVASTATIN CALCIUM 10 MG/1
10 TABLET, FILM COATED ORAL DAILY
Status: DISCONTINUED | OUTPATIENT
Start: 2025-07-15 | End: 2025-07-21 | Stop reason: HOSPADM

## 2025-07-15 RX ORDER — ACETAMINOPHEN 325 MG/1
650 TABLET ORAL EVERY 6 HOURS PRN
Status: DISCONTINUED | OUTPATIENT
Start: 2025-07-15 | End: 2025-07-21 | Stop reason: HOSPADM

## 2025-07-15 RX ORDER — FUROSEMIDE 40 MG/1
40 TABLET ORAL DAILY
Status: DISCONTINUED | OUTPATIENT
Start: 2025-07-16 | End: 2025-07-17

## 2025-07-15 RX ORDER — FOLIC ACID 1 MG/1
1 TABLET ORAL DAILY
Status: DISCONTINUED | OUTPATIENT
Start: 2025-07-15 | End: 2025-07-21 | Stop reason: HOSPADM

## 2025-07-15 RX ORDER — POTASSIUM CHLORIDE 1500 MG/1
40 TABLET, EXTENDED RELEASE ORAL PRN
Status: DISCONTINUED | OUTPATIENT
Start: 2025-07-15 | End: 2025-07-21 | Stop reason: HOSPADM

## 2025-07-15 RX ORDER — ENOXAPARIN SODIUM 100 MG/ML
30 INJECTION SUBCUTANEOUS 2 TIMES DAILY
Status: DISCONTINUED | OUTPATIENT
Start: 2025-07-15 | End: 2025-07-21 | Stop reason: HOSPADM

## 2025-07-15 RX ORDER — LACTULOSE 10 G/15ML
10 SOLUTION ORAL DAILY
Status: DISCONTINUED | OUTPATIENT
Start: 2025-07-16 | End: 2025-07-21 | Stop reason: HOSPADM

## 2025-07-15 RX ORDER — ONDANSETRON 4 MG/1
4 TABLET, ORALLY DISINTEGRATING ORAL EVERY 8 HOURS PRN
Status: DISCONTINUED | OUTPATIENT
Start: 2025-07-15 | End: 2025-07-21 | Stop reason: HOSPADM

## 2025-07-15 RX ORDER — SODIUM CHLORIDE 0.9 % (FLUSH) 0.9 %
5-40 SYRINGE (ML) INJECTION EVERY 12 HOURS SCHEDULED
Status: DISCONTINUED | OUTPATIENT
Start: 2025-07-15 | End: 2025-07-21 | Stop reason: HOSPADM

## 2025-07-15 RX ORDER — LORAZEPAM 2 MG/ML
1 INJECTION INTRAMUSCULAR
Status: DISCONTINUED | OUTPATIENT
Start: 2025-07-15 | End: 2025-07-21 | Stop reason: HOSPADM

## 2025-07-15 RX ORDER — LORAZEPAM 1 MG/1
3 TABLET ORAL
Status: DISCONTINUED | OUTPATIENT
Start: 2025-07-15 | End: 2025-07-21 | Stop reason: HOSPADM

## 2025-07-15 RX ORDER — DEXTROSE MONOHYDRATE 100 MG/ML
INJECTION, SOLUTION INTRAVENOUS CONTINUOUS PRN
Status: DISCONTINUED | OUTPATIENT
Start: 2025-07-15 | End: 2025-07-21 | Stop reason: HOSPADM

## 2025-07-15 RX ORDER — PANTOPRAZOLE SODIUM 40 MG/1
40 TABLET, DELAYED RELEASE ORAL
Status: DISCONTINUED | OUTPATIENT
Start: 2025-07-16 | End: 2025-07-21 | Stop reason: HOSPADM

## 2025-07-15 RX ORDER — ASCORBIC ACID 500 MG
500 TABLET ORAL DAILY
Status: DISCONTINUED | OUTPATIENT
Start: 2025-07-15 | End: 2025-07-21 | Stop reason: HOSPADM

## 2025-07-15 RX ORDER — SODIUM CHLORIDE 9 MG/ML
INJECTION, SOLUTION INTRAVENOUS PRN
Status: DISCONTINUED | OUTPATIENT
Start: 2025-07-15 | End: 2025-07-21 | Stop reason: HOSPADM

## 2025-07-15 RX ORDER — LORAZEPAM 2 MG/ML
4 INJECTION INTRAMUSCULAR
Status: DISCONTINUED | OUTPATIENT
Start: 2025-07-15 | End: 2025-07-21 | Stop reason: HOSPADM

## 2025-07-15 RX ORDER — GLUCAGON 1 MG/ML
1 KIT INJECTION PRN
Status: DISCONTINUED | OUTPATIENT
Start: 2025-07-15 | End: 2025-07-21 | Stop reason: HOSPADM

## 2025-07-15 RX ORDER — LORAZEPAM 1 MG/1
2 TABLET ORAL
Status: DISCONTINUED | OUTPATIENT
Start: 2025-07-15 | End: 2025-07-21 | Stop reason: HOSPADM

## 2025-07-15 RX ORDER — FLUTICASONE PROPIONATE 50 MCG
2 SPRAY, SUSPENSION (ML) NASAL DAILY PRN
Status: DISCONTINUED | OUTPATIENT
Start: 2025-07-15 | End: 2025-07-21 | Stop reason: HOSPADM

## 2025-07-15 RX ADMIN — ATORVASTATIN CALCIUM 10 MG: 10 TABLET, FILM COATED ORAL at 16:34

## 2025-07-15 RX ADMIN — SODIUM CHLORIDE, PRESERVATIVE FREE 10 ML: 5 INJECTION INTRAVENOUS at 20:12

## 2025-07-15 RX ADMIN — ENOXAPARIN SODIUM 30 MG: 100 INJECTION SUBCUTANEOUS at 20:09

## 2025-07-15 RX ADMIN — OXYCODONE HYDROCHLORIDE AND ACETAMINOPHEN 500 MG: 500 TABLET ORAL at 16:34

## 2025-07-15 RX ADMIN — ALLOPURINOL 100 MG: 100 TABLET ORAL at 16:34

## 2025-07-15 RX ADMIN — FLUTICASONE PROPIONATE 2 PUFF: 110 AEROSOL, METERED RESPIRATORY (INHALATION) at 19:10

## 2025-07-15 RX ADMIN — LOSARTAN POTASSIUM 50 MG: 50 TABLET, FILM COATED ORAL at 16:34

## 2025-07-15 RX ADMIN — SODIUM CHLORIDE: 0.9 INJECTION, SOLUTION INTRAVENOUS at 03:35

## 2025-07-15 RX ADMIN — GABAPENTIN 300 MG: 300 CAPSULE ORAL at 20:09

## 2025-07-15 RX ADMIN — METOPROLOL SUCCINATE 150 MG: 50 TABLET, EXTENDED RELEASE ORAL at 16:34

## 2025-07-15 RX ADMIN — GABAPENTIN 300 MG: 300 CAPSULE ORAL at 16:34

## 2025-07-15 RX ADMIN — MAGNESIUM SULFATE HEPTAHYDRATE 4000 MG: 40 INJECTION, SOLUTION INTRAVENOUS at 03:38

## 2025-07-15 RX ADMIN — SODIUM CHLORIDE: 0.9 INJECTION, SOLUTION INTRAVENOUS at 22:43

## 2025-07-15 NOTE — CARE COORDINATION
Case Management Assessment  Initial Evaluation    Date/Time of Evaluation: 7/15/2025 11:59 AM  Assessment Completed by: PAN Kaplan    If patient is discharged prior to next notation, then this note serves as note for discharge by case management.    Patient Name: Narayan Eddy                   YOB: 1955  Diagnosis: Hypomagnesemia [E83.42]  Alcoholism (HCC) [F10.20]  Hyponatremia [E87.1]  Closed head injury, initial encounter [S09.90XA]  Fall, initial encounter [W19.XXXA]  Anemia, unspecified type [D64.9]                   Date / Time: 7/14/2025  8:26 PM    Patient Admission Status: Inpatient   Readmission Risk (Low < 19, Mod (19-27), High > 27): Readmission Risk Score: 16.4    Current PCP: Mukesh Prieto MD  PCP verified by CM? Yes    Chart Reviewed: Yes      History Provided by: Patient  Patient Orientation: Alert and Oriented, Person, Place, Situation, Self    Patient Cognition: Alert    Hospitalization in the last 30 days (Readmission):  No    If yes, Readmission Assessment in CM Navigator will be completed.    Advance Directives:      Code Status: Full Code   Patient's Primary Decision Maker is: Legal Next of Kin    Primary Decision Maker: Kamilla Parrish - Other - [phone]    Secondary Decision Maker: shelly eddy - Brother/Sister - [phone]    Discharge Planning:    Patient lives with: Spouse/Significant Other Type of Home: House  Primary Care Giver: Self  Patient Support Systems include: Spouse/Significant Other   Current Financial resources:    Current community resources:    Current services prior to admission: Durable Medical Equipment            Current DME: Walker            Type of Home Care services:  None    ADLS  Prior functional level: Independent in ADLs/IADLs  Current functional level: Assistance with the following:, Bathing, Dressing, Toileting    PT AM-PAC:   /24  OT AM-PAC:   /24    Family can provide assistance at DC: Yes  Would you like Case Management to

## 2025-07-15 NOTE — CARE COORDINATION
Inpatient Rehab referral received. Met with patient and explained [facility] Inpatient Rehab program and requirements, including 3 hours of intense therapy daily, anticipated length of stay and goal of discharge to home. Patient was very sleepy during conversation and was falling asleep often, advised patient that rehab would follow after therapy has seen patient and would discuss further, patient agreeable. PM&R consult pending. Electronically signed by Alisson Leavitt RN on 7/15/2025 at 2:02 PM

## 2025-07-15 NOTE — ED NOTES
Pt brought in via EMS for an unwitnessed fall at home.   Pt was found on the ground in the bathroom of his home by his significant other- down for an unknown amount of time.   Pt admits to being a daily drinker of liquor and was drinking today.   Pt appears jaundice on assessment.   Hair disheveled- appears unkempt   Abdomen is distended   Vss  Afebrile

## 2025-07-15 NOTE — CARE COORDINATION
This LSW met with patient at bedside this am.  I completed Social Work consult with patient for consideration for Rehab.  Patient is accepting of having his referral sent to 1.) [facility] program, 2.) [facility].  Referrals sent , awaiting acceptance.    Electronically signed by PAN Kaplan on 7/15/25 at 11:57 AM EDT   This NEW completed 1st IMM with patient, copy given.  Electronically signed by PAN Kaplan on 7/15/25 at 2:48 PM EDT

## 2025-07-15 NOTE — ED NOTES
Report called to Amy MORRIS  Verified patient name, date of birth, with the patient ID band with the patient and the monitor room tech on the phone. Verified rythm and rate with monitor tech.

## 2025-07-15 NOTE — ACP (ADVANCE CARE PLANNING)
Advance Care Planning   Healthcare Decision Maker:    Primary Decision Maker: Kamilla Parrish - Other - [phone]    Secondary Decision Maker: shelly silva - Brother/Sister - [phone]    Click here to complete Healthcare Decision Makers including selection of the Healthcare Decision Maker Relationship (ie \"Primary\").

## 2025-07-15 NOTE — ED PROVIDER NOTES
[facility] EMERGENCY DEPARTMENT  eMERGENCY dEPARTMENT eNCOUnter      Pt Name: Narayan Eddy  MRN: [id number]  Birthdate 1955  Date of evaluation: 7/14/2025  Provider: GEORGIE RM  8:36 PM EDT     My attending is Dr. Miranda.    HISTORY OF PRESENT ILLNESS    Narayan Eddy is a 70 y.o. male with PMHx of asthma, DM2, gout, hyperlipidemia, hypertension, prostate cancer with prostatectomy, alcoholism presents to the emergency department with fall. Pt has had an ataxic gait for months and uses a walker.  Today his walker got ahead of him on the hard floor, and he fell backwards, + head injury, and negative LOC/thinners. Significant other showed up 15 minutes later and called the ambulance to assist him to stand (states he always needs helping standing from falls). Drinks \"a couple tall glasses\" of vodka and gatorade daily.  Bilateral lower extremity swelling for the past couple days.  He denies fevers, lightheadedness, headaches, neck pain, back pain, upper respiratory symptoms, chest pain, shortness of breath, abdominal pain, nausea, vomiting, urinary symptoms.  Intermittent chronic diarrhea.  Denies any pain from the falls.  No drug use    HPI    Nursing Notes were reviewed.    REVIEW OF SYSTEMS       Review of Systems   Constitutional:  Negative for appetite change, chills and fever.   HENT:  Negative for congestion, rhinorrhea and sore throat.    Respiratory:  Negative for cough and shortness of breath.    Cardiovascular:  Negative for chest pain.   Gastrointestinal:  Negative for abdominal pain, blood in stool, diarrhea, nausea and vomiting.   Genitourinary:  Negative for difficulty urinating.   Musculoskeletal:  Positive for gait problem. Negative for neck stiffness.   Skin:  Negative for color change and rash.   Neurological:  Negative for dizziness, syncope, weakness, light-headedness, numbness and headaches.   All other systems reviewed and are negative.            PAST MEDICAL HISTORY     Past

## 2025-07-15 NOTE — CARE COORDINATION
[facility] REVIEWING, AWAITING PT/OT EVALS AND RECOMMENDATIONS. NO PRECERT NEEDED FOR TRANSFER.

## 2025-07-15 NOTE — CARE COORDINATION
[facility] Pre-Admission Screening Document      Patient Name: Narayan Eddy       MRN: [id number]    : 1955    Age: 70 y.o.  Gender: male   Payor: Payor: MEDICARE / Plan: MEDICARE PART A AND B / Product Type: *No Product type* /   MSSP: No    Admitted from: [facility] Floor: 4W  Attending Care Provider: Nichole Aguilar DO  Inpatient Rehab Referring Care Provider: Dr Aguilar  Primary Care Provider: Mukesh Prieto MD  Inpatient Treatment Team including Consults: Treatment Team:   Nichole Aguilar DO Strickland, Jessica, RN Nadkarni, Vivek, MD Razack, MD Zulema Kruse Lisa, RN Westfall, Makenzie, RN Hartwig, Yael Little RN Brown, Jennifer, Akilah Taylor    Reason for Hospitalization:   1. Fall, initial encounter    2. Closed head injury, initial encounter    3. Hyponatremia    4. Alcoholism (HCC)    5. Hypomagnesemia    6. Anemia, unspecified type    7. Localized edema    8. Generalized edema      Chief Complaint   Patient presents with    Fall     Isolation:No active isolations    Hospital Course:  Admit Date: 2025  8:26 PM  Inpatient Rehab Referral Date: 7/15/25  Narrative of hospital course/history of present illness: Patient presented to ED following fall. Per patient, he has had multiple falls in the past few weeks. Patient uses a ww at baseline. Patient was found to have hyponatremia and admitted. Patient had IR consult and was given a paracentesis on  with 3000ml removed.   Medical & Surgical History/Current Comorbidities:  Past Medical History:   Diagnosis Date    Actinic keratoses     has had LN2 and used Efudex    Alcohol consumption of one to four drinks per day on alcohol screening     Alcoholism (HCC)     Allergic rhinitis     cats, weeds, grasses, ragweed    Asthma     Bilateral knee pain     Diabetes mellitus (HCC)     Gout     History of colon polyps 2016    needs f/u 5 years Pepe

## 2025-07-16 ENCOUNTER — APPOINTMENT (OUTPATIENT)
Dept: INTERVENTIONAL RADIOLOGY/VASCULAR | Age: 70
DRG: 641 | End: 2025-07-16
Payer: MEDICARE

## 2025-07-16 PROBLEM — Z78.9 IMPAIRED MOBILITY AND ACTIVITIES OF DAILY LIVING: Status: ACTIVE | Noted: 2025-07-16

## 2025-07-16 PROBLEM — Z74.09 IMPAIRED MOBILITY AND ACTIVITIES OF DAILY LIVING: Status: ACTIVE | Noted: 2025-07-16

## 2025-07-16 PROBLEM — D17.1 LIPOMA OF BACK: Status: ACTIVE | Noted: 2021-02-22

## 2025-07-16 PROBLEM — R18.8 OTHER ASCITES: Status: ACTIVE | Noted: 2025-07-16

## 2025-07-16 LAB
AFP-TM SERPL-MCNC: 2.3 UG/L
ALBUMIN SERPL-MCNC: 2.9 G/DL (ref 3.5–4.6)
ALP SERPL-CCNC: 169 U/L (ref 35–104)
ALT SERPL-CCNC: 24 U/L (ref 0–41)
ANION GAP SERPL CALCULATED.3IONS-SCNC: 10 MEQ/L (ref 9–15)
ANION GAP SERPL CALCULATED.3IONS-SCNC: 11 MEQ/L (ref 9–15)
ANION GAP SERPL CALCULATED.3IONS-SCNC: 11 MEQ/L (ref 9–15)
ANION GAP SERPL CALCULATED.3IONS-SCNC: 13 MEQ/L (ref 9–15)
APPEARANCE FLUID: CLEAR
AST SERPL-CCNC: 75 U/L (ref 0–40)
BASOPHILS # BLD: 0 K/UL (ref 0–0.2)
BASOPHILS NFR BLD: 0.4 %
BILIRUB SERPL-MCNC: 3.4 MG/DL (ref 0.2–0.7)
BUN SERPL-MCNC: 8 MG/DL (ref 8–23)
BUN SERPL-MCNC: 9 MG/DL (ref 8–23)
CALCIUM SERPL-MCNC: 7.3 MG/DL (ref 8.5–9.9)
CALCIUM SERPL-MCNC: 7.5 MG/DL (ref 8.5–9.9)
CALCIUM SERPL-MCNC: 7.7 MG/DL (ref 8.5–9.9)
CALCIUM SERPL-MCNC: 8 MG/DL (ref 8.5–9.9)
CELL COUNT FLUID TYPE: NORMAL
CHLORIDE SERPL-SCNC: 83 MEQ/L (ref 95–107)
CHLORIDE SERPL-SCNC: 86 MEQ/L (ref 95–107)
CHLORIDE SERPL-SCNC: 88 MEQ/L (ref 95–107)
CHLORIDE SERPL-SCNC: 88 MEQ/L (ref 95–107)
CLOT EVALUATION: NORMAL
CO2 SERPL-SCNC: 22 MEQ/L (ref 20–31)
CO2 SERPL-SCNC: 22 MEQ/L (ref 20–31)
CO2 SERPL-SCNC: 23 MEQ/L (ref 20–31)
CO2 SERPL-SCNC: 24 MEQ/L (ref 20–31)
COLOR FLUID: YELLOW
CREAT SERPL-MCNC: 0.88 MG/DL (ref 0.7–1.2)
CREAT SERPL-MCNC: 0.98 MG/DL (ref 0.7–1.2)
CREAT SERPL-MCNC: 1.05 MG/DL (ref 0.7–1.2)
CREAT SERPL-MCNC: 1.1 MG/DL (ref 0.7–1.2)
EOSINOPHIL # BLD: 0.1 K/UL (ref 0–0.7)
EOSINOPHIL FLUID: 1 %
EOSINOPHIL NFR BLD: 1.3 %
ERYTHROCYTE [DISTWIDTH] IN BLOOD BY AUTOMATED COUNT: 13.2 % (ref 11.5–14.5)
FLUID PATH CONSULT: YES
FLUID TYPE: NORMAL
GLOBULIN SER CALC-MCNC: 2.2 G/DL (ref 2.3–3.5)
GLUCOSE BLD-MCNC: 103 MG/DL (ref 70–99)
GLUCOSE BLD-MCNC: 159 MG/DL (ref 70–99)
GLUCOSE BLD-MCNC: 173 MG/DL (ref 70–99)
GLUCOSE BLD-MCNC: 179 MG/DL (ref 70–99)
GLUCOSE FLD-MCNC: 161 MG/DL
GLUCOSE SERPL-MCNC: 113 MG/DL (ref 70–99)
GLUCOSE SERPL-MCNC: 125 MG/DL (ref 70–99)
GLUCOSE SERPL-MCNC: 155 MG/DL (ref 70–99)
GLUCOSE SERPL-MCNC: 166 MG/DL (ref 70–99)
HCT VFR BLD AUTO: 23.7 % (ref 42–52)
HEPATITIS C ANTIBODY: NONREACTIVE
HGB BLD-MCNC: 8.6 G/DL (ref 14–18)
INR PPP: 1.6
LYMPHOCYTES # BLD: 0.8 K/UL (ref 1–4.8)
LYMPHOCYTES NFR BLD: 15.9 %
LYMPHOCYTES, BODY FLUID: 92 %
MAGNESIUM SERPL-MCNC: 1.6 MG/DL (ref 1.7–2.4)
MCH RBC QN AUTO: 35.8 PG (ref 27–31.3)
MCHC RBC AUTO-ENTMCNC: 36.3 % (ref 33–37)
MCV RBC AUTO: 98.8 FL (ref 79–92.2)
MONOCYTES # BLD: 0.7 K/UL (ref 0.2–0.8)
MONOCYTES NFR BLD: 13.8 %
NEUTROPHIL, FLUID: 7 %
NEUTROPHILS # BLD: 3.2 K/UL (ref 1.4–6.5)
NEUTS SEG NFR BLD: 67.8 %
NUCLEATED CELLS FLUID: 44 /CUMM
NUMBER OF CELLS COUNTED FLUID: 100
PERFORMED ON: ABNORMAL
PLATELET # BLD AUTO: 111 K/UL (ref 130–400)
PLATELET BLD QL SMEAR: ABNORMAL
POTASSIUM SERPL-SCNC: 4.5 MEQ/L (ref 3.4–4.9)
POTASSIUM SERPL-SCNC: 4.6 MEQ/L (ref 3.4–4.9)
POTASSIUM SERPL-SCNC: 4.9 MEQ/L (ref 3.4–4.9)
POTASSIUM SERPL-SCNC: 5 MEQ/L (ref 3.4–4.9)
PROT FLD-MCNC: 1.1 G/DL
PROT SERPL-MCNC: 5.1 G/DL (ref 6.3–8)
PROTHROMBIN TIME: 19.4 SEC (ref 12.3–14.9)
PTH-INTACT SERPL-MCNC: 132 PG/ML (ref 15–65)
RBC # BLD AUTO: 2.4 M/UL (ref 4.7–6.1)
RBC FLUID: <2000 /CUMM
REASON FOR REJECTION: NORMAL
REASON FOR REJECTION: NORMAL
REJECTED TEST: NORMAL
REJECTED TEST: NORMAL
SODIUM SERPL-SCNC: 115 MEQ/L (ref 135–144)
SODIUM SERPL-SCNC: 119 MEQ/L (ref 135–144)
SODIUM SERPL-SCNC: 123 MEQ/L (ref 135–144)
SODIUM SERPL-SCNC: 124 MEQ/L (ref 135–144)
VITAMIN D 25-HYDROXY: 64.4 NG/ML (ref 30–100)
WBC # BLD AUTO: 4.8 K/UL (ref 4.8–10.8)

## 2025-07-16 PROCEDURE — 6370000000 HC RX 637 (ALT 250 FOR IP): Performed by: INTERNAL MEDICINE

## 2025-07-16 PROCEDURE — 49083 ABD PARACENTESIS W/IMAGING: CPT

## 2025-07-16 PROCEDURE — 87205 SMEAR GRAM STAIN: CPT

## 2025-07-16 PROCEDURE — 87070 CULTURE OTHR SPECIMN AEROBIC: CPT

## 2025-07-16 PROCEDURE — 83735 ASSAY OF MAGNESIUM: CPT

## 2025-07-16 PROCEDURE — 99232 SBSQ HOSP IP/OBS MODERATE 35: CPT | Performed by: PHYSICAL MEDICINE & REHABILITATION

## 2025-07-16 PROCEDURE — 80053 COMPREHEN METABOLIC PANEL: CPT

## 2025-07-16 PROCEDURE — 89051 BODY FLUID CELL COUNT: CPT

## 2025-07-16 PROCEDURE — 6370000000 HC RX 637 (ALT 250 FOR IP): Performed by: SPECIALIST

## 2025-07-16 PROCEDURE — 6360000002 HC RX W HCPCS: Performed by: NURSE PRACTITIONER

## 2025-07-16 PROCEDURE — APPSS45 APP SPLIT SHARED TIME 31-45 MINUTES: Performed by: NURSE PRACTITIONER

## 2025-07-16 PROCEDURE — P9047 ALBUMIN (HUMAN), 25%, 50ML: HCPCS | Performed by: NURSE PRACTITIONER

## 2025-07-16 PROCEDURE — 82306 VITAMIN D 25 HYDROXY: CPT

## 2025-07-16 PROCEDURE — 88342 IMHCHEM/IMCYTCHM 1ST ANTB: CPT

## 2025-07-16 PROCEDURE — 0W9G3ZZ DRAINAGE OF PERITONEAL CAVITY, PERCUTANEOUS APPROACH: ICD-10-PCS | Performed by: RADIOLOGY

## 2025-07-16 PROCEDURE — 99223 1ST HOSP IP/OBS HIGH 75: CPT | Performed by: RADIOLOGY

## 2025-07-16 PROCEDURE — 2500000003 HC RX 250 WO HCPCS: Performed by: SPECIALIST

## 2025-07-16 PROCEDURE — 94640 AIRWAY INHALATION TREATMENT: CPT

## 2025-07-16 PROCEDURE — 97167 OT EVAL HIGH COMPLEX 60 MIN: CPT

## 2025-07-16 PROCEDURE — 82150 ASSAY OF AMYLASE: CPT

## 2025-07-16 PROCEDURE — 82945 GLUCOSE OTHER FLUID: CPT

## 2025-07-16 PROCEDURE — 6370000000 HC RX 637 (ALT 250 FOR IP): Performed by: NURSE PRACTITIONER

## 2025-07-16 PROCEDURE — 97163 PT EVAL HIGH COMPLEX 45 MIN: CPT

## 2025-07-16 PROCEDURE — 96365 THER/PROPH/DIAG IV INF INIT: CPT

## 2025-07-16 PROCEDURE — 88305 TISSUE EXAM BY PATHOLOGIST: CPT

## 2025-07-16 PROCEDURE — 2709999900 IR US GUIDED PARACENTESIS

## 2025-07-16 PROCEDURE — 85025 COMPLETE CBC W/AUTO DIFF WBC: CPT

## 2025-07-16 PROCEDURE — 94761 N-INVAS EAR/PLS OXIMETRY MLT: CPT

## 2025-07-16 PROCEDURE — 85610 PROTHROMBIN TIME: CPT

## 2025-07-16 PROCEDURE — 1210000000 HC MED SURG R&B

## 2025-07-16 PROCEDURE — 83615 LACTATE (LD) (LDH) ENZYME: CPT

## 2025-07-16 PROCEDURE — 6360000002 HC RX W HCPCS: Performed by: INTERNAL MEDICINE

## 2025-07-16 PROCEDURE — 83970 ASSAY OF PARATHORMONE: CPT

## 2025-07-16 PROCEDURE — 88112 CYTOPATH CELL ENHANCE TECH: CPT

## 2025-07-16 PROCEDURE — 82042 OTHER SOURCE ALBUMIN QUAN EA: CPT

## 2025-07-16 PROCEDURE — 49083 ABD PARACENTESIS W/IMAGING: CPT | Performed by: RADIOLOGY

## 2025-07-16 PROCEDURE — 36415 COLL VENOUS BLD VENIPUNCTURE: CPT

## 2025-07-16 PROCEDURE — 84157 ASSAY OF PROTEIN OTHER: CPT

## 2025-07-16 RX ORDER — ALBUMIN (HUMAN) 12.5 G/50ML
SOLUTION INTRAVENOUS CONTINUOUS PRN
Status: COMPLETED | OUTPATIENT
Start: 2025-07-16 | End: 2025-07-16

## 2025-07-16 RX ORDER — LIDOCAINE HYDROCHLORIDE 20 MG/ML
INJECTION, SOLUTION INFILTRATION; PERINEURAL PRN
Status: COMPLETED | OUTPATIENT
Start: 2025-07-16 | End: 2025-07-16

## 2025-07-16 RX ORDER — LOSARTAN POTASSIUM 25 MG/1
25 TABLET ORAL DAILY
Status: DISCONTINUED | OUTPATIENT
Start: 2025-07-17 | End: 2025-07-21 | Stop reason: HOSPADM

## 2025-07-16 RX ORDER — MAGNESIUM SULFATE IN WATER 40 MG/ML
2000 INJECTION, SOLUTION INTRAVENOUS ONCE
Status: COMPLETED | OUTPATIENT
Start: 2025-07-16 | End: 2025-07-16

## 2025-07-16 RX ORDER — SODIUM CHLORIDE 9 MG/ML
25 INJECTION, SOLUTION INTRAVENOUS PRN
Status: DISCONTINUED | OUTPATIENT
Start: 2025-07-16 | End: 2025-07-17 | Stop reason: HOSPADM

## 2025-07-16 RX ORDER — SODIUM CHLORIDE 0.9 % (FLUSH) 0.9 %
5-40 SYRINGE (ML) INJECTION EVERY 12 HOURS SCHEDULED
Status: DISCONTINUED | OUTPATIENT
Start: 2025-07-16 | End: 2025-07-17 | Stop reason: HOSPADM

## 2025-07-16 RX ORDER — SODIUM CHLORIDE 0.9 % (FLUSH) 0.9 %
5-40 SYRINGE (ML) INJECTION PRN
Status: DISCONTINUED | OUTPATIENT
Start: 2025-07-16 | End: 2025-07-17 | Stop reason: HOSPADM

## 2025-07-16 RX ADMIN — GABAPENTIN 300 MG: 300 CAPSULE ORAL at 08:27

## 2025-07-16 RX ADMIN — FLUTICASONE PROPIONATE 2 PUFF: 110 AEROSOL, METERED RESPIRATORY (INHALATION) at 08:03

## 2025-07-16 RX ADMIN — SODIUM CHLORIDE, PRESERVATIVE FREE 10 ML: 5 INJECTION INTRAVENOUS at 21:06

## 2025-07-16 RX ADMIN — ALBUMIN (HUMAN) 12.5 G: 12.5 SOLUTION INTRAVENOUS at 14:23

## 2025-07-16 RX ADMIN — PANTOPRAZOLE SODIUM 40 MG: 40 TABLET, DELAYED RELEASE ORAL at 08:27

## 2025-07-16 RX ADMIN — MULTIVITAMIN TABLET 1 TABLET: TABLET at 08:27

## 2025-07-16 RX ADMIN — ENOXAPARIN SODIUM 30 MG: 100 INJECTION SUBCUTANEOUS at 21:06

## 2025-07-16 RX ADMIN — METOPROLOL SUCCINATE 150 MG: 50 TABLET, EXTENDED RELEASE ORAL at 08:27

## 2025-07-16 RX ADMIN — MAGNESIUM SULFATE HEPTAHYDRATE 2000 MG: 40 INJECTION, SOLUTION INTRAVENOUS at 09:41

## 2025-07-16 RX ADMIN — OXYCODONE HYDROCHLORIDE AND ACETAMINOPHEN 500 MG: 500 TABLET ORAL at 08:27

## 2025-07-16 RX ADMIN — GABAPENTIN 300 MG: 300 CAPSULE ORAL at 21:06

## 2025-07-16 RX ADMIN — FOLIC ACID 1 MG: 1 TABLET ORAL at 08:27

## 2025-07-16 RX ADMIN — LACTULOSE 10 G: 10 SOLUTION ORAL at 08:27

## 2025-07-16 RX ADMIN — ATORVASTATIN CALCIUM 10 MG: 10 TABLET, FILM COATED ORAL at 08:27

## 2025-07-16 RX ADMIN — ALLOPURINOL 100 MG: 100 TABLET ORAL at 08:27

## 2025-07-16 RX ADMIN — LOSARTAN POTASSIUM 50 MG: 50 TABLET, FILM COATED ORAL at 08:27

## 2025-07-16 RX ADMIN — GABAPENTIN 300 MG: 300 CAPSULE ORAL at 16:32

## 2025-07-16 RX ADMIN — LIDOCAINE HYDROCHLORIDE 7 ML: 20 INJECTION, SOLUTION INFILTRATION; PERINEURAL at 14:00

## 2025-07-16 RX ADMIN — FUROSEMIDE 40 MG: 40 TABLET ORAL at 08:27

## 2025-07-16 RX ADMIN — SPIRONOLACTONE 50 MG: 25 TABLET ORAL at 08:27

## 2025-07-16 RX ADMIN — Medication 100 MG: at 08:27

## 2025-07-16 RX ADMIN — FLUTICASONE PROPIONATE 2 PUFF: 110 AEROSOL, METERED RESPIRATORY (INHALATION) at 19:32

## 2025-07-16 NOTE — OR NURSING
PARACENTESIS - NO SEDATION    1335 - Patient arrived to special procedures via bed from inpatient room. A&Ox4, calm and cooperative. Noted that recent lab work shows Na = 119. Isela Burris CNP checking with Dr. Aguilar & Dr. Raya to check if ok to proceed with para - both physicians say yes \"ok to proceed.\" Allergies and current MAR reviewed.     1343 - Patient brought into room #6 via bed. Procedure explained, consent signed. Positioned lying propped to left side using a pillow wedge. Initial images obtained using U/S guidance by Isela Burris CNP. Site marked.     Left side of abdomen then cleansed with Chloraprep. After 3 minute dry time, draped with sterile drape and towels.    1357 - Timeout completed for Ultrasound Guided Paracentesis.     1400 - Site numbed with 2% lidocaine by HA-CNP, see eMAR.  [id number] Centesis 5F catheter placed using Ultrasound guidance.  Fluid appears clear yellow. Sample of fluid obtained (50 ml) and placed into sterile specimen containers for ordered diagnostics. Catheter tubing connected to CareWire machine to remove fluid.    Fluid specimen taken to lab by ARTURO OLIVA.     1423 - Patient with >2L drain. Existing IV to left wrist flushed and patent - IV albumin administered per order, Isela Burris CNP.     1426 - Pt tolerated well. Total 3000 ml removed. Catheter removed, pressure held and bandaid applied. Verbal and tactile reassurance given throughout.     1439 - Transport requested to return patient to his room. Report called to ARTURO Summers on 4 west. Spoke with patient re: need for future para if fluid re-accumulates. AVS updated. Patient indicated understanding. Electronically signed by Carin Butler RN on 7/16/2025 at 2:39 PM

## 2025-07-16 NOTE — CARE COORDINATION
[facility] ACUTE REHAB FOLLOWING FOR MEDICAL CLEARANCE. IR CONSULTED FOR CONSIDERATION OF PARACENTESIS. POSSIBLE EDG FOR VARICEAL SCREENING. PT/OT EVALS AND RECOMMENDATIONS. NO PRECERT NEEDED.

## 2025-07-16 NOTE — BRIEF OP NOTE
Brief Postoperative Note      Patient: Narayan Eddy  YOB: 1955  MRN: [id number]    Date of Procedure: 7/16/2025    Ascites    Post-Op Diagnosis: Same       Ultrasound guided paracentesis    CALIXTO Nelson CNP    Assistant: Carin Butler RN    Anesthesia: 2% Lidocaine    Estimated Blood Loss (mL): Minimal    Complications: None    Specimens: Sent to the lab    Findings:  3000 mL clear yellow fluid drained    Electronically signed by CALIXTO Nelson CNP on 7/16/2025 at 3:25 PM

## 2025-07-16 NOTE — CONSULTS
Vascular Medicine and Interventional Radiology    Date of Consultation:2025    Narayan Eddy  : 1955  MR #: [id number]    Consultant:CALIXTO Nelson - CITLALY  Consulting Physician: Dr. Shaheen Sofia, Vascular Medicine and Interventional Radiology     Consult Ordered By: Dr. Cantu            PCP:  Mukesh Prieto MD     Attending Physician: Nichole Aguilar DO     Date of Admission: 2025  8:26 PM     Chief Complaint:   Chief Complaint   Patient presents with    Fall      Reason for Consultation: diagnostic paracentesis    History of Present Illness: 70 year old male admitted with severe hyponatremia, frequent falls and weakness. Hx of alcohol abuse, drinks vodka daily. Also reports leg swelling and abdominal distention that he noticed approximately a week or so ago. Abdominal US noting ascites and fatty liver.   IR consulted for paracentesis.   Vital signs stable. He is not on anticoagulation/antiplatelet therapy at home. Receiving lovenox for VTE prophylaxis, held this morning in anticipation of paracentesis.  He denies shortness of breath, chest pain or decreased appetite.  States he has followed with GI in the past, although states it has been over a year since his last visit.    Past Medical History:   has a past medical history of Actinic keratoses, Alcohol consumption of one to four drinks per day on alcohol screening, Alcoholism (HCC), Allergic rhinitis, Asthma, Bilateral knee pain, Diabetes mellitus (HCC), Gout, History of colon polyps, History of type 2 diabetes mellitus, Hyperlipidemia, Hypertension, Keratosis, inflamed seborrheic, Osteoarthritis, Osteoarthritis, localized, shoulder, right, Polyp of transverse colon f/u due , Prediabetes, Prostate cancer (HCC), Syncope and collapse, and Varicose vein of leg.    Past SurgicalHistory:   has a past surgical history that includes Varicose vein surgery; Prostatectomy (2014); Colonoscopy (2016); Ulnar tunnel release 
Consults    Patient Name: Narayan Eddy  Admit Date: 2025  8:26 PM  MR #: [id number]  : 1955    Attending Physician: Nichole Aguilar DO  Reason for consult: Abnormal LFT    History of Presenting Illness:      Narayan Eddy is a 70 y.o. male on hospital day 1 with a history of frequent falls and weakness, GI consult was requested because of abnormal LFT.  Patient has a history of EtOH abuse for many years, she reports having leg swelling and some abdominal discomfort.,  No history of hematemesis or melena.  No history of rectal bleeding.. History Obtained From:  patient      History:      Past Medical History:   Diagnosis Date    Actinic keratoses     has had LN2 and used Efudex    Alcohol consumption of one to four drinks per day on alcohol screening     Alcoholism (HCC)     Allergic rhinitis     cats, weeds, grasses, ragweed    Asthma     Bilateral knee pain     Diabetes mellitus (HCC)     Gout     History of colon polyps 2016    needs f/u 5 years Pepe    History of type 2 diabetes mellitus     about 15 years    Hyperlipidemia     Hypertension     Keratosis, inflamed seborrheic     Osteoarthritis, localized, shoulder, right     Polyp of transverse colon f/u due 2014    Prediabetes     Prostate cancer (HCC) 2014    s/p prostatectomy    Syncope and collapse     Varicose vein of leg      Past Surgical History:   Procedure Laterality Date    ARM SURGERY Left 2024    ARM LESION BIOPSY EXCISION performed by Светлана Lucio MD at [facility] [place]    COLONOSCOPY  2016    Dr. Cantu; polyps f/u in 5 years    COLONOSCOPY N/A 10/02/2020    COLONOSCOPY DIAGNOSTIC performed by Ashley Price MD at [facility]    EXCISION/BIOPSY N/A 2024    Back and left arm mass excisions performed by Светлана Lucio MD at [facility]    JOINT REPLACEMENT Right 2024    right total knee    MUSCLE BIOPSY Left 2023    Left quadriecep muscle biopsy performed by Светлана Lucio MD at 
[facility]  Consult Note           Reason for Consult:  hyponatremia   Requesting Physician:  Dr. Aguilar     Chief Complaint:  fall  History Obtained From:  patient, electronic medical record    History of Present Ilness:    70 y.o. male with history s/f HTN, HLD, T2DM, OA, spinal stenosis, neuropathy who presented for a fall. Has had ~6 in the past month. Tends to have difficulty w/ ambulation due to his spinal stenosis and neuropathy. On presentation pt hemodynamically stable even though did have low BP at some point to 90s. Labs showed Na 121, most recently 119, nml renal function, albumin low 3s w/ elevated LFTs, UA bland w/ low SG, Yu <20. Pt drinks vodka and gatorade daily. Poor PO intake. Doesn't have prior issues w/ hyponatremia. ON metformin as outpatient. Not on diuretics, NSAIDs, psychotropic medications.     Past Medical History:        Diagnosis Date    Actinic keratoses     has had LN2 and used Efudex    Alcohol consumption of one to four drinks per day on alcohol screening     Alcoholism (HCC)     Allergic rhinitis     cats, weeds, grasses, ragweed    Asthma     Bilateral knee pain     Diabetes mellitus (HCC)     Gout     History of colon polyps 02/2016    needs f/u 5 years Pepe    History of type 2 diabetes mellitus     about 15 years    Hyperlipidemia     Hypertension     Keratosis, inflamed seborrheic     Osteoarthritis, localized, shoulder, right     Polyp of transverse colon f/u due 2021 08/09/2014    Prediabetes     Prostate cancer (HCC) 2014    s/p prostatectomy    Syncope and collapse     Varicose vein of leg        Past Surgical History:        Procedure Laterality Date    ARM SURGERY Left 7/9/2024    ARM LESION BIOPSY EXCISION performed by Светлана Lucio MD at [facility] [place]    COLONOSCOPY  02/04/2016    Dr. Cantu; polyps f/u in 5 years    COLONOSCOPY N/A 10/02/2020    COLONOSCOPY DIAGNOSTIC performed by Ashley Price MD at [facility]    EXCISION/BIOPSY N/A 7/9/2024 
92.2 fL    MCH 36.2 (H) 27.0 - 31.3 pg    MCHC 36.5 33.0 - 37.0 %    RDW 13.1 11.5 - 14.5 %    Platelets 169 130 - 400 K/uL    Neutrophils % 71.4 %    Lymphocytes % 14.3 %    Monocytes % 12.1 %    Eosinophils % 1.2 %    Basophils % 0.5 %    Neutrophils Absolute 4.7 1.4 - 6.5 K/uL    Lymphocytes Absolute 0.9 (L) 1.0 - 4.8 K/uL    Monocytes Absolute 0.8 0.2 - 0.8 K/uL    Eosinophils Absolute 0.1 0.0 - 0.7 K/uL    Basophils Absolute 0.0 0.0 - 0.2 K/uL   CMP    Collection Time: 07/14/25  9:01 PM   Result Value Ref Range    Sodium 121 (L) 135 - 144 mEq/L    Potassium 4.4 3.4 - 4.9 mEq/L    Chloride 86 (L) 95 - 107 mEq/L    CO2 21 20 - 31 mEq/L    Anion Gap 14 9 - 15 mEq/L    Glucose 118 (H) 70 - 99 mg/dL    BUN 7 (L) 8 - 23 mg/dL    Creatinine 0.90 0.70 - 1.20 mg/dL    Est, Glom Filt Rate >90.0 >60    Calcium 7.5 (L) 8.5 - 9.9 mg/dL    Total Protein 5.8 (L) 6.3 - 8.0 g/dL    Albumin 3.2 (L) 3.5 - 4.6 g/dL    Total Bilirubin 3.2 (H) 0.2 - 0.7 mg/dL    Alkaline Phosphatase 202 (H) 35 - 104 U/L    ALT 27 0 - 41 U/L    AST 91 (H) 0 - 40 U/L    Globulin 2.6 2.3 - 3.5 g/dL   Magnesium    Collection Time: 07/14/25  9:01 PM   Result Value Ref Range    Magnesium 1.1 (L) 1.7 - 2.4 mg/dL   Protime-INR    Collection Time: 07/14/25  9:01 PM   Result Value Ref Range    Protime 18.2 (H) 12.3 - 14.9 sec    INR 1.4    APTT    Collection Time: 07/14/25  9:01 PM   Result Value Ref Range    aPTT 35.7 24.4 - 36.8 sec   TSH reflex to FT4    Collection Time: 07/14/25  9:01 PM   Result Value Ref Range    TSH 2.040 0.440 - 3.860 uIU/mL   Ammonia    Collection Time: 07/14/25  9:01 PM   Result Value Ref Range    Ammonia 39 16 - 60 umol/L   ETOH    Collection Time: 07/14/25  9:01 PM   Result Value Ref Range    Ethanol Lvl 191 mg/dL    Ethanol percent 0.168 G/dL   Brain Natriuretic Peptide    Collection Time: 07/14/25  9:01 PM   Result Value Ref Range    NT Pro-BNP 2,200 pg/mL   OSMOLALITY    Collection Time: 07/15/25  1:21 AM   Result Value Ref

## 2025-07-16 NOTE — FLOWSHEET NOTE
[id number]    Shift Summary    1900-  Assumed care of this pt at this time. Bedside change of shift report received. Skin and ID band check completed.     2000-  PM assessment completed see flowsheet for details.     2100-  PM medications administered, see MAR for details.   Pt Na+=120, Dr Burgess notified via perfect serve, new orders placed.     2200-  No acute distress at this time.     0000-  No acute distress noted.     0200-  Pt Na+=115 at this time, Dr Burgess made aware via perfect serve, new orders placed.     0400-  No acute distress noted.    AM labs obtained    Daily weight obtained    I/O's completed.     Bedside change of shift report given.

## 2025-07-16 NOTE — CARE COORDINATION
This LSW visited patient at bedside this am.  Patient and I discussed discharge plans.  Patient did complete PT,OT this morning. Patient is hopeful that he will be able to be transferred to [facility] upon discharge.  Patient states that he is very weak and [facility] to be transferred to [facility] as he and his significant other live very close to this facility.  This LSW sent referral to [facility].  Awaiting response at this time.  Electronically signed by PAN Kaplan on 7/16/25 at 11:08 AM EDT

## 2025-07-16 NOTE — DISCHARGE INSTRUCTIONS
Ultrasound Guided Paracentesis Discharge Instructions:     Rest today. No heavy lifting, bending, stretching or pulling.  Some tenderness will be normal at the procedure site for a few days.    You may remove the bandaid in 24-48 hours.     If you experience any drainage or bleeding at the site, hold pressure for 30 minutes and lay on side opposite of the procedure site (move fluid away from the area of leakage). If drainage or bleeding does not stop and seems excessive, call your physician or proceed to the ER.      Watch for signs of infection such as fever, chills, drainage or redness at the site. If you experience any of these symptoms, call your primary doctor or go to the emergency room.       Please keep all follow-up appointments with your Hepatologist. Schedule follow-up appointment for repeat paracentesis if necessary - Dr. oSfia's office at [phone].     If you have any concerns please contact the [facility] Radiology Department at [phone].

## 2025-07-16 NOTE — OR NURSING
NO SEDATION    Patient arrived to special procedures, room #6 via bed.      @ Procedure explained, consent signed.  Timeout completed for Ultrasound Guided Paracentesis. Using U/S,  @ marked, prepped @ with Chloraprep.    After 3 minute dry time, draped with sterile drape and towels.    @ Site numbed with lidocaine.  [id number] Centesis 5F catheter placed using Ultrasound guidance.  Fluid appears @. Catheter tubing connected to Tiller machine to remove fluid.    @ Pt tolerated well. Total @ ml removed. Catheter removed, pressure held and bandaid applied. Verbal and tactile reassurance given throughout.

## 2025-07-16 NOTE — CARE COORDINATION
[facility] IS ABLE TO ACCEPT PT AS EARLY AS TOMORROW. NO PC NEEDED. UPDATED NURSE AND DR. CHIU.

## 2025-07-17 ENCOUNTER — ANESTHESIA EVENT (OUTPATIENT)
Dept: ENDOSCOPY | Age: 70
End: 2025-07-17
Payer: MEDICARE

## 2025-07-17 ENCOUNTER — TELEPHONE (OUTPATIENT)
Dept: INTERVENTIONAL RADIOLOGY/VASCULAR | Age: 70
End: 2025-07-17

## 2025-07-17 ENCOUNTER — ANESTHESIA (OUTPATIENT)
Dept: ENDOSCOPY | Age: 70
End: 2025-07-17
Payer: MEDICARE

## 2025-07-17 PROBLEM — K74.60 CIRRHOSIS (HCC): Status: ACTIVE | Noted: 2025-07-14

## 2025-07-17 LAB
ALBUMIN FLUID: 0.7 G/DL
ALBUMIN SERPL-MCNC: 2.8 G/DL (ref 3.5–4.6)
ALP SERPL-CCNC: 169 U/L (ref 35–104)
ALT SERPL-CCNC: 24 U/L (ref 0–41)
AMYLASE FLUID: 13 U/L
ANION GAP SERPL CALCULATED.3IONS-SCNC: 10 MEQ/L (ref 9–15)
ANION GAP SERPL CALCULATED.3IONS-SCNC: 11 MEQ/L (ref 9–15)
ANION GAP SERPL CALCULATED.3IONS-SCNC: 12 MEQ/L (ref 9–15)
ANION GAP SERPL CALCULATED.3IONS-SCNC: 12 MEQ/L (ref 9–15)
ANISOCYTOSIS BLD QL SMEAR: ABNORMAL
AST SERPL-CCNC: 76 U/L (ref 0–40)
BASOPHILS # BLD: 0 K/UL (ref 0–0.2)
BASOPHILS NFR BLD: 0.4 %
BILIRUB SERPL-MCNC: 3.7 MG/DL (ref 0.2–0.7)
BUN SERPL-MCNC: 10 MG/DL (ref 8–23)
BUN SERPL-MCNC: 9 MG/DL (ref 8–23)
CALCIUM SERPL-MCNC: 7.8 MG/DL (ref 8.5–9.9)
CALCIUM SERPL-MCNC: 7.8 MG/DL (ref 8.5–9.9)
CALCIUM SERPL-MCNC: 8.1 MG/DL (ref 8.5–9.9)
CALCIUM SERPL-MCNC: 8.3 MG/DL (ref 8.5–9.9)
CHLORIDE SERPL-SCNC: 89 MEQ/L (ref 95–107)
CHLORIDE SERPL-SCNC: 91 MEQ/L (ref 95–107)
CHLORIDE SERPL-SCNC: 92 MEQ/L (ref 95–107)
CHLORIDE SERPL-SCNC: 95 MEQ/L (ref 95–107)
CO2 SERPL-SCNC: 21 MEQ/L (ref 20–31)
CO2 SERPL-SCNC: 22 MEQ/L (ref 20–31)
CO2 SERPL-SCNC: 23 MEQ/L (ref 20–31)
CO2 SERPL-SCNC: 25 MEQ/L (ref 20–31)
CREAT SERPL-MCNC: 0.89 MG/DL (ref 0.7–1.2)
CREAT SERPL-MCNC: 1 MG/DL (ref 0.7–1.2)
CREAT SERPL-MCNC: 1.07 MG/DL (ref 0.7–1.2)
CREAT SERPL-MCNC: 1.07 MG/DL (ref 0.7–1.2)
CREAT UR-MCNC: 30.8 MG/DL
EOSINOPHIL # BLD: 0 K/UL (ref 0–0.7)
EOSINOPHIL NFR BLD: 1 %
ERYTHROCYTE [DISTWIDTH] IN BLOOD BY AUTOMATED COUNT: 13 % (ref 11.5–14.5)
GLOBULIN SER CALC-MCNC: 2.5 G/DL (ref 2.3–3.5)
GLUCOSE BLD-MCNC: 112 MG/DL (ref 70–99)
GLUCOSE BLD-MCNC: 122 MG/DL (ref 70–99)
GLUCOSE BLD-MCNC: 131 MG/DL (ref 70–99)
GLUCOSE BLD-MCNC: 138 MG/DL (ref 70–99)
GLUCOSE BLD-MCNC: 157 MG/DL (ref 70–99)
GLUCOSE SERPL-MCNC: 111 MG/DL (ref 70–99)
GLUCOSE SERPL-MCNC: 118 MG/DL (ref 70–99)
GLUCOSE SERPL-MCNC: 126 MG/DL (ref 70–99)
GLUCOSE SERPL-MCNC: 133 MG/DL (ref 70–99)
HCT VFR BLD AUTO: 23.9 % (ref 42–52)
HGB BLD-MCNC: 8.9 G/DL (ref 14–18)
LACTATE DEHYDROGENASE, FLUID: 47 U/L
LYMPHOCYTES # BLD: 0.6 K/UL (ref 1–4.8)
LYMPHOCYTES NFR BLD: 12 %
MACROCYTES BLD QL SMEAR: ABNORMAL
MAGNESIUM SERPL-MCNC: 1.8 MG/DL (ref 1.7–2.4)
MCH RBC QN AUTO: 36.5 PG (ref 27–31.3)
MCHC RBC AUTO-ENTMCNC: 37.2 % (ref 33–37)
MCV RBC AUTO: 98 FL (ref 79–92.2)
MONOCYTES # BLD: 0.1 K/UL (ref 0.2–0.8)
MONOCYTES NFR BLD: 2.9 %
NEUTROPHILS # BLD: 3.9 K/UL (ref 1.4–6.5)
NEUTS SEG NFR BLD: 84 %
PATH CONSULT FLUID: NORMAL
PERFORMED ON: ABNORMAL
PLATELET # BLD AUTO: ABNORMAL K/UL (ref 130–400)
PLATELET BLD QL SMEAR: ABNORMAL
POIKILOCYTOSIS BLD QL SMEAR: ABNORMAL
POTASSIUM SERPL-SCNC: 4.3 MEQ/L (ref 3.4–4.9)
POTASSIUM SERPL-SCNC: 4.8 MEQ/L (ref 3.4–4.9)
POTASSIUM SERPL-SCNC: 5 MEQ/L (ref 3.4–4.9)
POTASSIUM SERPL-SCNC: 5.2 MEQ/L (ref 3.4–4.9)
PROT SERPL-MCNC: 5.3 G/DL (ref 6.3–8)
RBC # BLD AUTO: 2.44 M/UL (ref 4.7–6.1)
SLIDE REVIEW: ABNORMAL
SMUDGE CELLS BLD QL SMEAR: 11.4
SODIUM SERPL-SCNC: 124 MEQ/L (ref 135–144)
SODIUM SERPL-SCNC: 124 MEQ/L (ref 135–144)
SODIUM SERPL-SCNC: 126 MEQ/L (ref 135–144)
SODIUM SERPL-SCNC: 129 MEQ/L (ref 135–144)
SODIUM UR-SCNC: 38 MEQ/L
SPECIMEN TYPE: NORMAL
WBC # BLD AUTO: 4.7 K/UL (ref 4.8–10.8)

## 2025-07-17 PROCEDURE — 94640 AIRWAY INHALATION TREATMENT: CPT

## 2025-07-17 PROCEDURE — 83935 ASSAY OF URINE OSMOLALITY: CPT

## 2025-07-17 PROCEDURE — 7100000011 HC PHASE II RECOVERY - ADDTL 15 MIN: Performed by: SPECIALIST

## 2025-07-17 PROCEDURE — 2709999900 HC NON-CHARGEABLE SUPPLY: Performed by: SPECIALIST

## 2025-07-17 PROCEDURE — 84300 ASSAY OF URINE SODIUM: CPT

## 2025-07-17 PROCEDURE — 6360000002 HC RX W HCPCS: Performed by: NURSE ANESTHETIST, CERTIFIED REGISTERED

## 2025-07-17 PROCEDURE — 99232 SBSQ HOSP IP/OBS MODERATE 35: CPT | Performed by: PHYSICAL MEDICINE & REHABILITATION

## 2025-07-17 PROCEDURE — 3700000000 HC ANESTHESIA ATTENDED CARE: Performed by: SPECIALIST

## 2025-07-17 PROCEDURE — 6370000000 HC RX 637 (ALT 250 FOR IP): Performed by: INTERNAL MEDICINE

## 2025-07-17 PROCEDURE — 6360000002 HC RX W HCPCS: Performed by: STUDENT IN AN ORGANIZED HEALTH CARE EDUCATION/TRAINING PROGRAM

## 2025-07-17 PROCEDURE — 2500000003 HC RX 250 WO HCPCS: Performed by: SPECIALIST

## 2025-07-17 PROCEDURE — 43235 EGD DIAGNOSTIC BRUSH WASH: CPT | Performed by: SPECIALIST

## 2025-07-17 PROCEDURE — 7100000010 HC PHASE II RECOVERY - FIRST 15 MIN: Performed by: SPECIALIST

## 2025-07-17 PROCEDURE — 82570 ASSAY OF URINE CREATININE: CPT

## 2025-07-17 PROCEDURE — 6370000000 HC RX 637 (ALT 250 FOR IP): Performed by: SPECIALIST

## 2025-07-17 PROCEDURE — 83735 ASSAY OF MAGNESIUM: CPT

## 2025-07-17 PROCEDURE — 3609017100 HC EGD: Performed by: SPECIALIST

## 2025-07-17 PROCEDURE — 3700000001 HC ADD 15 MINUTES (ANESTHESIA): Performed by: SPECIALIST

## 2025-07-17 PROCEDURE — 94761 N-INVAS EAR/PLS OXIMETRY MLT: CPT

## 2025-07-17 PROCEDURE — 6360000002 HC RX W HCPCS: Performed by: NURSE PRACTITIONER

## 2025-07-17 PROCEDURE — 1210000000 HC MED SURG R&B

## 2025-07-17 PROCEDURE — 6370000000 HC RX 637 (ALT 250 FOR IP): Performed by: NURSE PRACTITIONER

## 2025-07-17 PROCEDURE — 97116 GAIT TRAINING THERAPY: CPT

## 2025-07-17 PROCEDURE — 36415 COLL VENOUS BLD VENIPUNCTURE: CPT

## 2025-07-17 PROCEDURE — 0DJ08ZZ INSPECTION OF UPPER INTESTINAL TRACT, VIA NATURAL OR ARTIFICIAL OPENING ENDOSCOPIC: ICD-10-PCS | Performed by: SPECIALIST

## 2025-07-17 PROCEDURE — 80053 COMPREHEN METABOLIC PANEL: CPT

## 2025-07-17 PROCEDURE — 85025 COMPLETE CBC W/AUTO DIFF WBC: CPT

## 2025-07-17 PROCEDURE — 97535 SELF CARE MNGMENT TRAINING: CPT

## 2025-07-17 PROCEDURE — 2500000003 HC RX 250 WO HCPCS: Performed by: NURSE PRACTITIONER

## 2025-07-17 RX ORDER — SODIUM CHLORIDE 9 MG/ML
INJECTION, SOLUTION INTRAVENOUS CONTINUOUS
Status: DISCONTINUED | OUTPATIENT
Start: 2025-07-17 | End: 2025-07-17 | Stop reason: HOSPADM

## 2025-07-17 RX ORDER — PROPOFOL 10 MG/ML
INJECTION, EMULSION INTRAVENOUS
Status: DISCONTINUED | OUTPATIENT
Start: 2025-07-17 | End: 2025-07-17 | Stop reason: SDUPTHER

## 2025-07-17 RX ORDER — FUROSEMIDE 10 MG/ML
20 INJECTION INTRAMUSCULAR; INTRAVENOUS 2 TIMES DAILY
Status: COMPLETED | OUTPATIENT
Start: 2025-07-17 | End: 2025-07-17

## 2025-07-17 RX ORDER — SODIUM CHLORIDE 0.9 % (FLUSH) 0.9 %
5-40 SYRINGE (ML) INJECTION EVERY 12 HOURS SCHEDULED
Status: DISCONTINUED | OUTPATIENT
Start: 2025-07-17 | End: 2025-07-17 | Stop reason: HOSPADM

## 2025-07-17 RX ORDER — METOPROLOL SUCCINATE 50 MG/1
50 TABLET, EXTENDED RELEASE ORAL DAILY
Status: DISCONTINUED | OUTPATIENT
Start: 2025-07-18 | End: 2025-07-21 | Stop reason: HOSPADM

## 2025-07-17 RX ORDER — FUROSEMIDE 10 MG/ML
20 INJECTION INTRAMUSCULAR; INTRAVENOUS 2 TIMES DAILY
Status: DISCONTINUED | OUTPATIENT
Start: 2025-07-17 | End: 2025-07-17

## 2025-07-17 RX ORDER — FUROSEMIDE 20 MG/1
20 TABLET ORAL DAILY
Status: DISCONTINUED | OUTPATIENT
Start: 2025-07-18 | End: 2025-07-19

## 2025-07-17 RX ORDER — MIDODRINE HYDROCHLORIDE 5 MG/1
5 TABLET ORAL
Status: DISCONTINUED | OUTPATIENT
Start: 2025-07-17 | End: 2025-07-18

## 2025-07-17 RX ORDER — LIDOCAINE HYDROCHLORIDE 20 MG/ML
INJECTION, SOLUTION INFILTRATION; PERINEURAL
Status: DISCONTINUED | OUTPATIENT
Start: 2025-07-17 | End: 2025-07-17 | Stop reason: SDUPTHER

## 2025-07-17 RX ORDER — SODIUM CHLORIDE 0.9 % (FLUSH) 0.9 %
5-40 SYRINGE (ML) INJECTION PRN
Status: DISCONTINUED | OUTPATIENT
Start: 2025-07-17 | End: 2025-07-17 | Stop reason: HOSPADM

## 2025-07-17 RX ORDER — SODIUM CHLORIDE 9 MG/ML
INJECTION, SOLUTION INTRAVENOUS PRN
Status: DISCONTINUED | OUTPATIENT
Start: 2025-07-17 | End: 2025-07-17 | Stop reason: HOSPADM

## 2025-07-17 RX ADMIN — SODIUM CHLORIDE, PRESERVATIVE FREE 10 ML: 5 INJECTION INTRAVENOUS at 19:54

## 2025-07-17 RX ADMIN — OXYCODONE HYDROCHLORIDE AND ACETAMINOPHEN 500 MG: 500 TABLET ORAL at 14:55

## 2025-07-17 RX ADMIN — FLUTICASONE PROPIONATE 2 PUFF: 110 AEROSOL, METERED RESPIRATORY (INHALATION) at 19:42

## 2025-07-17 RX ADMIN — SPIRONOLACTONE 50 MG: 25 TABLET ORAL at 14:47

## 2025-07-17 RX ADMIN — GABAPENTIN 300 MG: 300 CAPSULE ORAL at 19:54

## 2025-07-17 RX ADMIN — PROPOFOL 80 MG: 10 INJECTION, EMULSION INTRAVENOUS at 13:23

## 2025-07-17 RX ADMIN — GABAPENTIN 300 MG: 300 CAPSULE ORAL at 14:34

## 2025-07-17 RX ADMIN — Medication 100 MG: at 14:52

## 2025-07-17 RX ADMIN — PROPOFOL 40 MG: 10 INJECTION, EMULSION INTRAVENOUS at 13:25

## 2025-07-17 RX ADMIN — ENOXAPARIN SODIUM 30 MG: 100 INJECTION SUBCUTANEOUS at 19:53

## 2025-07-17 RX ADMIN — FOLIC ACID 1 MG: 1 TABLET ORAL at 14:47

## 2025-07-17 RX ADMIN — LIDOCAINE HYDROCHLORIDE 40 MG: 20 INJECTION, SOLUTION INFILTRATION; PERINEURAL at 13:23

## 2025-07-17 RX ADMIN — SODIUM CHLORIDE, PRESERVATIVE FREE 10 ML: 5 INJECTION INTRAVENOUS at 08:44

## 2025-07-17 RX ADMIN — ATORVASTATIN CALCIUM 10 MG: 10 TABLET, FILM COATED ORAL at 14:54

## 2025-07-17 RX ADMIN — FUROSEMIDE 20 MG: 10 INJECTION, SOLUTION INTRAMUSCULAR; INTRAVENOUS at 16:42

## 2025-07-17 RX ADMIN — LACTULOSE 10 G: 10 SOLUTION ORAL at 14:47

## 2025-07-17 RX ADMIN — MIDODRINE HYDROCHLORIDE 5 MG: 5 TABLET ORAL at 16:42

## 2025-07-17 RX ADMIN — ALLOPURINOL 100 MG: 100 TABLET ORAL at 14:55

## 2025-07-17 RX ADMIN — MULTIVITAMIN TABLET 1 TABLET: TABLET at 14:47

## 2025-07-17 RX ADMIN — FLUTICASONE PROPIONATE 2 PUFF: 110 AEROSOL, METERED RESPIRATORY (INHALATION) at 07:20

## 2025-07-17 ASSESSMENT — PAIN - FUNCTIONAL ASSESSMENT
PAIN_FUNCTIONAL_ASSESSMENT: 0-10
PAIN_FUNCTIONAL_ASSESSMENT: 0-10

## 2025-07-17 NOTE — ANESTHESIA PRE PROCEDURE
Department of Anesthesiology  Preprocedure Note       Name:  Narayan Eddy   Age:  70 y.o.  :  1955                                          MRN:  [id number]         Date:  2025      Surgeon: Surgeon(s):  Nolberto Cantu MD    Procedure: Procedure(s):  ESOPHAGOGASTRODUODENOSCOPY    Medications prior to admission:   Prior to Admission medications    Medication Sig Start Date End Date Taking? Authorizing Provider   irbesartan (AVAPRO) 150 MG tablet TAKE ONE TABLET BY MOUTH DAILY 25  Yes Mukesh Prieto MD   esomeprazole (NEXIUM) 40 MG delayed release capsule TAKE ONE CAPSULE BY MOUTH EVERY MORNING BEFORE BREAKFAST 25  Yes Ashley Price MD   gabapentin (NEURONTIN) 300 MG capsule Take 1 capsule by mouth in the morning, at noon, and at bedtime for 120 days. 25 Yes Tim Crawford MD   allopurinol (ZYLOPRIM) 100 MG tablet TAKE ONE TABLET BY MOUTH DAILY 3/24/25  Yes Mukesh Prieto MD   fluticasone (FLOVENT HFA) 110 MCG/ACT inhaler Inhale 2 puffs into the lungs 2 times daily 3/4/25 3/4/26 Yes Mukesh Prieto MD   simvastatin (ZOCOR) 20 MG tablet TAKE ONE TABLET BY MOUTH EVERY DAY 24  Yes Mukesh Prieto MD   metoprolol succinate (TOPROL XL) 100 MG extended release tablet Take 1.5 tablets by mouth daily 24  Yes Mukesh Prieto MD   metFORMIN (GLUCOPHAGE) 500 MG tablet Take 1 tablet by mouth 2 times daily (with meals) 24  Yes Mukesh Prieto MD   fluticasone (FLONASE) 50 MCG/ACT nasal spray 2 sprays by Nasal route daily 24  Yes Mukesh Prieto MD   LYCOPENE PO Take by mouth   Yes Joaquín Joe MD   thiamine 100 MG tablet Take 1 tablet by mouth daily 24  Yes Mukesh Prieto MD   Cyanocobalamin (VITAMIN B-12) 1000 MCG extended release tablet Take 1 tablet by mouth daily Chewable   Yes Joaquín Joe MD   albuterol sulfate HFA (VENTOLIN HFA) 108 (90 Base) MCG/ACT inhaler Inhale 2 puffs into the lungs every 6 hours as needed

## 2025-07-17 NOTE — ANESTHESIA POSTPROCEDURE EVALUATION
Department of Anesthesiology  Postprocedure Note    Patient: Narayan Eddy  MRN: [id number]  YOB: 1955  Date of evaluation: 7/17/2025    Procedure Summary       Date: 07/17/25 Room / Location: [facility] OR 02 / [facility]    Anesthesia Start: 1316 Anesthesia Stop: 1330    Procedure: ESOPHAGOGASTRODUODENOSCOPY Diagnosis:       Cirrhosis (HCC)      (Cirrhosis (HCC) [K74.60])    Surgeons: Nolberto Cantu MD Responsible Provider: Rafael Smith APRN - CRNA    Anesthesia Type: MAC ASA Status: 2            Anesthesia Type: No value filed.    Isabella Phase I: Isabella Score: 10    Isabella Phase II:      Anesthesia Post Evaluation    Patient location during evaluation: bedside  Patient participation: complete - patient participated  Level of consciousness: awake and awake and alert  Airway patency: patent  Nausea & Vomiting: no nausea and no vomiting  Cardiovascular status: blood pressure returned to baseline and hemodynamically stable  Respiratory status: acceptable  Hydration status: euvolemic  Pain management: adequate        No notable events documented.

## 2025-07-17 NOTE — DISCHARGE INSTR - COC
Continuity of Care Form    Patient Name: Narayan Eddy   :  1955  MRN:  [id number]    Admit date:  2025  Discharge date:  25    Code Status Order: Full Code   Advance Directives:     Admitting Physician:  Carla Gill MD  PCP: Mukesh Prieto MD    Discharging Nurse:   Discharging Hospital Unit/Room#: W481/W481-01  Discharging Unit Phone Number: [phone]    Emergency Contact:   Extended Emergency Contact Information  Primary Emergency Contact: Kamilla Parrish  Address: [address] [place] [id number] [facility]  Home Phone: [phone]  Work Phone: [phone]  Mobile Phone: [phone]  Relation: Other  Secondary Emergency Contact: shelly eddy  Home Phone: [phone]  Relation: Brother/Sister   needed? No    Past Surgical History:  Past Surgical History:   Procedure Laterality Date    ARM SURGERY Left 2024    ARM LESION BIOPSY EXCISION performed by Светлана Lucio [place] [facility] [place]    COLONOSCOPY  2016    Dr. Cantu; polyps f/u in 5 years    COLONOSCOPY N/A 10/02/2020    COLONOSCOPY DIAGNOSTIC performed by Ashley Price MD at [facility]    EXCISION/BIOPSY N/A 2024    Back and left arm mass excisions performed by Светлана Lucio MD at [facility] [place]    JOINT REPLACEMENT Right 2024    right total knee    MUSCLE BIOPSY Left 2023    Left quadriecep muscle biopsy performed by Светлана Lucio MD at [facility]    PARACENTESIS Left 2025    3000 ml removed by Isela Burris CNP - diagnostics sent    PROSTATECTOMY  2014    SKIN BIOPSY      ULNAR TUNNEL RELEASE Bilateral     UPPER GASTROINTESTINAL ENDOSCOPY N/A 2023    EGD WITH BIOPSIES performed by Ashley Price MD at [facility]    VARICOSE VEIN SURGERY      x2       Immunization History:   Immunization History   Administered Date(s) Administered    COVID-19, PFIZER PURPLE top, DILUTE for use, (age 12 y+), 30mcg/0.3mL 2021, 2021

## 2025-07-17 NOTE — TELEPHONE ENCOUNTER
Post procedure phone call placed. Spoke to Phil nurse on floor for this patient. This patient had a paracentesis done on 07/16/2025 by Isela Burris CNP. Per Phil, the patient has not complained of any pain due to the paracentesis. Band aide is clean,dry,intact per Phil. Patient stated that after the paracentesis was completed that his abdomen was way less distended.

## 2025-07-17 NOTE — CARE COORDINATION
Met with patient, more alert this visit. Patient interested in rehab. Pending EGD today, rehab to follow. Electronically signed by Alisson Leavitt RN on 7/17/2025 at 10:53 AM    Per LSW, patient to go to [facility] at discharge, rehab to follow if there are any changes. Electronically signed by Alisson Leavitt RN on 7/17/2025 at 11:03 AM

## 2025-07-17 NOTE — CARE COORDINATION
This LSW visited patient at bedside this am.  I notified him that I received response from [facility]. Patients referral has been accepted.  Patient will discharge to [facility] once he is medically cleared.  Electronically signed by PAN Kaplan on 7/17/25 at 10:53 AM EDT

## 2025-07-18 LAB
ALBUMIN SERPL-MCNC: 2.9 G/DL (ref 3.5–4.6)
ALP SERPL-CCNC: 178 U/L (ref 35–104)
ALT SERPL-CCNC: 22 U/L (ref 0–41)
ANION GAP SERPL CALCULATED.3IONS-SCNC: 10 MEQ/L (ref 9–15)
ANION GAP SERPL CALCULATED.3IONS-SCNC: 11 MEQ/L (ref 9–15)
ANION GAP SERPL CALCULATED.3IONS-SCNC: 13 MEQ/L (ref 9–15)
AST SERPL-CCNC: 66 U/L (ref 0–40)
BASOPHILS # BLD: 0 K/UL (ref 0–0.2)
BASOPHILS NFR BLD: 0.4 %
BILIRUB SERPL-MCNC: 3.9 MG/DL (ref 0.2–0.7)
BUN SERPL-MCNC: 9 MG/DL (ref 8–23)
CALCIUM SERPL-MCNC: 8.3 MG/DL (ref 8.5–9.9)
CALCIUM SERPL-MCNC: 8.3 MG/DL (ref 8.5–9.9)
CALCIUM SERPL-MCNC: 8.4 MG/DL (ref 8.5–9.9)
CHLORIDE SERPL-SCNC: 91 MEQ/L (ref 95–107)
CHLORIDE SERPL-SCNC: 92 MEQ/L (ref 95–107)
CHLORIDE SERPL-SCNC: 92 MEQ/L (ref 95–107)
CO2 SERPL-SCNC: 21 MEQ/L (ref 20–31)
CO2 SERPL-SCNC: 24 MEQ/L (ref 20–31)
CO2 SERPL-SCNC: 24 MEQ/L (ref 20–31)
CREAT SERPL-MCNC: 1.02 MG/DL (ref 0.7–1.2)
CREAT SERPL-MCNC: 1.05 MG/DL (ref 0.7–1.2)
CREAT SERPL-MCNC: 1.07 MG/DL (ref 0.7–1.2)
EOSINOPHIL # BLD: 0.1 K/UL (ref 0–0.7)
EOSINOPHIL NFR BLD: 1.5 %
ERYTHROCYTE [DISTWIDTH] IN BLOOD BY AUTOMATED COUNT: 13.3 % (ref 11.5–14.5)
GLOBULIN SER CALC-MCNC: 2.7 G/DL (ref 2.3–3.5)
GLUCOSE BLD-MCNC: 121 MG/DL (ref 70–99)
GLUCOSE BLD-MCNC: 122 MG/DL (ref 70–99)
GLUCOSE BLD-MCNC: 129 MG/DL (ref 70–99)
GLUCOSE BLD-MCNC: 152 MG/DL (ref 70–99)
GLUCOSE SERPL-MCNC: 105 MG/DL (ref 70–99)
GLUCOSE SERPL-MCNC: 111 MG/DL (ref 70–99)
GLUCOSE SERPL-MCNC: 134 MG/DL (ref 70–99)
HCT VFR BLD AUTO: 25.5 % (ref 42–52)
HGB BLD-MCNC: 9.5 G/DL (ref 14–18)
LYMPHOCYTES # BLD: 0.9 K/UL (ref 1–4.8)
LYMPHOCYTES NFR BLD: 16.7 %
MAGNESIUM SERPL-MCNC: 1.5 MG/DL (ref 1.7–2.4)
MCH RBC QN AUTO: 37.1 PG (ref 27–31.3)
MCHC RBC AUTO-ENTMCNC: 37.3 % (ref 33–37)
MCV RBC AUTO: 99.6 FL (ref 79–92.2)
MONOCYTES # BLD: 0.8 K/UL (ref 0.2–0.8)
MONOCYTES NFR BLD: 15.2 %
NEUTROPHILS # BLD: 3.5 K/UL (ref 1.4–6.5)
NEUTS SEG NFR BLD: 65.6 %
OSMOLALITY URINE: 139 MOSM/KG (ref 80–1300)
PERFORMED ON: ABNORMAL
PLATELET # BLD AUTO: 111 K/UL (ref 130–400)
POTASSIUM SERPL-SCNC: 3.9 MEQ/L (ref 3.4–4.9)
POTASSIUM SERPL-SCNC: 4.5 MEQ/L (ref 3.4–4.9)
POTASSIUM SERPL-SCNC: 4.6 MEQ/L (ref 3.4–4.9)
PROT SERPL-MCNC: 5.6 G/DL (ref 6.3–8)
RBC # BLD AUTO: 2.56 M/UL (ref 4.7–6.1)
SODIUM SERPL-SCNC: 125 MEQ/L (ref 135–144)
SODIUM SERPL-SCNC: 126 MEQ/L (ref 135–144)
SODIUM SERPL-SCNC: 127 MEQ/L (ref 135–144)
WBC # BLD AUTO: 5.3 K/UL (ref 4.8–10.8)

## 2025-07-18 PROCEDURE — 97535 SELF CARE MNGMENT TRAINING: CPT

## 2025-07-18 PROCEDURE — 97116 GAIT TRAINING THERAPY: CPT

## 2025-07-18 PROCEDURE — 6370000000 HC RX 637 (ALT 250 FOR IP): Performed by: INTERNAL MEDICINE

## 2025-07-18 PROCEDURE — 83735 ASSAY OF MAGNESIUM: CPT

## 2025-07-18 PROCEDURE — 1210000000 HC MED SURG R&B

## 2025-07-18 PROCEDURE — 94761 N-INVAS EAR/PLS OXIMETRY MLT: CPT

## 2025-07-18 PROCEDURE — 6370000000 HC RX 637 (ALT 250 FOR IP): Performed by: SPECIALIST

## 2025-07-18 PROCEDURE — 6370000000 HC RX 637 (ALT 250 FOR IP): Performed by: STUDENT IN AN ORGANIZED HEALTH CARE EDUCATION/TRAINING PROGRAM

## 2025-07-18 PROCEDURE — 80053 COMPREHEN METABOLIC PANEL: CPT

## 2025-07-18 PROCEDURE — 94640 AIRWAY INHALATION TREATMENT: CPT

## 2025-07-18 PROCEDURE — 36415 COLL VENOUS BLD VENIPUNCTURE: CPT

## 2025-07-18 PROCEDURE — 6370000000 HC RX 637 (ALT 250 FOR IP): Performed by: NURSE PRACTITIONER

## 2025-07-18 PROCEDURE — 82105 ALPHA-FETOPROTEIN SERUM: CPT

## 2025-07-18 PROCEDURE — 85025 COMPLETE CBC W/AUTO DIFF WBC: CPT

## 2025-07-18 PROCEDURE — 6360000002 HC RX W HCPCS: Performed by: NURSE PRACTITIONER

## 2025-07-18 PROCEDURE — 99232 SBSQ HOSP IP/OBS MODERATE 35: CPT | Performed by: SPECIALIST

## 2025-07-18 PROCEDURE — 2500000003 HC RX 250 WO HCPCS: Performed by: NURSE PRACTITIONER

## 2025-07-18 RX ORDER — METOPROLOL SUCCINATE 50 MG/1
50 TABLET, EXTENDED RELEASE ORAL DAILY
Qty: 30 TABLET | Refills: 3
Start: 2025-07-19

## 2025-07-18 RX ADMIN — MULTIVITAMIN TABLET 1 TABLET: TABLET at 09:57

## 2025-07-18 RX ADMIN — FLUTICASONE PROPIONATE 2 PUFF: 110 AEROSOL, METERED RESPIRATORY (INHALATION) at 19:06

## 2025-07-18 RX ADMIN — ENOXAPARIN SODIUM 30 MG: 100 INJECTION SUBCUTANEOUS at 21:58

## 2025-07-18 RX ADMIN — FUROSEMIDE 20 MG: 20 TABLET ORAL at 09:57

## 2025-07-18 RX ADMIN — FOLIC ACID 1 MG: 1 TABLET ORAL at 09:57

## 2025-07-18 RX ADMIN — SODIUM CHLORIDE, PRESERVATIVE FREE 10 ML: 5 INJECTION INTRAVENOUS at 22:14

## 2025-07-18 RX ADMIN — GABAPENTIN 300 MG: 300 CAPSULE ORAL at 21:58

## 2025-07-18 RX ADMIN — ATORVASTATIN CALCIUM 10 MG: 10 TABLET, FILM COATED ORAL at 09:57

## 2025-07-18 RX ADMIN — OXYCODONE HYDROCHLORIDE AND ACETAMINOPHEN 500 MG: 500 TABLET ORAL at 09:57

## 2025-07-18 RX ADMIN — METOPROLOL SUCCINATE 50 MG: 50 TABLET, EXTENDED RELEASE ORAL at 09:57

## 2025-07-18 RX ADMIN — Medication 100 MG: at 09:57

## 2025-07-18 RX ADMIN — LACTULOSE 10 G: 10 SOLUTION ORAL at 09:57

## 2025-07-18 RX ADMIN — GABAPENTIN 300 MG: 300 CAPSULE ORAL at 14:54

## 2025-07-18 RX ADMIN — PANTOPRAZOLE SODIUM 40 MG: 40 TABLET, DELAYED RELEASE ORAL at 06:19

## 2025-07-18 RX ADMIN — ALLOPURINOL 100 MG: 100 TABLET ORAL at 09:57

## 2025-07-18 RX ADMIN — GABAPENTIN 300 MG: 300 CAPSULE ORAL at 09:57

## 2025-07-18 RX ADMIN — FLUTICASONE PROPIONATE 2 PUFF: 110 AEROSOL, METERED RESPIRATORY (INHALATION) at 07:24

## 2025-07-18 RX ADMIN — SPIRONOLACTONE 50 MG: 25 TABLET ORAL at 09:57

## 2025-07-18 NOTE — CARE COORDINATION
CM WENT TO PT'S ROOM THIS AM TO CONFIRM [facility] VS [facility] SINCE THERES DOCUMENTATION PT HAS SAID HE'S WANTS BOTH PLACES.  PT HAS CONFIRMED [facility].  WILL LET LSW KNOW.     UPDATE: DURING ROUNDS LSW DID INDICATE THAT PT WANTS [facility].  D/C ORDER MADE FOR [facility].

## 2025-07-19 LAB
AFP-TM SERPL-MCNC: 2.2 UG/L
ALBUMIN SERPL-MCNC: 3.1 G/DL (ref 3.5–4.6)
ALP SERPL-CCNC: 174 U/L (ref 35–104)
ALT SERPL-CCNC: 22 U/L (ref 0–41)
ANION GAP SERPL CALCULATED.3IONS-SCNC: 10 MEQ/L (ref 9–15)
ANION GAP SERPL CALCULATED.3IONS-SCNC: 10 MEQ/L (ref 9–15)
ANION GAP SERPL CALCULATED.3IONS-SCNC: 11 MEQ/L (ref 9–15)
ANION GAP SERPL CALCULATED.3IONS-SCNC: 12 MEQ/L (ref 9–15)
ANION GAP SERPL CALCULATED.3IONS-SCNC: 13 MEQ/L (ref 9–15)
AST SERPL-CCNC: 59 U/L (ref 0–40)
BASOPHILS # BLD: 0 K/UL (ref 0–0.2)
BASOPHILS NFR BLD: 0.9 %
BILIRUB SERPL-MCNC: 3.8 MG/DL (ref 0.2–0.7)
BUN SERPL-MCNC: 8 MG/DL (ref 8–23)
BUN SERPL-MCNC: 9 MG/DL (ref 8–23)
CALCIUM SERPL-MCNC: 8.2 MG/DL (ref 8.5–9.9)
CALCIUM SERPL-MCNC: 8.4 MG/DL (ref 8.5–9.9)
CALCIUM SERPL-MCNC: 8.8 MG/DL (ref 8.5–9.9)
CHLORIDE SERPL-SCNC: 89 MEQ/L (ref 95–107)
CHLORIDE SERPL-SCNC: 91 MEQ/L (ref 95–107)
CHLORIDE SERPL-SCNC: 93 MEQ/L (ref 95–107)
CHLORIDE UR-SCNC: 41 MEQ/L
CO2 SERPL-SCNC: 23 MEQ/L (ref 20–31)
CO2 SERPL-SCNC: 24 MEQ/L (ref 20–31)
CO2 SERPL-SCNC: 25 MEQ/L (ref 20–31)
CREAT SERPL-MCNC: 0.88 MG/DL (ref 0.7–1.2)
CREAT SERPL-MCNC: 0.9 MG/DL (ref 0.7–1.2)
CREAT SERPL-MCNC: 0.92 MG/DL (ref 0.7–1.2)
CREAT SERPL-MCNC: 0.96 MG/DL (ref 0.7–1.2)
CREAT SERPL-MCNC: 1.05 MG/DL (ref 0.7–1.2)
CREAT UR-MCNC: 11.3 MG/DL
EOSINOPHIL # BLD: 0.1 K/UL (ref 0–0.7)
EOSINOPHIL NFR BLD: 1.7 %
ERYTHROCYTE [DISTWIDTH] IN BLOOD BY AUTOMATED COUNT: 13.6 % (ref 11.5–14.5)
GLOBULIN SER CALC-MCNC: 2.7 G/DL (ref 2.3–3.5)
GLUCOSE BLD-MCNC: 132 MG/DL (ref 70–99)
GLUCOSE BLD-MCNC: 134 MG/DL (ref 70–99)
GLUCOSE BLD-MCNC: 140 MG/DL (ref 70–99)
GLUCOSE SERPL-MCNC: 108 MG/DL (ref 70–99)
GLUCOSE SERPL-MCNC: 114 MG/DL (ref 70–99)
GLUCOSE SERPL-MCNC: 127 MG/DL (ref 70–99)
GLUCOSE SERPL-MCNC: 143 MG/DL (ref 70–99)
GLUCOSE SERPL-MCNC: 154 MG/DL (ref 70–99)
HCT VFR BLD AUTO: 27 % (ref 42–52)
HGB BLD-MCNC: 10 G/DL (ref 14–18)
LYMPHOCYTES # BLD: 0.9 K/UL (ref 1–4.8)
LYMPHOCYTES NFR BLD: 18.6 %
MAGNESIUM SERPL-MCNC: 1.2 MG/DL (ref 1.7–2.4)
MCH RBC QN AUTO: 37.3 PG (ref 27–31.3)
MCHC RBC AUTO-ENTMCNC: 37 % (ref 33–37)
MCV RBC AUTO: 100.7 FL (ref 79–92.2)
MONOCYTES # BLD: 0.8 K/UL (ref 0.2–0.8)
MONOCYTES NFR BLD: 16.2 %
NEUTROPHILS # BLD: 2.9 K/UL (ref 1.4–6.5)
NEUTS SEG NFR BLD: 62.2 %
OSMOLALITY URINE: 131 MOSM/KG (ref 80–1300)
PERFORMED ON: ABNORMAL
PLATELET # BLD AUTO: 127 K/UL (ref 130–400)
POTASSIUM SERPL-SCNC: 4 MEQ/L (ref 3.4–4.9)
POTASSIUM SERPL-SCNC: 4.3 MEQ/L (ref 3.4–4.9)
POTASSIUM SERPL-SCNC: 4.6 MEQ/L (ref 3.4–4.9)
POTASSIUM SERPL-SCNC: 4.7 MEQ/L (ref 3.4–4.9)
POTASSIUM SERPL-SCNC: 4.7 MEQ/L (ref 3.4–4.9)
PROT SERPL-MCNC: 5.8 G/DL (ref 6.3–8)
RBC # BLD AUTO: 2.68 M/UL (ref 4.7–6.1)
SODIUM SERPL-SCNC: 123 MEQ/L (ref 135–144)
SODIUM SERPL-SCNC: 124 MEQ/L (ref 135–144)
SODIUM SERPL-SCNC: 126 MEQ/L (ref 135–144)
SODIUM SERPL-SCNC: 127 MEQ/L (ref 135–144)
SODIUM SERPL-SCNC: 127 MEQ/L (ref 135–144)
SODIUM UR-SCNC: 44 MEQ/L
WBC # BLD AUTO: 4.6 K/UL (ref 4.8–10.8)

## 2025-07-19 PROCEDURE — 99231 SBSQ HOSP IP/OBS SF/LOW 25: CPT | Performed by: NURSE PRACTITIONER

## 2025-07-19 PROCEDURE — 94640 AIRWAY INHALATION TREATMENT: CPT

## 2025-07-19 PROCEDURE — 2500000003 HC RX 250 WO HCPCS: Performed by: NURSE PRACTITIONER

## 2025-07-19 PROCEDURE — 6370000000 HC RX 637 (ALT 250 FOR IP): Performed by: SPECIALIST

## 2025-07-19 PROCEDURE — 85025 COMPLETE CBC W/AUTO DIFF WBC: CPT

## 2025-07-19 PROCEDURE — 82436 ASSAY OF URINE CHLORIDE: CPT

## 2025-07-19 PROCEDURE — 84300 ASSAY OF URINE SODIUM: CPT

## 2025-07-19 PROCEDURE — 94761 N-INVAS EAR/PLS OXIMETRY MLT: CPT

## 2025-07-19 PROCEDURE — 80053 COMPREHEN METABOLIC PANEL: CPT

## 2025-07-19 PROCEDURE — 6370000000 HC RX 637 (ALT 250 FOR IP): Performed by: NURSE PRACTITIONER

## 2025-07-19 PROCEDURE — 83935 ASSAY OF URINE OSMOLALITY: CPT

## 2025-07-19 PROCEDURE — 36415 COLL VENOUS BLD VENIPUNCTURE: CPT

## 2025-07-19 PROCEDURE — 82570 ASSAY OF URINE CREATININE: CPT

## 2025-07-19 PROCEDURE — 6370000000 HC RX 637 (ALT 250 FOR IP): Performed by: STUDENT IN AN ORGANIZED HEALTH CARE EDUCATION/TRAINING PROGRAM

## 2025-07-19 PROCEDURE — 83735 ASSAY OF MAGNESIUM: CPT

## 2025-07-19 PROCEDURE — 6370000000 HC RX 637 (ALT 250 FOR IP): Performed by: INTERNAL MEDICINE

## 2025-07-19 PROCEDURE — 6360000002 HC RX W HCPCS: Performed by: NURSE PRACTITIONER

## 2025-07-19 PROCEDURE — 1210000000 HC MED SURG R&B

## 2025-07-19 RX ORDER — TOLVAPTAN 15 MG/1
15 TABLET ORAL ONCE
Status: COMPLETED | OUTPATIENT
Start: 2025-07-19 | End: 2025-07-19

## 2025-07-19 RX ORDER — FUROSEMIDE 20 MG/1
20 TABLET ORAL 2 TIMES DAILY
Status: DISCONTINUED | OUTPATIENT
Start: 2025-07-19 | End: 2025-07-21 | Stop reason: HOSPADM

## 2025-07-19 RX ADMIN — GABAPENTIN 300 MG: 300 CAPSULE ORAL at 08:18

## 2025-07-19 RX ADMIN — MAGNESIUM SULFATE HEPTAHYDRATE 2000 MG: 40 INJECTION, SOLUTION INTRAVENOUS at 08:25

## 2025-07-19 RX ADMIN — PANTOPRAZOLE SODIUM 40 MG: 40 TABLET, DELAYED RELEASE ORAL at 06:11

## 2025-07-19 RX ADMIN — SODIUM CHLORIDE, PRESERVATIVE FREE 10 ML: 5 INJECTION INTRAVENOUS at 08:20

## 2025-07-19 RX ADMIN — SODIUM CHLORIDE, PRESERVATIVE FREE 10 ML: 5 INJECTION INTRAVENOUS at 21:01

## 2025-07-19 RX ADMIN — LACTULOSE 10 G: 10 SOLUTION ORAL at 08:18

## 2025-07-19 RX ADMIN — FOLIC ACID 1 MG: 1 TABLET ORAL at 08:19

## 2025-07-19 RX ADMIN — GABAPENTIN 300 MG: 300 CAPSULE ORAL at 21:01

## 2025-07-19 RX ADMIN — ENOXAPARIN SODIUM 30 MG: 100 INJECTION SUBCUTANEOUS at 21:01

## 2025-07-19 RX ADMIN — ATORVASTATIN CALCIUM 10 MG: 10 TABLET, FILM COATED ORAL at 08:19

## 2025-07-19 RX ADMIN — FUROSEMIDE 20 MG: 20 TABLET ORAL at 18:19

## 2025-07-19 RX ADMIN — GABAPENTIN 300 MG: 300 CAPSULE ORAL at 15:33

## 2025-07-19 RX ADMIN — MULTIVITAMIN TABLET 1 TABLET: TABLET at 08:19

## 2025-07-19 RX ADMIN — FUROSEMIDE 20 MG: 20 TABLET ORAL at 08:19

## 2025-07-19 RX ADMIN — OXYCODONE HYDROCHLORIDE AND ACETAMINOPHEN 500 MG: 500 TABLET ORAL at 08:19

## 2025-07-19 RX ADMIN — FLUTICASONE PROPIONATE 2 PUFF: 110 AEROSOL, METERED RESPIRATORY (INHALATION) at 07:14

## 2025-07-19 RX ADMIN — ENOXAPARIN SODIUM 30 MG: 100 INJECTION SUBCUTANEOUS at 08:19

## 2025-07-19 RX ADMIN — METOPROLOL SUCCINATE 50 MG: 50 TABLET, EXTENDED RELEASE ORAL at 08:18

## 2025-07-19 RX ADMIN — FLUTICASONE PROPIONATE 2 PUFF: 110 AEROSOL, METERED RESPIRATORY (INHALATION) at 19:28

## 2025-07-19 RX ADMIN — TOLVAPTAN 15 MG: 15 TABLET ORAL at 08:18

## 2025-07-19 RX ADMIN — MAGNESIUM SULFATE HEPTAHYDRATE 2000 MG: 40 INJECTION, SOLUTION INTRAVENOUS at 09:41

## 2025-07-19 RX ADMIN — SPIRONOLACTONE 50 MG: 25 TABLET ORAL at 08:18

## 2025-07-19 RX ADMIN — ALLOPURINOL 100 MG: 100 TABLET ORAL at 08:19

## 2025-07-19 RX ADMIN — Medication 100 MG: at 08:18

## 2025-07-20 LAB
ALBUMIN SERPL-MCNC: 2.9 G/DL (ref 3.5–4.6)
ALP SERPL-CCNC: 160 U/L (ref 35–104)
ALT SERPL-CCNC: 18 U/L (ref 0–41)
ANION GAP SERPL CALCULATED.3IONS-SCNC: 8 MEQ/L (ref 9–15)
AST SERPL-CCNC: 50 U/L (ref 0–40)
BASOPHILS # BLD: 0 K/UL (ref 0–0.2)
BASOPHILS NFR BLD: 0.9 %
BILIRUB SERPL-MCNC: 3.3 MG/DL (ref 0.2–0.7)
BUN SERPL-MCNC: 10 MG/DL (ref 8–23)
CALCIUM SERPL-MCNC: 9.1 MG/DL (ref 8.5–9.9)
CHLORIDE SERPL-SCNC: 95 MEQ/L (ref 95–107)
CO2 SERPL-SCNC: 26 MEQ/L (ref 20–31)
CREAT SERPL-MCNC: 0.99 MG/DL (ref 0.7–1.2)
EOSINOPHIL # BLD: 0.1 K/UL (ref 0–0.7)
EOSINOPHIL NFR BLD: 2.1 %
ERYTHROCYTE [DISTWIDTH] IN BLOOD BY AUTOMATED COUNT: 13.9 % (ref 11.5–14.5)
GLOBULIN SER CALC-MCNC: 2.7 G/DL (ref 2.3–3.5)
GLUCOSE BLD-MCNC: 102 MG/DL (ref 70–99)
GLUCOSE BLD-MCNC: 130 MG/DL (ref 70–99)
GLUCOSE BLD-MCNC: 141 MG/DL (ref 70–99)
GLUCOSE BLD-MCNC: 151 MG/DL (ref 70–99)
GLUCOSE SERPL-MCNC: 107 MG/DL (ref 70–99)
HCT VFR BLD AUTO: 25.4 % (ref 42–52)
HGB BLD-MCNC: 9.5 G/DL (ref 14–18)
LYMPHOCYTES # BLD: 0.9 K/UL (ref 1–4.8)
LYMPHOCYTES NFR BLD: 19.8 %
MAGNESIUM SERPL-MCNC: 1.7 MG/DL (ref 1.7–2.4)
MCH RBC QN AUTO: 37.4 PG (ref 27–31.3)
MCHC RBC AUTO-ENTMCNC: 37.4 % (ref 33–37)
MCV RBC AUTO: 100 FL (ref 79–92.2)
MONOCYTES # BLD: 0.8 K/UL (ref 0.2–0.8)
MONOCYTES NFR BLD: 18.2 %
NEUTROPHILS # BLD: 2.6 K/UL (ref 1.4–6.5)
NEUTS SEG NFR BLD: 58.5 %
PERFORMED ON: ABNORMAL
PLATELET # BLD AUTO: 111 K/UL (ref 130–400)
POTASSIUM SERPL-SCNC: 4.4 MEQ/L (ref 3.4–4.9)
PROT SERPL-MCNC: 5.6 G/DL (ref 6.3–8)
RBC # BLD AUTO: 2.54 M/UL (ref 4.7–6.1)
SODIUM SERPL-SCNC: 129 MEQ/L (ref 135–144)
WBC # BLD AUTO: 4.4 K/UL (ref 4.8–10.8)

## 2025-07-20 PROCEDURE — 36415 COLL VENOUS BLD VENIPUNCTURE: CPT

## 2025-07-20 PROCEDURE — 6360000002 HC RX W HCPCS: Performed by: NURSE PRACTITIONER

## 2025-07-20 PROCEDURE — 80053 COMPREHEN METABOLIC PANEL: CPT

## 2025-07-20 PROCEDURE — 6370000000 HC RX 637 (ALT 250 FOR IP): Performed by: STUDENT IN AN ORGANIZED HEALTH CARE EDUCATION/TRAINING PROGRAM

## 2025-07-20 PROCEDURE — 6370000000 HC RX 637 (ALT 250 FOR IP): Performed by: NURSE PRACTITIONER

## 2025-07-20 PROCEDURE — 2500000003 HC RX 250 WO HCPCS: Performed by: NURSE PRACTITIONER

## 2025-07-20 PROCEDURE — 1210000000 HC MED SURG R&B

## 2025-07-20 PROCEDURE — 85025 COMPLETE CBC W/AUTO DIFF WBC: CPT

## 2025-07-20 PROCEDURE — 94640 AIRWAY INHALATION TREATMENT: CPT

## 2025-07-20 PROCEDURE — 6370000000 HC RX 637 (ALT 250 FOR IP): Performed by: INTERNAL MEDICINE

## 2025-07-20 PROCEDURE — 6370000000 HC RX 637 (ALT 250 FOR IP): Performed by: SPECIALIST

## 2025-07-20 PROCEDURE — 83735 ASSAY OF MAGNESIUM: CPT

## 2025-07-20 PROCEDURE — 94761 N-INVAS EAR/PLS OXIMETRY MLT: CPT

## 2025-07-20 RX ORDER — SPIRONOLACTONE 50 MG/1
50 TABLET, FILM COATED ORAL DAILY
Qty: 30 TABLET | Refills: 3
Start: 2025-07-21

## 2025-07-20 RX ORDER — FUROSEMIDE 20 MG/1
20 TABLET ORAL 2 TIMES DAILY
Qty: 60 TABLET | Refills: 3
Start: 2025-07-20

## 2025-07-20 RX ADMIN — SODIUM CHLORIDE, PRESERVATIVE FREE 10 ML: 5 INJECTION INTRAVENOUS at 21:32

## 2025-07-20 RX ADMIN — ENOXAPARIN SODIUM 30 MG: 100 INJECTION SUBCUTANEOUS at 09:14

## 2025-07-20 RX ADMIN — GABAPENTIN 300 MG: 300 CAPSULE ORAL at 21:32

## 2025-07-20 RX ADMIN — SODIUM CHLORIDE, PRESERVATIVE FREE 10 ML: 5 INJECTION INTRAVENOUS at 09:16

## 2025-07-20 RX ADMIN — FUROSEMIDE 20 MG: 20 TABLET ORAL at 18:13

## 2025-07-20 RX ADMIN — OXYCODONE HYDROCHLORIDE AND ACETAMINOPHEN 500 MG: 500 TABLET ORAL at 09:14

## 2025-07-20 RX ADMIN — ACETAMINOPHEN 650 MG: 325 TABLET ORAL at 18:15

## 2025-07-20 RX ADMIN — MULTIVITAMIN TABLET 1 TABLET: TABLET at 09:13

## 2025-07-20 RX ADMIN — METOPROLOL SUCCINATE 50 MG: 50 TABLET, EXTENDED RELEASE ORAL at 09:14

## 2025-07-20 RX ADMIN — ALLOPURINOL 100 MG: 100 TABLET ORAL at 09:14

## 2025-07-20 RX ADMIN — GABAPENTIN 300 MG: 300 CAPSULE ORAL at 09:14

## 2025-07-20 RX ADMIN — FLUTICASONE PROPIONATE 2 PUFF: 110 AEROSOL, METERED RESPIRATORY (INHALATION) at 07:14

## 2025-07-20 RX ADMIN — FOLIC ACID 1 MG: 1 TABLET ORAL at 09:14

## 2025-07-20 RX ADMIN — ENOXAPARIN SODIUM 30 MG: 100 INJECTION SUBCUTANEOUS at 21:32

## 2025-07-20 RX ADMIN — Medication 100 MG: at 09:14

## 2025-07-20 RX ADMIN — FUROSEMIDE 20 MG: 20 TABLET ORAL at 09:14

## 2025-07-20 RX ADMIN — GABAPENTIN 300 MG: 300 CAPSULE ORAL at 15:46

## 2025-07-20 RX ADMIN — PANTOPRAZOLE SODIUM 40 MG: 40 TABLET, DELAYED RELEASE ORAL at 06:29

## 2025-07-20 RX ADMIN — LACTULOSE 10 G: 10 SOLUTION ORAL at 21:32

## 2025-07-20 RX ADMIN — FLUTICASONE PROPIONATE 2 PUFF: 110 AEROSOL, METERED RESPIRATORY (INHALATION) at 19:09

## 2025-07-20 RX ADMIN — ATORVASTATIN CALCIUM 10 MG: 10 TABLET, FILM COATED ORAL at 09:14

## 2025-07-20 RX ADMIN — SPIRONOLACTONE 50 MG: 25 TABLET ORAL at 09:14

## 2025-07-20 ASSESSMENT — PAIN SCALES - GENERAL: PAINLEVEL_OUTOF10: 4

## 2025-07-20 ASSESSMENT — PAIN DESCRIPTION - DESCRIPTORS: DESCRIPTORS: ACHING

## 2025-07-20 ASSESSMENT — PAIN DESCRIPTION - LOCATION: LOCATION: OTHER (COMMENT)

## 2025-07-20 NOTE — CARE COORDINATION
Per chart review patient has been accepted to [facility] does not need pre-cert awaiting medical clearance for discharge.  Updated [facility] via care port patient potential discharge.    1054  Per Dr. Aguilar via [facility] patient not cleared by [facility].  Updated [facility] via care port.

## 2025-07-21 ENCOUNTER — CARE COORDINATION (OUTPATIENT)
Dept: CARE COORDINATION | Age: 70
End: 2025-07-21

## 2025-07-21 VITALS
HEART RATE: 66 BPM | WEIGHT: 213.7 LBS | HEIGHT: 72 IN | RESPIRATION RATE: 18 BRPM | TEMPERATURE: 97.7 F | OXYGEN SATURATION: 100 % | SYSTOLIC BLOOD PRESSURE: 102 MMHG | BODY MASS INDEX: 28.94 KG/M2 | DIASTOLIC BLOOD PRESSURE: 75 MMHG

## 2025-07-21 PROBLEM — K31.89 PORTAL HYPERTENSIVE GASTROPATHY (HCC): Status: ACTIVE | Noted: 2025-07-21

## 2025-07-21 PROBLEM — K76.6 PORTAL HYPERTENSIVE GASTROPATHY (HCC): Status: ACTIVE | Noted: 2025-07-21

## 2025-07-21 LAB
ANION GAP SERPL CALCULATED.3IONS-SCNC: 9 MEQ/L (ref 9–15)
BUN SERPL-MCNC: 10 MG/DL (ref 8–23)
CALCIUM SERPL-MCNC: 8.8 MG/DL (ref 8.5–9.9)
CHLORIDE SERPL-SCNC: 95 MEQ/L (ref 95–107)
CO2 SERPL-SCNC: 27 MEQ/L (ref 20–31)
CREAT SERPL-MCNC: 1.02 MG/DL (ref 0.7–1.2)
GLUCOSE BLD-MCNC: 107 MG/DL (ref 70–99)
GLUCOSE BLD-MCNC: 119 MG/DL (ref 70–99)
GLUCOSE SERPL-MCNC: 99 MG/DL (ref 70–99)
PERFORMED ON: ABNORMAL
PERFORMED ON: ABNORMAL
POTASSIUM SERPL-SCNC: 4.2 MEQ/L (ref 3.4–4.9)
SODIUM SERPL-SCNC: 131 MEQ/L (ref 135–144)

## 2025-07-21 PROCEDURE — 97116 GAIT TRAINING THERAPY: CPT

## 2025-07-21 PROCEDURE — 80048 BASIC METABOLIC PNL TOTAL CA: CPT

## 2025-07-21 PROCEDURE — 94640 AIRWAY INHALATION TREATMENT: CPT

## 2025-07-21 PROCEDURE — 36415 COLL VENOUS BLD VENIPUNCTURE: CPT

## 2025-07-21 PROCEDURE — 6370000000 HC RX 637 (ALT 250 FOR IP): Performed by: NURSE PRACTITIONER

## 2025-07-21 PROCEDURE — 94761 N-INVAS EAR/PLS OXIMETRY MLT: CPT

## 2025-07-21 PROCEDURE — 2500000003 HC RX 250 WO HCPCS: Performed by: NURSE PRACTITIONER

## 2025-07-21 PROCEDURE — 6370000000 HC RX 637 (ALT 250 FOR IP): Performed by: SPECIALIST

## 2025-07-21 PROCEDURE — 6360000002 HC RX W HCPCS: Performed by: NURSE PRACTITIONER

## 2025-07-21 PROCEDURE — 6370000000 HC RX 637 (ALT 250 FOR IP): Performed by: STUDENT IN AN ORGANIZED HEALTH CARE EDUCATION/TRAINING PROGRAM

## 2025-07-21 PROCEDURE — 6370000000 HC RX 637 (ALT 250 FOR IP): Performed by: INTERNAL MEDICINE

## 2025-07-21 PROCEDURE — 97535 SELF CARE MNGMENT TRAINING: CPT

## 2025-07-21 RX ADMIN — SODIUM CHLORIDE, PRESERVATIVE FREE 5 ML: 5 INJECTION INTRAVENOUS at 09:27

## 2025-07-21 RX ADMIN — FLUTICASONE PROPIONATE 2 PUFF: 110 AEROSOL, METERED RESPIRATORY (INHALATION) at 06:53

## 2025-07-21 RX ADMIN — ENOXAPARIN SODIUM 30 MG: 100 INJECTION SUBCUTANEOUS at 10:21

## 2025-07-21 RX ADMIN — OXYCODONE HYDROCHLORIDE AND ACETAMINOPHEN 500 MG: 500 TABLET ORAL at 09:28

## 2025-07-21 RX ADMIN — ALLOPURINOL 100 MG: 100 TABLET ORAL at 09:28

## 2025-07-21 RX ADMIN — FUROSEMIDE 20 MG: 20 TABLET ORAL at 09:28

## 2025-07-21 RX ADMIN — LACTULOSE 10 G: 10 SOLUTION ORAL at 09:30

## 2025-07-21 RX ADMIN — SPIRONOLACTONE 50 MG: 25 TABLET ORAL at 09:28

## 2025-07-21 RX ADMIN — ATORVASTATIN CALCIUM 10 MG: 10 TABLET, FILM COATED ORAL at 09:28

## 2025-07-21 RX ADMIN — Medication 100 MG: at 09:28

## 2025-07-21 RX ADMIN — GABAPENTIN 300 MG: 300 CAPSULE ORAL at 09:27

## 2025-07-21 RX ADMIN — PANTOPRAZOLE SODIUM 40 MG: 40 TABLET, DELAYED RELEASE ORAL at 05:17

## 2025-07-21 RX ADMIN — MULTIVITAMIN TABLET 1 TABLET: TABLET at 09:28

## 2025-07-21 RX ADMIN — METOPROLOL SUCCINATE 50 MG: 50 TABLET, EXTENDED RELEASE ORAL at 09:28

## 2025-07-21 RX ADMIN — FOLIC ACID 1 MG: 1 TABLET ORAL at 09:28

## 2025-07-21 NOTE — CARE COORDINATION
This LSW was notified that patient has been given discharge orders.  I have arranged transportation for patient to be transferred to [facility].  Transport is scheduled for 1:00 PM.  Patient, patients significant other, ARTURO Watson and [facility] are all aware.  I have completed 2nd IMM with patient, copy given.  [id number] has been completed for SNF transfer.  Electronically signed by PAN Kaplan on 7/21/25 at 10:48 AM EDT

## 2025-07-21 NOTE — H&P (VIEW-ONLY)
Anesthesia Evaluation     Patient summary reviewed and Nursing notes reviewed   no history of anesthetic complications:   NPO Solid Status: > 8 hours  NPO Liquid Status: > 2 hours           Airway   Dental      Pulmonary    (+) a smoker Former,sleep apnea  Cardiovascular     Patient on routine beta blocker    (+) hypertension, hyperlipidemia      Neuro/Psych  GI/Hepatic/Renal/Endo    (+) obesity, GERD, diabetes mellitus    Musculoskeletal     (+) arthralgias, back pain, chronic pain, neck pain  Abdominal    Substance History      OB/GYN          Other   arthritis,     ROS/Med Hx Other: Additional History:  Allergies, hypogonadism, fatigue    PSH:  APPENDECTOMY              Anesthesia Plan    ASA 3     general   total IV anesthesia  (Patient identified; pre-operative vital signs, all relevant labs/studies, complete medical/surgical/anesthetic history, full medication list, full allergy list, and NPO status obtained/reviewed; physical assessment performed; anesthetic options, side effects, potential complications, risks, and benefits discussed; questions answered; verbal and/or written anesthesia consent obtained; patient cleared for procedure; anesthesia machine and equipment checked and functioning)  intravenous induction     Anesthetic plan, risks, benefits, and alternatives have been provided, discussed and informed consent has been obtained with: patient.    Use of blood products discussed with  Consented to blood products.    Plan discussed with CRNA and CAA.    CODE STATUS:           Diagnosis Orders   1. Unilateral primary osteoarthritis, right knee        2. Alcoholic liver disease (HCC)        3. Type 2 diabetes mellitus without complication, without long-term current use of insulin (HCC)          Return in about 3 months (around 6/13/2024) for for routine major medical condition management.  Patient Instructions   Patient is likely to be cleared for surgery, waiting on preadmission testing.    Cardiology recently saw and diagnosed benign atrial tachycardia episodes with treatment with metoprolol.  Diabetes has been well-controlled.  And alcohol intake has been decreased.    Again educated patient on the importance of communicating with surgical team exactly how much alcohol he consumes in a day.    Addendum: 3/25/2024: Patient laboratory was reviewed and are normal.  Patient is cleared for surgery.    Subjective:      Patient ID: David Roca is a 69 y.o. male who presents for:  Chief Complaint   Patient presents with   • Pre-op Exam     Right total Knee        Patient presents for preoperative clearance.    Recent evaluation with cardiology is demonstrating some palpitations with atrial tachycardia.  No malignant arrhythmias according to cardiology.  No other cardiac concerns at this time.    Patient has arthritis in his knee.  Is looking forward to knee replacement to increase mobility.    Patient has been drinking less.  Has decreased to 2 drinks per day.  He is unable to delineate how many shots of alcohol this is as he consumes liquor.  We discussed how this can affect surgery.  He understands the importance to be monitoring exactly how much he drinks prior to surgery if he is not going to discontinue and to be sure to communicate to the surgical team how much she is actually drinking.  Liver functions have improved significantly.  Actively working with hepatology    Denies signs of infection such as sinus symptoms, ear pain, urinary symptoms, cough etc.        Current Outpatient  Medications on File Prior to Visit   Medication Sig Dispense Refill   • thiamine 100 MG tablet Take 1 tablet by mouth daily 30 tablet 12   • sodium-potassium-mag sulfate (SUPREP) 17.5-3.13-1.6 GM/177ML SOLN solution As directed 1 each 0   • Handicap Placard MISC by Does not apply route Pt cannot ambulate > 100 ft without rest    Good for 5 years 1 each 0   • metoprolol succinate (TOPROL XL) 100 MG extended release tablet Take 1.5 tablets by mouth daily 90 tablet 3   • esomeprazole (NEXIUM) 40 MG delayed release capsule Take 1 capsule by mouth every morning (before breakfast) 90 capsule 1   • Cyanocobalamin (VITAMIN B-12) 1000 MCG extended release tablet Take 1 tablet by mouth daily Chewable     • simvastatin (ZOCOR) 20 MG tablet Take 1 tablet by mouth daily 90 tablet 3   • metFORMIN (GLUCOPHAGE) 500 MG tablet Take 1 tablet by mouth 3 times daily 270 tablet 3   • irbesartan (AVAPRO) 150 MG tablet Take 1 tablet by mouth daily 90 tablet 3   • allopurinol (ZYLOPRIM) 300 MG tablet Take 1 tablet by mouth daily 90 tablet 3   • fluticasone (FLOVENT HFA) 110 MCG/ACT inhaler Inhale 2 puffs into the lungs 2 times daily 1 each 5   • folic acid (FOLVITE) 1 MG tablet Take 1 tablet by mouth daily Pt is taking     • albuterol sulfate HFA (VENTOLIN HFA) 108 (90 Base) MCG/ACT inhaler Inhale 2 puffs into the lungs every 6 hours as needed for Wheezing 18 g 1   • fluticasone (FLONASE) 50 MCG/ACT nasal spray 2 sprays by Nasal route daily 32 g 0   • Spacer/Aero-Hold Chamber Bags MISC 1 applicator by Does not apply route 4 times daily 1 each 0   • Blood Pressure KIT 1 applicator by Does not apply route 4 times daily 1 kit 0   • Coenzyme Q10-Vitamin E (QUNOL ULTRA COQ10) 100-150 MG-UNIT CAPS      • vitamin C (ASCORBIC ACID) 500 MG tablet Take 1 tablet by mouth daily     • Multiple Vitamins-Minerals (MULTIVITAMIN MEN 50+ PO) Take by mouth       No current facility-administered medications on file prior to visit.     Past Medical History:    Diagnosis Date   • Actinic keratoses     has had LN2 and used Efudex   • Alcohol consumption of one to four drinks per day on alcohol screening    • Allergic rhinitis     cats, weeds, grasses, ragweed   • Asthma    • Bilateral knee pain    • Diabetes mellitus (HCC)    • Gout    • History of colon polyps 2016    needs f/u 5 years [facility]   • History of type 2 diabetes mellitus     about 15 years   • Hyperlipidemia    • Hypertension    • Keratosis, inflamed seborrheic    • Osteoarthritis, localized, shoulder, right    • Polyp of transverse colon f/u due 2014   • Prediabetes    • Prostate cancer (HCC) 2014    s/p prostatectomy   • Syncope and collapse    • Varicose vein of leg      Past Surgical History:   Procedure Laterality Date   • COLONOSCOPY  2016    Dr. Polo; polyps f/u in 5 years   • COLONOSCOPY N/A 10/02/2020    COLONOSCOPY DIAGNOSTIC performed by Ruby Warren MD at [facility]   • MUSCLE BIOPSY Left 2023    Left quadriecep muscle biopsy performed by Rina Lopez MD at [facility]   • PROSTATECTOMY  2014   • SKIN BIOPSY     • ULNAR TUNNEL RELEASE Bilateral    • UPPER GASTROINTESTINAL ENDOSCOPY N/A 2023    EGD WITH BIOPSIES performed by Ruby Warren MD at [facility]   • VARICOSE VEIN SURGERY      x2     Social History     Socioeconomic History   • Marital status: Single     Spouse name: Not on file   • Number of children: Not on file   • Years of education: 0   • Highest education level: Not on file   Occupational History   • Not on file   Tobacco Use   • Smoking status: Former     Current packs/day: 0.00     Average packs/day: 0.5 packs/day for 12.0 years (6.0 ttl pk-yrs)     Types: Cigarettes     Start date: 1970     Quit date: 1982     Years since quittin.1   • Smokeless tobacco: Never   Vaping Use   • Vaping Use: Never used   Substance and Sexual Activity   • Alcohol use: Yes     Alcohol/week: 5.0 standard drinks of alcohol      Types: 5 Drinks containing 0.5 oz of alcohol per week     Comment: daily 5 vodka drinks per day   • Drug use: No   • Sexual activity: Not Currently     Partners: Female   Other Topics Concern   • Not on file   Social History Narrative    Engaged. No children.     Social Determinants of Health     Financial Resource Strain: Low Risk  (4/25/2023)    Overall Financial Resource Strain (CARDIA)    • Difficulty of Paying Living Expenses: Not hard at all   Food Insecurity: Not on file (4/25/2023)   Transportation Needs: Unknown (4/25/2023)    PRAPARE - Transportation    • Lack of Transportation (Medical): Not on file    • Lack of Transportation (Non-Medical): No   Physical Activity: Inactive (1/28/2024)    Exercise Vital Sign    • Days of Exercise per Week: 0 days    • Minutes of Exercise per Session: 0 min   Stress: Not on file   Social Connections: Not on file   Intimate Partner Violence: Not on file   Housing Stability: Unknown (4/25/2023)    Housing Stability Vital Sign    • Unable to Pay for Housing in the Last Year: Not on file    • Number of Places Lived in the Last Year: Not on file    • Unstable Housing in the Last Year: No     Family History   Problem Relation Age of Onset   • Alzheimer's Disease Mother    • Heart Disease Father    • Cancer Father    • Diabetes Father    • Cancer Brother    • Colon Cancer Neg Hx    • Celiac Disease Neg Hx    • Crohn's Disease Neg Hx      Allergies:  Patient has no known allergies.    Review of Systems   Constitutional:  Negative for activity change, appetite change, diaphoresis and unexpected weight change.   Eyes:  Negative for photophobia and visual disturbance.   Respiratory:  Negative for chest tightness and shortness of breath.         No orthopnea   Cardiovascular:  Negative for chest pain, palpitations and leg swelling.   Gastrointestinal:  Negative for abdominal distention and abdominal pain.   Genitourinary:  Negative for flank pain and frequency.   Musculoskeletal:   "Negative for gait problem and joint swelling.   Neurological:  Negative for dizziness, weakness, light-headedness and headaches.   Psychiatric/Behavioral:  Negative for confusion.        Objective:   /82   Pulse 76   Temp 97.9 °F (36.6 °C)   Ht 1.84 m (6' 0.44\")   Wt 89.8 kg (198 lb)   SpO2 97%   BMI 26.53 kg/m²     Physical Exam  Vitals reviewed.   Constitutional:       General: He is not in acute distress.     Appearance: He is well-developed.   HENT:      Head: Normocephalic and atraumatic.      Right Ear: External ear normal.      Left Ear: External ear normal.      Nose: Nose normal.   Eyes:      General:         Right eye: No discharge.         Left eye: No discharge.      Conjunctiva/sclera: Conjunctivae normal.      Pupils: Pupils are equal, round, and reactive to light.   Neck:      Thyroid: No thyromegaly.   Cardiovascular:      Rate and Rhythm: Normal rate and regular rhythm.      Heart sounds: Normal heart sounds.   Pulmonary:      Effort: Pulmonary effort is normal. No respiratory distress.      Breath sounds: Normal breath sounds.   Abdominal:      General: There is no distension.   Musculoskeletal:      Cervical back: Neck supple.   Skin:     General: Skin is warm and dry.   Neurological:      Mental Status: He is alert and oriented to person, place, and time.      Coordination: Coordination normal.   Psychiatric:         Thought Content: Thought content normal.         Judgment: Judgment normal.         No results found for this visit on 03/13/24.    Recent Results (from the past 2016 hour(s))   Pancreatic Elastase, Fecal    Collection Time: 01/04/24  3:28 PM   Result Value Ref Range    Pancreatic Elastase, Fecal >800 >=100 ug/g   Calprotectin Stool    Collection Time: 01/04/24  3:28 PM   Result Value Ref Range    Calprotectin, Fecal 184 (H) <=49 ug/g   Gamma GT    Collection Time: 01/17/24 10:32 AM   Result Value Ref Range     (H) 8 - 61 U/L   ALT    Collection Time: 01/17/24 " 10:32 AM   Result Value Ref Range    ALT 25 0 - 41 U/L   AST    Collection Time: 01/17/24 10:32 AM   Result Value Ref Range    AST 44 (H) 0 - 40 U/L   Sedimentation Rate    Collection Time: 01/17/24 10:32 AM   Result Value Ref Range    Sed Rate 20 0 - 20 mm   High Sensitivity Crp    Collection Time: 01/17/24 10:32 AM   Result Value Ref Range    CRP High Sensitivity 9.3 (H) 0.0 - 5.0 mg/L   Nuclear stress test with myocardial perfusion    Collection Time: 01/31/24 10:33 AM   Result Value Ref Range    Baseline Systolic  mmHg    Baseline Diastolic BP 92 mmHg    Stress Systolic  mmHg    Stress Diastolic BP 92 mmHg    Baseline HR 84 BPM    Stress Peak HR 90 BPM    Stress Estimated Workload 1.0 METS    Stress Rate Pressure Product 14,310 BPM*mmHg    Stress Target  bpm    Stress Percent HR Achieved 60 %    TID 0.93     Nuc Stress EF 73 %    Stress ST Depression 0 mm   Echo (TTE) complete (PRN contrast/bubble/strain/3D)    Collection Time: 02/12/24 11:35 AM   Result Value Ref Range    Body Surface Area 2.16 m2    IVSd 1.4 (A) 0.6 - 1.0 cm    IVSs 1.3 cm    LVIDd 4.6 4.2 - 5.9 cm    LVIDs 2.9 cm    LVOT Diameter 1.8 cm    LVPWd 1.3 (A) 0.6 - 1.0 cm    LVPWs 1.5 cm    EF BP 61 55 - 100 %    LV Ejection Fraction A2C 69 %    LV Ejection Fraction A4C 57 %    LV EDV A2C 63 mL    LV EDV A4C 48 mL    LV EDV BP 56 (A) 67 - 155 mL    LV ESV A2C 20 mL    LV ESV A4C 21 mL    LV ESV BP 22 22 - 58 mL    LVOT Peak Gradient 4 mmHg    LVOT Mean Gradient 2 mmHg    LVOT SV 52.9 ml    LVOT Peak Velocity 1.1 m/s    LVOT VTI 20.8 cm    RVIDd 2.6 cm    RVSP 13 mmHg    RVOT Peak Gradient 2 mmHg    RVOT Peak Velocity 0.6 m/s    LA Diameter 4.1 cm    LA Volume A/L 45 mL    LA Volume A-L A4C 30 18 - 58 mL    LA Volume A-L A4C 62 (A) 18 - 58 mL    LA Volume MOD A2C 28 18 - 58 mL    LA Volume MOD A4C 57 18 - 58 mL    Est. RA Pressure 3 mmHg    AV Cusp Mmode 2.3 cm    AV Area by Peak Velocity 2.1 cm2    AV Area by VTI 2.0 cm2    AV  Peak Gradient 6 mmHg    AV Mean Gradient 4 mmHg    AV Peak Velocity 1.3 m/s    AV Mean Velocity 0.9 m/s    AV VTI 26.3 cm    MV A Velocity 0.58 m/s    MV E Wave Deceleration Time 191.2 ms    MV E Velocity 0.65 m/s    LV E' Lateral Velocity 10 cm/s    LV E' Septal Velocity 7 cm/s    PV Peak Gradient 2 mmHg    PV Max Velocity 0.8 m/s    TAPSE 1.3 (A) 1.7 cm    TR Peak Gradient 10 mmHg    TR Max Velocity 1.61 m/s    Fractional Shortening 2D 37 28 - 44 %    LV ESV Index BP 10 mL/m2    LV EDV Index BP 26 mL/m2    LV ESV Index A4C 10 mL/m2    LV EDV Index A4C 22 mL/m2    LV ESV Index A2C 9 mL/m2    LV EDV Index A2C 29 mL/m2    LVIDd Index 2.15 cm/m2    LVIDs Index 1.36 cm/m2    LV RWT Ratio 0.57     LV Mass 2D 243.3 (A) 88 - 224 g    LV Mass 2D Index 113.7 49 - 115 g/m2    MV E/A 1.12     E/E' Ratio (Averaged) 7.89     E/E' Lateral 6.50     LA Volume Index A/L 21 16 - 34 mL/m2    LVOT Stroke Volume Index 24.7 mL/m2    LVOT Area 2.5 cm2    LA Volume Index A-L A2C 14 (A) 16 - 34 mL/m2    LA Volume Index A-L A4C 29 16 - 34 mL/m2    LA Volume Index MOD A2C 13 (A) 16 - 34 ml/m2    LA Volume Index MOD A4C 27 16 - 34 ml/m2    LA Size Index 1.92 cm/m2    AV Velocity Ratio 0.85     LVOT:AV VTI Index 0.79     SANDRINE/BSA VTI 0.9 cm2/m2    SANDRINE/BSA Peak Velocity 1.0 cm2/m2       [] Pt was seen by provider for      Minutes  Counseling and coordination of care was done for all assessment diagnosis listed for today with patient and any family/friend present.   More than 50% of this visit was spent coordinating current care, obtaining information for prior records, and counseling for current plan of action.           Assessment:       Diagnosis Orders   1. Unilateral primary osteoarthritis, right knee        2. Alcoholic liver disease (HCC)        3. Type 2 diabetes mellitus without complication, without long-term current use of insulin (HCC)              No orders of the defined types were placed in this encounter.      No orders of the  defined types were placed in this encounter.         Medication List           * Accurate as of March 13, 2024 11:59 PM. If you have any questions, ask your nurse or doctor.                CONTINUE taking these medications      albuterol sulfate  (90 Base) MCG/ACT inhaler  Commonly known as: Ventolin HFA  Inhale 2 puffs into the lungs every 6 hours as needed for Wheezing     allopurinol 300 MG tablet  Commonly known as: ZYLOPRIM  Take 1 tablet by mouth daily     Blood Pressure Kit  1 applicator by Does not apply route 4 times daily     esomeprazole 40 MG delayed release capsule  Commonly known as: NEXIUM  Take 1 capsule by mouth every morning (before breakfast)     fluticasone 110 MCG/ACT inhaler  Commonly known as: Flovent HFA  Inhale 2 puffs into the lungs 2 times daily     fluticasone 50 MCG/ACT nasal spray  Commonly known as: FLONASE  2 sprays by Nasal route daily     folic acid 1 MG tablet  Commonly known as: FOLVITE     Handicap Placard Misc  by Does not apply route Pt cannot ambulate > 100 ft without rest    Good for 5 years     irbesartan 150 MG tablet  Commonly known as: AVAPRO  Take 1 tablet by mouth daily     metFORMIN 500 MG tablet  Commonly known as: GLUCOPHAGE  Take 1 tablet by mouth 3 times daily     metoprolol succinate 100 MG extended release tablet  Commonly known as: TOPROL XL  Take 1.5 tablets by mouth daily     MULTIVITAMIN MEN 50+ PO     Qunol Ultra CoQ10 100-150 MG-UNIT Caps  Generic drug: Coenzyme Q10-Vitamin E     simvastatin 20 MG tablet  Commonly known as: ZOCOR  Take 1 tablet by mouth daily     sodium-potassium-mag sulfate 17.5-3.13-1.6 GM/177ML Soln solution  Commonly known as: SUPREP  As directed     Spacer/Aero-Hold Chamber Bags Misc  1 applicator by Does not apply route 4 times daily     thiamine 100 MG tablet  Take 1 tablet by mouth daily     vitamin B-12 1000 MCG extended release tablet     vitamin C 500 MG tablet  Commonly known as: ASCORBIC ACID            STOP taking these  medications      naltrexone 50 MG tablet  Commonly known as: DEPADE  Stopped by: Francisco Smith MD                Plan:   Return in about 3 months (around 6/13/2024) for for routine major medical condition management.    Patient Instructions   Patient is likely to be cleared for surgery, waiting on preadmission testing.    Cardiology recently saw and diagnosed benign atrial tachycardia episodes with treatment with metoprolol.  Diabetes has been well-controlled.  And alcohol intake has been decreased.    Again educated patient on the importance of communicating with surgical team exactly how much alcohol he consumes in a day.    Addendum: 3/25/2024: Patient laboratory was reviewed and are normal.  Patient is cleared for surgery.    This note was partially created with the assistance of dictation.  This may lead to grammatical or spelling errors.      Francisco Smith M.D.        *Some images could not be shown.

## 2025-07-21 NOTE — PLAN OF CARE
Problem: Chronic Conditions and Co-morbidities  Goal: Patient's chronic conditions and co-morbidity symptoms are monitored and maintained or improved  7/16/2025 0905 by Yoselin Chang RN  Outcome: Progressing  7/15/2025 2134 by Rafael Perla RN  Outcome: Progressing  Flowsheets (Taken 7/15/2025 2000)  Care Plan - Patient's Chronic Conditions and Co-Morbidity Symptoms are Monitored and Maintained or Improved: Monitor and assess patient's chronic conditions and comorbid symptoms for stability, deterioration, or improvement     Problem: Discharge Planning  Goal: Discharge to home or other facility with appropriate resources  7/16/2025 0905 by Yoselin Chang RN  Outcome: Progressing  7/15/2025 2134 by Rafael Perla RN  Outcome: Progressing  Flowsheets (Taken 7/15/2025 2000)  Discharge to home or other facility with appropriate resources:   Identify barriers to discharge with patient and caregiver   Arrange for needed discharge resources and transportation as appropriate   Identify discharge learning needs (meds, wound care, etc)   Arrange for interpreters to assist at discharge as needed   Refer to discharge planning if patient needs post-hospital services based on physician order or complex needs related to functional status, cognitive ability or social support system     Problem: Skin/Tissue Integrity  Goal: Skin integrity remains intact  Description: 1.  Monitor for areas of redness and/or skin breakdown  2.  Assess vascular access sites hourly  3.  Every 4-6 hours minimum:  Change oxygen saturation probe site  4.  Every 4-6 hours:  If on nasal continuous positive airway pressure, respiratory therapy assess nares and determine need for appliance change or resting period  7/16/2025 0905 by Yoselin Chang RN  Outcome: Progressing  7/15/2025 2134 by Rafael Perla RN  Outcome: Progressing  Flowsheets (Taken 7/15/2025 2000)  Skin Integrity Remains Intact:   Monitor for areas of redness and/or skin 
  Problem: Chronic Conditions and Co-morbidities  Goal: Patient's chronic conditions and co-morbidity symptoms are monitored and maintained or improved  7/18/2025 1004 by Yoselin Chang RN  Outcome: Progressing  7/18/2025 0405 by Gretchen Bowers RN  Outcome: Progressing  Flowsheets (Taken 7/17/2025 1955)  Care Plan - Patient's Chronic Conditions and Co-Morbidity Symptoms are Monitored and Maintained or Improved: Monitor and assess patient's chronic conditions and comorbid symptoms for stability, deterioration, or improvement     Problem: Discharge Planning  Goal: Discharge to home or other facility with appropriate resources  7/18/2025 1004 by Yoselin Chang RN  Outcome: Progressing  7/18/2025 0405 by Gretchen Bowers RN  Outcome: Progressing  Flowsheets (Taken 7/17/2025 1955)  Discharge to home or other facility with appropriate resources: Identify barriers to discharge with patient and caregiver     Problem: Safety - Adult  Goal: Free from fall injury  7/18/2025 1004 by Yoselin Chang RN  Outcome: Progressing  7/18/2025 0405 by Gretchen Bowers RN  Outcome: Progressing     Problem: Skin/Tissue Integrity  Goal: Skin integrity remains intact  Description: 1.  Monitor for areas of redness and/or skin breakdown  2.  Assess vascular access sites hourly  3.  Every 4-6 hours minimum:  Change oxygen saturation probe site  4.  Every 4-6 hours:  If on nasal continuous positive airway pressure, respiratory therapy assess nares and determine need for appliance change or resting period  Outcome: Progressing     
  Problem: Chronic Conditions and Co-morbidities  Goal: Patient's chronic conditions and co-morbidity symptoms are monitored and maintained or improved  7/21/2025 1105 by Monica Aguayo RN  Outcome: Adequate for Discharge  Flowsheets (Taken 7/21/2025 1105)  Care Plan - Patient's Chronic Conditions and Co-Morbidity Symptoms are Monitored and Maintained or Improved:   Monitor and assess patient's chronic conditions and comorbid symptoms for stability, deterioration, or improvement   Collaborate with multidisciplinary team to address chronic and comorbid conditions and prevent exacerbation or deterioration   Update acute care plan with appropriate goals if chronic or comorbid symptoms are exacerbated and prevent overall improvement and discharge    Problem: Discharge Planning  Goal: Discharge to home or other facility with appropriate resources  7/21/2025 1105 by Monica Aguayo RN  Outcome: Completed  Flowsheets (Taken 7/21/2025 1105)  Discharge to home or other facility with appropriate resources:   Identify barriers to discharge with patient and caregiver   Arrange for needed discharge resources and transportation as appropriate   Identify discharge learning needs (meds, wound care, etc)   Refer to discharge planning if patient needs post-hospital services based on physician order or complex needs related to functional status, cognitive ability or social support system    Problem: Safety - Adult  Goal: Free from fall injury  7/21/2025 1105 by Monica Aguayo RN  Outcome: Completed  Flowsheets (Taken 7/21/2025 1105)  Free From Fall Injury: Instruct family/caregiver on patient safety    Problem: Skin/Tissue Integrity  Goal: Skin integrity remains intact  Description: 1.  Monitor for areas of redness and/or skin breakdown  2.  Assess vascular access sites hourly  3.  Every 4-6 hours minimum:  Change oxygen saturation probe site  4.  Every 4-6 hours:  If on nasal continuous positive airway pressure, respiratory 
  Problem: Chronic Conditions and Co-morbidities  Goal: Patient's chronic conditions and co-morbidity symptoms are monitored and maintained or improved  Outcome: Progressing     Problem: Discharge Planning  Goal: Discharge to home or other facility with appropriate resources  Outcome: Progressing     Problem: Safety - Adult  Goal: Free from fall injury  Outcome: Progressing     Problem: Skin/Tissue Integrity  Goal: Skin integrity remains intact  Description: 1.  Monitor for areas of redness and/or skin breakdown  2.  Assess vascular access sites hourly  3.  Every 4-6 hours minimum:  Change oxygen saturation probe site  4.  Every 4-6 hours:  If on nasal continuous positive airway pressure, respiratory therapy assess nares and determine need for appliance change or resting period  Outcome: Progressing     Problem: Seizure Precautions  Goal: Remains free of injury related to seizures activity  Outcome: Progressing     Problem: Nutrition Deficit:  Goal: Optimize nutritional status  Outcome: Progressing     Problem: Pain  Goal: Verbalizes/displays adequate comfort level or baseline comfort level  Outcome: Progressing     Problem: Skin/Tissue Integrity - Adult  Goal: Skin integrity remains intact  Description: 1.  Monitor for areas of redness and/or skin breakdown  2.  Assess vascular access sites hourly  3.  Every 4-6 hours minimum:  Change oxygen saturation probe site  4.  Every 4-6 hours:  If on nasal continuous positive airway pressure, respiratory therapy assess nares and determine need for appliance change or resting period  Outcome: Progressing  Goal: Incisions, wounds, or drain sites healing without S/S of infection  Outcome: Progressing  Goal: Oral mucous membranes remain intact  Outcome: Progressing     Problem: Musculoskeletal - Adult  Goal: Return mobility to safest level of function  Outcome: Progressing  Goal: Maintain proper alignment of affected body part  Outcome: Progressing  Goal: Return ADL status to a 
  Problem: Chronic Conditions and Co-morbidities  Goal: Patient's chronic conditions and co-morbidity symptoms are monitored and maintained or improved  Outcome: Progressing     Problem: Discharge Planning  Goal: Discharge to home or other facility with appropriate resources  Outcome: Progressing     Problem: Safety - Adult  Goal: Free from fall injury  Outcome: Progressing     Problem: Skin/Tissue Integrity  Goal: Skin integrity remains intact  Description: 1.  Monitor for areas of redness and/or skin breakdown  2.  Assess vascular access sites hourly  3.  Every 4-6 hours minimum:  Change oxygen saturation probe site  4.  Every 4-6 hours:  If on nasal continuous positive airway pressure, respiratory therapy assess nares and determine need for appliance change or resting period  Outcome: Progressing     Problem: Seizure Precautions  Goal: Remains free of injury related to seizures activity  Outcome: Progressing     Problem: Nutrition Deficit:  Goal: Optimize nutritional status  Outcome: Progressing     Problem: Pain  Goal: Verbalizes/displays adequate comfort level or baseline comfort level  Outcome: Progressing     Problem: Skin/Tissue Integrity - Adult  Goal: Skin integrity remains intact  Description: 1.  Monitor for areas of redness and/or skin breakdown  2.  Assess vascular access sites hourly  3.  Every 4-6 hours minimum:  Change oxygen saturation probe site  4.  Every 4-6 hours:  If on nasal continuous positive airway pressure, respiratory therapy assess nares and determine need for appliance change or resting period  Outcome: Progressing  Goal: Incisions, wounds, or drain sites healing without S/S of infection  Outcome: Progressing  Goal: Oral mucous membranes remain intact  Outcome: Progressing     Problem: Musculoskeletal - Adult  Goal: Return mobility to safest level of function  Outcome: Progressing  Goal: Maintain proper alignment of affected body part  Outcome: Progressing  Goal: Return ADL status to a 
  Problem: Chronic Conditions and Co-morbidities  Goal: Patient's chronic conditions and co-morbidity symptoms are monitored and maintained or improved  Outcome: Progressing     Problem: Discharge Planning  Goal: Discharge to home or other facility with appropriate resources  Outcome: Progressing  Flowsheets (Taken 7/15/2025 0102)  Discharge to home or other facility with appropriate resources:   Identify barriers to discharge with patient and caregiver   Arrange for needed discharge resources and transportation as appropriate   Identify discharge learning needs (meds, wound care, etc)   Arrange for interpreters to assist at discharge as needed   Refer to discharge planning if patient needs post-hospital services based on physician order or complex needs related to functional status, cognitive ability or social support system     Problem: Safety - Adult  Goal: Free from fall injury  Outcome: Progressing     Problem: Skin/Tissue Integrity  Goal: Skin integrity remains intact  Description: 1.  Monitor for areas of redness and/or skin breakdown  2.  Assess vascular access sites hourly  3.  Every 4-6 hours minimum:  Change oxygen saturation probe site  4.  Every 4-6 hours:  If on nasal continuous positive airway pressure, respiratory therapy assess nares and determine need for appliance change or resting period  Outcome: Progressing     
  Problem: Chronic Conditions and Co-morbidities  Goal: Patient's chronic conditions and co-morbidity symptoms are monitored and maintained or improved  Outcome: Progressing  Flowsheets (Taken 7/17/2025 1955)  Care Plan - Patient's Chronic Conditions and Co-Morbidity Symptoms are Monitored and Maintained or Improved: Monitor and assess patient's chronic conditions and comorbid symptoms for stability, deterioration, or improvement     Problem: Discharge Planning  Goal: Discharge to home or other facility with appropriate resources  Outcome: Progressing  Flowsheets (Taken 7/17/2025 1955)  Discharge to home or other facility with appropriate resources: Identify barriers to discharge with patient and caregiver     Problem: Safety - Adult  Goal: Free from fall injury  Outcome: Progressing     
Nutrition Problem #1: Inadequate oral intake  Intervention: Food and/or Nutrient Delivery: Continue Current Diet, start supplement     Problem: Nutrition Deficit:  Goal: Optimize nutritional status  Flowsheets (Taken 7/15/2025 1254)  Nutrient intake appropriate for improving, restoring, or maintaining nutritional needs:   Assess nutritional status and recommend course of action   Monitor oral intake, labs, and treatment plans   Recommend appropriate diets, oral nutritional supplements, and vitamin/mineral supplements   Provide specific nutrition education to patient or family as appropriate     
Progressing  7/15/2025 0129 by Ashli Turcios, RN  Outcome: Progressing     Problem: Skin/Tissue Integrity  Goal: Skin integrity remains intact  Description: 1.  Monitor for areas of redness and/or skin breakdown  2.  Assess vascular access sites hourly  3.  Every 4-6 hours minimum:  Change oxygen saturation probe site  4.  Every 4-6 hours:  If on nasal continuous positive airway pressure, respiratory therapy assess nares and determine need for appliance change or resting period  7/15/2025 0905 by Yoselin Chang, RN  Outcome: Progressing  7/15/2025 0129 by Ashli Turcios, RN  Outcome: Progressing  Flowsheets (Taken 7/15/2025 0103)  Skin Integrity Remains Intact: Monitor for areas of redness and/or skin breakdown     
Progressing  Flowsheets (Taken 7/15/2025 2000)  Skin Integrity Remains Intact:   Monitor for areas of redness and/or skin breakdown   Assess vascular access sites hourly  7/15/2025 0905 by Yoselin Chang RN  Outcome: Progressing     Problem: Seizure Precautions  Goal: Remains free of injury related to seizures activity  7/15/2025 2134 by Rafael Perla RN  Outcome: Progressing  7/15/2025 0905 by Yoselin Chang RN  Outcome: Progressing     Problem: Nutrition Deficit:  Goal: Optimize nutritional status  7/15/2025 2134 by Rafael Perla RN  Outcome: Progressing  7/15/2025 1254 by Monica Taylor, MS, RD, LD  Flowsheets (Taken 7/15/2025 1254)  Nutrient intake appropriate for improving, restoring, or maintaining nutritional needs:   Assess nutritional status and recommend course of action   Monitor oral intake, labs, and treatment plans   Recommend appropriate diets, oral nutritional supplements, and vitamin/mineral supplements   Provide specific nutrition education to patient or family as appropriate

## 2025-07-21 NOTE — DISCHARGE SUMMARY
Hospital Medicine Discharge Summary    Narayan Eddy  :  1955  MRN:  [id number]    Admit date:  2025  Discharge date:  2025    Admitting Physician:  Carla Gill MD  Primary Care Physician:  Mukesh Prieto MD      Chief Complaint   Patient presents with    Fall     Hospital Course:     71 yo M with PMH HTN, DM 2, HLD, polyneuropathy with spinal stenosis, EtOH abuse, and asthma who presented after a fall. Was admitted for significant hyponatremia with Na returning at 121 on admission. Etiology of hyponatremia thought to be multifactorial in this context related to hypovolemia, poor solute intake, daily EtOH use (drinks vodka). He was given 1 dose of tolvaptan as well as diuresis which improved his sodium. Na 131 by day of discharge and patient was feeling improved. Ultimately he was discharged to SNF in stable condition.        Exam on discharge:    /75   Pulse 66   Temp 97.7 °F (36.5 °C) (Oral)   Resp 18   Ht 1.829 m (6')   Wt 96.9 kg (213 lb 11.2 oz)   SpO2 100%   BMI 28.98 kg/m²   General appearance: No apparent distress, appears stated age and cooperative.  HEENT: Pupils equal, round, and reactive to light. Conjunctivae/corneas clear.  Neck: Supple, with full range of motion. No jugular venous distention. Trachea midline.  Respiratory:  Normal respiratory effort. Clear to auscultation, bilaterally without Rales/Wheezes/Rhonchi.  Cardiovascular: Regular rate and rhythm with normal S1/S2 without murmurs, rubs or gallops.  Abdomen: Soft, non-tender, non-distended with normal bowel sounds.  Musculoskeletal: No clubbing, cyanosis or edema bilaterally.  Full range of motion without deformity.  Skin: Skin color, texture, turgor normal.  No rashes or lesions.  Neuro: Non Focal. Symetrical motor and tone. Nl Comprehension, Alert,awake and oriented. NL CN. Symetrical tone and reflexes.  Psychiatric: Alert and oriented, thought content appropriate, normal insight  Capillary Refill:

## 2025-07-22 ENCOUNTER — OFFICE VISIT (OUTPATIENT)
Dept: GERIATRIC MEDICINE | Age: 70
End: 2025-07-22
Payer: MEDICARE

## 2025-07-22 DIAGNOSIS — E78.5 HYPERLIPIDEMIA, UNSPECIFIED HYPERLIPIDEMIA TYPE: ICD-10-CM

## 2025-07-22 DIAGNOSIS — E11.9 TYPE 2 DIABETES MELLITUS WITHOUT COMPLICATION, WITHOUT LONG-TERM CURRENT USE OF INSULIN (HCC): ICD-10-CM

## 2025-07-22 DIAGNOSIS — I10 ESSENTIAL HYPERTENSION: ICD-10-CM

## 2025-07-22 DIAGNOSIS — R29.6 FALLS: Primary | ICD-10-CM

## 2025-07-22 PROCEDURE — 3046F HEMOGLOBIN A1C LEVEL >9.0%: CPT | Performed by: INTERNAL MEDICINE

## 2025-07-22 PROCEDURE — 99306 1ST NF CARE HIGH MDM 50: CPT | Performed by: INTERNAL MEDICINE

## 2025-07-22 PROCEDURE — 1123F ACP DISCUSS/DSCN MKR DOCD: CPT | Performed by: INTERNAL MEDICINE

## 2025-07-22 RX ORDER — M-VIT,TX,IRON,MINS/CALC/FOLIC 27MG-0.4MG
1 TABLET ORAL DAILY
COMMUNITY
Start: 2025-01-01

## 2025-07-22 RX ORDER — PANTOPRAZOLE SODIUM 40 MG/1
40 FOR SUSPENSION ORAL
COMMUNITY

## 2025-07-22 RX ORDER — ATORVASTATIN CALCIUM 10 MG/1
10 TABLET, FILM COATED ORAL DAILY
COMMUNITY

## 2025-07-22 NOTE — PROGRESS NOTES
He was admitted through the ER with hyponatremia.  He has a history of alcoholism.      Subjective:  The patient complains of severe acute on chronic progressive fatigue and gait ataxia, peripheral neuropathy partially relieved by rest, PT, OT and meds and hydration and exacerbated by exertion and recent hyponatremia.      I am concerned about patient’s medical complexities including:  Principal Problem:    Hyponatremia  Active Problems:    Alcoholic liver disease    Fall    Impaired mobility and activities of daily living  Resolved Problems:    * No resolved hospital problems. *      .    Controlled Substance Monitoring:    Acute and Chronic Pain Monitoring:   RX Monitoring Acute Pain Prescriptions Periodic Controlled Substance Monitoring Chronic Pain > 50 MEDD   7/16/2025   8:37 AM Prescription exceeds daily limit for a specific reason. See comments or note.;Severe pain not adequately treated with lower dose.;Not required given exclusionary diagnoses... Possible medication side effects, risk of tolerance/dependence & alternative treatments discussed.;No signs of potential drug abuse or diversion identified.;Assessed functional status (ability to engage in work or other purposeful activities, the pain intensity and its interference with activities of daily living, quality of family life and social activities, and the physical activity);Obtaining appropriate analgesic effect of treatment. Re-evaluated the status of the patient's underlying condition causing pain.           Reviewed recent nursing note and discussed current status and planned care with acute care providers, \"Shift assessment complete, see flowsheets. Morning medications administered per MAR without difficulty. Patient aware of potential paracentesis today. Resting quietly, denies further needs  \".    ROS x10:  The patient also complains of severely impaired mobility and activities of daily living.  Otherwise no new problems with vision, hearing, 
 He was admitted through the ER with hyponatremia.  He has a history of alcoholism.    Workup included a paracentesis 7/16/2025- paracentesis which showed low protein and no evidence of SBP, hepatitis B surface added and hep C antibody are negative, alpha-fetoprotein is normal.       Subjective:  The patient complains of severe acute on chronic progressive fatigue and gait ataxia, peripheral neuropathy partially relieved by rest, PT, OT and meds and hydration and exacerbated by exertion and recent hyponatremia.      I am concerned about patient’s medical complexities including:  Principal Problem:    Hyponatremia  Active Problems:    Alcoholic liver disease    Fall    Impaired mobility and activities of daily living    Other ascites  Resolved Problems:    * No resolved hospital problems. *      .    Controlled Substance Monitoring:    Acute and Chronic Pain Monitoring:   RX Monitoring Periodic Controlled Substance Monitoring   7/16/2025  11:07 AM Possible medication side effects, risk of tolerance/dependence & alternative treatments discussed.;Potential drug abuse or diversion identified, see note documentation.           Reviewed recent nursing note and discussed current status and planned care with acute care providers, \"Shift assessment complete, see flowsheets. Morning medications administered per MAR without difficulty. Patient aware of potential paracentesis today. Resting quietly, denies further needs  \".    He had 3000 cc out after paracentesis.   EGD is pending for today.    ROS x10:  The patient also complains of severely impaired mobility and activities of daily living.  Otherwise no new problems with vision, hearing, nose, mouth, throat, dermal, cardiovascular, GI, , pulmonary, musculoskeletal, psychiatric or neurological.        Vital signs:  BP 98/75   Pulse 66   Temp 97.9 °F (36.6 °C) (Oral)   Resp 18   Ht 1.829 m (6')   Wt 104 kg (229 lb 4.8 oz)   SpO2 96%   BMI 31.10 kg/m² 
0700  Assumed care of patient    0900 Shift assessment complete, see flowsheets. Morning medications administered per MAR without difficulty. Patient aware of potential paracentesis today. Resting quietly, denies further needs    1300 Patient off unit to specials for paracentesis    1445 Patient returned to unit in stable condition    Electronically signed by Yoselin Chang RN on 7/16/2025 at 9:09 AM      
0700 Assumed care of patient    0830 Shift assessment complete, see flowsheets. Patient A/Ox4, on RA. IV infusing without difficulty. NPO at this time pending ultrasound    0900 Patient off unit to ultrasound    Electronically signed by Yoselin Chang RN on 7/15/2025 at 9:06 AM      
Comprehensive Nutrition Assessment    Type and Reason for Visit:  Initial, Positive nutrition screen    Nutrition Recommendations/Plan:   Continue ADULT DIET; Regular; 4 carb choices (60 gm/meal)  Diabetic supplement BID (B+L)  Continue replacing electrolytes as needed       Malnutrition Assessment:  Malnutrition Status:  At risk for malnutrition (07/15/25 1234)    Context:  Social/Environmental Circumstances     Findings of the 6 clinical characteristics of malnutrition:  Energy Intake:  50% or less estimated energy requirements for 1 month or longer  Weight Loss:  Unable to assess     Body Fat Loss:  No body fat loss     Muscle Mass Loss:  No muscle mass loss    Fluid Accumulation:  Unable to assess     Strength:  Not Performed    Nutrition Assessment:    Pt at risk for malnutrition with poor PO intake related to alcoholism. Wt stable per EMR. Bed scale weight ordered for further assessment. Current diet appropriate. RD to provide diabetic supplement BID to help optimize nutrition status. Replace electrolytes as needed. RUQ US pending. RD to continue to monitor.    Nutrition Related Findings:    Pmhx: HTN, DM 2, HLD, polyneuropathy with spinal stenosis, alcohol abuse. Drinks daily. admitted for hyponatremia s/p fall. Labs reviewed, hyponatremia continues, elevated bun/Cr. Mg low-replaced. Elevated LFTs and bilirubin. NH3 WNL. RUQ ultrasound pending.  +2 BLE pitting edema. Gluc <160. Wound Type: None       Current Nutrition Intake & Therapies:    Average Meal Intake: Was NPO (for testing, diet just advanced no meals yet)  ADULT DIET; Regular; 4 carb choices (60 gm/meal)    Anthropometric Measures:  Height: 182.9 cm (6')  Ideal Body Weight (IBW): 178 lbs (81 kg)    Admission Body Weight: 103.9 kg (229 lb)  Current Body Weight: 103.9 kg (229 lb),    Weight Source: Stated  Current BMI (kg/m2): 31.1  Usual Body Weight: 85.6 kg (188 lb 13 oz) (7/2024 bed)     % Weight Change (Calculated): 21.3                    BMI 
Internal Medicine   Hospitalist   Progress Note    2025   12:26 PM    Name:  Narayan Eddy  MRN:    [id number]     IP Day: 3     Admit Date: 2025  8:26 PM  PCP: Mukesh Prieto MD    Code Status:  Full Code    Assessment and Plan:        Active Problems/ diagnosis:     Severe hyponatremia-concern for beer potomania and decrease food intake  History of alcohol abuse-Daily vodka drinker  Gait impairment  Generalized weakness  Lower extremity edema-bilateral, no tenderness or redness  HTN  Diabetes  Hypertension  Polyneuropathy  Asthma     Plan  Going for EGD for EV surveillance, also with decreased appetite.  Sodium is improving  Status post paracentesis with 3 L fluid removal   appreciate nephrology input, monitor sodium  CIWA protocol with as needed Ativan  Noted echocardiogram  PT/OT  Sliding-scale insulin, hypoglycemia protocol  Home meds reviewed and ordered as appropriate  Discussed plan with patient    DVT PPx     7 pm- 7 am, please contact on call Hospitalist for any needs     Subjective:      no new events.  No new symptoms.  Feels better overall today.  He is going for EGD today.    Physical Examination:      Vitals:  BP 98/75   Pulse 66   Temp 97.9 °F (36.6 °C) (Oral)   Resp 18   Ht 1.829 m (6')   Wt 104 kg (229 lb 4.8 oz)   SpO2 96%   BMI 31.10 kg/m²   Temp (24hrs), Av °F (36.7 °C), Min:97.9 °F (36.6 °C), Max:98.1 °F (36.7 °C)      General appearance: alert, cooperative and no distress  Mental Status: oriented to person, place and time and normal affect  Lungs: clear to auscultation bilaterally, normal effort  Heart: regular rate and rhythm, no murmur  Abdomen: soft, nontender, distended, bowel sounds present, no masses  Extremities: 3+ pitting bilateral lower extremity edema, no redness, tenderness in the calves  Skin: no gross lesions, rashes  Neurologically intact throughout    Data:     Labs:  Recent Labs     25  0545 25  0613   WBC 4.8 4.7*   HGB 8.6* 8.9*   PLT 
Internal Medicine   Hospitalist   Progress Note    2025   12:48 PM    Name:  Narayan Eddy  MRN:    [id number]     IP Day: 6     Admit Date: 2025  8:26 PM  PCP: Mukesh Prieto MD    Code Status:  Full Code    Assessment and Plan:        Active Problems/ diagnosis:     Severe hyponatremia-concern for beer potomania and decrease food intake  History of alcohol abuse-Daily vodka drinker  Gait impairment  Generalized weakness  Lower extremity edema-bilateral, no tenderness or redness  HTN  Diabetes  Hypertension  Polyneuropathy  Asthma     Plan  Sodium 129.  Received tolvaptan yesterday, put out over 4 L urine output last 24 hours.  Discussed with nephrologist.  Nephrologist like to monitor another 24 hours given large volume urine output.  Status post EGD showing esophageal varices grade 1, no active bleeding or bleeding ulcers.  Sodium is improving  Status post paracentesis with 3 L fluid removal   appreciate nephrology input, monitor sodium  CIWA protocol with as needed Ativan  Noted echocardiogram  PT/OT  Sliding-scale insulin, hypoglycemia protocol  Home meds reviewed and ordered as appropriate  Discussed plan with patient    DVT PPx     7 pm- 7 am, please contact on call Hospitalist for any needs     DC once okayed by nephrologist    Subjective:      no new events.  No new symptoms.  Feels well    Physical Examination:      Vitals:  /71   Pulse 66   Temp 97.5 °F (36.4 °C) (Oral)   Resp 19   Ht 1.829 m (6')   Wt 99.9 kg (220 lb 3.2 oz)   SpO2 99%   BMI 29.86 kg/m²   Temp (24hrs), Av.6 °F (36.4 °C), Min:97.5 °F (36.4 °C), Max:97.9 °F (36.6 °C)      General appearance: alert, cooperative and no distress  Mental Status: oriented to person, place and time and normal affect  Lungs: clear to auscultation bilaterally, normal effort  Heart: regular rate and rhythm, no murmur  Abdomen: soft, nontender, distended, bowel sounds present, no masses  Extremities: 3+ pitting bilateral lower 
Internal Medicine   Hospitalist   Progress Note    2025   1:21 PM    Name:  Narayan Eddy  MRN:    [id number]     IP Day: 5     Admit Date: 2025  8:26 PM  PCP: Mukesh Prieto MD    Code Status:  Full Code    Assessment and Plan:        Active Problems/ diagnosis:     Severe hyponatremia-concern for beer potomania and decrease food intake  History of alcohol abuse-Daily vodka drinker  Gait impairment  Generalized weakness  Lower extremity edema-bilateral, no tenderness or redness  HTN  Diabetes  Hypertension  Polyneuropathy  Asthma     Plan  Sodium 127.  Received tolvaptan.  Discussed with nephrologist.  Status post EGD showing esophageal varices grade 1, no active bleeding or bleeding ulcers.  Sodium is improving  Status post paracentesis with 3 L fluid removal   appreciate nephrology input, monitor sodium  CIWA protocol with as needed Ativan  Noted echocardiogram  PT/OT  Sliding-scale insulin, hypoglycemia protocol  Home meds reviewed and ordered as appropriate  Discussed plan with patient    DVT PPx     7 pm- 7 am, please contact on call Hospitalist for any needs     DC once okayed by nephrologist    Subjective:      no new events.  No new symptoms.  Feels well    Physical Examination:      Vitals:  /63   Pulse 69   Temp 97.3 °F (36.3 °C) (Oral)   Resp 18   Ht 1.829 m (6')   Wt 99.9 kg (220 lb 3.2 oz)   SpO2 100%   BMI 29.86 kg/m²   Temp (24hrs), Av.6 °F (36.4 °C), Min:97.3 °F (36.3 °C), Max:98.2 °F (36.8 °C)      General appearance: alert, cooperative and no distress  Mental Status: oriented to person, place and time and normal affect  Lungs: clear to auscultation bilaterally, normal effort  Heart: regular rate and rhythm, no murmur  Abdomen: soft, nontender, distended, bowel sounds present, no masses  Extremities: 3+ pitting bilateral lower extremity edema, no redness, tenderness in the calves  Skin: no gross lesions, rashes  Neurologically intact throughout    Data: 
Internal Medicine   Hospitalist   Progress Note    2025   4:47 PM    Name:  Narayan Eddy  MRN:    [id number]     IP Day: 2     Admit Date: 2025  8:26 PM  PCP: Mukesh Prieto MD    Code Status:  Full Code    Assessment and Plan:        Active Problems/ diagnosis:     Severe hyponatremia-concern for beer potomania and decrease food intake  History of alcohol abuse-Daily vodka drinker  Gait impairment  Generalized weakness  Lower extremity edema-bilateral, no tenderness or redness  HTN  Diabetes  Hypertension  Polyneuropathy  Asthma     Plan  Status post paracentesis with 3 L fluid removal today  appreciate nephrology input, monitor sodium  CIWA protocol with as needed Ativan  Obtain echocardiogram  PT/OT  Sliding-scale insulin, hypoglycemia protocol  Home meds reviewed and ordered as appropriate  Discussed plan with patient    DVT PPx     7 pm- 7 am, please contact on call Hospitalist for any needs     Subjective:      no new events.  No new symptoms.  Feels generally weak again today.    Physical Examination:      Vitals:  /63   Pulse 71   Temp 97.7 °F (36.5 °C) (Oral)   Resp 14   Ht 1.829 m (6')   Wt 104 kg (229 lb 4.8 oz)   SpO2 98%   BMI 31.10 kg/m²   Temp (24hrs), Av.8 °F (36.6 °C), Min:97.7 °F (36.5 °C), Max:98 °F (36.7 °C)      General appearance: alert, cooperative and no distress  Mental Status: oriented to person, place and time and normal affect  Lungs: clear to auscultation bilaterally, normal effort  Heart: regular rate and rhythm, no murmur  Abdomen: soft, nontender, distended, bowel sounds present, no masses  Extremities: 3+ pitting bilateral lower extremity edema, no redness, tenderness in the calves  Skin: no gross lesions, rashes  Neurologically intact throughout    Data:     Labs:  Recent Labs     07/15/25  0601 25  0545   WBC 6.5 4.8   HGB 10.3* 8.6*    111*     Recent Labs     25  0545 25  1212   * 119*   K 4.9 4.6   CL 88* 86*   CO2 
Internal Medicine   Hospitalist   Progress Note    7/15/2025   1:59 PM    Name:  Narayan Eddy  MRN:    [id number]     IP Day: 1     Admit Date: 2025  8:26 PM  PCP: Mukesh Prieto MD    Code Status:  Full Code    Assessment and Plan:        Active Problems/ diagnosis:     Severe hyponatremia-concern for beer potomania and decrease food intake  History of alcohol abuse-Daily vodka drinker  Gait impairment  Generalized weakness  Lower extremity edema-bilateral, no tenderness or redness  HTN  Diabetes  Hypertension  Polyneuropathy  Asthma     Plan  Gentle IV hydration, appreciate nephrology input, monitor sodium  CIWA protocol with as needed Ativan  Obtain echocardiogram  PT/OT  Sliding-scale insulin, hypoglycemia protocol  Home meds reviewed and ordered as appropriate  Discussed plan with patient    DVT PPx     7 pm- 7 am, please contact on call Hospitalist for any needs     Subjective:      no new events.  No new symptoms.  Feels generally weak.    Physical Examination:      Vitals:  /83   Pulse 75   Temp 98.4 °F (36.9 °C) (Oral)   Resp 16   Ht 1.829 m (6')   Wt 103.9 kg (229 lb)   SpO2 100%   BMI 31.06 kg/m²   Temp (24hrs), Av °F (36.7 °C), Min:97.5 °F (36.4 °C), Max:98.5 °F (36.9 °C)      General appearance: alert, cooperative and no distress  Mental Status: oriented to person, place and time and normal affect  Lungs: clear to auscultation bilaterally, normal effort  Heart: regular rate and rhythm, no murmur  Abdomen: soft, nontender, nondistended, bowel sounds present, no masses  Extremities: 3+ pitting bilateral lower extremity edema, no redness, tenderness in the calves  Skin: no gross lesions, rashes  Neurologically intact throughout    Data:     Labs:  Recent Labs     25  2101 07/15/25  0601   WBC 6.5 6.5   HGB 10.1* 10.3*    171     Recent Labs     07/15/25  0601 07/15/25  1201   * 121*   K 4.7 4.6   CL 86* 86*   CO2 20 21   BUN 6* 6*   CREATININE 0.65* 0.66* 
[facility] OCCUPATIONAL THERAPY EVALUATION - ACUTE     NAME: Narayan Eddy  : 1955 (70 y.o.)  MRN: [id number]  CODE STATUS: Full Code  Room: [facility]/W481-01    Date of Service: 2025    Patient Diagnosis(es): Hypomagnesemia [E83.42]  Alcoholism (HCC) [F10.20]  Hyponatremia [E87.1]  Closed head injury, initial encounter [S09.90XA]  Fall, initial encounter [W19.XXXA]  Anemia, unspecified type [D64.9]   Patient Active Problem List    Diagnosis Date Noted    Impaired mobility and activities of daily living 2025    Fall 07/15/2025    Hyponatremia 2025    Spinal stenosis of lumbar region with neurogenic claudication 2024    Polyneuropathy associated with underlying disease 05/15/2024    PVD (peripheral vascular disease) 05/15/2024    Pins and needles sensation 05/15/2024    Malignant neoplasm of prostate (HCC) 2024    Alcoholic liver disease 2024    Unilateral primary osteoarthritis, right knee 2024    Oropharyngeal dysphagia 2023    Post-op pain 2023    Chronic cough 2023    Liposarcoma, well differentiated s/p removal. no further Rx 2021    Lipoma of back 2021    Elevated LFTs 2020    Actinic keratoses 2020    Diverticula of colon 2019    Allergic rhinitis 2018    Gout 2018    Mixed hyperlipidemia 2018    Essential hypertension 2018    Varicose veins of left lower extremity with pain 2018    Varicose veins of both lower extremities 2018    Venous insufficiency of left leg 2018    Keratosis, inflamed seborrheic 2017    Asthma 2017    Posterior calcaneal exostosis 2016    S/P prostatectomy 2014    Hyperuricemia 2014    Polyp of transverse colon f/u due 2014    Type 2 diabetes mellitus without complication, without long-term current use of insulin (HCC) 2014    H/O prostate cancer 2014      Fall, initial encounter  Closed head injury, 
Morning labs reviewed. Mag 1.2. PRN Mag replacement currently infusing per protocol.     Electronically signed by MARIA ALEJANDRA RICK RN on 7/19/25 at 8:33 AM EDT    
Nephrology Progress Note    Assessment:  70 y.o. male with history s/f HTN, HLD, T2DM, OA, spinal stenosis, neuropathy who presented for a fall.     Hyponatremia: 2/2 hypovolemia, poor solute intake, daily EtOH use (drinks vodka), had EtOH level 0.1 on presentation, not on NSAIDs, diuretics or psychotropic medications, Na 121 on presentation, doesn't tend to have issues w/ hyponatremia, Cl 80s, Yu <20, nml renal function, nml TSH  Elevated LFTS   Hypoalbuminemia   HTN: has had bouts of hypotension   Hypomagnesemia     Plan:  - given lasix PO this AM  - keep off IVFS   - will monitor Na, w/ his edema and ascites may need to transition to IV diuretics  - getting paracentesis   - decreasing losartan to 25 mg daily        Patient Active Problem List:     Asthma     Allergic rhinitis     Gout     Mixed hyperlipidemia     Essential hypertension     H/O prostate cancer     Actinic keratoses     Keratosis, inflamed seborrheic     Varicose veins of left lower extremity with pain     Venous insufficiency of left leg     Varicose veins of both lower extremities     Hyperuricemia     Polyp of transverse colon f/u due 2021     Posterior calcaneal exostosis     S/P prostatectomy     Type 2 diabetes mellitus without complication, without long-term current use of insulin (HCC)     Diverticula of colon     Elevated LFTs     Liposarcoma, well differentiated s/p removal. no further Rx     Chronic cough     Post-op pain     Oropharyngeal dysphagia     Alcoholic liver disease     Unilateral primary osteoarthritis, right knee     Polyneuropathy associated with underlying disease     PVD (peripheral vascular disease)     Pins and needles sensation     Malignant neoplasm of prostate (HCC)     Spinal stenosis of lumbar region with neurogenic claudication     Hyponatremia     Fall     Lipoma of back     Impaired mobility and activities of daily living      Subjective:  Admit Date: 7/14/2025    Interval History: Na as low as 115 overnight, 
Nephrology Progress Note    Assessment:  70 y.o. male with history s/f HTN, HLD, T2DM, OA, spinal stenosis, neuropathy who presented for a fall.     Hyponatremia: 2/2 hypovolemia, poor solute intake, daily EtOH use (drinks vodka), had EtOH level 0.1 on presentation, not on NSAIDs, diuretics or psychotropic medications, Na 121 on presentation, doesn't tend to have issues w/ hyponatremia, Cl 80s, Yu <20, nml renal function, nml TSH  Elevated LFTS   Hypoalbuminemia   HTN: has had bouts of hypotension   Hypomagnesemia:      Plan:  - continue lasix 20 mg PO lBID   - would keep today to make sure Na stable to improving, should be ok to discharge tomorrow if doesn't worsen  - continue serial labs today   - on midodrine, losartan on hold for low BP   - Mg repletion   - strict I/Os       Patient Active Problem List:     Asthma     Allergic rhinitis     Gout     Mixed hyperlipidemia     Essential hypertension     H/O prostate cancer     Actinic keratoses     Keratosis, inflamed seborrheic     Varicose veins of left lower extremity with pain     Venous insufficiency of left leg     Varicose veins of both lower extremities     Hyperuricemia     Polyp of transverse colon f/u due 2021     Posterior calcaneal exostosis     S/P prostatectomy     Type 2 diabetes mellitus without complication, without long-term current use of insulin (HCC)     Diverticula of colon     Elevated LFTs     Liposarcoma, well differentiated s/p removal. no further Rx     Chronic cough     Post-op pain     Oropharyngeal dysphagia     Alcoholic liver disease     Unilateral primary osteoarthritis, right knee     Polyneuropathy associated with underlying disease     PVD (peripheral vascular disease)     Pins and needles sensation     Malignant neoplasm of prostate (HCC)     Spinal stenosis of lumbar region with neurogenic claudication     Hyponatremia     Fall     Lipoma of back     Impaired mobility and activities of daily living      Subjective:  Admit 
Nephrology Progress Note    Assessment:  70 y.o. male with history s/f HTN, HLD, T2DM, OA, spinal stenosis, neuropathy who presented for a fall.     Hyponatremia: 2/2 hypovolemia, poor solute intake, daily EtOH use (drinks vodka), had EtOH level 0.1 on presentation, not on NSAIDs, diuretics or psychotropic medications, Na 121 on presentation, doesn't tend to have issues w/ hyponatremia, Cl 80s, Yu <20, nml renal function, nml TSH  Elevated LFTS   Hypoalbuminemia   HTN: has had bouts of hypotension   Hypomagnesemia:      Plan:  - increasing lasix to 20 mg PO BID  - just got tolvaptan, will follow repeat labs  - on midodrine, losartan on hold for low BP   - Mg repletion   - strict I/Os       Patient Active Problem List:     Asthma     Allergic rhinitis     Gout     Mixed hyperlipidemia     Essential hypertension     H/O prostate cancer     Actinic keratoses     Keratosis, inflamed seborrheic     Varicose veins of left lower extremity with pain     Venous insufficiency of left leg     Varicose veins of both lower extremities     Hyperuricemia     Polyp of transverse colon f/u due 2021     Posterior calcaneal exostosis     S/P prostatectomy     Type 2 diabetes mellitus without complication, without long-term current use of insulin (HCC)     Diverticula of colon     Elevated LFTs     Liposarcoma, well differentiated s/p removal. no further Rx     Chronic cough     Post-op pain     Oropharyngeal dysphagia     Alcoholic liver disease     Unilateral primary osteoarthritis, right knee     Polyneuropathy associated with underlying disease     PVD (peripheral vascular disease)     Pins and needles sensation     Malignant neoplasm of prostate (HCC)     Spinal stenosis of lumbar region with neurogenic claudication     Hyponatremia     Fall     Lipoma of back     Impaired mobility and activities of daily living      Subjective:  Admit Date: 7/14/2025    Interval History: Na continues to fluctuate, was up to 127 down to 124 and 
Nephrology Progress Note    Assessment:  70 y.o. male with history s/f HTN, HLD, T2DM, OA, spinal stenosis, neuropathy who presented for a fall.     Hyponatremia: 2/2 hypovolemia, poor solute intake, daily EtOH use (drinks vodka), had EtOH level 0.1 on presentation, not on NSAIDs, diuretics or psychotropic medications, Na 121 on presentation, doesn't tend to have issues w/ hyponatremia, Cl 80s, Yu <20, nml renal function, nml TSH  Elevated LFTS   Hypoalbuminemia   HTN: has had bouts of hypotension   Hypomagnesemia: corrected      Plan:  - changed lasix to 20 mg IV BID today then 20 mg PO daily tomorrow   - decrease serial lab checks to Q8H  - can stop losartan if concern for hypotension and/or decrease metoprolol dose        Patient Active Problem List:     Asthma     Allergic rhinitis     Gout     Mixed hyperlipidemia     Essential hypertension     H/O prostate cancer     Actinic keratoses     Keratosis, inflamed seborrheic     Varicose veins of left lower extremity with pain     Venous insufficiency of left leg     Varicose veins of both lower extremities     Hyperuricemia     Polyp of transverse colon f/u due 2021     Posterior calcaneal exostosis     S/P prostatectomy     Type 2 diabetes mellitus without complication, without long-term current use of insulin (HCC)     Diverticula of colon     Elevated LFTs     Liposarcoma, well differentiated s/p removal. no further Rx     Chronic cough     Post-op pain     Oropharyngeal dysphagia     Alcoholic liver disease     Unilateral primary osteoarthritis, right knee     Polyneuropathy associated with underlying disease     PVD (peripheral vascular disease)     Pins and needles sensation     Malignant neoplasm of prostate (HCC)     Spinal stenosis of lumbar region with neurogenic claudication     Hyponatremia     Fall     Lipoma of back     Impaired mobility and activities of daily living      Subjective:  Admit Date: 7/14/2025    Interval History: Na up to 124 since 
Nephrology Progress Note    Assessment:  70 y.o. male with history s/f HTN, HLD, T2DM, OA, spinal stenosis, neuropathy who presented for a fall.     Hyponatremia: 2/2 hypovolemia, poor solute intake, daily EtOH use (drinks vodka), had EtOH level 0.1 on presentation, not on NSAIDs, diuretics or psychotropic medications, Na 121 on presentation, doesn't tend to have issues w/ hyponatremia, Cl 80s, Yu <20, nml renal function, nml TSH  Elevated LFTS   Hypoalbuminemia   HTN: has had bouts of hypotension   Hypomagnesemia: corrected      Plan:  - continue lasix 20 mg PO daily for now  - will check Q8H labs sooner to make decision if to give tolvaptan, if Na unchanged or worse will give tolvaptan 15 mg x1, if improved increase lasix to 20 mg BID, can discharge if Na up to 130  - on midodrine, losartan on hold for low BP        Patient Active Problem List:     Asthma     Allergic rhinitis     Gout     Mixed hyperlipidemia     Essential hypertension     H/O prostate cancer     Actinic keratoses     Keratosis, inflamed seborrheic     Varicose veins of left lower extremity with pain     Venous insufficiency of left leg     Varicose veins of both lower extremities     Hyperuricemia     Polyp of transverse colon f/u due 2021     Posterior calcaneal exostosis     S/P prostatectomy     Type 2 diabetes mellitus without complication, without long-term current use of insulin (HCC)     Diverticula of colon     Elevated LFTs     Liposarcoma, well differentiated s/p removal. no further Rx     Chronic cough     Post-op pain     Oropharyngeal dysphagia     Alcoholic liver disease     Unilateral primary osteoarthritis, right knee     Polyneuropathy associated with underlying disease     PVD (peripheral vascular disease)     Pins and needles sensation     Malignant neoplasm of prostate (HCC)     Spinal stenosis of lumbar region with neurogenic claudication     Hyponatremia     Fall     Lipoma of back     Impaired mobility and activities of 
Okay to discharge from nephrology standpoint per Dr. Chavez.  
Physical Therapy  Facility/Department: [facility] MED SURG W481/W481-01  Physical Therapy Discharge      NAME: Narayan Eddy    : 1955 (70 y.o.)  MRN: [id number]    Account: [id number]  Gender: male      Patient has been discharged from acute care hospital. DC patient from current PT program.      Electronically signed by Lucy Scott PT on 25 at 11:39 AM EDT      
Physical Therapy Med Surg Daily Treatment Note  Facility/Department: [address] MED SURG UNIT  Room: [place] [address]       NAME: Narayan Eddy  : 1955 (70 y.o.)  MRN: [id number]  CODE STATUS: Full Code    Date of Service: 2025    Patient Diagnosis(es): Hypomagnesemia [E83.42]  Alcoholism (HCC) [F10.20]  Hyponatremia [E87.1]  Closed head injury, initial encounter [S09.90XA]  Fall, initial encounter [W19.XXXA]  Anemia, unspecified type [D64.9]   Chief Complaint   Patient presents with    Fall     Patient Active Problem List    Diagnosis Date Noted    Impaired mobility and activities of daily living 2025    Other ascites 2025    Fall 07/15/2025    Hyponatremia 2025    Cirrhosis (HCC) 2025    Spinal stenosis of lumbar region with neurogenic claudication 2024    Polyneuropathy associated with underlying disease 05/15/2024    PVD (peripheral vascular disease) 05/15/2024    Pins and needles sensation 05/15/2024    Malignant neoplasm of prostate (HCC) 2024    Alcoholic liver disease 2024    Unilateral primary osteoarthritis, right knee 2024    Oropharyngeal dysphagia 2023    Post-op pain 2023    Chronic cough 2023    Liposarcoma, well differentiated s/p removal. no further Rx 2021    Lipoma of back 2021    Elevated LFTs 2020    Actinic keratoses 2020    Diverticula of colon 2019    Allergic rhinitis 2018    Gout 2018    Mixed hyperlipidemia 2018    Essential hypertension 2018    Varicose veins of left lower extremity with pain 2018    Varicose veins of both lower extremities 2018    Venous insufficiency of left leg 2018    Keratosis, inflamed seborrheic 2017    Asthma 2017    Posterior calcaneal exostosis 2016    S/P prostatectomy 2014    Hyperuricemia 2014    Polyp of transverse colon f/u due 2014    Type 2 diabetes mellitus without 
Physical Therapy Med Surg Initial Assessment  Facility/Department: [address] MED SURG UNIT  Room: [place] [address]       NAME: Narayan Eddy  : 1955 (70 y.o.)  MRN: [id number]  CODE STATUS: Full Code    Date of Service: 2025    Patient Diagnosis(es): Hypomagnesemia [E83.42]  Alcoholism (HCC) [F10.20]  Hyponatremia [E87.1]  Closed head injury, initial encounter [S09.90XA]  Fall, initial encounter [W19.XXXA]  Anemia, unspecified type [D64.9]   Chief Complaint   Patient presents with    Fall     Patient Active Problem List    Diagnosis Date Noted    Impaired mobility and activities of daily living 2025    Fall 07/15/2025    Hyponatremia 2025    Spinal stenosis of lumbar region with neurogenic claudication 2024    Polyneuropathy associated with underlying disease 05/15/2024    PVD (peripheral vascular disease) 05/15/2024    Pins and needles sensation 05/15/2024    Malignant neoplasm of prostate (HCC) 2024    Alcoholic liver disease 2024    Unilateral primary osteoarthritis, right knee 2024    Oropharyngeal dysphagia 2023    Post-op pain 2023    Chronic cough 2023    Liposarcoma, well differentiated s/p removal. no further Rx 2021    Lipoma of back 2021    Elevated LFTs 2020    Actinic keratoses 2020    Diverticula of colon 2019    Allergic rhinitis 2018    Gout 2018    Mixed hyperlipidemia 2018    Essential hypertension 2018    Varicose veins of left lower extremity with pain 2018    Varicose veins of both lower extremities 2018    Venous insufficiency of left leg 2018    Keratosis, inflamed seborrheic 2017    Asthma 2017    Posterior calcaneal exostosis 2016    S/P prostatectomy 2014    Hyperuricemia 2014    Polyp of transverse colon f/u due 2014    Type 2 diabetes mellitus without complication, without long-term current use of insulin ([facility]) 
Progress Note  Date:2025       Room:[place] [address]  Patient Name:Narayan Eddy     YOB: 1955     Age:70 y.o.        Subjective    Subjective feels okay, no emesis or GI bleeding, responding to diuretics.  Blood pressure is stable, hyponatremia is improving  Review of Systems  Objective         Vitals Last 24 Hours:  TEMPERATURE:  Temp  Av.6 °F (36.4 °C)  Min: 97.3 °F (36.3 °C)  Max: 97.7 °F (36.5 °C)  RESPIRATIONS RANGE: Resp  Av.8  Min: 16  Max: 18  PULSE OXIMETRY RANGE: SpO2  Av.6 %  Min: 98 %  Max: 100 %  PULSE RANGE: Pulse  Av.8  Min: 61  Max: 72  BLOOD PRESSURE RANGE: Systolic (24hrs), Av , Min:85 , Max:119   ; Diastolic (24hrs), Av, Min:66, Max:89    I/O (24Hr):    Intake/Output Summary (Last 24 hours) at 2025 1727  Last data filed at 2025 1704  Gross per 24 hour   Intake 1110 ml   Output 3550 ml   Net -2440 ml     Objective  Labs/Imaging/Diagnostics    Labs:  CBC:  Recent Labs     25  0545 25  0613 25  0511   WBC 4.8 4.7* 5.3   RBC 2.40* 2.44* 2.56*   HGB 8.6* 8.9* 9.5*   HCT 23.7* 23.9* 25.5*   MCV 98.8* 98.0* 99.6*   RDW 13.2 13.0 13.3   * Clumped 111*     CHEMISTRIES:  Recent Labs     25  0545 25  1212 25  0613 25  1431 25  2139 25  0511 25  1202   *   < > 124*   < > 126* 126* 125*   K 4.9   < > 4.8   < > 4.3 4.6 3.9   CL 88*   < > 91*   < > 92* 92* 91*   CO2 24   < > 21   < > 22 24 21   BUN 9   < > 10   < > 9 9 9   CREATININE 0.98   < > 0.89   < > 1.00 1.05 1.07   GLUCOSE 113*   < > 118*   < > 111* 105* 134*   MG 1.6*  --  1.8  --   --  1.5*  --     < > = values in this interval not displayed.     PT/INR:  Recent Labs     25  0545   PROTIME 19.4*   INR 1.6     APTT:No results for input(s): \"APTT\" in the last 72 hours.  LIVER PROFILE:  Recent Labs     25  0545 25  0613 25  0511   AST 75* 76* 66*   ALT 24 24 22   BILITOT 3.4* 3.7* 3.9*   ALKPHOS 169* 169* 
Progress Note  Date:2025       Room:[place] [address]  Patient Name:Narayan Eddy     YOB: 1955     Age:70 y.o.        Subjective    Subjective patient had a paracentesis today and fluid study shows low protein and no evidence of SBP, hepatitis B surface added and hep C antibody are negative, alpha-fetoprotein is normal  Review of Systems  Objective         Vitals Last 24 Hours:  TEMPERATURE:  Temp  Av.8 °F (36.6 °C)  Min: 97.7 °F (36.5 °C)  Max: 98 °F (36.7 °C)  RESPIRATIONS RANGE: Resp  Av.9  Min: 14  Max: 16  PULSE OXIMETRY RANGE: SpO2  Av.6 %  Min: 97 %  Max: 98 %  PULSE RANGE: Pulse  Av.9  Min: 66  Max: 89  BLOOD PRESSURE RANGE: Systolic (24hrs), Av , Min:90 , Max:120   ; Diastolic (24hrs), Av, Min:62, Max:86    I/O (24Hr):    Intake/Output Summary (Last 24 hours) at 2025 1713  Last data filed at 2025 1438  Gross per 24 hour   Intake 10 ml   Output 750 ml   Net -740 ml     Objective  Labs/Imaging/Diagnostics    Labs:  CBC:  Recent Labs     25  2101 07/15/25  0601 25  0545   WBC 6.5 6.5 4.8   RBC 2.79* 2.84* 2.40*   HGB 10.1* 10.3* 8.6*   HCT 27.7* 27.8* 23.7*   MCV 99.3* 97.9* 98.8*   RDW 13.1 12.9 13.2    171 111*     CHEMISTRIES:  Recent Labs     07/15/25  0121 07/15/25  0601 07/15/25  1201 25  0010 25  0545 25  1212   * 119*   < > 115* 123* 119*   K 4.7 4.7   < > 4.5 4.9 4.6   CL 86* 86*   < > 83* 88* 86*   CO2 22 20   < > 22 24 22   BUN 6* 6*   < > 8 9 9   CREATININE 0.79 0.65*   < > 0.88 0.98 1.05   GLUCOSE 101* 90   < > 125* 113* 155*   MG 1.1* 1.8  --   --  1.6*  --     < > = values in this interval not displayed.     PT/INR:  Recent Labs     25  0545   PROTIME 18.2* 19.4*   INR 1.4 1.6     APTT:  Recent Labs     25   APTT 35.7     LIVER PROFILE:  Recent Labs     07/14/25  2101 07/15/25  0601 25  0545   AST 91* 91* 75*   ALT 27 27 24   BILIDIR  --  2.3*  --    BILITOT 
Report given to RN at [facility].  
Shift assessment complete. VSS. A&Ox4. Patient is calm and cooperative. Denies having chest pain, nausea, or dyspnea on exertion. On room air. Lung sounds are clear. SR on tele. No complaints of chest discomfort. C/O tremors (mild) to hands. CIWA score documented in flowsheets. Abdomen is distended/round. Bowel sounds are hypoactive. Pt states he has not had a Bowel movement in several day. Currently NPO for EGD this afternoon. (All medications held / BP and diuretics held per Dr Aguilar for lower Blood pressures) Patient bed bound / repositioned with pillow supports. Pt bathed & linen changed. Currently off the unit in [facility].    Electronically signed by Phil Eason RN on 7/17/2025 at 1:37 PM   
Spiritual Health History and Assessment/Progress Note  [facility]    Initial Encounter,  ,  ,      Name: Narayan Eddy MRN: [id number]    Age: 70 y.o.     Sex: male   Language: English   Uatsdin: Samaritan   Hyponatremia     Date: 7/17/2025            Total Time Calculated: 25 min              Pt calm, lying flat in bed, recovering from procedure earlier, some discomfort, denies pain.  Pt engaged in conversation about injury and desire to stop drinking and plan to go rehab.  Pt supported by girlfriend.   provided supportive presence, active listening, encouragement and prayer.  Pt positive, receptive, coping well and expressed thanks for prayer.   remains available for support if needed.    Spiritual Assessment [place] in [facility] MED SURG UNIT        Referral/Consult From: Rounding   Encounter Overview/Reason: Initial Encounter  Service Provided For: Patient    Taty, Belief, Meaning:   Patient has beliefs or practices that help with coping during difficult times  Family/Friends No family/friends present      Importance and Influence:  Patient has spiritual/personal beliefs that influence decisions regarding their health  Family/Friends No family/friends present    Community:  Patient feels well-supported. Support system includes: Spouse/Partner  Family/Friends No family/friends present    Assessment and Plan of Care:     Patient Interventions include: Facilitated expression of thoughts and feelings, Explored spiritual coping/struggle/distress, Affirmed coping skills/support systems, and Provided sacramental/Sikhism ritual  Family/Friends Interventions include: No family/friends present    Patient Plan of Care: Spiritual Care available upon further referral  Family/Friends Plan of Care: No family/friends present    Electronically signed by Sandy Wellslain Intern on 7/17/2025 at 7:07 PM   
complication, without long-term current use of insulin (HCC) 08/09/2014    H/O prostate cancer 01/01/2014        Past Medical History:   Diagnosis Date    Actinic keratoses     has had LN2 and used Efudex    Alcohol consumption of one to four drinks per day on alcohol screening     Alcoholism (HCC)     Allergic rhinitis     cats, weeds, grasses, ragweed    Asthma     Bilateral knee pain     Diabetes mellitus (HCC)     Gout     History of colon polyps 02/2016    needs f/u 5 years [facility]    History of type 2 diabetes mellitus     about 15 years    Hyperlipidemia     Hypertension     Keratosis, inflamed seborrheic     Osteoarthritis     Osteoarthritis, localized, shoulder, right     Polyp of transverse colon f/u due 2021 08/09/2014    Prediabetes     Prostate cancer (HCC) 2014    s/p prostatectomy    Syncope and collapse     Varicose vein of leg      Past Surgical History:   Procedure Laterality Date    ARM SURGERY Left 07/09/2024    ARM LESION BIOPSY EXCISION performed by Светлана Lucio MD at [facility] [place]    COLONOSCOPY  02/04/2016    Dr. Cantu; polyps f/u in 5 years    COLONOSCOPY N/A 10/02/2020    COLONOSCOPY DIAGNOSTIC performed by Ashley Price MD at [facility]    EXCISION/BIOPSY N/A 07/09/2024    Back and left arm mass excisions performed by Светлана Lucio MD at [facility] [place]    JOINT REPLACEMENT Right 04/01/2024    right total knee    MUSCLE BIOPSY Left 12/05/2023    Left quadriecep muscle biopsy performed by Светлана Lucio MD at [facility]    PARACENTESIS Left 07/16/2025    3000 ml removed by Isela Burris CNP - diagnostics sent    PROSTATECTOMY  11/2014    SKIN BIOPSY      ULNAR TUNNEL RELEASE Bilateral     UPPER GASTROINTESTINAL ENDOSCOPY N/A 12/19/2023    EGD WITH BIOPSIES performed by Ashley Price MD at [facility]    UPPER GASTROINTESTINAL ENDOSCOPY N/A 7/17/2025    ESOPHAGOGASTRODUODENOSCOPY performed by Nolberto Cantu MD at [facility]    VARICOSE VEIN SURGERY      x2 
brushing or            washing, when performed (separate procedure)  Diagnosis Code(s):         - I85.00, Esophageal varices without bleeding         - K31.89, Other diseases of stomach and duodenum  CPT(R) - 2023 copyright American Medical Association. All Rights Reserved.        The CPT codes, CCI edits and ICD codes generated are intended as        suggestions and were generated based on input data.  These codes are        preliminary and upon  review may be revised to meet current        compliance and payer requirements.  The provider is responsible for        the final determination of appropriate codes, and modifiers.  RAFAEL CANTU  This document has been electronically signed.  Note Initiated:7/17/2025  Note Completed:7/17/2025 1:32 PM  IR US GUIDED PARACENTESIS  1.  Status post technically successful ultrasound-guided paracentesis.      CLINICAL HISTORY:KELY BETH is a Male of 70 years age, referred for  Ultrasound Guided Paracentesis.    PROCEDURE: Survey of the abdomen showed large amount of ascites fluid.  After obtaining informed consent, the patient was positioned supine on the  sonography table. Using ultrasound, the skin over the left hemiabdomen was  locally anesthetized with 1% lidocaine.  Following that, a Yueh needle was  advanced into the fluid pocket using ultrasound visualization. 3000 cc, of clear  yellow fluid were aspirated and sent for cytology, and pathology.  The needle  was removed, and hemostasis was obtained with pressure.  A Band-Aid was placed.     Post procedure images did not demonstrate hemorrhage at the target site. The  patient tolerated the procedure well.  The patient left the department in good condition. A radiology nurse was in  presence monitoring vital signs, assisting throughout the procedure.    Electronically signed by Shaheen Sofia       ASSESSMENT:  Consult per Dr. Cantu 7/15/2025  70-year-old male admitted after frequent falls.  Patient was 
mEq  40 mEq Oral PRN Lisa Miranda APRN - CNP        Or    potassium chloride 10 mEq/100 mL IVPB (Peripheral Line)  10 mEq IntraVENous PRN Lisa Miranda APRN - CNP        magnesium sulfate 2000 mg in 50 mL IVPB premix  2,000 mg IntraVENous PRN Lisa Miranda APRN - CNP        enoxaparin Sodium (LOVENOX) injection 30 mg  30 mg SubCUTAneous BID Lisa Miranda APRN - CNP   30 mg at 07/17/25 1953    ondansetron (ZOFRAN-ODT) disintegrating tablet 4 mg  4 mg Oral Q8H PRN Lisa Miranda APRN - CNP        Or    ondansetron (ZOFRAN) injection 4 mg  4 mg IntraVENous Q6H PRN Lisa Miranda APRN - CNP        polyethylene glycol (GLYCOLAX) packet 17 g  17 g Oral Daily PRN Lisa Miranda APRN - CNP        acetaminophen (TYLENOL) tablet 650 mg  650 mg Oral Q6H PRN Lisa Miranda APRN - CNP        Or    acetaminophen (TYLENOL) suppository 650 mg  650 mg Rectal Q6H PRN Lisa Miranda APRN - CNP        thiamine tablet 100 mg  100 mg Oral Daily Lisa Miranda APRN - CNP   100 mg at 07/18/25 0957    multivitamin 1 tablet  1 tablet Oral Daily Lisa Miranda APRN - CNP   1 tablet at 07/18/25 0957    folic acid (FOLVITE) tablet 1 mg  1 mg Oral Daily Lisa Miranda APRN - CNP   1 mg at 07/18/25 0957    LORazepam (ATIVAN) tablet 1 mg  1 mg Oral Q1H PRN Lisa Miranda APRN - CNP        Or    LORazepam (ATIVAN) injection 1 mg  1 mg IntraVENous Q1H PRN Lisa Miranda APRN - CNP        Or    LORazepam (ATIVAN) tablet 2 mg  2 mg Oral Q1H PRN Lisa Miranda APRN - CNP        Or    LORazepam (ATIVAN) injection 2 mg  2 mg IntraVENous Q1H PRN Lisa Miranda APRN - CNP        Or    LORazepam (ATIVAN) tablet 3 mg  3 mg Oral Q1H PRN Lisa Miranda APRN - CNP        Or    LORazepam (ATIVAN) injection 3 mg  3 mg IntraVENous Q1H PRN Lisa Miranda APRN - CNP        Or    
time.\"    Pain  Pain  Pre-Pain: 0  Post-Pain: 0     OBJECTIVE:        Bed mobility  Supine to Sit: Stand by assistance  Sit to Supine: Stand by assistance  Bed Mobility Comments: increased time and effort required for all activities - encouragment required to complete at highest level of independence    Transfers  Sit to Stand: Partial/Moderate assistance  Stand to Sit: Partial/Moderate assistance  Comment: initially completed with face to face technique to build trust and confidence. pt then able to complete with WW and Min A from slightly elevated surface d/t pt's height. vc's for improved anterior weight shifting    Ambulation  Surface: Level tile  Device: Rolling Walker  Assistance: Partial/Moderate assistance  Gait Deviations: Slow Miriam;Decreased step length;Decreased step height  Distance: 3' FWD/BWD x 3  Comments: pt reports fatigue with this distance. able to complete standing for 2 minutes goal.               Activity Tolerance  Activity Tolerance: Patient tolerated treatment well          ASSESSMENT   Assessment: pt with improving mobility able to complete mobility with less assistance and progress to transfer training and short distance gait training.     Discharge Recommendations:  Continue to assess pending progress         Goals  Long Term Goals  Long Term Goal 1: indep and efficient bed mobility  Long Term Goal 2: SBA sit to stand and bed transfers  Long Term Goal 3: min assist for gait with appropriate device 30-50 feet  Long Term Goal 4: indep w/c mobility 150 feet  Long Term Goal 5: pt able to stand with min assist with bilat UE support for 2 min    PLAN    General Plan: 1 time a day 3-6 times a week  Safety Devices  Type of Devices: Bed alarm in place, Left in bed, Call light within reach, Nurse notified     [facility] (6 CLICK) BASIC MOBILITY  AM-PAC Inpatient Mobility Raw Score : 13      Therapy Time   Individual   Time In 1054   Time Out 1117   Minutes 23      BM/trsf: 15  Gait: 8       Damian

## 2025-07-23 PROBLEM — C49.9 LIPOSARCOMA, WELL DIFFERENTIATED TYPE (HCC): Status: RESOLVED | Noted: 2021-02-22 | Resolved: 2025-07-23

## 2025-07-23 LAB — BACTERIA FLD AEROBE CULT: NORMAL

## 2025-07-23 NOTE — PROGRESS NOTES
History and Physical      CHIEF COMPLAINT:  Fall     History of Present Illness:      A 70 y.o. male who is being seen at [facility] after a fall. He is a drinker.     REVIEW OF SYSTEMS:  A complete 10 Point review of systems was preformed and negative unless previously stated      PMH:  Past Medical History:   Diagnosis Date    Actinic keratoses     has had LN2 and used Efudex    Alcohol consumption of one to four drinks per day on alcohol screening     Alcoholism (HCC)     Allergic rhinitis     cats, weeds, grasses, ragweed    Asthma     Bilateral knee pain     Diabetes mellitus (HCC)     Gout     History of colon polyps 02/2016    needs f/u 5 years [facility]    History of type 2 diabetes mellitus     about 15 years    Hyperlipidemia     Hypertension     Keratosis, inflamed seborrheic     Osteoarthritis     Osteoarthritis, localized, shoulder, right     Polyp of transverse colon f/u due 2021 08/09/2014    Prediabetes     Prostate cancer (HCC) 2014    s/p prostatectomy    Syncope and collapse     Varicose vein of leg        Surgical History:  Past Surgical History:   Procedure Laterality Date    ARM SURGERY Left 07/09/2024    ARM LESION BIOPSY EXCISION performed by Светлана Lucio MD [place] [facility] [place]    COLONOSCOPY  02/04/2016    Dr. Cantu; polyps f/u in 5 years    COLONOSCOPY N/A 10/02/2020    COLONOSCOPY DIAGNOSTIC performed by Ashley Price MD at [facility]    EXCISION/BIOPSY N/A 07/09/2024    Back and left arm mass excisions performed by Светлана Lucio MD at [facility] [place]    JOINT REPLACEMENT Right 04/01/2024    right total knee    MUSCLE BIOPSY Left 12/05/2023    Left quadriecep muscle biopsy performed by Светлана Lucio MD at [facility] [place]    PARACENTESIS Left 07/16/2025    3000 ml removed by Isela Burris CNP - diagnostics sent    PROSTATECTOMY  11/2014    SKIN BIOPSY      ULNAR TUNNEL RELEASE Bilateral     UPPER GASTROINTESTINAL ENDOSCOPY N/A 12/19/2023    EGD WITH BIOPSIES performed by Ashley Price,

## 2025-07-23 NOTE — CARE COORDINATION
SECURE email notification sent to identified IDT members at   [facility]

## 2025-07-25 ENCOUNTER — OFFICE VISIT (OUTPATIENT)
Dept: GERIATRIC MEDICINE | Age: 70
End: 2025-07-25

## 2025-07-25 DIAGNOSIS — I10 ESSENTIAL HYPERTENSION: ICD-10-CM

## 2025-07-25 DIAGNOSIS — E11.9 TYPE 2 DIABETES MELLITUS WITHOUT COMPLICATION, WITHOUT LONG-TERM CURRENT USE OF INSULIN (HCC): ICD-10-CM

## 2025-07-25 DIAGNOSIS — R29.6 FALLS: Primary | ICD-10-CM

## 2025-07-25 DIAGNOSIS — E78.5 HYPERLIPIDEMIA, UNSPECIFIED HYPERLIPIDEMIA TYPE: ICD-10-CM

## 2025-07-26 PROBLEM — C61 MALIGNANT NEOPLASM OF PROSTATE (HCC): Status: RESOLVED | Noted: 2024-04-01 | Resolved: 2025-07-26

## 2025-07-26 NOTE — PROGRESS NOTES
SNF PROGRESS NOTE      Cc- Fall       Patient is a Narayan Eddy 70 y.o. male who is being seen at [facility] after a fall. He is a drinker.     Patient is sitting up in his room. He states that therapy is going ok. He is eating fair.         Past Medical History:   Diagnosis Date    Actinic keratoses     has had LN2 and used Efudex    Alcohol consumption of one to four drinks per day on alcohol screening     Alcoholism (HCC)     Allergic rhinitis     cats, weeds, grasses, ragweed    Asthma     Bilateral knee pain     Diabetes mellitus (HCC)     Gout     History of colon polyps 02/2016    needs f/u 5 years [facility]    History of type 2 diabetes mellitus     about 15 years    Hyperlipidemia     Hypertension     Keratosis, inflamed seborrheic     Osteoarthritis     Osteoarthritis, localized, shoulder, right     Polyp of transverse colon f/u due 2021 08/09/2014    Prediabetes     Prostate cancer (HCC) 2014    s/p prostatectomy    Syncope and collapse     Varicose vein of leg      Patient has no known allergies.    VS reviewed    Gen- Alert and oriented x 2  Heart- RRR no murmur no LE edema   Lungs- CTA b/l no resp distress RA oxygen   Abd- bs x 4           Assessment and Plan    Fall / Weakness   Pancytopenia from ETOH   DM-- BS controlled   Metformin 500 mg BID   HTN-- BP controlled   Lasix   Metoprolol 50 mg daily   Spironolactone 50 mg daily   HLD  Statin       DIONTE Ross DO     Electronically signed by: Pia Marrero DO on 7/25/2025

## 2025-07-28 ENCOUNTER — OFFICE VISIT (OUTPATIENT)
Dept: GERIATRIC MEDICINE | Age: 70
End: 2025-07-28
Payer: MEDICARE

## 2025-07-28 DIAGNOSIS — R29.6 FALLS: Primary | ICD-10-CM

## 2025-07-28 DIAGNOSIS — K70.9 ALCOHOLIC LIVER DISEASE: Primary | ICD-10-CM

## 2025-07-28 DIAGNOSIS — I10 ESSENTIAL HYPERTENSION: ICD-10-CM

## 2025-07-28 DIAGNOSIS — K70.31 ALCOHOLIC CIRRHOSIS OF LIVER WITH ASCITES (HCC): ICD-10-CM

## 2025-07-28 DIAGNOSIS — E11.9 TYPE 2 DIABETES MELLITUS WITHOUT COMPLICATION, WITHOUT LONG-TERM CURRENT USE OF INSULIN (HCC): ICD-10-CM

## 2025-07-28 DIAGNOSIS — E78.5 HYPERLIPIDEMIA, UNSPECIFIED HYPERLIPIDEMIA TYPE: ICD-10-CM

## 2025-07-28 PROCEDURE — 1123F ACP DISCUSS/DSCN MKR DOCD: CPT | Performed by: INTERNAL MEDICINE

## 2025-07-28 PROCEDURE — 3046F HEMOGLOBIN A1C LEVEL >9.0%: CPT | Performed by: INTERNAL MEDICINE

## 2025-07-28 PROCEDURE — 99309 SBSQ NF CARE MODERATE MDM 30: CPT | Performed by: INTERNAL MEDICINE

## 2025-07-28 NOTE — PROGRESS NOTES
SNF PROGRESS NOTE      Cc- Fall       Patient is a Narayan Eddy 70 y.o. male who is being seen at [facility] after a fall. He is a drinker.      Patient is sitting up in bed. His weight is increased and his abdominal girth is getting larger.         Past Medical History:   Diagnosis Date    Actinic keratoses     has had LN2 and used Efudex    Alcohol consumption of one to four drinks per day on alcohol screening     Alcoholism (HCC)     Allergic rhinitis     cats, weeds, grasses, ragweed    Asthma     Bilateral knee pain     Diabetes mellitus (HCC)     Gout     History of colon polyps 02/2016    needs f/u 5 years [facility]    History of type 2 diabetes mellitus     about 15 years    Hyperlipidemia     Hypertension     Keratosis, inflamed seborrheic     Osteoarthritis     Osteoarthritis, localized, shoulder, right     Polyp of transverse colon f/u due 2021 08/09/2014    Prediabetes     Prostate cancer (HCC) 2014    s/p prostatectomy    Syncope and collapse     Varicose vein of leg      Patient has no known allergies.    VS reviewed      Gen- Alert and oriented x 2  Heart- RRR no murmur no LE edema   Lungs- CTA b/l no resp distress RA oxygen   Abd- bs x 4, + ascites         Assessment and Plan         Fall / Weakness  Alcoholic Cirrhosis   Measure abd girth   Will order a paracentesis.    Pancytopenia from ETOH   DM-- BS controlled   Metformin 500 mg BID   HTN-- BP controlled   Lasix   Metoprolol 50 mg daily   Spironolactone 50 mg daily   HLD  Statin     DIONTE Ross DO     Electronically signed by: Pia Marrero DO on 7/28/2025

## 2025-07-29 ENCOUNTER — TELEPHONE (OUTPATIENT)
Age: 70
End: 2025-07-29

## 2025-07-29 NOTE — TELEPHONE ENCOUNTER
Charley Raphael RN at [facility] was given this information via phone conversation - voiced understanding  2.  Spoke to Sayra in [facility] to schedule procedure    >  Paracentesis is scheduled on 8/7/2025 @ 9:00 am   >  You will need to arrive at 8:30 am from [facility] and check in at the Diagnostic Imaging Check In desk.

## 2025-08-01 ENCOUNTER — OFFICE VISIT (OUTPATIENT)
Dept: GERIATRIC MEDICINE | Age: 70
End: 2025-08-01

## 2025-08-01 DIAGNOSIS — K70.31 ALCOHOLIC CIRRHOSIS OF LIVER WITH ASCITES (HCC): ICD-10-CM

## 2025-08-01 DIAGNOSIS — R29.6 FALLS: Primary | ICD-10-CM

## 2025-08-01 DIAGNOSIS — E78.5 HYPERLIPIDEMIA, UNSPECIFIED HYPERLIPIDEMIA TYPE: ICD-10-CM

## 2025-08-01 DIAGNOSIS — E11.9 TYPE 2 DIABETES MELLITUS WITHOUT COMPLICATION, WITHOUT LONG-TERM CURRENT USE OF INSULIN (HCC): ICD-10-CM

## 2025-08-01 DIAGNOSIS — I10 ESSENTIAL HYPERTENSION: ICD-10-CM

## 2025-08-04 ENCOUNTER — OFFICE VISIT (OUTPATIENT)
Dept: GERIATRIC MEDICINE | Age: 70
End: 2025-08-04
Payer: MEDICARE

## 2025-08-04 DIAGNOSIS — E11.9 TYPE 2 DIABETES MELLITUS WITHOUT COMPLICATION, WITHOUT LONG-TERM CURRENT USE OF INSULIN (HCC): ICD-10-CM

## 2025-08-04 DIAGNOSIS — I10 ESSENTIAL HYPERTENSION: ICD-10-CM

## 2025-08-04 DIAGNOSIS — E78.5 HYPERLIPIDEMIA, UNSPECIFIED HYPERLIPIDEMIA TYPE: ICD-10-CM

## 2025-08-04 DIAGNOSIS — K70.31 ALCOHOLIC CIRRHOSIS OF LIVER WITH ASCITES (HCC): ICD-10-CM

## 2025-08-04 DIAGNOSIS — R29.6 FALLS: Primary | ICD-10-CM

## 2025-08-04 PROCEDURE — 99309 SBSQ NF CARE MODERATE MDM 30: CPT | Performed by: INTERNAL MEDICINE

## 2025-08-04 PROCEDURE — 1123F ACP DISCUSS/DSCN MKR DOCD: CPT | Performed by: INTERNAL MEDICINE

## 2025-08-04 PROCEDURE — 3046F HEMOGLOBIN A1C LEVEL >9.0%: CPT | Performed by: INTERNAL MEDICINE

## 2025-08-06 ENCOUNTER — OFFICE VISIT (OUTPATIENT)
Dept: GERIATRIC MEDICINE | Age: 70
End: 2025-08-06

## 2025-08-06 ENCOUNTER — TELEPHONE (OUTPATIENT)
Dept: INTERVENTIONAL RADIOLOGY/VASCULAR | Age: 70
End: 2025-08-06

## 2025-08-06 DIAGNOSIS — K70.31 ALCOHOLIC CIRRHOSIS OF LIVER WITH ASCITES (HCC): ICD-10-CM

## 2025-08-06 DIAGNOSIS — E11.9 TYPE 2 DIABETES MELLITUS WITHOUT COMPLICATION, WITHOUT LONG-TERM CURRENT USE OF INSULIN (HCC): ICD-10-CM

## 2025-08-06 DIAGNOSIS — I10 ESSENTIAL HYPERTENSION: ICD-10-CM

## 2025-08-06 DIAGNOSIS — R29.6 FALLS: Primary | ICD-10-CM

## 2025-08-06 DIAGNOSIS — E78.5 HYPERLIPIDEMIA, UNSPECIFIED HYPERLIPIDEMIA TYPE: ICD-10-CM

## 2025-08-07 ENCOUNTER — HOSPITAL ENCOUNTER (OUTPATIENT)
Dept: INTERVENTIONAL RADIOLOGY/VASCULAR | Age: 70
Discharge: HOME | End: 2025-08-09
Payer: MEDICARE

## 2025-08-07 VITALS — HEART RATE: 72 BPM | SYSTOLIC BLOOD PRESSURE: 92 MMHG | OXYGEN SATURATION: 99 % | DIASTOLIC BLOOD PRESSURE: 62 MMHG

## 2025-08-07 DIAGNOSIS — K70.9 ALCOHOLIC LIVER DISEASE: ICD-10-CM

## 2025-08-07 PROCEDURE — 49083 ABD PARACENTESIS W/IMAGING: CPT

## 2025-08-07 PROCEDURE — C1729 CATH, DRAINAGE: HCPCS

## 2025-08-07 PROCEDURE — 6360000002 HC RX W HCPCS: Performed by: NURSE PRACTITIONER

## 2025-08-07 RX ORDER — LIDOCAINE HYDROCHLORIDE 20 MG/ML
INJECTION, SOLUTION INFILTRATION; PERINEURAL PRN
Status: COMPLETED | OUTPATIENT
Start: 2025-08-07 | End: 2025-08-07

## 2025-08-07 RX ADMIN — LIDOCAINE HYDROCHLORIDE 7 ML: 20 INJECTION, SOLUTION INFILTRATION; PERINEURAL at 09:40

## 2025-08-08 ENCOUNTER — TELEPHONE (OUTPATIENT)
Dept: INTERVENTIONAL RADIOLOGY/VASCULAR | Age: 70
End: 2025-08-08

## 2025-08-08 ENCOUNTER — OFFICE VISIT (OUTPATIENT)
Dept: GERIATRIC MEDICINE | Age: 70
End: 2025-08-08

## 2025-08-08 DIAGNOSIS — E11.9 TYPE 2 DIABETES MELLITUS WITHOUT COMPLICATION, WITHOUT LONG-TERM CURRENT USE OF INSULIN (HCC): ICD-10-CM

## 2025-08-08 DIAGNOSIS — I10 ESSENTIAL HYPERTENSION: ICD-10-CM

## 2025-08-08 DIAGNOSIS — K70.31 ALCOHOLIC CIRRHOSIS OF LIVER WITH ASCITES (HCC): ICD-10-CM

## 2025-08-08 DIAGNOSIS — E78.5 HYPERLIPIDEMIA, UNSPECIFIED HYPERLIPIDEMIA TYPE: ICD-10-CM

## 2025-08-08 DIAGNOSIS — R29.6 FALLS: Primary | ICD-10-CM

## 2025-08-12 ENCOUNTER — OFFICE VISIT (OUTPATIENT)
Dept: GERIATRIC MEDICINE | Age: 70
End: 2025-08-12

## 2025-08-12 DIAGNOSIS — I10 ESSENTIAL HYPERTENSION: ICD-10-CM

## 2025-08-12 DIAGNOSIS — E78.5 HYPERLIPIDEMIA, UNSPECIFIED HYPERLIPIDEMIA TYPE: ICD-10-CM

## 2025-08-12 DIAGNOSIS — K70.31 ALCOHOLIC CIRRHOSIS OF LIVER WITH ASCITES (HCC): ICD-10-CM

## 2025-08-12 DIAGNOSIS — R29.6 FALLS: Primary | ICD-10-CM

## 2025-08-12 DIAGNOSIS — E11.9 TYPE 2 DIABETES MELLITUS WITHOUT COMPLICATION, WITHOUT LONG-TERM CURRENT USE OF INSULIN (HCC): ICD-10-CM

## 2025-08-14 ENCOUNTER — OFFICE VISIT (OUTPATIENT)
Dept: GERIATRIC MEDICINE | Age: 70
End: 2025-08-14
Payer: MEDICARE

## 2025-08-14 DIAGNOSIS — R29.6 FALLS: Primary | ICD-10-CM

## 2025-08-14 DIAGNOSIS — E11.9 TYPE 2 DIABETES MELLITUS WITHOUT COMPLICATION, WITHOUT LONG-TERM CURRENT USE OF INSULIN (HCC): ICD-10-CM

## 2025-08-14 DIAGNOSIS — K70.31 ALCOHOLIC CIRRHOSIS OF LIVER WITH ASCITES (HCC): ICD-10-CM

## 2025-08-14 DIAGNOSIS — I10 ESSENTIAL HYPERTENSION: ICD-10-CM

## 2025-08-14 DIAGNOSIS — E78.5 HYPERLIPIDEMIA, UNSPECIFIED HYPERLIPIDEMIA TYPE: ICD-10-CM
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
## 2025-08-14 PROCEDURE — 3046F HEMOGLOBIN A1C LEVEL >9.0%: CPT | Performed by: INTERNAL MEDICINE

## 2025-08-14 PROCEDURE — 99309 SBSQ NF CARE MODERATE MDM 30: CPT | Performed by: INTERNAL MEDICINE

## 2025-08-14 PROCEDURE — 1123F ACP DISCUSS/DSCN MKR DOCD: CPT | Performed by: INTERNAL MEDICINE

## 2025-08-15 ENCOUNTER — OFFICE VISIT (OUTPATIENT)
Dept: GERIATRIC MEDICINE | Age: 70
End: 2025-08-15
Payer: MEDICARE

## 2025-08-15 DIAGNOSIS — E11.9 TYPE 2 DIABETES MELLITUS WITHOUT COMPLICATION, WITHOUT LONG-TERM CURRENT USE OF INSULIN (HCC): ICD-10-CM

## 2025-08-15 DIAGNOSIS — K70.31 ALCOHOLIC CIRRHOSIS OF LIVER WITH ASCITES (HCC): ICD-10-CM

## 2025-08-15 DIAGNOSIS — R29.6 FALLS: Primary | ICD-10-CM

## 2025-08-15 DIAGNOSIS — I10 ESSENTIAL HYPERTENSION: ICD-10-CM

## 2025-08-15 DIAGNOSIS — E78.5 HYPERLIPIDEMIA, UNSPECIFIED HYPERLIPIDEMIA TYPE: ICD-10-CM

## 2025-08-15 PROCEDURE — 3046F HEMOGLOBIN A1C LEVEL >9.0%: CPT | Performed by: INTERNAL MEDICINE

## 2025-08-15 PROCEDURE — 99309 SBSQ NF CARE MODERATE MDM 30: CPT | Performed by: INTERNAL MEDICINE

## 2025-08-15 PROCEDURE — 1123F ACP DISCUSS/DSCN MKR DOCD: CPT | Performed by: INTERNAL MEDICINE

## 2025-08-18 ENCOUNTER — OFFICE VISIT (OUTPATIENT)
Dept: GERIATRIC MEDICINE | Age: 70
End: 2025-08-18

## 2025-08-18 ENCOUNTER — TELEPHONE (OUTPATIENT)
Age: 70
End: 2025-08-18

## 2025-08-18 DIAGNOSIS — K70.31 ALCOHOLIC CIRRHOSIS OF LIVER WITH ASCITES (HCC): ICD-10-CM

## 2025-08-18 DIAGNOSIS — K70.9 ALCOHOLIC LIVER DISEASE: Primary | ICD-10-CM

## 2025-08-18 DIAGNOSIS — E78.5 HYPERLIPIDEMIA, UNSPECIFIED HYPERLIPIDEMIA TYPE: ICD-10-CM

## 2025-08-18 DIAGNOSIS — I10 ESSENTIAL HYPERTENSION: ICD-10-CM

## 2025-08-18 DIAGNOSIS — E11.9 TYPE 2 DIABETES MELLITUS WITHOUT COMPLICATION, WITHOUT LONG-TERM CURRENT USE OF INSULIN (HCC): ICD-10-CM

## 2025-08-18 DIAGNOSIS — R29.6 FALLS: Primary | ICD-10-CM

## 2025-08-19 ENCOUNTER — HOSPITAL ENCOUNTER (OUTPATIENT)
Dept: INTERVENTIONAL RADIOLOGY/VASCULAR | Age: 70
Discharge: HOME | End: 2025-08-21
Payer: MEDICARE

## 2025-08-19 ENCOUNTER — OFFICE VISIT (OUTPATIENT)
Dept: GERIATRIC MEDICINE | Age: 70
End: 2025-08-19

## 2025-08-19 VITALS — SYSTOLIC BLOOD PRESSURE: 112 MMHG | OXYGEN SATURATION: 97 % | HEART RATE: 85 BPM | DIASTOLIC BLOOD PRESSURE: 68 MMHG

## 2025-08-19 DIAGNOSIS — R29.6 FALLS: Primary | ICD-10-CM

## 2025-08-19 DIAGNOSIS — E78.5 HYPERLIPIDEMIA, UNSPECIFIED HYPERLIPIDEMIA TYPE: ICD-10-CM

## 2025-08-19 DIAGNOSIS — I10 ESSENTIAL HYPERTENSION: ICD-10-CM

## 2025-08-19 DIAGNOSIS — K70.31 ALCOHOLIC CIRRHOSIS OF LIVER WITH ASCITES (HCC): ICD-10-CM

## 2025-08-19 DIAGNOSIS — K70.9 ALCOHOLIC LIVER DISEASE: ICD-10-CM

## 2025-08-19 DIAGNOSIS — E11.9 TYPE 2 DIABETES MELLITUS WITHOUT COMPLICATION, WITHOUT LONG-TERM CURRENT USE OF INSULIN (HCC): ICD-10-CM

## 2025-08-19 PROCEDURE — 49083 ABD PARACENTESIS W/IMAGING: CPT

## 2025-08-19 PROCEDURE — C1729 CATH, DRAINAGE: HCPCS

## 2025-08-19 PROCEDURE — 76705 ECHO EXAM OF ABDOMEN: CPT | Performed by: RADIOLOGY

## 2025-08-21 ENCOUNTER — TELEPHONE (OUTPATIENT)
Dept: FAMILY MEDICINE CLINIC | Age: 70
End: 2025-08-21

## 2025-08-21 ENCOUNTER — OFFICE VISIT (OUTPATIENT)
Dept: GERIATRIC MEDICINE | Age: 70
End: 2025-08-21

## 2025-08-21 DIAGNOSIS — R29.6 FALLS: Primary | ICD-10-CM

## 2025-08-21 DIAGNOSIS — K70.31 ALCOHOLIC CIRRHOSIS OF LIVER WITH ASCITES (HCC): ICD-10-CM

## 2025-08-21 DIAGNOSIS — I10 ESSENTIAL HYPERTENSION: ICD-10-CM

## 2025-08-21 DIAGNOSIS — E11.9 TYPE 2 DIABETES MELLITUS WITHOUT COMPLICATION, WITHOUT LONG-TERM CURRENT USE OF INSULIN (HCC): ICD-10-CM

## 2025-08-21 DIAGNOSIS — R52 PAIN: Primary | ICD-10-CM

## 2025-08-21 DIAGNOSIS — E78.5 HYPERLIPIDEMIA, UNSPECIFIED HYPERLIPIDEMIA TYPE: ICD-10-CM

## 2025-08-21 RX ORDER — OXYCODONE HYDROCHLORIDE 5 MG/1
5 TABLET ORAL EVERY 6 HOURS PRN
Qty: 28 TABLET | Refills: 0 | Status: SHIPPED | OUTPATIENT
Start: 2025-08-21 | End: 2025-08-28

## 2025-08-22 ENCOUNTER — CARE COORDINATION (OUTPATIENT)
Dept: CARE COORDINATION | Age: 70
End: 2025-08-22

## 2025-08-25 ENCOUNTER — OFFICE VISIT (OUTPATIENT)
Dept: FAMILY MEDICINE CLINIC | Age: 70
End: 2025-08-25
Payer: MEDICARE

## 2025-08-25 VITALS
SYSTOLIC BLOOD PRESSURE: 120 MMHG | OXYGEN SATURATION: 98 % | DIASTOLIC BLOOD PRESSURE: 70 MMHG | WEIGHT: 200 LBS | BODY MASS INDEX: 27.09 KG/M2 | HEART RATE: 77 BPM | HEIGHT: 72 IN

## 2025-08-25 DIAGNOSIS — E83.42 HYPOMAGNESEMIA: ICD-10-CM

## 2025-08-25 DIAGNOSIS — K70.9 ALCOHOLIC LIVER DISEASE: ICD-10-CM

## 2025-08-25 DIAGNOSIS — D61.818 PANCYTOPENIA, ACQUIRED (HCC): ICD-10-CM

## 2025-08-25 DIAGNOSIS — E87.1 HYPONATREMIA: ICD-10-CM

## 2025-08-25 DIAGNOSIS — E87.1 HYPONATREMIA: Primary | ICD-10-CM

## 2025-08-25 LAB
ANION GAP SERPL CALCULATED.3IONS-SCNC: 14 MEQ/L (ref 9–15)
BASOPHILS # BLD: 0 K/UL (ref 0–0.2)
BASOPHILS NFR BLD: 0.5 %
BUN SERPL-MCNC: 12 MG/DL (ref 8–23)
CALCIUM SERPL-MCNC: 9.3 MG/DL (ref 8.5–9.9)
CHLORIDE SERPL-SCNC: 93 MEQ/L (ref 95–107)
CO2 SERPL-SCNC: 26 MEQ/L (ref 20–31)
CREAT SERPL-MCNC: 1.29 MG/DL (ref 0.7–1.2)
EOSINOPHIL # BLD: 0.1 K/UL (ref 0–0.7)
EOSINOPHIL NFR BLD: 2 %
ERYTHROCYTE [DISTWIDTH] IN BLOOD BY AUTOMATED COUNT: 13.1 % (ref 11.5–14.5)
GLUCOSE SERPL-MCNC: 97 MG/DL (ref 70–99)
HCT VFR BLD AUTO: 32.6 % (ref 42–52)
HGB BLD-MCNC: 11.4 G/DL (ref 14–18)
LYMPHOCYTES # BLD: 1.4 K/UL (ref 1–4.8)
LYMPHOCYTES NFR BLD: 21.8 %
MAGNESIUM SERPL-MCNC: 1.5 MG/DL (ref 1.7–2.4)
MCH RBC QN AUTO: 33.9 PG (ref 27–31.3)
MCHC RBC AUTO-ENTMCNC: 35 % (ref 33–37)
MCV RBC AUTO: 97 FL (ref 79–92.2)
MONOCYTES # BLD: 0.7 K/UL (ref 0.2–0.8)
MONOCYTES NFR BLD: 11.1 %
NEUTROPHILS # BLD: 4.1 K/UL (ref 1.4–6.5)
NEUTS SEG NFR BLD: 64 %
PLATELET # BLD AUTO: 253 K/UL (ref 130–400)
POTASSIUM SERPL-SCNC: 3.6 MEQ/L (ref 3.4–4.9)
RBC # BLD AUTO: 3.36 M/UL (ref 4.7–6.1)
SODIUM SERPL-SCNC: 133 MEQ/L (ref 135–144)
WBC # BLD AUTO: 6.5 K/UL (ref 4.8–10.8)

## 2025-08-25 PROCEDURE — 1036F TOBACCO NON-USER: CPT | Performed by: FAMILY MEDICINE

## 2025-08-25 PROCEDURE — 1160F RVW MEDS BY RX/DR IN RCRD: CPT | Performed by: FAMILY MEDICINE

## 2025-08-25 PROCEDURE — 3078F DIAST BP <80 MM HG: CPT | Performed by: FAMILY MEDICINE

## 2025-08-25 PROCEDURE — 1159F MED LIST DOCD IN RCRD: CPT | Performed by: FAMILY MEDICINE

## 2025-08-25 PROCEDURE — 3074F SYST BP LT 130 MM HG: CPT | Performed by: FAMILY MEDICINE

## 2025-08-25 PROCEDURE — G8427 DOCREV CUR MEDS BY ELIG CLIN: HCPCS | Performed by: FAMILY MEDICINE

## 2025-08-25 PROCEDURE — G8417 CALC BMI ABV UP PARAM F/U: HCPCS | Performed by: FAMILY MEDICINE

## 2025-08-25 PROCEDURE — 1123F ACP DISCUSS/DSCN MKR DOCD: CPT | Performed by: FAMILY MEDICINE

## 2025-08-25 PROCEDURE — 1126F AMNT PAIN NOTED NONE PRSNT: CPT | Performed by: FAMILY MEDICINE

## 2025-08-25 PROCEDURE — G9901 SNP/LG TRM CRE PT W/POS CDE: HCPCS | Performed by: FAMILY MEDICINE

## 2025-08-25 PROCEDURE — 99215 OFFICE O/P EST HI 40 MIN: CPT | Performed by: FAMILY MEDICINE

## 2025-08-25 ASSESSMENT — ENCOUNTER SYMPTOMS
ABDOMINAL PAIN: 0
CHEST TIGHTNESS: 0
PHOTOPHOBIA: 0
SHORTNESS OF BREATH: 0
ABDOMINAL DISTENTION: 0

## 2025-09-02 ENCOUNTER — OFFICE VISIT (OUTPATIENT)
Dept: GASTROENTEROLOGY | Age: 70
End: 2025-09-02
Payer: MEDICARE

## 2025-09-02 VITALS
OXYGEN SATURATION: 95 % | WEIGHT: 203 LBS | DIASTOLIC BLOOD PRESSURE: 70 MMHG | HEART RATE: 95 BPM | BODY MASS INDEX: 27.53 KG/M2 | SYSTOLIC BLOOD PRESSURE: 118 MMHG

## 2025-09-02 DIAGNOSIS — K70.31 ALCOHOLIC CIRRHOSIS OF LIVER WITH ASCITES (HCC): Primary | ICD-10-CM

## 2025-09-02 DIAGNOSIS — E11.9 TYPE 2 DIABETES MELLITUS WITHOUT COMPLICATION, WITHOUT LONG-TERM CURRENT USE OF INSULIN (HCC): ICD-10-CM

## 2025-09-02 DIAGNOSIS — M1A.0720 CHRONIC GOUT OF LEFT FOOT, UNSPECIFIED CAUSE: ICD-10-CM

## 2025-09-02 PROCEDURE — 1123F ACP DISCUSS/DSCN MKR DOCD: CPT | Performed by: NURSE PRACTITIONER

## 2025-09-02 PROCEDURE — G8417 CALC BMI ABV UP PARAM F/U: HCPCS | Performed by: NURSE PRACTITIONER

## 2025-09-02 PROCEDURE — 99214 OFFICE O/P EST MOD 30 MIN: CPT | Performed by: NURSE PRACTITIONER

## 2025-09-02 PROCEDURE — 1036F TOBACCO NON-USER: CPT | Performed by: NURSE PRACTITIONER

## 2025-09-02 PROCEDURE — 3078F DIAST BP <80 MM HG: CPT | Performed by: NURSE PRACTITIONER

## 2025-09-02 PROCEDURE — G8428 CUR MEDS NOT DOCUMENT: HCPCS | Performed by: NURSE PRACTITIONER

## 2025-09-02 PROCEDURE — 3074F SYST BP LT 130 MM HG: CPT | Performed by: NURSE PRACTITIONER

## 2025-09-02 PROCEDURE — G9901 SNP/LG TRM CRE PT W/POS CDE: HCPCS | Performed by: NURSE PRACTITIONER

## 2025-09-02 ASSESSMENT — ENCOUNTER SYMPTOMS
DIARRHEA: 0
ABDOMINAL DISTENTION: 0
BLOOD IN STOOL: 0
CONSTIPATION: 0
VOMITING: 0
COLOR CHANGE: 0
PHOTOPHOBIA: 0
NAUSEA: 0
CHEST TIGHTNESS: 0
VOICE CHANGE: 0
ANAL BLEEDING: 0
EYE PAIN: 0
TROUBLE SWALLOWING: 0
EYE REDNESS: 0
ABDOMINAL PAIN: 0
RECTAL PAIN: 0

## 2025-09-03 RX ORDER — ALLOPURINOL 100 MG/1
100 TABLET ORAL DAILY
Qty: 30 TABLET | Refills: 12 | Status: SHIPPED | OUTPATIENT
Start: 2025-09-03

## 2025-09-05 DIAGNOSIS — R60.0 LEG EDEMA, LEFT: ICD-10-CM

## 2025-09-05 DIAGNOSIS — R60.0 LEG EDEMA: Primary | ICD-10-CM

## 2025-09-05 RX ORDER — ATORVASTATIN CALCIUM 10 MG/1
10 TABLET, FILM COATED ORAL NIGHTLY
Qty: 30 TABLET | Refills: 12 | Status: SHIPPED | OUTPATIENT
Start: 2025-09-05

## 2025-09-05 RX ORDER — PANTOPRAZOLE SODIUM 40 MG/1
40 TABLET, DELAYED RELEASE ORAL DAILY
Qty: 30 TABLET | Refills: 0 | Status: SHIPPED | OUTPATIENT
Start: 2025-09-05

## 2025-09-05 RX ORDER — FUROSEMIDE 20 MG/1
TABLET ORAL
Qty: 56 TABLET | Refills: 0 | OUTPATIENT
Start: 2025-09-05

## 2025-09-06 RX ORDER — ESOMEPRAZOLE MAGNESIUM 40 MG/1
40 CAPSULE, DELAYED RELEASE ORAL
Qty: 90 CAPSULE | Refills: 0 | Status: SHIPPED | OUTPATIENT
Start: 2025-09-06

## (undated) DEVICE — SOLUTION, INJECTION, USP, SODIUM CHLORIDE 0.9%, .9, NACL, 1000 ML, BAG

## (undated) DEVICE — MARKER SURG SKIN GENTIAN VLT REG TIP W/ 6IN RUL DYNJSM01

## (undated) DEVICE — TUBE SET 96 MM 64 MM H2O PERISTALTIC STD AUX CHANNEL

## (undated) DEVICE — SNARE ENDOSCP AD L240CM LOOP W10MM SHTH DIA2.4MM RND INSUL

## (undated) DEVICE — SUTURE VCRL SZ 2-0 L36IN ABSRB UD L36MM CT-1 1/2 CIR J945H

## (undated) DEVICE — WRAP, DEMAYO, LEG, STERILE

## (undated) DEVICE — SUTURE VCRL SZ 3-0 L27IN ABSRB UD L26MM SH 1/2 CIR J416H

## (undated) DEVICE — BLADE, PATELLA REAMER, W/PILOT HOLE, 35MM

## (undated) DEVICE — WOUND SYSTEM, DEBRIDEMENT & CLEANING, O.R DUOPAK

## (undated) DEVICE — NEPTUNE E-SEP SMOKE EVACUATION PENCIL, COATED, 70MM BLADE, PUSH BUTTON SWITCH: Brand: NEPTUNE E-SEP

## (undated) DEVICE — HAND: Brand: MEDLINE INDUSTRIES, INC.

## (undated) DEVICE — CEMENT, MIXEVAC III, 10S BOWL, KNEES

## (undated) DEVICE — SUTURE, MONOCRYL, 4-0, 27 IN, PS-2, UNDYED

## (undated) DEVICE — IMMOBILIZER, KNEE, 19IN, ADJUSTABLE FOAM, W/ ELASTIC STRAPS

## (undated) DEVICE — CUFF, TOURNIQUET, DUAL PORT, SNG BLADDER, 30 IN, PLC

## (undated) DEVICE — CONMED SCOPE SAVER BITE BLOCK, 20X27 MM: Brand: SCOPE SAVER

## (undated) DEVICE — GOWN,SIRUS,POLYRNF,BRTHSLV,XLN/XL,20/CS: Brand: MEDLINE

## (undated) DEVICE — DRESSING HYDROFIBER AQUACEL AG ADVANTAGE 3.5X6 IN

## (undated) DEVICE — SINGLE PORT MANIFOLD: Brand: NEPTUNE 2

## (undated) DEVICE — BLADE, MAKO, SAGITTAL, STANDARD

## (undated) DEVICE — PADDING, CAST, SPECIALIST, 6 IN X 4 YD, STERILE

## (undated) DEVICE — CONTAINER,SPECIMEN,OR STERILE,4OZ: Brand: MEDLINE

## (undated) DEVICE — PREP, IODOPHOR, W/ALCOHOL, DURAPREP, W/APPLICATOR, 26 CC

## (undated) DEVICE — SYRINGE, 50 CC, LUER LOCK

## (undated) DEVICE — BATH BLANKET STERILE

## (undated) DEVICE — STRAP, VELCRO, BODY, 4 X 60IN, NS

## (undated) DEVICE — TOWEL PACK 10-PK

## (undated) DEVICE — SUTURE, VICRYL, 1, 36 IN, V-34, VIOLET

## (undated) DEVICE — Device: Brand: ENDO SMARTCAP

## (undated) DEVICE — PIN, BONE, 4MM X 110MM, STERILE

## (undated) DEVICE — TUBING, SUCTION, 1/4" X 10', STRAIGHT: Brand: MEDLINE

## (undated) DEVICE — DRAPE,UTILITY,TAPE,15X26,STERILE: Brand: MEDLINE

## (undated) DEVICE — PIN, BONE, 4MM X 140MM, STERILE

## (undated) DEVICE — CLAMP SURG JAW W12MM POLYPR DISP FOR MUSC BX RAYPRT

## (undated) DEVICE — APPLICATOR MEDICATED 26 CC SOLUTION HI LT ORNG CHLORAPREP

## (undated) DEVICE — BANDAGE, ELASTIC, 6 X 10YD, BEIGE, LF

## (undated) DEVICE — LIQUIBAND RAPID ADHESIVE 36/CS 0.8ML: Brand: MEDLINE

## (undated) DEVICE — SPONGE GZ W4XL4IN RAYON POLY CVR W/NONWOVEN FAB STRL 2/PK

## (undated) DEVICE — GLOVE, SURGICAL, PROTEXIS PI , 7.5, PF, LF

## (undated) DEVICE — TRACKING KIT, TM KNEE PROCEDURES, VIZADISC

## (undated) DEVICE — GLOVE SURG SZ 55 THK91MIL ORANGE  LTX FREE SYN POLYISOPRENE

## (undated) DEVICE — TUBING, IRRIGATION, HIGH FLOW, HAND PIECE SET

## (undated) DEVICE — SUTURE MCRYL SZ 4-0 L27IN ABSRB UD L19MM PS-2 1/2 CIR PRIM Y426H

## (undated) DEVICE — BLADE, GEN COATED 2.75, LF

## (undated) DEVICE — PAD N ADH W3XL4IN POLY COT SFT PERF FLM EASILY CUT ABSRB

## (undated) DEVICE — GLOVE SURG SZ 6 L12IN FNGR THK79MIL GRN LTX FREE

## (undated) DEVICE — ADHESIVE, SKIN, LIQUIBAND EXCEED

## (undated) DEVICE — SPONGE,LAP,18"X18",DLX,XR,ST,5/PK,40/PK: Brand: MEDLINE

## (undated) DEVICE — CLAMP SURG JAW W16MM POLYPR DISP FOR MUSC BX RAYPRT

## (undated) DEVICE — STRAP, ARM BOARD, 32 X 1.5

## (undated) DEVICE — SYRINGE MED 10ML LUERLOCK TIP W/O SFTY DISP

## (undated) DEVICE — PACK,LAPAROTOMY,NO GOWNS: Brand: MEDLINE

## (undated) DEVICE — TUBING IRRIGATION 140/160/180/190 SER GI ENDOSCP SMARTCAP

## (undated) DEVICE — ENDO CARRY-ON PROCEDURE KIT: Brand: ENDO CARRY-ON PROCEDURE KIT

## (undated) DEVICE — HOOD, SURGICAL, FLYTE, T7

## (undated) DEVICE — GLOVE SURG SZ 65 THK91MIL LTX FREE SYN POLYISOPRENE

## (undated) DEVICE — SUTURE MONOCRYL SZ 4-0 L27IN ABSRB UD L19MM PS-2 1/2 CIR PRIM Y426H

## (undated) DEVICE — SOLUTION, IRRIGATION, STERILE WATER, 1000 ML, POUR BOTTLE

## (undated) DEVICE — NEEDLE, SAFETY, 18 G X 1.5 IN

## (undated) DEVICE — TOWEL,OR,DSP,ST,BLUE,STD,4/PK,20PK/CS: Brand: MEDLINE

## (undated) DEVICE — TRAP POLYP BALEEN

## (undated) DEVICE — SUTURE, MONOCRYL, 3-0, 36 IN, CT-1, UNDYED

## (undated) DEVICE — ELECTRODE PT RET AD L9FT HI MOIST COND ADH HYDRGEL CORDED

## (undated) DEVICE — GLOVE ORANGE PI 8 1/2   MSG9085

## (undated) DEVICE — SYRINGE IRRIG 60ML SFT PLIABLE BLB EZ TO GRP 1 HND USE W/

## (undated) DEVICE — COVER DUST PERIMETER HUMPREY

## (undated) DEVICE — TIBIAL CHECKPOINT, STERILE

## (undated) DEVICE — COVER, MAYO STAND, W/PAD, 23 IN, DISPOSABLE, PLASTIC, LF, STERILE

## (undated) DEVICE — BLADE,CARBON-STEEL,15,STRL,DISPOSABLE,TB: Brand: MEDLINE

## (undated) DEVICE — FORCEPS BX L240CM JAW DIA2.4MM ORNG L CAP W/ NDL DISP RAD

## (undated) DEVICE — BRUSH ENDO CLN L90.5IN SHTH DIA1.7MM BRIST DIA5-7MM 2-6MM

## (undated) DEVICE — DRAIN, PENROSE, 5/8 X 12IN,  LF

## (undated) DEVICE — LABEL MED MINI W/ MARKER

## (undated) DEVICE — GENERAL MINOR: Brand: MEDLINE INDUSTRIES, INC.

## (undated) DEVICE — ADAPTER FLSH PMP FLD MGMT GI IRRIG OFP 2 DISPOSABLE

## (undated) DEVICE — MANIFOLD, 4 PORT NEPTUNE STANDARD

## (undated) DEVICE — DRAPE KIT, RIO

## (undated) DEVICE — COUNTER NDL 40 COUNT HLD 70 FOAM BLK ADH W/ MAG

## (undated) DEVICE — DRESSING, MEPILEX BORDER, POST-OP AG, 4 X 12 IN

## (undated) DEVICE — GOWN,AURORA,NONREINFORCED,LARGE: Brand: MEDLINE

## (undated) DEVICE — Device

## (undated) DEVICE — SUPPLEMENT DIGESTIVE H2O SOL GI-EASE

## (undated) DEVICE — GOWN,SIRUS,POLYRNF,BRTHSLV,LG,30/CS: Brand: MEDLINE

## (undated) DEVICE — COVER LT HNDL BLU PLAS

## (undated) DEVICE — PROVE COVER: Brand: UNBRANDED